# Patient Record
Sex: MALE | Race: ASIAN | NOT HISPANIC OR LATINO
[De-identification: names, ages, dates, MRNs, and addresses within clinical notes are randomized per-mention and may not be internally consistent; named-entity substitution may affect disease eponyms.]

---

## 2017-01-13 ENCOUNTER — APPOINTMENT (OUTPATIENT)
Dept: CV DIAGNOSITCS | Facility: HOSPITAL | Age: 32
End: 2017-01-13

## 2017-01-13 ENCOUNTER — APPOINTMENT (OUTPATIENT)
Dept: CV DIAGNOSTICS | Facility: HOSPITAL | Age: 32
End: 2017-01-13

## 2017-01-13 ENCOUNTER — OUTPATIENT (OUTPATIENT)
Dept: OUTPATIENT SERVICES | Facility: HOSPITAL | Age: 32
LOS: 1 days | End: 2017-01-13

## 2017-01-13 DIAGNOSIS — I10 ESSENTIAL (PRIMARY) HYPERTENSION: ICD-10-CM

## 2017-01-13 DIAGNOSIS — N18.6 END STAGE RENAL DISEASE: ICD-10-CM

## 2017-03-06 ENCOUNTER — APPOINTMENT (OUTPATIENT)
Dept: TRANSPLANT | Facility: CLINIC | Age: 32
End: 2017-03-06

## 2017-03-06 ENCOUNTER — APPOINTMENT (OUTPATIENT)
Dept: NEPHROLOGY | Facility: CLINIC | Age: 32
End: 2017-03-06

## 2017-06-26 ENCOUNTER — APPOINTMENT (OUTPATIENT)
Dept: TRANSPLANT | Facility: CLINIC | Age: 32
End: 2017-06-26

## 2017-06-26 ENCOUNTER — APPOINTMENT (OUTPATIENT)
Dept: NEPHROLOGY | Facility: CLINIC | Age: 32
End: 2017-06-26

## 2017-08-08 ENCOUNTER — APPOINTMENT (OUTPATIENT)
Dept: NEPHROLOGY | Facility: CLINIC | Age: 32
End: 2017-08-08

## 2017-08-08 ENCOUNTER — APPOINTMENT (OUTPATIENT)
Dept: TRANSPLANT | Facility: CLINIC | Age: 32
End: 2017-08-08

## 2017-12-19 ENCOUNTER — APPOINTMENT (OUTPATIENT)
Dept: TRANSPLANT | Facility: CLINIC | Age: 32
End: 2017-12-19
Payer: COMMERCIAL

## 2017-12-19 ENCOUNTER — APPOINTMENT (OUTPATIENT)
Dept: NEPHROLOGY | Facility: CLINIC | Age: 32
End: 2017-12-19
Payer: COMMERCIAL

## 2017-12-19 VITALS
OXYGEN SATURATION: 99 % | RESPIRATION RATE: 16 BRPM | WEIGHT: 149 LBS | BODY MASS INDEX: 24.24 KG/M2 | HEIGHT: 65.75 IN | SYSTOLIC BLOOD PRESSURE: 146 MMHG | DIASTOLIC BLOOD PRESSURE: 94 MMHG | TEMPERATURE: 98.2 F | HEART RATE: 75 BPM

## 2017-12-19 PROCEDURE — 99215 OFFICE O/P EST HI 40 MIN: CPT

## 2017-12-19 PROCEDURE — 99214 OFFICE O/P EST MOD 30 MIN: CPT

## 2018-01-02 ENCOUNTER — APPOINTMENT (OUTPATIENT)
Dept: TRANSPLANT | Facility: CLINIC | Age: 33
End: 2018-01-02

## 2018-01-02 ENCOUNTER — APPOINTMENT (OUTPATIENT)
Dept: NEPHROLOGY | Facility: CLINIC | Age: 33
End: 2018-01-02

## 2018-01-26 ENCOUNTER — EMERGENCY (EMERGENCY)
Facility: HOSPITAL | Age: 33
LOS: 1 days | Discharge: HOME | End: 2018-01-26
Attending: EMERGENCY MEDICINE | Admitting: EMERGENCY MEDICINE
Payer: MEDICARE

## 2018-01-26 VITALS
OXYGEN SATURATION: 100 % | HEART RATE: 81 BPM | SYSTOLIC BLOOD PRESSURE: 141 MMHG | TEMPERATURE: 98 F | RESPIRATION RATE: 16 BRPM | DIASTOLIC BLOOD PRESSURE: 87 MMHG

## 2018-01-26 PROCEDURE — 99285 EMERGENCY DEPT VISIT HI MDM: CPT | Mod: 25,GC

## 2018-01-26 NOTE — ED ADULT TRIAGE NOTE - CHIEF COMPLAINT QUOTE
PT from dialysis with complaints of chest pain which began with dialysis and get progressively worse after, pt told to come to ED for further evaluation, had similar symptoms int he past had a workup at [facility] with negative findings, pt has cardiologist appointment on the 30th with dr gilmore, vitaliy SOB, dizziness, N/V/D

## 2018-01-27 VITALS
HEART RATE: 75 BPM | OXYGEN SATURATION: 99 % | DIASTOLIC BLOOD PRESSURE: 82 MMHG | SYSTOLIC BLOOD PRESSURE: 123 MMHG | RESPIRATION RATE: 16 BRPM

## 2018-01-27 LAB
ALBUMIN SERPL ELPH-MCNC: 4.7 G/DL — SIGNIFICANT CHANGE UP (ref 3.3–5)
ALP SERPL-CCNC: 91 U/L — SIGNIFICANT CHANGE UP (ref 40–120)
ALT FLD-CCNC: 11 U/L — SIGNIFICANT CHANGE UP (ref 4–41)
APTT BLD: 33.4 SEC — SIGNIFICANT CHANGE UP (ref 27.5–37.4)
AST SERPL-CCNC: 9 U/L — SIGNIFICANT CHANGE UP (ref 4–40)
BASOPHILS # BLD AUTO: 0.03 K/UL — SIGNIFICANT CHANGE UP (ref 0–0.2)
BASOPHILS NFR BLD AUTO: 0.5 % — SIGNIFICANT CHANGE UP (ref 0–2)
BILIRUB SERPL-MCNC: 0.3 MG/DL — SIGNIFICANT CHANGE UP (ref 0.2–1.2)
BUN SERPL-MCNC: 25 MG/DL — HIGH (ref 7–23)
CALCIUM SERPL-MCNC: 10.4 MG/DL — SIGNIFICANT CHANGE UP (ref 8.4–10.5)
CHLORIDE SERPL-SCNC: 94 MMOL/L — LOW (ref 98–107)
CK MB BLD-MCNC: 1 NG/ML — SIGNIFICANT CHANGE UP (ref 1–6.6)
CK SERPL-CCNC: 69 U/L — SIGNIFICANT CHANGE UP (ref 30–200)
CO2 SERPL-SCNC: 31 MMOL/L — SIGNIFICANT CHANGE UP (ref 22–31)
CREAT SERPL-MCNC: 7.89 MG/DL — HIGH (ref 0.5–1.3)
EOSINOPHIL # BLD AUTO: 0.14 K/UL — SIGNIFICANT CHANGE UP (ref 0–0.5)
EOSINOPHIL NFR BLD AUTO: 2.2 % — SIGNIFICANT CHANGE UP (ref 0–6)
GLUCOSE SERPL-MCNC: 112 MG/DL — HIGH (ref 70–99)
HCT VFR BLD CALC: 39 % — SIGNIFICANT CHANGE UP (ref 39–50)
HGB BLD-MCNC: 13 G/DL — SIGNIFICANT CHANGE UP (ref 13–17)
IMM GRANULOCYTES # BLD AUTO: 0.01 # — SIGNIFICANT CHANGE UP
IMM GRANULOCYTES NFR BLD AUTO: 0.2 % — SIGNIFICANT CHANGE UP (ref 0–1.5)
INR BLD: 0.96 — SIGNIFICANT CHANGE UP (ref 0.88–1.17)
LYMPHOCYTES # BLD AUTO: 2.47 K/UL — SIGNIFICANT CHANGE UP (ref 1–3.3)
LYMPHOCYTES # BLD AUTO: 39.3 % — SIGNIFICANT CHANGE UP (ref 13–44)
MCHC RBC-ENTMCNC: 29.6 PG — SIGNIFICANT CHANGE UP (ref 27–34)
MCHC RBC-ENTMCNC: 33.3 % — SIGNIFICANT CHANGE UP (ref 32–36)
MCV RBC AUTO: 88.8 FL — SIGNIFICANT CHANGE UP (ref 80–100)
MONOCYTES # BLD AUTO: 0.44 K/UL — SIGNIFICANT CHANGE UP (ref 0–0.9)
MONOCYTES NFR BLD AUTO: 7 % — SIGNIFICANT CHANGE UP (ref 2–14)
NEUTROPHILS # BLD AUTO: 3.2 K/UL — SIGNIFICANT CHANGE UP (ref 1.8–7.4)
NEUTROPHILS NFR BLD AUTO: 50.8 % — SIGNIFICANT CHANGE UP (ref 43–77)
NRBC # FLD: 0 — SIGNIFICANT CHANGE UP
PLATELET # BLD AUTO: 212 K/UL — SIGNIFICANT CHANGE UP (ref 150–400)
PMV BLD: 9.7 FL — SIGNIFICANT CHANGE UP (ref 7–13)
POTASSIUM SERPL-MCNC: 4 MMOL/L — SIGNIFICANT CHANGE UP (ref 3.5–5.3)
POTASSIUM SERPL-SCNC: 4 MMOL/L — SIGNIFICANT CHANGE UP (ref 3.5–5.3)
PROT SERPL-MCNC: 7.6 G/DL — SIGNIFICANT CHANGE UP (ref 6–8.3)
PROTHROM AB SERPL-ACNC: 10.7 SEC — SIGNIFICANT CHANGE UP (ref 9.8–13.1)
RBC # BLD: 4.39 M/UL — SIGNIFICANT CHANGE UP (ref 4.2–5.8)
RBC # FLD: 13.6 % — SIGNIFICANT CHANGE UP (ref 10.3–14.5)
SODIUM SERPL-SCNC: 140 MMOL/L — SIGNIFICANT CHANGE UP (ref 135–145)
TROPONIN T SERPL-MCNC: < 0.06 NG/ML — SIGNIFICANT CHANGE UP (ref 0–0.06)
TROPONIN T SERPL-MCNC: < 0.06 NG/ML — SIGNIFICANT CHANGE UP (ref 0–0.06)
WBC # BLD: 6.29 K/UL — SIGNIFICANT CHANGE UP (ref 3.8–10.5)
WBC # FLD AUTO: 6.29 K/UL — SIGNIFICANT CHANGE UP (ref 3.8–10.5)

## 2018-01-27 PROCEDURE — 71045 X-RAY EXAM CHEST 1 VIEW: CPT | Mod: 26

## 2018-01-27 RX ADMIN — Medication 30 MILLILITER(S): at 03:46

## 2018-01-27 NOTE — ED ADULT NURSE NOTE - OBJECTIVE STATEMENT
pt received to room 18. [id number] c/o chest pain after dialysis. pt experiencing left sternal chest pain of an 8/10 after receiving dialysis at 11pm 1/26. pt describes the pain as intermittent and sharp. pt has a hx of kidney failure due from htn at the age of 25. pt denies SOB/heart palpitations at this time. pt on cardiac monitor in NSR. vs noted. Left AV fistula noted. Right 20 ac , labs collected and sent. will continue to monitor

## 2018-01-27 NOTE — ED ADULT NURSE NOTE - CHIEF COMPLAINT QUOTE
PT from dialysis with complaints of chest pain which began with dialysis and get progressively worse after, pt told to come to ED for further evaluation, had similar symptoms int he past had a workup at [facility] with negative findings, pt has cardiologist appointment on the 30th with dr gilmore, vitaliy SOB, dizziness, N/V/D

## 2018-01-27 NOTE — ED PROVIDER NOTE - MEDICAL DECISION MAKING DETAILS
32M hx of ESRD on HD, HTN presenting with CP that continues since HD is done today. ACS workup, delta trop, Stress test with Dr. Harrell on 1/30 if trop neg, cxr.

## 2018-01-27 NOTE — ED PROVIDER NOTE - ATTENDING CONTRIBUTION TO CARE
31 y/o M with h/o HTN, ESRD on HD (MWF via LUE AVF) here with chest pain.  Pt reports he was finishing dialysis and started to get a "pulling" sensation to the left lower side of his chest.  Pain is nonradiating, constant, without associated palpitiatons, cough, sob, back pain, n/v, fever.  Pt states he has been burping, which is relieving the pain.  No known personal cardiac disease, he had a routine stress test last year Jan 2017 (pt is being evaluated for renal transplant) and is scheduled for a stress test with his cardiologist (Dr Harrell) next week on Wednesday.  Well appearing, lying comfortably in stretcher, awake and alert, nontoxic.  VSS.  Lungs cta bl.  Cards nl S1/S2, RRR, no MRG.  Abd soft ntnd.  No pedal edema or calf tenderness.  AVF with LUE with pos thrill.  Plan for ekg, cxr ce x 2.  Low risk with atypical pain, plan for 2 sets, if neg pt has cards follow-up for provocative testing next week.

## 2018-01-27 NOTE — ED PROVIDER NOTE - OBJECTIVE STATEMENT
32M presenting with CP during HD. Pt has ESRD on HD MWF, had chest pain with last 2 HD episodes. Today is different in that CP worse after the HD and therefore pt came to the ED. Pt has regular stress test with Dr. Harrell for the transplant purpose. Pt has an appointment with Dr. Harrell for stress test on 1/30. Pt denies n/v/weakness or diaphoresis. Has CP currently but since he passed gas, pt feels better with the pain.

## 2018-01-27 NOTE — ED ADULT NURSE REASSESSMENT NOTE - NS ED NURSE REASSESS COMMENT FT1
pt DC instructions given by MD. pt in NAD and denies chest pain at this time. Pt ambulated with friend upon leaving

## 2018-02-07 ENCOUNTER — FORM ENCOUNTER (OUTPATIENT)
Age: 33
End: 2018-02-07

## 2018-02-08 ENCOUNTER — NON-APPOINTMENT (OUTPATIENT)
Age: 33
End: 2018-02-08

## 2018-02-08 ENCOUNTER — APPOINTMENT (OUTPATIENT)
Dept: ULTRASOUND IMAGING | Facility: IMAGING CENTER | Age: 33
End: 2018-02-08
Payer: COMMERCIAL

## 2018-02-08 ENCOUNTER — APPOINTMENT (OUTPATIENT)
Dept: CARDIOLOGY | Facility: CLINIC | Age: 33
End: 2018-02-08
Payer: COMMERCIAL

## 2018-02-08 ENCOUNTER — APPOINTMENT (OUTPATIENT)
Dept: RADIOLOGY | Facility: IMAGING CENTER | Age: 33
End: 2018-02-08
Payer: COMMERCIAL

## 2018-02-08 ENCOUNTER — OUTPATIENT (OUTPATIENT)
Dept: OUTPATIENT SERVICES | Facility: HOSPITAL | Age: 33
LOS: 1 days | End: 2018-02-08
Payer: COMMERCIAL

## 2018-02-08 VITALS
WEIGHT: 156 LBS | OXYGEN SATURATION: 98 % | HEART RATE: 77 BPM | BODY MASS INDEX: 25.37 KG/M2 | HEIGHT: 65.75 IN | DIASTOLIC BLOOD PRESSURE: 87 MMHG | SYSTOLIC BLOOD PRESSURE: 135 MMHG

## 2018-02-08 DIAGNOSIS — R07.89 OTHER CHEST PAIN: ICD-10-CM

## 2018-02-08 DIAGNOSIS — Z76.82 AWAITING ORGAN TRANSPLANT STATUS: ICD-10-CM

## 2018-02-08 PROCEDURE — 71046 X-RAY EXAM CHEST 2 VIEWS: CPT | Mod: 26

## 2018-02-08 PROCEDURE — 93000 ELECTROCARDIOGRAM COMPLETE: CPT

## 2018-02-08 PROCEDURE — 99214 OFFICE O/P EST MOD 30 MIN: CPT

## 2018-02-08 PROCEDURE — 71046 X-RAY EXAM CHEST 2 VIEWS: CPT

## 2018-02-08 PROCEDURE — 93979 VASCULAR STUDY: CPT

## 2018-02-08 PROCEDURE — 93979 VASCULAR STUDY: CPT | Mod: 26

## 2018-02-08 PROCEDURE — 76700 US EXAM ABDOM COMPLETE: CPT | Mod: 26

## 2018-02-08 PROCEDURE — 76700 US EXAM ABDOM COMPLETE: CPT

## 2018-02-19 PROBLEM — R07.89 ATYPICAL CHEST PAIN: Status: ACTIVE | Noted: 2018-02-19

## 2018-03-09 ENCOUNTER — OUTPATIENT (OUTPATIENT)
Dept: OUTPATIENT SERVICES | Facility: HOSPITAL | Age: 33
LOS: 1 days | End: 2018-03-09

## 2018-03-09 ENCOUNTER — APPOINTMENT (OUTPATIENT)
Dept: CV DIAGNOSTICS | Facility: HOSPITAL | Age: 33
End: 2018-03-09
Payer: COMMERCIAL

## 2018-03-09 DIAGNOSIS — I10 ESSENTIAL (PRIMARY) HYPERTENSION: ICD-10-CM

## 2018-03-09 DIAGNOSIS — N18.6 END STAGE RENAL DISEASE: ICD-10-CM

## 2018-03-09 DIAGNOSIS — Z76.82 AWAITING ORGAN TRANSPLANT STATUS: ICD-10-CM

## 2018-03-09 PROCEDURE — 93016 CV STRESS TEST SUPVJ ONLY: CPT | Mod: GC

## 2018-03-09 PROCEDURE — 93018 CV STRESS TEST I&R ONLY: CPT | Mod: GC

## 2018-03-09 PROCEDURE — 78452 HT MUSCLE IMAGE SPECT MULT: CPT | Mod: 26

## 2018-03-13 ENCOUNTER — TRANSCRIPTION ENCOUNTER (OUTPATIENT)
Age: 33
End: 2018-03-13

## 2018-07-22 ENCOUNTER — TRANSCRIPTION ENCOUNTER (OUTPATIENT)
Age: 33
End: 2018-07-22

## 2018-07-23 ENCOUNTER — INPATIENT (INPATIENT)
Facility: HOSPITAL | Age: 33
LOS: 21 days | Discharge: HOME HEALTH | DRG: 652 | End: 2018-08-14
Attending: TRANSPLANT SURGERY | Admitting: TRANSPLANT SURGERY
Payer: MEDICARE

## 2018-07-23 VITALS
OXYGEN SATURATION: 99 % | RESPIRATION RATE: 18 BRPM | WEIGHT: 159.17 LBS | HEART RATE: 64 BPM | HEIGHT: 66 IN | DIASTOLIC BLOOD PRESSURE: 102 MMHG | SYSTOLIC BLOOD PRESSURE: 155 MMHG | TEMPERATURE: 98 F

## 2018-07-23 DIAGNOSIS — N18.6 END STAGE RENAL DISEASE: ICD-10-CM

## 2018-07-23 DIAGNOSIS — I10 ESSENTIAL (PRIMARY) HYPERTENSION: ICD-10-CM

## 2018-07-23 LAB
ALBUMIN SERPL ELPH-MCNC: 4.1 G/DL — SIGNIFICANT CHANGE UP (ref 3.3–5)
ALBUMIN SERPL ELPH-MCNC: 4.4 G/DL — SIGNIFICANT CHANGE UP (ref 3.3–5)
ALP SERPL-CCNC: 69 U/L — SIGNIFICANT CHANGE UP (ref 40–120)
ALP SERPL-CCNC: 69 U/L — SIGNIFICANT CHANGE UP (ref 40–120)
ALT FLD-CCNC: 10 U/L — SIGNIFICANT CHANGE UP (ref 10–45)
ALT FLD-CCNC: 11 U/L — SIGNIFICANT CHANGE UP (ref 10–45)
ANION GAP SERPL CALC-SCNC: 17 MMOL/L — SIGNIFICANT CHANGE UP (ref 5–17)
ANION GAP SERPL CALC-SCNC: 22 MMOL/L — HIGH (ref 5–17)
APTT BLD: 32.5 SEC — SIGNIFICANT CHANGE UP (ref 27.5–37.4)
AST SERPL-CCNC: 14 U/L — SIGNIFICANT CHANGE UP (ref 10–40)
AST SERPL-CCNC: 7 U/L — LOW (ref 10–40)
BASOPHILS # BLD AUTO: 0.1 K/UL — SIGNIFICANT CHANGE UP (ref 0–0.2)
BASOPHILS NFR BLD AUTO: 0.9 % — SIGNIFICANT CHANGE UP (ref 0–2)
BILIRUB SERPL-MCNC: 0.3 MG/DL — SIGNIFICANT CHANGE UP (ref 0.2–1.2)
BILIRUB SERPL-MCNC: 0.4 MG/DL — SIGNIFICANT CHANGE UP (ref 0.2–1.2)
BLD GP AB SCN SERPL QL: NEGATIVE — SIGNIFICANT CHANGE UP
BUN SERPL-MCNC: 36 MG/DL — HIGH (ref 7–23)
BUN SERPL-MCNC: 87 MG/DL — HIGH (ref 7–23)
CALCIUM SERPL-MCNC: 10 MG/DL — SIGNIFICANT CHANGE UP (ref 8.4–10.5)
CALCIUM SERPL-MCNC: 8.8 MG/DL — SIGNIFICANT CHANGE UP (ref 8.4–10.5)
CHLORIDE SERPL-SCNC: 103 MMOL/L — SIGNIFICANT CHANGE UP (ref 96–108)
CHLORIDE SERPL-SCNC: 99 MMOL/L — SIGNIFICANT CHANGE UP (ref 96–108)
CO2 SERPL-SCNC: 19 MMOL/L — LOW (ref 22–31)
CO2 SERPL-SCNC: 22 MMOL/L — SIGNIFICANT CHANGE UP (ref 22–31)
CREAT SERPL-MCNC: 17.32 MG/DL — HIGH (ref 0.5–1.3)
CREAT SERPL-MCNC: 9.17 MG/DL — HIGH (ref 0.5–1.3)
EOSINOPHIL # BLD AUTO: 0.2 K/UL — SIGNIFICANT CHANGE UP (ref 0–0.5)
EOSINOPHIL NFR BLD AUTO: 2.8 % — SIGNIFICANT CHANGE UP (ref 0–6)
GLUCOSE SERPL-MCNC: 137 MG/DL — HIGH (ref 70–99)
GLUCOSE SERPL-MCNC: 93 MG/DL — SIGNIFICANT CHANGE UP (ref 70–99)
HCT VFR BLD CALC: 34.9 % — LOW (ref 39–50)
HCT VFR BLD CALC: 35.7 % — LOW (ref 39–50)
HGB BLD-MCNC: 11.8 G/DL — LOW (ref 13–17)
HGB BLD-MCNC: 11.9 G/DL — LOW (ref 13–17)
INR BLD: 0.98 RATIO — SIGNIFICANT CHANGE UP (ref 0.88–1.16)
LYMPHOCYTES # BLD AUTO: 2.1 K/UL — SIGNIFICANT CHANGE UP (ref 1–3.3)
LYMPHOCYTES # BLD AUTO: 31.4 % — SIGNIFICANT CHANGE UP (ref 13–44)
MAGNESIUM SERPL-MCNC: 1.7 MG/DL — SIGNIFICANT CHANGE UP (ref 1.6–2.6)
MAGNESIUM SERPL-MCNC: 2.4 MG/DL — SIGNIFICANT CHANGE UP (ref 1.6–2.6)
MCHC RBC-ENTMCNC: 28.6 PG — SIGNIFICANT CHANGE UP (ref 27–34)
MCHC RBC-ENTMCNC: 29.1 PG — SIGNIFICANT CHANGE UP (ref 27–34)
MCHC RBC-ENTMCNC: 33.4 GM/DL — SIGNIFICANT CHANGE UP (ref 32–36)
MCHC RBC-ENTMCNC: 34 GM/DL — SIGNIFICANT CHANGE UP (ref 32–36)
MCV RBC AUTO: 85.6 FL — SIGNIFICANT CHANGE UP (ref 80–100)
MCV RBC AUTO: 85.8 FL — SIGNIFICANT CHANGE UP (ref 80–100)
MONOCYTES # BLD AUTO: 0.4 K/UL — SIGNIFICANT CHANGE UP (ref 0–0.9)
MONOCYTES NFR BLD AUTO: 6 % — SIGNIFICANT CHANGE UP (ref 2–14)
NEUTROPHILS # BLD AUTO: 4 K/UL — SIGNIFICANT CHANGE UP (ref 1.8–7.4)
NEUTROPHILS NFR BLD AUTO: 58.8 % — SIGNIFICANT CHANGE UP (ref 43–77)
PHOSPHATE SERPL-MCNC: 10.1 MG/DL — HIGH (ref 2.5–4.5)
PHOSPHATE SERPL-MCNC: 5.4 MG/DL — HIGH (ref 2.5–4.5)
PLATELET # BLD AUTO: 149 K/UL — LOW (ref 150–400)
PLATELET # BLD AUTO: 162 K/UL — SIGNIFICANT CHANGE UP (ref 150–400)
POTASSIUM SERPL-MCNC: 4 MMOL/L — SIGNIFICANT CHANGE UP (ref 3.5–5.3)
POTASSIUM SERPL-MCNC: 6.4 MMOL/L — CRITICAL HIGH (ref 3.5–5.3)
POTASSIUM SERPL-SCNC: 4 MMOL/L — SIGNIFICANT CHANGE UP (ref 3.5–5.3)
POTASSIUM SERPL-SCNC: 6.4 MMOL/L — CRITICAL HIGH (ref 3.5–5.3)
PROT SERPL-MCNC: 6.6 G/DL — SIGNIFICANT CHANGE UP (ref 6–8.3)
PROT SERPL-MCNC: 7.1 G/DL — SIGNIFICANT CHANGE UP (ref 6–8.3)
PROTHROM AB SERPL-ACNC: 10.6 SEC — SIGNIFICANT CHANGE UP (ref 9.8–12.7)
RBC # BLD: 4.06 M/UL — LOW (ref 4.2–5.8)
RBC # BLD: 4.18 M/UL — LOW (ref 4.2–5.8)
RBC # FLD: 12 % — SIGNIFICANT CHANGE UP (ref 10.3–14.5)
RBC # FLD: 12.4 % — SIGNIFICANT CHANGE UP (ref 10.3–14.5)
RH IG SCN BLD-IMP: POSITIVE — SIGNIFICANT CHANGE UP
SODIUM SERPL-SCNC: 140 MMOL/L — SIGNIFICANT CHANGE UP (ref 135–145)
SODIUM SERPL-SCNC: 142 MMOL/L — SIGNIFICANT CHANGE UP (ref 135–145)
WBC # BLD: 6.8 K/UL — SIGNIFICANT CHANGE UP (ref 3.8–10.5)
WBC # BLD: 7.5 K/UL — SIGNIFICANT CHANGE UP (ref 3.8–10.5)
WBC # FLD AUTO: 6.8 K/UL — SIGNIFICANT CHANGE UP (ref 3.8–10.5)
WBC # FLD AUTO: 7.5 K/UL — SIGNIFICANT CHANGE UP (ref 3.8–10.5)

## 2018-07-23 PROCEDURE — 71045 X-RAY EXAM CHEST 1 VIEW: CPT | Mod: 26

## 2018-07-23 PROCEDURE — 93010 ELECTROCARDIOGRAM REPORT: CPT

## 2018-07-23 PROCEDURE — 50360 RNL ALTRNSPLJ W/O RCP NFRCT: CPT

## 2018-07-23 PROCEDURE — 71045 X-RAY EXAM CHEST 1 VIEW: CPT | Mod: 26,77

## 2018-07-23 PROCEDURE — 50605 INSERT URETERAL SUPPORT: CPT

## 2018-07-23 RX ORDER — METOCLOPRAMIDE HCL 10 MG
10 TABLET ORAL ONCE
Qty: 0 | Refills: 0 | Status: DISCONTINUED | OUTPATIENT
Start: 2018-07-23 | End: 2018-07-24

## 2018-07-23 RX ORDER — HYDROMORPHONE HYDROCHLORIDE 2 MG/ML
0.5 INJECTION INTRAMUSCULAR; INTRAVENOUS; SUBCUTANEOUS
Qty: 0 | Refills: 0 | Status: DISCONTINUED | OUTPATIENT
Start: 2018-07-23 | End: 2018-07-24

## 2018-07-23 RX ORDER — TACROLIMUS 5 MG/1
3 CAPSULE ORAL EVERY 12 HOURS
Qty: 0 | Refills: 0 | Status: DISCONTINUED | OUTPATIENT
Start: 2018-07-24 | End: 2018-07-24

## 2018-07-23 RX ORDER — HYDROMORPHONE HYDROCHLORIDE 2 MG/ML
30 INJECTION INTRAMUSCULAR; INTRAVENOUS; SUBCUTANEOUS
Qty: 0 | Refills: 0 | Status: DISCONTINUED | OUTPATIENT
Start: 2018-07-23 | End: 2018-07-24

## 2018-07-23 RX ORDER — MYCOPHENOLATE MOFETIL 250 MG/1
1000 CAPSULE ORAL EVERY 12 HOURS
Qty: 0 | Refills: 0 | Status: DISCONTINUED | OUTPATIENT
Start: 2018-07-24 | End: 2018-07-29

## 2018-07-23 RX ORDER — DOCUSATE SODIUM 100 MG
100 CAPSULE ORAL DAILY
Qty: 0 | Refills: 0 | Status: DISCONTINUED | OUTPATIENT
Start: 2018-07-23 | End: 2018-07-29

## 2018-07-23 RX ORDER — ONDANSETRON 8 MG/1
4 TABLET, FILM COATED ORAL EVERY 6 HOURS
Qty: 0 | Refills: 0 | Status: DISCONTINUED | OUTPATIENT
Start: 2018-07-23 | End: 2018-07-24

## 2018-07-23 RX ORDER — SENNA PLUS 8.6 MG/1
2 TABLET ORAL AT BEDTIME
Qty: 0 | Refills: 0 | Status: DISCONTINUED | OUTPATIENT
Start: 2018-07-23 | End: 2018-07-29

## 2018-07-23 RX ORDER — ONDANSETRON 8 MG/1
4 TABLET, FILM COATED ORAL ONCE
Qty: 0 | Refills: 0 | Status: DISCONTINUED | OUTPATIENT
Start: 2018-07-23 | End: 2018-07-24

## 2018-07-23 RX ORDER — NYSTATIN 500MM UNIT
500000 POWDER (EA) MISCELLANEOUS
Qty: 0 | Refills: 0 | Status: DISCONTINUED | OUTPATIENT
Start: 2018-07-24 | End: 2018-07-29

## 2018-07-23 RX ORDER — SODIUM CHLORIDE 9 MG/ML
1000 INJECTION INTRAMUSCULAR; INTRAVENOUS; SUBCUTANEOUS
Qty: 0 | Refills: 0 | Status: DISCONTINUED | OUTPATIENT
Start: 2018-07-23 | End: 2018-07-24

## 2018-07-23 RX ORDER — LABETALOL HCL 100 MG
5 TABLET ORAL ONCE
Qty: 0 | Refills: 0 | Status: COMPLETED | OUTPATIENT
Start: 2018-07-23 | End: 2018-07-23

## 2018-07-23 RX ORDER — CEFAZOLIN SODIUM 1 G
2000 VIAL (EA) INJECTION ONCE
Qty: 0 | Refills: 0 | Status: COMPLETED | OUTPATIENT
Start: 2018-07-23 | End: 2018-07-23

## 2018-07-23 RX ORDER — SODIUM CHLORIDE 9 MG/ML
1000 INJECTION, SOLUTION INTRAVENOUS
Qty: 0 | Refills: 0 | Status: DISCONTINUED | OUTPATIENT
Start: 2018-07-23 | End: 2018-07-24

## 2018-07-23 RX ORDER — VALGANCICLOVIR 450 MG/1
900 TABLET, FILM COATED ORAL DAILY
Qty: 0 | Refills: 0 | Status: DISCONTINUED | OUTPATIENT
Start: 2018-07-24 | End: 2018-07-24

## 2018-07-23 RX ORDER — BASILIXIMAB 20 MG/5ML
20 INJECTION, POWDER, FOR SOLUTION INTRAVENOUS
Qty: 0 | Refills: 0 | Status: DISCONTINUED | OUTPATIENT
Start: 2018-07-23 | End: 2018-07-27

## 2018-07-23 RX ORDER — LABETALOL HCL 100 MG
10 TABLET ORAL ONCE
Qty: 0 | Refills: 0 | Status: DISCONTINUED | OUTPATIENT
Start: 2018-07-23 | End: 2018-07-23

## 2018-07-23 RX ORDER — SODIUM CHLORIDE 9 MG/ML
500 INJECTION INTRAMUSCULAR; INTRAVENOUS; SUBCUTANEOUS ONCE
Qty: 0 | Refills: 0 | Status: COMPLETED | OUTPATIENT
Start: 2018-07-23 | End: 2018-07-23

## 2018-07-23 RX ORDER — METOPROLOL TARTRATE 50 MG
100 TABLET ORAL EVERY 12 HOURS
Qty: 0 | Refills: 0 | Status: DISCONTINUED | OUTPATIENT
Start: 2018-07-23 | End: 2018-07-29

## 2018-07-23 RX ORDER — NALOXONE HYDROCHLORIDE 4 MG/.1ML
0.1 SPRAY NASAL
Qty: 0 | Refills: 0 | Status: DISCONTINUED | OUTPATIENT
Start: 2018-07-23 | End: 2018-07-24

## 2018-07-23 RX ORDER — FAMOTIDINE 10 MG/ML
20 INJECTION INTRAVENOUS DAILY
Qty: 0 | Refills: 0 | Status: DISCONTINUED | OUTPATIENT
Start: 2018-07-24 | End: 2018-07-29

## 2018-07-23 RX ADMIN — Medication 5 MILLIGRAM(S): at 21:46

## 2018-07-23 RX ADMIN — HYDROMORPHONE HYDROCHLORIDE 30 MILLILITER(S): 2 INJECTION INTRAMUSCULAR; INTRAVENOUS; SUBCUTANEOUS at 20:52

## 2018-07-23 RX ADMIN — HYDROMORPHONE HYDROCHLORIDE 30 MILLILITER(S): 2 INJECTION INTRAMUSCULAR; INTRAVENOUS; SUBCUTANEOUS at 21:59

## 2018-07-23 RX ADMIN — SODIUM CHLORIDE 100 MILLILITER(S): 9 INJECTION, SOLUTION INTRAVENOUS at 20:19

## 2018-07-23 RX ADMIN — SODIUM CHLORIDE 70 MILLILITER(S): 9 INJECTION INTRAMUSCULAR; INTRAVENOUS; SUBCUTANEOUS at 20:25

## 2018-07-23 RX ADMIN — SODIUM CHLORIDE 70 MILLILITER(S): 9 INJECTION INTRAMUSCULAR; INTRAVENOUS; SUBCUTANEOUS at 20:18

## 2018-07-23 RX ADMIN — SODIUM CHLORIDE 500 MILLILITER(S): 9 INJECTION INTRAMUSCULAR; INTRAVENOUS; SUBCUTANEOUS at 20:25

## 2018-07-23 RX ADMIN — SODIUM CHLORIDE 70 MILLILITER(S): 9 INJECTION INTRAMUSCULAR; INTRAVENOUS; SUBCUTANEOUS at 20:22

## 2018-07-23 RX ADMIN — SODIUM CHLORIDE 100 MILLILITER(S): 9 INJECTION, SOLUTION INTRAVENOUS at 20:21

## 2018-07-23 NOTE — CONSULT NOTE ADULT - PROBLEM SELECTOR RECOMMENDATION 9
Pt with hx of ESRD on HD. Plan for HD today prior to DDRT  Pt last outpatient HD was on Friday.   Monitor BMP

## 2018-07-23 NOTE — CONSULT NOTE ADULT - ASSESSMENT
33 year old male with a PMH of ESRD on HD (MWF) from LUE AVF, HTN presents for DDRT planned for today.

## 2018-07-23 NOTE — BRIEF OPERATIVE NOTE - PROCEDURE
<<-----Click on this checkbox to enter Procedure Preparation, donor kidney, cadaveric  2018    [facility],  don[facility], adult recipient  2018    [facility]  2018    [facility]

## 2018-07-23 NOTE — H&P ADULT - NSHPLABSRESULTS_GEN_ALL_CORE
Vital Signs Last 24 Hrs  T(C): 36.6 (07-23-18 @ 08:27), Max: 36.6 (07-23-18 @ 08:27)  T(F): 97.8 (07-23-18 @ 08:27), Max: 97.8 (07-23-18 @ 08:27)  HR: 67 (07-23-18 @ 08:27) (67 - 67)  BP: 165/95 (07-23-18 @ 08:27) (165/95 - 165/95)  BP(mean): --  RR: 18 (07-23-18 @ 08:27) (18 - 18)  SpO2: 99% (07-23-18 @ 08:27) (99% - 99%)  I&O's Detail

## 2018-07-23 NOTE — H&P ADULT - NSHPREVIEWOFSYSTEMS_GEN_ALL_CORE
The patient denies fever, chills; chest pain, SOB, palpitation; dizziness, weakness; nausea, vomiting; diarrhea, constipation; abdominal pain.

## 2018-07-23 NOTE — H&P ADULT - HISTORY OF PRESENT ILLNESS
33M with PMH of HTN and ESRD since 2010 presents for DDRT.    Donor Details:  KDPI: 29%  ABO: B  CMV +, EBV +  Hep B -, Hep C -

## 2018-07-23 NOTE — CONSULT NOTE ADULT - SUBJECTIVE AND OBJECTIVE BOX
[facility] DIVISION OF KIDNEY DISEASES AND HYPERTENSION -- INITIAL CONSULT NOTE  --------------------------------------------------------------------------------    HPI: 33 year old male with a PMH of ESRD on HD (MWF) from [facility], HTN presents for DDRT planned for today. Pt has been on HD for the last 8 years and he follows with outpatient nephrologist Dr. Cline at [facility]. Pts last HD was on Friday. Pt denies hypotension with HD however admits to some hypoglycemia in the past with HD. Pt states he usually has 2-2.5kg removed with HD with a DW of 70.5kg. Pt still has residual renal function and urinates 2x per day. Pt seen and examined. Pt denies CP, SOB or LE edema.     PAST HISTORY  --------------------------------------------------------------------------------  PAST MEDICAL & SURGICAL HISTORY:  ESRD on dialysis  HTN (hypertension)  No significant past surgical history    FAMILY HISTORY:    PAST SOCIAL HISTORY:    ALLERGIES & MEDICATIONS  --------------------------------------------------------------------------------  Allergies    IV Contrast (Rash)  nyquil (Rhinorrhea)  vancomycin (Pruritus; Hives)    Intolerances      Standing Inpatient Medications    PRN Inpatient Medications      REVIEW OF SYSTEMS  --------------------------------------------------------------------------------  Gen: No weakness  Skin: No rashes  Head/Eyes/Ears/Mouth: No headache  Respiratory: No dyspnea  CV: No chest pain, PND, orthopnea  GI: No abdominal pain, diarrhea  : No increased frequency, dysuria  MSK: No edema  Neuro: No dizziness/lightheadedness    All other systems were reviewed and are negative, except as noted.    VITALS/PHYSICAL EXAM  --------------------------------------------------------------------------------  T(C): 36.9 (07-23-18 @ 09:38), Max: 36.9 (07-23-18 @ 09:38)  HR: 64 (07-23-18 @ 09:38) (64 - 67)  BP: 155/102 (07-23-18 @ 09:38) (155/102 - 165/95)  RR: 18 (07-23-18 @ 09:38) (18 - 18)  SpO2: 99% (07-23-18 @ 09:38) (99% - 99%)  Wt(kg): --  Height (cm): 167.64 (07-23-18 @ 08:27)  Weight (kg): 72.2 (07-23-18 @ 09:38)  BMI (kg/m2): 25.7 (07-23-18 @ 09:38)  BSA (m2): 1.82 (07-23-18 @ 09:38)      Physical Exam:  	Gen: NAD, well-appearing  	Pulm: CTA B/L  	CV: RRR, S1S2; no rub  	Abd: +BS, soft, nontender/nondistended  	: No suprapubic tenderness  	UE: Warm, no asterixis  	LE: Warm, no edema, palpable DP pulses b/l   	Psych: Normal affect and mood  	Skin: Warm, without rashes  	Vascular access: LUE AVF, aneurysmal appearance     LABS/STUDIES  --------------------------------------------------------------------------------    Pending

## 2018-07-23 NOTE — H&P ADULT - ASSESSMENT
33M presents for DDRT  - Admit to transplant surgery  - Will undergo HD this am  - OR booked for 1pm    M MD Hollie PGY 3

## 2018-07-24 DIAGNOSIS — Z94.0 KIDNEY TRANSPLANT STATUS: ICD-10-CM

## 2018-07-24 DIAGNOSIS — Z79.899 OTHER LONG TERM (CURRENT) DRUG THERAPY: ICD-10-CM

## 2018-07-24 LAB
ALBUMIN SERPL ELPH-MCNC: 4 G/DL — SIGNIFICANT CHANGE UP (ref 3.3–5)
ALBUMIN SERPL ELPH-MCNC: 4.1 G/DL — SIGNIFICANT CHANGE UP (ref 3.3–5)
ALP SERPL-CCNC: 68 U/L — SIGNIFICANT CHANGE UP (ref 40–120)
ALP SERPL-CCNC: 71 U/L — SIGNIFICANT CHANGE UP (ref 40–120)
ALT FLD-CCNC: 10 U/L — SIGNIFICANT CHANGE UP (ref 10–45)
ALT FLD-CCNC: 10 U/L — SIGNIFICANT CHANGE UP (ref 10–45)
ANION GAP SERPL CALC-SCNC: 16 MMOL/L — SIGNIFICANT CHANGE UP (ref 5–17)
ANION GAP SERPL CALC-SCNC: 17 MMOL/L — SIGNIFICANT CHANGE UP (ref 5–17)
APTT BLD: 30.2 SEC — SIGNIFICANT CHANGE UP (ref 27.5–37.4)
APTT BLD: 30.7 SEC — SIGNIFICANT CHANGE UP (ref 27.5–37.4)
AST SERPL-CCNC: 12 U/L — SIGNIFICANT CHANGE UP (ref 10–40)
AST SERPL-CCNC: 24 U/L — SIGNIFICANT CHANGE UP (ref 10–40)
BASOPHILS # BLD AUTO: 0 K/UL — SIGNIFICANT CHANGE UP (ref 0–0.2)
BASOPHILS # BLD AUTO: 0 K/UL — SIGNIFICANT CHANGE UP (ref 0–0.2)
BASOPHILS NFR BLD AUTO: 0 % — SIGNIFICANT CHANGE UP (ref 0–2)
BASOPHILS NFR BLD AUTO: 0.1 % — SIGNIFICANT CHANGE UP (ref 0–2)
BILIRUB SERPL-MCNC: 0.3 MG/DL — SIGNIFICANT CHANGE UP (ref 0.2–1.2)
BILIRUB SERPL-MCNC: 0.3 MG/DL — SIGNIFICANT CHANGE UP (ref 0.2–1.2)
BUN SERPL-MCNC: 39 MG/DL — HIGH (ref 7–23)
BUN SERPL-MCNC: 43 MG/DL — HIGH (ref 7–23)
CALCIUM SERPL-MCNC: 9.6 MG/DL — SIGNIFICANT CHANGE UP (ref 8.4–10.5)
CALCIUM SERPL-MCNC: 9.7 MG/DL — SIGNIFICANT CHANGE UP (ref 8.4–10.5)
CHLORIDE SERPL-SCNC: 100 MMOL/L — SIGNIFICANT CHANGE UP (ref 96–108)
CHLORIDE SERPL-SCNC: 102 MMOL/L — SIGNIFICANT CHANGE UP (ref 96–108)
CO2 SERPL-SCNC: 22 MMOL/L — SIGNIFICANT CHANGE UP (ref 22–31)
CO2 SERPL-SCNC: 22 MMOL/L — SIGNIFICANT CHANGE UP (ref 22–31)
CREAT SERPL-MCNC: 8.93 MG/DL — HIGH (ref 0.5–1.3)
CREAT SERPL-MCNC: 9.83 MG/DL — HIGH (ref 0.5–1.3)
EOSINOPHIL # BLD AUTO: 0 K/UL — SIGNIFICANT CHANGE UP (ref 0–0.5)
EOSINOPHIL # BLD AUTO: 0 K/UL — SIGNIFICANT CHANGE UP (ref 0–0.5)
EOSINOPHIL NFR BLD AUTO: 0.1 % — SIGNIFICANT CHANGE UP (ref 0–6)
EOSINOPHIL NFR BLD AUTO: 0.3 % — SIGNIFICANT CHANGE UP (ref 0–6)
GLUCOSE BLDC GLUCOMTR-MCNC: 120 MG/DL — HIGH (ref 70–99)
GLUCOSE BLDC GLUCOMTR-MCNC: 126 MG/DL — HIGH (ref 70–99)
GLUCOSE BLDC GLUCOMTR-MCNC: 126 MG/DL — HIGH (ref 70–99)
GLUCOSE BLDC GLUCOMTR-MCNC: 136 MG/DL — HIGH (ref 70–99)
GLUCOSE SERPL-MCNC: 135 MG/DL — HIGH (ref 70–99)
GLUCOSE SERPL-MCNC: 140 MG/DL — HIGH (ref 70–99)
HCT VFR BLD CALC: 36.2 % — LOW (ref 39–50)
HCT VFR BLD CALC: 36.7 % — LOW (ref 39–50)
HGB BLD-MCNC: 12 G/DL — LOW (ref 13–17)
HGB BLD-MCNC: 12.2 G/DL — LOW (ref 13–17)
INR BLD: 1.05 RATIO — SIGNIFICANT CHANGE UP (ref 0.88–1.16)
INR BLD: 1.07 RATIO — SIGNIFICANT CHANGE UP (ref 0.88–1.16)
LACTATE SERPL-SCNC: 1.3 MMOL/L — SIGNIFICANT CHANGE UP (ref 0.7–2)
LDH SERPL L TO P-CCNC: 153 U/L — SIGNIFICANT CHANGE UP (ref 50–242)
LYMPHOCYTES # BLD AUTO: 0.5 K/UL — LOW (ref 1–3.3)
LYMPHOCYTES # BLD AUTO: 0.6 K/UL — LOW (ref 1–3.3)
LYMPHOCYTES # BLD AUTO: 4.6 % — LOW (ref 13–44)
LYMPHOCYTES # BLD AUTO: 8.1 % — LOW (ref 13–44)
MAGNESIUM SERPL-MCNC: 1.8 MG/DL — SIGNIFICANT CHANGE UP (ref 1.6–2.6)
MAGNESIUM SERPL-MCNC: 1.9 MG/DL — SIGNIFICANT CHANGE UP (ref 1.6–2.6)
MCHC RBC-ENTMCNC: 28.4 PG — SIGNIFICANT CHANGE UP (ref 27–34)
MCHC RBC-ENTMCNC: 28.7 PG — SIGNIFICANT CHANGE UP (ref 27–34)
MCHC RBC-ENTMCNC: 33.1 GM/DL — SIGNIFICANT CHANGE UP (ref 32–36)
MCHC RBC-ENTMCNC: 33.1 GM/DL — SIGNIFICANT CHANGE UP (ref 32–36)
MCV RBC AUTO: 85.6 FL — SIGNIFICANT CHANGE UP (ref 80–100)
MCV RBC AUTO: 86.5 FL — SIGNIFICANT CHANGE UP (ref 80–100)
MONOCYTES # BLD AUTO: 0.1 K/UL — SIGNIFICANT CHANGE UP (ref 0–0.9)
MONOCYTES # BLD AUTO: 0.3 K/UL — SIGNIFICANT CHANGE UP (ref 0–0.9)
MONOCYTES NFR BLD AUTO: 1.6 % — LOW (ref 2–14)
MONOCYTES NFR BLD AUTO: 3.2 % — SIGNIFICANT CHANGE UP (ref 2–14)
NEUTROPHILS # BLD AUTO: 7 K/UL — SIGNIFICANT CHANGE UP (ref 1.8–7.4)
NEUTROPHILS # BLD AUTO: 9.2 K/UL — HIGH (ref 1.8–7.4)
NEUTROPHILS NFR BLD AUTO: 90 % — HIGH (ref 43–77)
NEUTROPHILS NFR BLD AUTO: 92 % — HIGH (ref 43–77)
PHOSPHATE SERPL-MCNC: 6.9 MG/DL — HIGH (ref 2.5–4.5)
PHOSPHATE SERPL-MCNC: 7.3 MG/DL — HIGH (ref 2.5–4.5)
PLATELET # BLD AUTO: 163 K/UL — SIGNIFICANT CHANGE UP (ref 150–400)
PLATELET # BLD AUTO: 168 K/UL — SIGNIFICANT CHANGE UP (ref 150–400)
POTASSIUM SERPL-MCNC: 4.8 MMOL/L — SIGNIFICANT CHANGE UP (ref 3.5–5.3)
POTASSIUM SERPL-MCNC: 5 MMOL/L — SIGNIFICANT CHANGE UP (ref 3.5–5.3)
POTASSIUM SERPL-SCNC: 4.8 MMOL/L — SIGNIFICANT CHANGE UP (ref 3.5–5.3)
POTASSIUM SERPL-SCNC: 5 MMOL/L — SIGNIFICANT CHANGE UP (ref 3.5–5.3)
PROT SERPL-MCNC: 6.7 G/DL — SIGNIFICANT CHANGE UP (ref 6–8.3)
PROT SERPL-MCNC: 6.8 G/DL — SIGNIFICANT CHANGE UP (ref 6–8.3)
PROTHROM AB SERPL-ACNC: 11.3 SEC — SIGNIFICANT CHANGE UP (ref 9.8–12.7)
PROTHROM AB SERPL-ACNC: 11.7 SEC — SIGNIFICANT CHANGE UP (ref 9.8–12.7)
RBC # BLD: 4.19 M/UL — LOW (ref 4.2–5.8)
RBC # BLD: 4.29 M/UL — SIGNIFICANT CHANGE UP (ref 4.2–5.8)
RBC # FLD: 12.2 % — SIGNIFICANT CHANGE UP (ref 10.3–14.5)
RBC # FLD: 12.3 % — SIGNIFICANT CHANGE UP (ref 10.3–14.5)
SODIUM SERPL-SCNC: 138 MMOL/L — SIGNIFICANT CHANGE UP (ref 135–145)
SODIUM SERPL-SCNC: 141 MMOL/L — SIGNIFICANT CHANGE UP (ref 135–145)
TACROLIMUS SERPL-MCNC: <2 — SIGNIFICANT CHANGE UP
WBC # BLD: 10 K/UL — SIGNIFICANT CHANGE UP (ref 3.8–10.5)
WBC # BLD: 7.8 K/UL — SIGNIFICANT CHANGE UP (ref 3.8–10.5)
WBC # FLD AUTO: 10 K/UL — SIGNIFICANT CHANGE UP (ref 3.8–10.5)
WBC # FLD AUTO: 7.8 K/UL — SIGNIFICANT CHANGE UP (ref 3.8–10.5)

## 2018-07-24 PROCEDURE — 76776 US EXAM K TRANSPL W/DOPPLER: CPT | Mod: 26,RT

## 2018-07-24 PROCEDURE — 99233 SBSQ HOSP IP/OBS HIGH 50: CPT | Mod: GC

## 2018-07-24 RX ORDER — OXYCODONE AND ACETAMINOPHEN 5; 325 MG/1; MG/1
1 TABLET ORAL ONCE
Qty: 0 | Refills: 0 | Status: DISCONTINUED | OUTPATIENT
Start: 2018-07-24 | End: 2018-07-24

## 2018-07-24 RX ORDER — GLYCERIN ADULT
1 SUPPOSITORY, RECTAL RECTAL ONCE
Qty: 0 | Refills: 0 | Status: COMPLETED | OUTPATIENT
Start: 2018-07-24 | End: 2018-07-24

## 2018-07-24 RX ORDER — OXYCODONE AND ACETAMINOPHEN 5; 325 MG/1; MG/1
2 TABLET ORAL EVERY 4 HOURS
Qty: 0 | Refills: 0 | Status: DISCONTINUED | OUTPATIENT
Start: 2018-07-24 | End: 2018-07-27

## 2018-07-24 RX ORDER — VALGANCICLOVIR 450 MG/1
450 TABLET, FILM COATED ORAL DAILY
Qty: 0 | Refills: 0 | Status: DISCONTINUED | OUTPATIENT
Start: 2018-07-24 | End: 2018-07-29

## 2018-07-24 RX ORDER — TACROLIMUS 5 MG/1
5 CAPSULE ORAL
Qty: 0 | Refills: 0 | Status: DISCONTINUED | OUTPATIENT
Start: 2018-07-24 | End: 2018-07-26

## 2018-07-24 RX ORDER — FUROSEMIDE 40 MG
60 TABLET ORAL ONCE
Qty: 0 | Refills: 0 | Status: COMPLETED | OUTPATIENT
Start: 2018-07-24 | End: 2018-07-24

## 2018-07-24 RX ORDER — OXYCODONE AND ACETAMINOPHEN 5; 325 MG/1; MG/1
1 TABLET ORAL EVERY 4 HOURS
Qty: 0 | Refills: 0 | Status: DISCONTINUED | OUTPATIENT
Start: 2018-07-24 | End: 2018-07-27

## 2018-07-24 RX ORDER — SODIUM CHLORIDE 9 MG/ML
1000 INJECTION INTRAMUSCULAR; INTRAVENOUS; SUBCUTANEOUS
Qty: 0 | Refills: 0 | Status: DISCONTINUED | OUTPATIENT
Start: 2018-07-24 | End: 2018-07-25

## 2018-07-24 RX ORDER — ACETAMINOPHEN 500 MG
650 TABLET ORAL EVERY 6 HOURS
Qty: 0 | Refills: 0 | Status: DISCONTINUED | OUTPATIENT
Start: 2018-07-24 | End: 2018-07-29

## 2018-07-24 RX ORDER — SIMETHICONE 80 MG/1
80 TABLET, CHEWABLE ORAL EVERY 6 HOURS
Qty: 0 | Refills: 0 | Status: DISCONTINUED | OUTPATIENT
Start: 2018-07-24 | End: 2018-07-29

## 2018-07-24 RX ADMIN — OXYCODONE AND ACETAMINOPHEN 1 TABLET(S): 5; 325 TABLET ORAL at 19:27

## 2018-07-24 RX ADMIN — SODIUM CHLORIDE 120 MILLILITER(S): 9 INJECTION INTRAMUSCULAR; INTRAVENOUS; SUBCUTANEOUS at 22:00

## 2018-07-24 RX ADMIN — SENNA PLUS 2 TABLET(S): 8.6 TABLET ORAL at 21:48

## 2018-07-24 RX ADMIN — Medication 500000 UNIT(S): at 06:47

## 2018-07-24 RX ADMIN — Medication 500000 UNIT(S): at 00:33

## 2018-07-24 RX ADMIN — Medication 52 MILLIGRAM(S): at 18:30

## 2018-07-24 RX ADMIN — OXYCODONE AND ACETAMINOPHEN 1 TABLET(S): 5; 325 TABLET ORAL at 22:40

## 2018-07-24 RX ADMIN — MYCOPHENOLATE MOFETIL 1000 MILLIGRAM(S): 250 CAPSULE ORAL at 06:48

## 2018-07-24 RX ADMIN — Medication 60 MILLIGRAM(S): at 18:30

## 2018-07-24 RX ADMIN — Medication 500000 UNIT(S): at 18:31

## 2018-07-24 RX ADMIN — Medication 100 MILLIGRAM(S): at 06:47

## 2018-07-24 RX ADMIN — Medication 500000 UNIT(S): at 13:23

## 2018-07-24 RX ADMIN — OXYCODONE AND ACETAMINOPHEN 1 TABLET(S): 5; 325 TABLET ORAL at 21:48

## 2018-07-24 RX ADMIN — TACROLIMUS 5 MILLIGRAM(S): 5 CAPSULE ORAL at 18:31

## 2018-07-24 RX ADMIN — VALGANCICLOVIR 450 MILLIGRAM(S): 450 TABLET, FILM COATED ORAL at 13:23

## 2018-07-24 RX ADMIN — Medication 52 MILLIGRAM(S): at 06:48

## 2018-07-24 RX ADMIN — OXYCODONE AND ACETAMINOPHEN 1 TABLET(S): 5; 325 TABLET ORAL at 20:30

## 2018-07-24 RX ADMIN — SODIUM CHLORIDE 70 MILLILITER(S): 9 INJECTION INTRAMUSCULAR; INTRAVENOUS; SUBCUTANEOUS at 08:22

## 2018-07-24 RX ADMIN — TACROLIMUS 3 MILLIGRAM(S): 5 CAPSULE ORAL at 06:47

## 2018-07-24 RX ADMIN — SODIUM CHLORIDE 120 MILLILITER(S): 9 INJECTION INTRAMUSCULAR; INTRAVENOUS; SUBCUTANEOUS at 18:30

## 2018-07-24 RX ADMIN — Medication 500000 UNIT(S): at 23:12

## 2018-07-24 RX ADMIN — Medication 100 MILLIGRAM(S): at 13:23

## 2018-07-24 RX ADMIN — MYCOPHENOLATE MOFETIL 1000 MILLIGRAM(S): 250 CAPSULE ORAL at 18:31

## 2018-07-24 RX ADMIN — Medication 1 SUPPOSITORY(S): at 18:29

## 2018-07-24 RX ADMIN — HYDROMORPHONE HYDROCHLORIDE 30 MILLILITER(S): 2 INJECTION INTRAMUSCULAR; INTRAVENOUS; SUBCUTANEOUS at 07:22

## 2018-07-24 RX ADMIN — Medication 1 TABLET(S): at 19:27

## 2018-07-24 RX ADMIN — FAMOTIDINE 20 MILLIGRAM(S): 10 INJECTION INTRAVENOUS at 13:23

## 2018-07-24 RX ADMIN — SIMETHICONE 80 MILLIGRAM(S): 80 TABLET, CHEWABLE ORAL at 18:31

## 2018-07-24 RX ADMIN — Medication 100 MILLIGRAM(S): at 18:31

## 2018-07-24 NOTE — PROGRESS NOTE ADULT - SUBJECTIVE AND OBJECTIVE BOX
POSTOPERATIVE CHECK    Eric Brady Team Daily Progress Note    SUBJECTIVE: Patient seen and examined at bedside in the PACU. No complaints at this time, pain is well controlled, denies n/v, has urine output in marquez bag.      OBJECTIVE:   Vital Signs Last 24 Hrs  T(C): 36.4 (23 Jul 2018 23:00), Max: 37 (23 Jul 2018 13:35)  T(F): 97.5 (23 Jul 2018 23:00), Max: 98.6 (23 Jul 2018 13:35)  HR: 86 (24 Jul 2018 01:00) (64 - 98)  BP: 152/94 (24 Jul 2018 01:00) (146/105 - 172/91)  BP(mean): 117 (24 Jul 2018 01:00) (111 - 122)  RR: 15 (24 Jul 2018 01:00) (14 - 18)  SpO2: 100% (24 Jul 2018 01:00) (96% - 100%)    PHYSICAL EXAM:  Constitutional: resting in bed with no acute distress  Respiratory:  unlabored breathing  Gastrointestinal: Abdomen soft, non distended, tenderness associated with incision sites, incision sites are clean, dry and intact.   Genitourinary:  Marquez present with clear urine   Extremities:  No edema, no calf tenderness    RADIOLOGY & ADDITIONAL STUDIES: no new studies performed

## 2018-07-24 NOTE — DIETITIAN INITIAL EVALUATION ADULT. - PERTINENT MEDS FT
NaCl 0.9% @ 120cc/hr, prednisone, solumedrol, pepcid, cellcept, percocet, tacrolimus, simulect, colace, senna

## 2018-07-24 NOTE — CONSULT NOTE ADULT - PROBLEM SELECTOR RECOMMENDATION 9
f/u transplant team  monitor bmp s/p DDRT  f/u transplant team for immunosuppressive meds  monitor bmp  monitor I/O

## 2018-07-24 NOTE — PROGRESS NOTE ADULT - SUBJECTIVE AND OBJECTIVE BOX
INTERVAL HPI/OVERNIGHT EVENTS:  Patient seen with multidisciplinary team (Nephrologist, pharmacist, nurse, nurse manager, NP, MD surgeons, transplant coordinator, nutritionist, nephrology fellow, residents 3rd year, medical, PA, and pharmacy students, and  )  in am rounds and examined with Dr. Hdz. 34 y/o/m POD#1, s/p R sided DDRT anastomosed to R external iliac artery and vein. Ureter  Ureter anastomosed to bladder over double J stent Cold ischemia time14 hours CMV +, EBV + , no acute event overnight making urine in marquez, pain managed with PCA pump, LACEY intact.     MEDICATIONS  (STANDING):  basiliximab  IVPB 20 milliGRAM(s) IV Intermittent <User Schedule>  docusate sodium 100 milliGRAM(s) Oral daily  famotidine    Tablet 20 milliGRAM(s) Oral daily  methylPREDNISolone sodium succinate IVPB 125 milliGRAM(s) IV Intermittent every 12 hours  metoprolol tartrate 100 milliGRAM(s) Oral every 12 hours  mycophenolate mofetil 1000 milliGRAM(s) Oral every 12 hours  nystatin    Suspension 316147 Unit(s) Swish and Swallow four times a day  senna 2 Tablet(s) Oral at bedtime  sodium chloride 0.9%. 1000 milliLiter(s) (120 mL/Hr) IV Continuous <Continuous>  tacrolimus 3 milliGRAM(s) Oral every 12 hours  trimethoprim   80 mG/sulfamethoxazole 400 mG 1 Tablet(s) Oral daily  valGANciclovir 450 milliGRAM(s) Oral daily    MEDICATIONS  (PRN):  acetaminophen   Tablet. 650 milliGRAM(s) Oral every 6 hours PRN Mild Pain (1 - 3)  oxyCODONE    5 mG/acetaminophen 325 mG 1 Tablet(s) Oral every 4 hours PRN Moderate Pain (4 - 6)  oxyCODONE    5 mG/acetaminophen 325 mG 2 Tablet(s) Oral every 4 hours PRN Severe Pain (7 - 10)      Allergies    IV Contrast (Rash)  nyquil (Rhinorrhea)  vancomycin (Pruritus; Hives)    Intolerances        Vital Signs Last 24 Hrs  T(C): 36.9 (24 Jul 2018 09:00), Max: 37 (23 Jul 2018 13:35)  T(F): 98.5 (24 Jul 2018 09:00), Max: 98.6 (23 Jul 2018 13:35)  HR: 72 (24 Jul 2018 09:00) (70 - 98)  BP: 155/100 (24 Jul 2018 09:00) (136/90 - 172/91)  BP(mean): 123 (24 Jul 2018 07:00) (108 - 126)  RR: 16 (24 Jul 2018 09:00) (14 - 18)  SpO2: 94% (24 Jul 2018 09:00) (94% - 100%)    LABS:                        12.2   7.8   )-----------( 163      ( 24 Jul 2018 02:38 )             36.7     07-24    138  |  100  |  39<H>  ----------------------------<  140<H>  4.8   |  22  |  9.83<H>    Ca    9.6      24 Jul 2018 02:38  Phos  6.9     07-24  Mg     1.8     07-24    TPro  6.8  /  Alb  4.0  /  TBili  0.3  /  DBili  x   /  AST  24  /  ALT  10  /  AlkPhos  71  07-24    PT/INR - ( 24 Jul 2018 02:38 )   PT: 11.3 sec;   INR: 1.05 ratio         PTT - ( 24 Jul 2018 02:38 )  PTT:30.2 sec      RADIOLOGY & ADDITIONAL TESTS:    Review of systems  Gen: No weight changes, fatigue, fevers/chills, weakness  Skin: No rashes  Head/Eyes/Ears/Mouth: No headache; Normal hearing; Normal vision w/o blurriness; No sinus pain/discomfort, sore throat  Respiratory: No dyspnea, cough, wheezing, hemoptysis  CV: No chest pain, PND, orthopnea  GI: No abdominal pain, diarrhea, constipation, nausea, vomiting, melena, hematochezia  : No increased frequency, dysuria, hematuria, nocturia  MSK: No joint pain/swelling; no back pain; no edema  Neuro: No dizziness/lightheadedness, weakness, seizures, numbness, tingling  Heme: No easy bruising or bleeding  Endo: No heat/cold intolerance  Psych: No significant nervousness, anxiety, stress, depression  All other systems were reviewed and are negative, except as noted.    PHYSICAL EXAM:  Constitutional: Well developed / well nourished  Eyes: Anicteric, PERRLA  ENMT: nc/at  Neck: central line  left IJ  Respiratory: CTA B/L  Cardiovascular: RRR  Gastrointestinal: Soft abdomen, mild tender to touch at surgical site, ND, LACEY x1 intact  Genitourinary: Urinary catheter in place   Extremities: SCD's in place and working bilaterally  Vascular: Palpable dp pulses bilaterally  Neurological: A&O x3  Skin: Mild serosanguinous anna on wound dressing no erythema and evidence of infection noted  Musculoskeletal: Moving all extremities  Psychiatric: Responsive

## 2018-07-24 NOTE — DIETITIAN INITIAL EVALUATION ADULT. - ORAL INTAKE PTA
Pt endorsed a good appetite & PO intakes PTA. Usual Diet Recall: Breakfast- usually skips, never eaten even as a child Lunch- burger, pasta, rice, andrew chicken/duck, etc. Dinner- similar to lunch/good

## 2018-07-24 NOTE — PROGRESS NOTE ADULT - SUBJECTIVE AND OBJECTIVE BOX
Day 1 of Anesthesia Pain Management Service    SUBJECTIVE: Patient is doing well with IV PCA    Pain Scale Score:	[X] Refer to charted pain scores    THERAPY:    [ ] IV PCA Morphine		[ ] 5 mg/mL	[ ] 1 mg/mL  [X] IV PCA Hydromorphone	[ ] 5 mg/mL	[X] 1 mg/mL  [ ] IV PCA Fentanyl		[ ] 50 micrograms/mL    Demand dose: 0.2 mg     Lockout: 6 minutes   Continuous Rate: 0 mg/hr  4 Hour Limit: 4 mg    MEDICATIONS  (STANDING):  basiliximab  IVPB 20 milliGRAM(s) IV Intermittent <User Schedule>  docusate sodium 100 milliGRAM(s) Oral daily  famotidine    Tablet 20 milliGRAM(s) Oral daily  methylPREDNISolone sodium succinate IVPB 125 milliGRAM(s) IV Intermittent every 12 hours  metoprolol tartrate 100 milliGRAM(s) Oral every 12 hours  mycophenolate mofetil 1000 milliGRAM(s) Oral every 12 hours  nystatin    Suspension 426751 Unit(s) Swish and Swallow four times a day  senna 2 Tablet(s) Oral at bedtime  sodium chloride 0.9%. 1000 milliLiter(s) (120 mL/Hr) IV Continuous <Continuous>  tacrolimus 3 milliGRAM(s) Oral every 12 hours  trimethoprim   80 mG/sulfamethoxazole 400 mG 1 Tablet(s) Oral daily  valGANciclovir 450 milliGRAM(s) Oral daily    MEDICATIONS  (PRN):  acetaminophen   Tablet. 650 milliGRAM(s) Oral every 6 hours PRN Mild Pain (1 - 3)  oxyCODONE    5 mG/acetaminophen 325 mG 1 Tablet(s) Oral every 4 hours PRN Moderate Pain (4 - 6)  oxyCODONE    5 mG/acetaminophen 325 mG 2 Tablet(s) Oral every 4 hours PRN Severe Pain (7 - 10)      OBJECTIVE:    Sedation Score:	[ X] Alert	[ ] Drowsy 	[ ] Arousable	[ ] Asleep	[ ] Unresponsive    Side Effects:	[X ] None	[ ] Nausea	[ ] Vomiting	[ ] Pruritus  		[ ] Other:    Vital Signs Last 24 Hrs  T(C): 36.9 (24 Jul 2018 09:00), Max: 37 (23 Jul 2018 13:35)  T(F): 98.5 (24 Jul 2018 09:00), Max: 98.6 (23 Jul 2018 13:35)  HR: 72 (24 Jul 2018 09:00) (70 - 98)  BP: 155/100 (24 Jul 2018 09:00) (136/90 - 172/91)  BP(mean): 123 (24 Jul 2018 07:00) (108 - 126)  RR: 16 (24 Jul 2018 09:00) (14 - 18)  SpO2: 94% (24 Jul 2018 09:00) (94% - 100%)    ASSESSMENT/ PLAN    Therapy to  be:               [  ] Continued   [X ] Discontinued   [X ] Changed to PRN Analgesics    Documentation and Verification of current medications:   [X] Done	[ ] Not done, not eligible    Comments: PCA D\C'd by primary service

## 2018-07-24 NOTE — PROGRESS NOTE ADULT - ATTENDING COMMENTS
renal transplant recipient. post op day 1, non oliguric.  Has ureteral stent  Plan:  Immunosuppression reviewed  Prophylactic medications reviewed  F/u Tac level  Will follow

## 2018-07-24 NOTE — DIETITIAN INITIAL EVALUATION ADULT. - ENERGY NEEDS
Ht 66 inches Wt 159.1 pounds BMI 25.7 Kg/m^2   pounds +/- 10%; 112% IBW  Edema: none noted Skin: no pressure injuries  Other pertinent information: 34 yo male with PMH of HTN, ESRD on HD (MWF) S/P right sided DDRT to right external iliac arty and vein on (7/23) with cold time of 14hrs

## 2018-07-24 NOTE — PROGRESS NOTE ADULT - SUBJECTIVE AND OBJECTIVE BOX
33y M admitted for a  donor kidney transplant on 18    Outpatient medication reconciliation reviewed and will be re-started appropriately.  Plan discussed with multidisciplinary team.     Continue current immunosuppressive regimen.  Induction:  Simulect 20 mg on post-operative day 0 (intra-operative) and POD4  Methylprednisolone taper (500 mg, 125 mg twice daily, 60 mg twice daily, 30 mg twice daily) then to PO prednisone taper    Maintenance:  Tacrolimus adjusted to trough (8-10 ng/mL)  Mycophenolate mofetil 1000 mg q12h  Prednisone taper starting POD4 (20 mg twice daily, 10 mg twice daily, 5 mg twice daily, 5 mg daily)    Anti-infective prophylaxis:  Bactrim SS daily  Valcyte (adjusted to renal function)  Nystatin swish and swallow four times daily     GI/DVT ppx:  Famotidine 20 mg daily

## 2018-07-24 NOTE — PROGRESS NOTE ADULT - PROBLEM SELECTOR PLAN 1
Kidney replaced by transplant.  Plan: POD (1 ) doing well.    US/doppler of renal today  Advance PO diet    Pt encouraged to ambulate  Dressing intact. LACEY-SS   D/C PCA and start PRN Tylenol and Percocet for pain, Monitor and manage pain  Adequate urine output, keep Rodriguez in place  D/C replacement fluids and increase IVF to 125ml/hr  On bowel regimen

## 2018-07-24 NOTE — PROGRESS NOTE ADULT - PROBLEM SELECTOR PLAN 2
On Tacrolimus, cellcept, steroid taper, Simulect induction next dose on day 4, Nystatin S&S, bactrim, and Valcyte   F/U Tacrolimus level daily, tacro goal 8-10  Monitor closely.

## 2018-07-24 NOTE — PROGRESS NOTE ADULT - SUBJECTIVE AND OBJECTIVE BOX
[facility] DIVISION OF KIDNEY DISEASES AND HYPERTENSION -- FOLLOW UP NOTE  --------------------------------------------------------------------------------    HPI: 33 year old male with a PMH of ESRD on HD (MWF) from [facility], HTN s/p DDRT 7/23/18. Pt has been on HD for the last 8 years and he follows with outpatient nephrologist Dr. Cline at [facility]. Pts last HD was 7/23/18. Pt non-oliguric with good urine output. Pt seen and examined. Pt denies CP, SOB or LE edema.     PAST HISTORY  --------------------------------------------------------------------------------  No significant changes to PMH, PSH, FHx, SHx, unless otherwise noted    ALLERGIES & MEDICATIONS  --------------------------------------------------------------------------------  Allergies    IV Contrast (Rash)  nyquil (Rhinorrhea)  vancomycin (Pruritus; Hives)    Intolerances      Standing Inpatient Medications  basiliximab  IVPB 20 milliGRAM(s) IV Intermittent <User Schedule>  docusate sodium 100 milliGRAM(s) Oral daily  famotidine    Tablet 20 milliGRAM(s) Oral daily  methylPREDNISolone sodium succinate IVPB 125 milliGRAM(s) IV Intermittent every 12 hours  metoprolol tartrate 100 milliGRAM(s) Oral every 12 hours  mycophenolate mofetil 1000 milliGRAM(s) Oral every 12 hours  nystatin    Suspension [id number] Unit(s) Swish and Swallow four times a day  senna 2 Tablet(s) Oral at bedtime  sodium chloride 0.9%. 1000 milliLiter(s) IV Continuous <Continuous>  tacrolimus 3 milliGRAM(s) Oral every 12 hours  trimethoprim   80 mG/sulfamethoxazole 400 mG 1 Tablet(s) Oral daily  valGANciclovir 450 milliGRAM(s) Oral daily    PRN Inpatient Medications  acetaminophen   Tablet. 650 milliGRAM(s) Oral every 6 hours PRN  oxyCODONE    5 mG/acetaminophen 325 mG 1 Tablet(s) Oral every 4 hours PRN  oxyCODONE    5 mG/acetaminophen 325 mG 2 Tablet(s) Oral every 4 hours PRN      REVIEW OF SYSTEMS  --------------------------------------------------------------------------------  Gen: No weakness  Skin: No rashes  Head/Eyes/Ears/Mouth: No headache  Respiratory: No dyspnea  CV: No chest pain, PND, orthopnea  GI: No abdominal pain, diarrhea  : No increased frequency, dysuria  MSK: No edema  Neuro: No dizziness/lightheadedness    All other systems were reviewed and are negative, except as noted.    VITALS/PHYSICAL EXAM  --------------------------------------------------------------------------------  T(C): 36.9 (07-24-18 @ 09:00), Max: 37 (07-23-18 @ 13:35)  HR: 72 (07-24-18 @ 09:00) (70 - 98)  BP: 155/100 (07-24-18 @ 09:00) (136/90 - 172/91)  RR: 16 (07-24-18 @ 09:00) (14 - 18)  SpO2: 94% (07-24-18 @ 09:00) (94% - 100%)  Wt(kg): --  Height (cm): 167.64 (07-23-18 @ 14:29)  Weight (kg): 72.2 (07-23-18 @ 14:29)  BMI (kg/m2): 25.7 (07-23-18 @ 14:29)  BSA (m2): 1.82 (07-23-18 @ 14:29)      07-23-18 @ 07:01  -  07-24-18 @ 07:00  --------------------------------------------------------  IN: 1970 mL / OUT: 2385 mL / NET: -415 mL    07-24-18 @ 07:01  -  07-24-18 @ 11:36  --------------------------------------------------------  IN: 260 mL / OUT: 150 mL / NET: 110 mL    Physical Exam:  	Gen: NAD, well-appearing  	Pulm: CTA B/L  	CV: RRR, S1S2; no rub  	Abd: +BS, soft, nontender/nondistended  	: No suprapubic tenderness  	UE: Warm, no asterixis  	LE: Warm, no edema, palpable DP pulses b/l   	Psych: Normal affect and mood  	Skin: Warm, without rashes  	Vascular access: NIN[facility] AVF, aneurysmal appearance     LABS/STUDIES  --------------------------------------------------------------------------------              12.0   10.0  >-----------<  168      [07-24-18 @ 10:07]              36.2     141  |  102  |  43  ----------------------------<  135      [07-24-18 @ 10:07]  5.0   |  22  |  8.93        Ca     9.7     [07-24-18 @ 10:07]      Mg     1.9     [07-24-18 @ 10:07]      Phos  7.3     [07-24-18 @ 10:07]    TPro  6.7  /  Alb  4.1  /  TBili  0.3  /  DBili  x   /  AST  12  /  ALT  10  /  AlkPhos  68  [07-24-18 @ 10:07]    PT/INR: PT 11.7 , INR 1.07       [07-24-18 @ 10:07]  PTT: 30.7       [07-24-18 @ 10:07]          [07-24-18 @ 02:38]    Creatinine Trend:  SCr 8.93 [07-24 @ 10:07]  SCr 9.83 [07-24 @ 02:38]  SCr 9.17 [07-23 @ 20:52]  SCr 17.32 [07-23 @ 11:03]        HbA1c 5.1      [08-18-15 @ 18:19]

## 2018-07-24 NOTE — DIETITIAN INITIAL EVALUATION ADULT. - NS AS NUTRI INTERV ED CONTENT
Reviewed food safety guidelines for transplant recipients. Reviewed recommendations to avoid grapefruit, pomegranate and star fruit while taking immunosuppressant medication. Reviewed recommendations for moderate intake of sodium and carbohydrates with transplant medications. Discussed importance of adequate protein-energy intakes post-operatively to promote healing. Pt was receptive and expressed understanding. Provided nutrition handout: USDA Food Safety for Transplant Recipients booklet. Also provided Low Sodium & General Healthful diet education as per patient's request with written materials from [employer]

## 2018-07-24 NOTE — DIETITIAN INITIAL EVALUATION ADULT. - NS AS NUTRI INTERV MEDICAL AND FOOD SUPPLEMENTS
RD will add PowerAde electrolyte drink to meal trays to help meet increased post-Txp fluid needs of ~3L.

## 2018-07-24 NOTE — CONSULT NOTE ADULT - SUBJECTIVE AND OBJECTIVE BOX
Dr. Wagner (Nephrology)  Office [phone]  Cell [phone]  [facility]op[facility] KNIGHT  Cell [phone]    HPI:  33M with PMH of HTN and ESRD since 2010 s/p DDRT 7/23, + tenderness over incision site, feels b/l hand fullness. no other complaints           Allergies:  IV Contrast (Rash)  nyquil (Rhinorrhea)  vancomycin (Pruritus; Hives)      PAST MEDICAL & SURGICAL HISTORY:  ESRD on dialysis  HTN (hypertension)  No significant past surgical history      Home Medications Reviewed    Hospital Medications:   MEDICATIONS  (STANDING):  basiliximab  IVPB 20 milliGRAM(s) IV Intermittent <User Schedule>  docusate sodium 100 milliGRAM(s) Oral daily  famotidine    Tablet 20 milliGRAM(s) Oral daily  methylPREDNISolone sodium succinate IVPB 125 milliGRAM(s) IV Intermittent every 12 hours  metoprolol tartrate 100 milliGRAM(s) Oral every 12 hours  mycophenolate mofetil 1000 milliGRAM(s) Oral every 12 hours  nystatin    Suspension 111353 Unit(s) Swish and Swallow four times a day  senna 2 Tablet(s) Oral at bedtime  sodium chloride 0.9%. 1000 milliLiter(s) (120 mL/Hr) IV Continuous <Continuous>  tacrolimus 5 milliGRAM(s) Oral two times a day  trimethoprim   80 mG/sulfamethoxazole 400 mG 1 Tablet(s) Oral daily  valGANciclovir 450 milliGRAM(s) Oral daily      SOCIAL HISTORY:  Denies ETOh, Smoking,     FAMILY HISTORY:      REVIEW OF SYSTEMS:  CONSTITUTIONAL: No weakness, fevers or chills  EYES/ENT: No visual changes;  No vertigo or throat pain   NECK: No pain or stiffness  RESPIRATORY: No cough, wheezing, hemoptysis; No shortness of breath  CARDIOVASCULAR: No chest pain or palpitations.  GASTROINTESTINAL: see HPI  GENITOURINARY: No dysuria, frequency, foamy urine, urinary urgency, incontinence or hematuria  NEUROLOGICAL: No numbness or weakness  SKIN: No itching, burning, rashes, or lesions   VASCULAR: No bilateral lower extremity edema.   All other review of systems is negative unless indicated above.    VITALS:  T(F): 98.3 (07-24-18 @ 12:00), Max: 98.5 (07-24-18 @ 08:00)  HR: 70 (07-24-18 @ 12:00)  BP: 176/98 (07-24-18 @ 12:00)  RR: 16 (07-24-18 @ 12:00)  SpO2: 98% (07-24-18 @ 12:00)  Wt(kg): --    07-23 @ 07:01  -  07-24 @ 07:00  --------------------------------------------------------  IN: 1970 mL / OUT: 2385 mL / NET: -415 mL    07-24 @ 07:01  -  07-24 @ 16:34  --------------------------------------------------------  IN: 1340 mL / OUT: 460 mL / NET: 880 mL          PHYSICAL EXAM:  Constitutional: NAD  HEENT: anicteric sclera, oropharynx clear, MMM  Neck: No JVD  Respiratory: CTAB, no wheezes, rales or rhonchi  Cardiovascular: S1, S2, RRR  Gastrointestinal: BS+, + tendernss   Extremities: No cyanosis or clubbing. No peripheral edema  Neurological: A/O x 3, no focal deficits  Psychiatric: Normal mood, normal affect  : +marquez.   Skin: No rashes  Vascular Access:    LABS:  07-24    141  |  102  |  43<H>  ----------------------------<  135<H>  5.0   |  22  |  8.93<H>    Ca    9.7      24 Jul 2018 10:07  Phos  7.3     07-24  Mg     1.9     07-24    TPro  6.7  /  Alb  4.1  /  TBili  0.3  /  DBili      /  AST  12  /  ALT  10  /  AlkPhos  68  07-24    Creatinine Trend: 8.93 <--, 9.83 <--, 9.17 <--, 17.32 <--                        12.0   10.0  )-----------( 168      ( 24 Jul 2018 10:07 )             36.2     Urine Studies:        RADIOLOGY & ADDITIONAL STUDIES:

## 2018-07-24 NOTE — DIETITIAN INITIAL EVALUATION ADULT. - NS AS NUTRI INTERV MEALS SNACK
1) Continue regular diet to optimize PO intakes. If phosphorous remains elevated, consider addition of no concentrated phosphorous restriction 2) Provide food preferences within dietary restrictions when feasible 3) Encourage good PO intakes

## 2018-07-24 NOTE — PROGRESS NOTE ADULT - ASSESSMENT
A/P  33 y.o. w/ pmhx of ESRD s/p R renal transplant from  donor  - Pain management  - Antimicrobial  - CLD  - Monitor VS  - Monitor Urine Output  - f/u AM labs  - ISS

## 2018-07-24 NOTE — PROGRESS NOTE ADULT - ATTENDING COMMENTS
post op day 1 post  donor kidney transplant with immediate graft function. doing well. UOP about 50c /hr. immunosuppression FK, MMF and steroids. advance diet and ambulate and stop fluid replacements.

## 2018-07-24 NOTE — DIETITIAN INITIAL EVALUATION ADULT. - OTHER INFO
Patient seen for S/P DDRT as per departmental protocol. Reports UBW of 156 pounds, denies any recent large fluctuations in weight PTA. Noted admit weight (7/23) 159.1 pounds & current weight (7/24) 159.6 pounds consistent with reported weight history. Reports fair appetite, has only tried clear liquids thus far with no distress of note. Observed untouched tray of regular foods at bedside, states he will consume after interview. Discussed importance of adequate protein-energy intake post-operatively to promote healing. Denies chewing/swallowing difficulty. Denies nausea/emesis. No BM thus far. Urine output x24 hrs: 1295ml (ranging from 45-255ml/hr)

## 2018-07-24 NOTE — DIETITIAN INITIAL EVALUATION ADULT. - ADHERENCE
good/Patient with PMH of ESRD on HD for ~8 years (MWF). States he was following a renal diet (low K/Phos/Na). States his monthly numbers were reviewed at HD numbers with good reportings. States he was taking Fosrenol 3x daily (at meals) to assist with phosphorous level. Reports NKFA. Denied micronutrient supplementation PTA

## 2018-07-24 NOTE — PROGRESS NOTE ADULT - ASSESSMENT
33 Y/OM with PMH of HTN and ESRD since 2010, s/p POD#1, s/p R sided DDRT anastomosed to R external iliac artery and vein. Ureter Ureter anastomosed to bladder over double J stent Cold ischemia time14 hours CMV +, EBV + , no acute event overnight making urine in Rodriguez pain managed with PCA pump, LACEY intact.

## 2018-07-24 NOTE — PROGRESS NOTE ADULT - ASSESSMENT
33 year old male with a PMH of ESRD on HD (MWF) from LUE AVF, HTN presents for DDRT planned for today. 33 year old male with a PMH of ESRD on HD (MWF) from LUE AVF, HTN s/p DDRT 33 years old donor, has ureteral stent.  now non oliguric.

## 2018-07-24 NOTE — PROGRESS NOTE ADULT - PROBLEM SELECTOR PLAN 1
Pt s/p DDRT on 7/23/18. Pt non-oliguric with good urine output. Pt received Simulect induction now maintained on tacrolimus/ steroid/ cellcept.   Monitor 12 hour trough.

## 2018-07-25 DIAGNOSIS — E83.39 OTHER DISORDERS OF PHOSPHORUS METABOLISM: ICD-10-CM

## 2018-07-25 LAB
ANION GAP SERPL CALC-SCNC: 15 MMOL/L — SIGNIFICANT CHANGE UP (ref 5–17)
BUN SERPL-MCNC: 54 MG/DL — HIGH (ref 7–23)
CALCIUM SERPL-MCNC: 10.6 MG/DL — HIGH (ref 8.4–10.5)
CHLORIDE SERPL-SCNC: 103 MMOL/L — SIGNIFICANT CHANGE UP (ref 96–108)
CO2 SERPL-SCNC: 19 MMOL/L — LOW (ref 22–31)
CREAT SERPL-MCNC: 6.76 MG/DL — HIGH (ref 0.5–1.3)
GLUCOSE BLDC GLUCOMTR-MCNC: 102 MG/DL — HIGH (ref 70–99)
GLUCOSE BLDC GLUCOMTR-MCNC: 107 MG/DL — HIGH (ref 70–99)
GLUCOSE BLDC GLUCOMTR-MCNC: 119 MG/DL — HIGH (ref 70–99)
GLUCOSE BLDC GLUCOMTR-MCNC: 130 MG/DL — HIGH (ref 70–99)
GLUCOSE BLDC GLUCOMTR-MCNC: 149 MG/DL — HIGH (ref 70–99)
GLUCOSE SERPL-MCNC: 134 MG/DL — HIGH (ref 70–99)
HCT VFR BLD CALC: 38 % — LOW (ref 39–50)
HGB BLD-MCNC: 12.2 G/DL — LOW (ref 13–17)
MAGNESIUM SERPL-MCNC: 2.1 MG/DL — SIGNIFICANT CHANGE UP (ref 1.6–2.6)
MCHC RBC-ENTMCNC: 28.2 PG — SIGNIFICANT CHANGE UP (ref 27–34)
MCHC RBC-ENTMCNC: 32.2 GM/DL — SIGNIFICANT CHANGE UP (ref 32–36)
MCV RBC AUTO: 87.5 FL — SIGNIFICANT CHANGE UP (ref 80–100)
PHOSPHATE SERPL-MCNC: 6.5 MG/DL — HIGH (ref 2.5–4.5)
PLATELET # BLD AUTO: 186 K/UL — SIGNIFICANT CHANGE UP (ref 150–400)
POTASSIUM SERPL-MCNC: 5.4 MMOL/L — HIGH (ref 3.5–5.3)
POTASSIUM SERPL-SCNC: 5.4 MMOL/L — HIGH (ref 3.5–5.3)
RBC # BLD: 4.34 M/UL — SIGNIFICANT CHANGE UP (ref 4.2–5.8)
RBC # FLD: 12.5 % — SIGNIFICANT CHANGE UP (ref 10.3–14.5)
SODIUM SERPL-SCNC: 137 MMOL/L — SIGNIFICANT CHANGE UP (ref 135–145)
TACROLIMUS SERPL-MCNC: 4.4 NG/ML — SIGNIFICANT CHANGE UP
WBC # BLD: 13.1 K/UL — HIGH (ref 3.8–10.5)
WBC # FLD AUTO: 13.1 K/UL — HIGH (ref 3.8–10.5)

## 2018-07-25 PROCEDURE — 99232 SBSQ HOSP IP/OBS MODERATE 35: CPT | Mod: GC

## 2018-07-25 RX ORDER — NIFEDIPINE 30 MG
60 TABLET, EXTENDED RELEASE 24 HR ORAL DAILY
Qty: 0 | Refills: 0 | Status: DISCONTINUED | OUTPATIENT
Start: 2018-07-25 | End: 2018-07-26

## 2018-07-25 RX ORDER — HYDROMORPHONE HYDROCHLORIDE 2 MG/ML
0.25 INJECTION INTRAMUSCULAR; INTRAVENOUS; SUBCUTANEOUS ONCE
Qty: 0 | Refills: 0 | Status: DISCONTINUED | OUTPATIENT
Start: 2018-07-25 | End: 2018-07-25

## 2018-07-25 RX ORDER — HYDROMORPHONE HYDROCHLORIDE 2 MG/ML
0.5 INJECTION INTRAMUSCULAR; INTRAVENOUS; SUBCUTANEOUS ONCE
Qty: 0 | Refills: 0 | Status: DISCONTINUED | OUTPATIENT
Start: 2018-07-25 | End: 2018-07-25

## 2018-07-25 RX ORDER — HYDRALAZINE HCL 50 MG
10 TABLET ORAL ONCE
Qty: 0 | Refills: 0 | Status: DISCONTINUED | OUTPATIENT
Start: 2018-07-25 | End: 2018-07-26

## 2018-07-25 RX ORDER — LIDOCAINE 4 G/100G
1 CREAM TOPICAL THREE TIMES A DAY
Qty: 0 | Refills: 0 | Status: DISCONTINUED | OUTPATIENT
Start: 2018-07-25 | End: 2018-07-29

## 2018-07-25 RX ADMIN — Medication 500000 UNIT(S): at 17:50

## 2018-07-25 RX ADMIN — HYDROMORPHONE HYDROCHLORIDE 0.25 MILLIGRAM(S): 2 INJECTION INTRAMUSCULAR; INTRAVENOUS; SUBCUTANEOUS at 07:00

## 2018-07-25 RX ADMIN — TACROLIMUS 5 MILLIGRAM(S): 5 CAPSULE ORAL at 17:50

## 2018-07-25 RX ADMIN — HYDROMORPHONE HYDROCHLORIDE 0.5 MILLIGRAM(S): 2 INJECTION INTRAMUSCULAR; INTRAVENOUS; SUBCUTANEOUS at 22:40

## 2018-07-25 RX ADMIN — OXYCODONE AND ACETAMINOPHEN 2 TABLET(S): 5; 325 TABLET ORAL at 05:00

## 2018-07-25 RX ADMIN — TACROLIMUS 5 MILLIGRAM(S): 5 CAPSULE ORAL at 06:27

## 2018-07-25 RX ADMIN — Medication 50.96 MILLIGRAM(S): at 17:50

## 2018-07-25 RX ADMIN — Medication 60 MILLIGRAM(S): at 10:06

## 2018-07-25 RX ADMIN — MYCOPHENOLATE MOFETIL 1000 MILLIGRAM(S): 250 CAPSULE ORAL at 06:03

## 2018-07-25 RX ADMIN — Medication 500000 UNIT(S): at 12:01

## 2018-07-25 RX ADMIN — OXYCODONE AND ACETAMINOPHEN 2 TABLET(S): 5; 325 TABLET ORAL at 04:11

## 2018-07-25 RX ADMIN — MYCOPHENOLATE MOFETIL 1000 MILLIGRAM(S): 250 CAPSULE ORAL at 17:50

## 2018-07-25 RX ADMIN — Medication 50.96 MILLIGRAM(S): at 06:03

## 2018-07-25 RX ADMIN — SODIUM CHLORIDE 120 MILLILITER(S): 9 INJECTION INTRAMUSCULAR; INTRAVENOUS; SUBCUTANEOUS at 06:31

## 2018-07-25 RX ADMIN — SENNA PLUS 2 TABLET(S): 8.6 TABLET ORAL at 21:41

## 2018-07-25 RX ADMIN — Medication 100 MILLIGRAM(S): at 12:01

## 2018-07-25 RX ADMIN — HYDROMORPHONE HYDROCHLORIDE 0.5 MILLIGRAM(S): 2 INJECTION INTRAMUSCULAR; INTRAVENOUS; SUBCUTANEOUS at 21:41

## 2018-07-25 RX ADMIN — Medication 650 MILLIGRAM(S): at 21:16

## 2018-07-25 RX ADMIN — Medication 650 MILLIGRAM(S): at 20:16

## 2018-07-25 RX ADMIN — HYDROMORPHONE HYDROCHLORIDE 0.25 MILLIGRAM(S): 2 INJECTION INTRAMUSCULAR; INTRAVENOUS; SUBCUTANEOUS at 06:04

## 2018-07-25 RX ADMIN — Medication 500000 UNIT(S): at 06:03

## 2018-07-25 RX ADMIN — Medication 100 MILLIGRAM(S): at 20:16

## 2018-07-25 RX ADMIN — FAMOTIDINE 20 MILLIGRAM(S): 10 INJECTION INTRAVENOUS at 12:01

## 2018-07-25 RX ADMIN — Medication 100 MILLIGRAM(S): at 06:03

## 2018-07-25 RX ADMIN — Medication 1 TABLET(S): at 12:01

## 2018-07-25 RX ADMIN — VALGANCICLOVIR 450 MILLIGRAM(S): 450 TABLET, FILM COATED ORAL at 12:01

## 2018-07-25 RX ADMIN — LIDOCAINE 1 APPLICATION(S): 4 CREAM TOPICAL at 13:07

## 2018-07-25 NOTE — PROGRESS NOTE ADULT - PROBLEM SELECTOR PLAN 1
Adequate urine output, keep Marquez in place today. Plan for removal tomorrow am  Lidocaine jelly PRN for marquez pain   DC IVF  Diet as tolerated  Continue PRN Tylenol and Percocet for pain  Continue bowel regimen.

## 2018-07-25 NOTE — PROGRESS NOTE ADULT - PROBLEM SELECTOR PLAN 1
s/p DDRT 07/23  Serum creatinine improving  f/u transplant team for immunosuppressive meds  monitor bmp  monitor I/O.

## 2018-07-25 NOTE — PROGRESS NOTE ADULT - PROBLEM SELECTOR PLAN 1
Pt s/p DDRT on 7/23/18 with immediate function. Pt non-oliguric with good urine output. Pt received Simulect induction now maintained on tacrolimus/ steroid/ cellcept.   Monitor 12 hour trough.  Scr improving.

## 2018-07-25 NOTE — PROGRESS NOTE ADULT - PROBLEM SELECTOR PLAN 2
Fluctuating  Pain management  May need adjustment if BP remain suboptimal after pain control  monitor.

## 2018-07-25 NOTE — PROGRESS NOTE ADULT - ASSESSMENT
32 Y/O gentleman with PMH of HTN and ESRD since 2010 now doing well POD#2 R sided DDRT with downtrending creatinine and good uop.

## 2018-07-25 NOTE — PROGRESS NOTE ADULT - SUBJECTIVE AND OBJECTIVE BOX
[facility] DIVISION OF KIDNEY DISEASES AND HYPERTENSION -- FOLLOW UP NOTE  --------------------------------------------------------------------------------    HPI: 33 year old male with a PMH of ESRD on HD (MWF) from [facility], HTN s/p DDRT 7/23/18. Pt has been on HD for the last 8 years and he follows with outpatient nephrologist Dr. Cline at [facility]. Pts last HD was 7/23/18. Pt non-oliguric with good urine output. Pt seen and examined. Pt denies CP, SOB or LE edema.     PAST HISTORY  --------------------------------------------------------------------------------  No significant changes to PMH, PSH, FHx, SHx, unless otherwise noted    ALLERGIES & MEDICATIONS  --------------------------------------------------------------------------------  Allergies    IV Contrast (Rash)  nyquil (Rhinorrhea)  vancomycin (Pruritus; Hives)    Intolerances      Standing Inpatient Medications  basiliximab  IVPB 20 milliGRAM(s) IV Intermittent <User Schedule>  docusate sodium 100 milliGRAM(s) Oral daily  famotidine    Tablet 20 milliGRAM(s) Oral daily  methylPREDNISolone sodium succinate IVPB 60 milliGRAM(s) IV Intermittent every 12 hours  metoprolol tartrate 100 milliGRAM(s) Oral every 12 hours  mycophenolate mofetil 1000 milliGRAM(s) Oral every 12 hours  NIFEdipine XL 60 milliGRAM(s) Oral daily  nystatin    Suspension 057274 Unit(s) Swish and Swallow four times a day  senna 2 Tablet(s) Oral at bedtime  tacrolimus 5 milliGRAM(s) Oral two times a day  trimethoprim   80 mG/sulfamethoxazole 400 mG 1 Tablet(s) Oral daily  valGANciclovir 450 milliGRAM(s) Oral daily    PRN Inpatient Medications  acetaminophen   Tablet. 650 milliGRAM(s) Oral every 6 hours PRN  lidocaine 2% Gel 1 Application(s) Topical three times a day PRN  oxyCODONE    5 mG/acetaminophen 325 mG 1 Tablet(s) Oral every 4 hours PRN  oxyCODONE    5 mG/acetaminophen 325 mG 2 Tablet(s) Oral every 4 hours PRN  simethicone 80 milliGRAM(s) Chew every 6 hours PRN      REVIEW OF SYSTEMS  --------------------------------------------------------------------------------  Gen: No weakness  Skin: No rashes  Head/Eyes/Ears/Mouth: No headache  Respiratory: No dyspnea  CV: No chest pain, PND, orthopnea  GI: No abdominal pain, diarrhea  : No increased frequency, dysuria  MSK: No edema  Neuro: No dizziness/lightheadedness    All other systems were reviewed and are negative, except as noted.    VITALS/PHYSICAL EXAM  --------------------------------------------------------------------------------  T(C): 37 (07-25-18 @ 13:49), Max: 37.1 (07-24-18 @ 17:00)  HR: 63 (07-25-18 @ 13:49) (63 - 74)  BP: 198/115 (07-25-18 @ 13:49) (154/94 - 200/103)  RR: 18 (07-25-18 @ 13:49) (16 - 18)  SpO2: 100% (07-25-18 @ 13:49) (95% - 100%)  Wt(kg): --    07-24-18 @ 07:01  -  07-25-18 @ 07:00  --------------------------------------------------------  IN: 3290 mL / OUT: 2025 mL / NET: 1265 mL    07-25-18 @ 07:01  -  07-25-18 @ 14:42  --------------------------------------------------------  IN: 660 mL / OUT: 365 mL / NET: 295 mL    Physical Exam:  	Gen: NAD, well-appearing  	Pulm: CTA B/L  	CV: RRR, S1S2; no rub  	Abd: +BS, soft, nontender/nondistended  	: No suprapubic tenderness  	UE: Warm, no asterixis  	LE: Warm, no edema, palpable DP pulses b/l   	Psych: Normal affect and mood  	Skin: Warm, without rashes  	Vascular access: LUE AVF, aneurysmal appearance     LABS/STUDIES  --------------------------------------------------------------------------------              12.2   13.1  >-----------<  186      [07-25-18 @ 06:59]              38.0     137  |  103  |  54  ----------------------------<  134      [07-25-18 @ 06:45]  5.4   |  19  |  6.76        Ca     10.6     [07-25-18 @ 06:45]      Mg     2.1     [07-25-18 @ 06:45]      Phos  6.5     [07-25-18 @ 06:45]    TPro  6.7  /  Alb  4.1  /  TBili  0.3  /  DBili  x   /  AST  12  /  ALT  10  /  AlkPhos  68  [07-24-18 @ 10:07]    PT/INR: PT 11.7 , INR 1.07       [07-24-18 @ 10:07]  PTT: 30.7       [07-24-18 @ 10:07]          [07-24-18 @ 02:38]    Creatinine Trend:  SCr 6.76 [07-25 @ 06:45]  SCr 8.93 [07-24 @ 10:07]  SCr 9.83 [07-24 @ 02:38]  SCr 9.17 [07-23 @ 20:52]  SCr 17.32 [07-23 @ 11:03]    HbA1c 5.1      [08-18-15 @ 18:19]

## 2018-07-25 NOTE — PROGRESS NOTE ADULT - SUBJECTIVE AND OBJECTIVE BOX
INTERVAL HPI/OVERNIGHT EVENTS:  Patient seen with multidisciplinary team (Nephrologist, pharmacist, nurse, nurse manager, NP, MD surgeons, transplant cordinator,  nutritionist, and  )  in am rounds and examined with Dr. fleming. 34 y/o gnetleman POD#2 R sided DDRT anastomosed to R external iliac artery and vein. Ureter anastomosed to bladder over double J stent.  Cold ischemia time14 hours.  CMV +, EBV +     No acute event overnight.  Complaints of marquez discomfort. Reports pain mostly from marquez and not incisional. OOB to chair. Creatinine 6.76 from 8.9.  UOP 1790.  BP elevated at times.     MEDICATIONS  (STANDING):  basiliximab  IVPB 20 milliGRAM(s) IV Intermittent <User Schedule>  docusate sodium 100 milliGRAM(s) Oral daily  famotidine    Tablet 20 milliGRAM(s) Oral daily  methylPREDNISolone sodium succinate IVPB 60 milliGRAM(s) IV Intermittent every 12 hours  metoprolol tartrate 100 milliGRAM(s) Oral every 12 hours  mycophenolate mofetil 1000 milliGRAM(s) Oral every 12 hours  NIFEdipine XL 60 milliGRAM(s) Oral daily  nystatin    Suspension 801532 Unit(s) Swish and Swallow four times a day  senna 2 Tablet(s) Oral at bedtime  tacrolimus 5 milliGRAM(s) Oral two times a day  trimethoprim   80 mG/sulfamethoxazole 400 mG 1 Tablet(s) Oral daily  valGANciclovir 450 milliGRAM(s) Oral daily    MEDICATIONS  (PRN):  acetaminophen   Tablet. 650 milliGRAM(s) Oral every 6 hours PRN Mild Pain (1 - 3)  lidocaine 2% Gel 1 Application(s) Topical three times a day PRN pain related to urinary catheter  oxyCODONE    5 mG/acetaminophen 325 mG 1 Tablet(s) Oral every 4 hours PRN Moderate Pain (4 - 6)  oxyCODONE    5 mG/acetaminophen 325 mG 2 Tablet(s) Oral every 4 hours PRN Severe Pain (7 - 10)  simethicone 80 milliGRAM(s) Chew every 6 hours PRN Gas      Allergies    IV Contrast (Rash)  nyquil (Rhinorrhea)  vancomycin (Pruritus; Hives)    Intolerances        Vital Signs Last 24 Hrs  T(C): 36.8 (25 Jul 2018 10:01), Max: 37.1 (24 Jul 2018 17:00)  T(F): 98.2 (25 Jul 2018 10:01), Max: 98.8 (24 Jul 2018 17:00)  HR: 70 (25 Jul 2018 10:01) (64 - 74)  BP: 168/99 (25 Jul 2018 10:01) (154/94 - 200/103)  BP(mean): --  RR: 18 (25 Jul 2018 10:01) (16 - 18)  SpO2: 96% (25 Jul 2018 10:01) (95% - 100%)    LABS:                        12.2   13.1  )-----------( 186      ( 25 Jul 2018 06:59 )             38.0     07-25    137  |  103  |  54<H>  ----------------------------<  134<H>  5.4<H>   |  19<L>  |  6.76<H>    Ca    10.6<H>      25 Jul 2018 06:45  Phos  6.5     07-25  Mg     2.1     07-25    TPro  6.7  /  Alb  4.1  /  TBili  0.3  /  DBili  x   /  AST  12  /  ALT  10  /  AlkPhos  68  07-24    PT/INR - ( 24 Jul 2018 10:07 )   PT: 11.7 sec;   INR: 1.07 ratio         PTT - ( 24 Jul 2018 10:07 )  PTT:30.7 sec      RADIOLOGY & ADDITIONAL TESTS:    Review of systems    EXAM:  US KIDNEY TRANSPLANT W DOPP RT                       PROCEDURE DATE:  07/24/2018    INTERPRETATION:  CLINICAL INFORMATION: Status post renal transplant last   night to assess arteries and veins.    COMPARISON: Correlation is made with previous pre renal transplant images   of the abdomen dated 2/8/2018.    TECHNIQUE: Grayscale, Color and spectral Doppler evaluation of a  right   lower quadrant renal transplant.     FINDINGS:    Renal Transplant: 10.3 cm. No hydronephrosis.    Urinary bladder: Collapsed with a Marquez catheter.    Color and spectral Doppler reveals homogeneous flow throughout the   transplant.    Peak iliac artery velocity is 90 cm/sec pre-anastomosis, 119 cm/sec at   the anastomosis, and 100 cm/sec post anastomosis.    Transplant Renal Artery:   Peak systolic velocity is 190 cm/sec anastomosis, 160 cm/sec proximal,   208 cm/sec mid, 212 cm/sec distal and 180 cm/sec hilum.   Resistive Indices Range: 0.58-0.67    Transplant Renal Vein: Patent..    IMPRESSION:     No evidence of a significant renal artery stenosis.        Review of Systems  Gen: No weight changes, fatigue, fevers/chills, weakness  Skin: No rashes  Head/Eyes/Ears/Mouth: No headache; Normal hearing; Normal vision w/o blurriness; No sinus pain/discomfort, sore throat  Respiratory: No dyspnea, cough, wheezing, hemoptysis  CV: No chest pain, PND, orthopnea  GI: Reports incisional pain and TTP, denies diarrhea, constipation, nausea, vomiting, melena, hematochezia  : No increased frequency, dysuria, hematuria, nocturia. Reports marquez discomfort  MSK: No joint pain/swelling; no back pain; no edema  Neuro: No dizziness/lightheadedness, weakness, seizures, numbness, tingling  Heme: No easy bruising or bleeding  Endo: No heat/cold intolerance  Psych: No significant nervousness, anxiety, stress, depression  All other systems were reviewed and are negative, except as noted.    PHYSICAL EXAM:  Constitutional: Well developed / well nourished  Eyes: Anicteric, PERRLA  ENMT: nc/at  Neck: supple  Respiratory: CTA B/L  Cardiovascular: RRR  Gastrointestinal: Soft abdomen, mild tender to touch at surgical site, ND  Genitourinary: Urinary catheter in place   Extremities: SCD's in place and working bilaterally, No edema  Vascular: Palpable dp pulses bilaterally  Neurological: A&O x3  Skin: Mild serosanguinous anna on wound dressing no erythema and evidence of infection noted  Musculoskeletal: Moving all extremities  Psychiatric: Responsive

## 2018-07-25 NOTE — PROGRESS NOTE ADULT - ASSESSMENT
33 year old male with a PMH of ESRD on HD (MWF) from LUE AVF, HTN s/p DDRT 33 years old donor, has ureteral stent.  now non oliguric.

## 2018-07-25 NOTE — PROGRESS NOTE ADULT - SUBJECTIVE AND OBJECTIVE BOX
Dr. Wagner  Office [phone]  Cell [phone]  Cleopatra KNIGHT  Cell [phone]      Patient is a 33y old  Male who presents with a chief complaint of DDRT (23 Jul 2018 09:25)      Patient seen and examined at bedside. No chest pain/sob    VITALS:  T(F): 98.2 (07-25-18 @ 10:01), Max: 98.8 (07-24-18 @ 17:00)  HR: 70 (07-25-18 @ 10:01)  BP: 168/99 (07-25-18 @ 10:01)  RR: 18 (07-25-18 @ 10:01)  SpO2: 96% (07-25-18 @ 10:01)  Wt(kg): --    07-24 @ 07:01  -  07-25 @ 07:00  --------------------------------------------------------  IN: 3290 mL / OUT: 2025 mL / NET: 1265 mL    07-25 @ 07:01  -  07-25 @ 12:40  --------------------------------------------------------  IN: 660 mL / OUT: 365 mL / NET: 295 mL          PHYSICAL EXAM:  Constitutional: NAD  Neck: No JVD  Respiratory: CTAB, no wheezes, rales or rhonchi  Cardiovascular: S1, S2, RRR  Gastrointestinal: BS+, soft, NT/ND  Extremities: No peripheral edema    Hospital Medications:   MEDICATIONS  (STANDING):  basiliximab  IVPB 20 milliGRAM(s) IV Intermittent <User Schedule>  docusate sodium 100 milliGRAM(s) Oral daily  famotidine    Tablet 20 milliGRAM(s) Oral daily  methylPREDNISolone sodium succinate IVPB 60 milliGRAM(s) IV Intermittent every 12 hours  metoprolol tartrate 100 milliGRAM(s) Oral every 12 hours  mycophenolate mofetil 1000 milliGRAM(s) Oral every 12 hours  NIFEdipine XL 60 milliGRAM(s) Oral daily  nystatin    Suspension 065855 Unit(s) Swish and Swallow four times a day  senna 2 Tablet(s) Oral at bedtime  tacrolimus 5 milliGRAM(s) Oral two times a day  trimethoprim   80 mG/sulfamethoxazole 400 mG 1 Tablet(s) Oral daily  valGANciclovir 450 milliGRAM(s) Oral daily    Tacrolimus (), Serum: 4.4 ng/mL (07-25 @ 07:59)    LABS:  07-25    137  |  103  |  54<H>  ----------------------------<  134<H>  5.4<H>   |  19<L>  |  6.76<H>    Ca    10.6<H>      25 Jul 2018 06:45  Phos  6.5     07-25  Mg     2.1     07-25    TPro  6.7  /  Alb  4.1  /  TBili  0.3  /  DBili      /  AST  12  /  ALT  10  /  AlkPhos  68  07-24    Creatinine Trend: 6.76 <--, 8.93 <--, 9.83 <--, 9.17 <--, 17.32 <--    Phosphorus Level, Serum: 6.5 mg/dL (07-25 @ 06:45)                              12.2   13.1  )-----------( 186      ( 25 Jul 2018 06:59 )             38.0     Urine Studies:      HbA1c 5.1      [08-18-15 @ 18:19]        RADIOLOGY & ADDITIONAL STUDIES: Dr. Wagner  Office [phone]  Cell [phone]  Cleopatra KNIGHT  Cell [phone]      Patient is a 33y old  Male who presents with a chief complaint of DDRT (23 Jul 2018 09:25)      Patient seen and examined at bedside. No chest pain/sob    VITALS:  T(F): 98.2 (07-25-18 @ 10:01), Max: 98.8 (07-24-18 @ 17:00)  HR: 70 (07-25-18 @ 10:01)  BP: 168/99 (07-25-18 @ 10:01)  RR: 18 (07-25-18 @ 10:01)  SpO2: 96% (07-25-18 @ 10:01)  Wt(kg): --    07-24 @ 07:01  -  07-25 @ 07:00  --------------------------------------------------------  IN: 3290 mL / OUT: 2025 mL / NET: 1265 mL    07-25 @ 07:01  -  07-25 @ 12:40  --------------------------------------------------------  IN: 660 mL / OUT: 365 mL / NET: 295 mL          PHYSICAL EXAM:  Constitutional: NAD  Neck: No JVD  Respiratory: CTAB, no wheezes, rales or rhonchi  Cardiovascular: S1, S2, RRR  Gastrointestinal: BS+, soft, ND, tenderness +  Extremities: No peripheral edema    Hospital Medications:   MEDICATIONS  (STANDING):  basiliximab  IVPB 20 milliGRAM(s) IV Intermittent <User Schedule>  docusate sodium 100 milliGRAM(s) Oral daily  famotidine    Tablet 20 milliGRAM(s) Oral daily  methylPREDNISolone sodium succinate IVPB 60 milliGRAM(s) IV Intermittent every 12 hours  metoprolol tartrate 100 milliGRAM(s) Oral every 12 hours  mycophenolate mofetil 1000 milliGRAM(s) Oral every 12 hours  NIFEdipine XL 60 milliGRAM(s) Oral daily  nystatin    Suspension 482826 Unit(s) Swish and Swallow four times a day  senna 2 Tablet(s) Oral at bedtime  tacrolimus 5 milliGRAM(s) Oral two times a day  trimethoprim   80 mG/sulfamethoxazole 400 mG 1 Tablet(s) Oral daily  valGANciclovir 450 milliGRAM(s) Oral daily    Tacrolimus (), Serum: 4.4 ng/mL (07-25 @ 07:59)    LABS:  07-25    137  |  103  |  54<H>  ----------------------------<  134<H>  5.4<H>   |  19<L>  |  6.76<H>    Ca    10.6<H>      25 Jul 2018 06:45  Phos  6.5     07-25  Mg     2.1     07-25    TPro  6.7  /  Alb  4.1  /  TBili  0.3  /  DBili      /  AST  12  /  ALT  10  /  AlkPhos  68  07-24    Creatinine Trend: 6.76 <--, 8.93 <--, 9.83 <--, 9.17 <--, 17.32 <--    Phosphorus Level, Serum: 6.5 mg/dL (07-25 @ 06:45)                              12.2   13.1  )-----------( 186      ( 25 Jul 2018 06:59 )             38.0     Urine Studies:      HbA1c 5.1      [08-18-15 @ 18:19]        RADIOLOGY & ADDITIONAL STUDIES:

## 2018-07-26 ENCOUNTER — MEDICATION RENEWAL (OUTPATIENT)
Age: 33
End: 2018-07-26

## 2018-07-26 DIAGNOSIS — E87.5 HYPERKALEMIA: ICD-10-CM

## 2018-07-26 LAB
ALBUMIN SERPL ELPH-MCNC: 4.4 G/DL — SIGNIFICANT CHANGE UP (ref 3.3–5)
ALP SERPL-CCNC: 60 U/L — SIGNIFICANT CHANGE UP (ref 40–120)
ALT FLD-CCNC: 8 U/L — LOW (ref 10–45)
ANION GAP SERPL CALC-SCNC: 15 MMOL/L — SIGNIFICANT CHANGE UP (ref 5–17)
AST SERPL-CCNC: 15 U/L — SIGNIFICANT CHANGE UP (ref 10–40)
BASOPHILS # BLD AUTO: 0 K/UL — SIGNIFICANT CHANGE UP (ref 0–0.2)
BASOPHILS NFR BLD AUTO: 0.3 % — SIGNIFICANT CHANGE UP (ref 0–2)
BILIRUB SERPL-MCNC: 0.3 MG/DL — SIGNIFICANT CHANGE UP (ref 0.2–1.2)
BUN SERPL-MCNC: 66 MG/DL — HIGH (ref 7–23)
CALCIUM SERPL-MCNC: 10.7 MG/DL — HIGH (ref 8.4–10.5)
CHLORIDE SERPL-SCNC: 101 MMOL/L — SIGNIFICANT CHANGE UP (ref 96–108)
CO2 SERPL-SCNC: 21 MMOL/L — LOW (ref 22–31)
CREAT SERPL-MCNC: 5.99 MG/DL — HIGH (ref 0.5–1.3)
EOSINOPHIL # BLD AUTO: 0 K/UL — SIGNIFICANT CHANGE UP (ref 0–0.5)
EOSINOPHIL NFR BLD AUTO: 0.3 % — SIGNIFICANT CHANGE UP (ref 0–6)
GLUCOSE BLDC GLUCOMTR-MCNC: 106 MG/DL — HIGH (ref 70–99)
GLUCOSE BLDC GLUCOMTR-MCNC: 107 MG/DL — HIGH (ref 70–99)
GLUCOSE BLDC GLUCOMTR-MCNC: 111 MG/DL — HIGH (ref 70–99)
GLUCOSE BLDC GLUCOMTR-MCNC: 117 MG/DL — HIGH (ref 70–99)
GLUCOSE BLDC GLUCOMTR-MCNC: 123 MG/DL — HIGH (ref 70–99)
GLUCOSE BLDC GLUCOMTR-MCNC: 124 MG/DL — HIGH (ref 70–99)
GLUCOSE SERPL-MCNC: 110 MG/DL — HIGH (ref 70–99)
HCT VFR BLD CALC: 36.6 % — LOW (ref 39–50)
HGB BLD-MCNC: 12.1 G/DL — LOW (ref 13–17)
LDH SERPL L TO P-CCNC: 222 U/L — SIGNIFICANT CHANGE UP (ref 50–242)
LYMPHOCYTES # BLD AUTO: 1.4 K/UL — SIGNIFICANT CHANGE UP (ref 1–3.3)
LYMPHOCYTES # BLD AUTO: 11.4 % — LOW (ref 13–44)
MAGNESIUM SERPL-MCNC: 2.3 MG/DL — SIGNIFICANT CHANGE UP (ref 1.6–2.6)
MCHC RBC-ENTMCNC: 28.8 PG — SIGNIFICANT CHANGE UP (ref 27–34)
MCHC RBC-ENTMCNC: 33 GM/DL — SIGNIFICANT CHANGE UP (ref 32–36)
MCV RBC AUTO: 87.2 FL — SIGNIFICANT CHANGE UP (ref 80–100)
MONOCYTES # BLD AUTO: 1 K/UL — HIGH (ref 0–0.9)
MONOCYTES NFR BLD AUTO: 8.2 % — SIGNIFICANT CHANGE UP (ref 2–14)
NEUTROPHILS # BLD AUTO: 9.8 K/UL — HIGH (ref 1.8–7.4)
NEUTROPHILS NFR BLD AUTO: 79.8 % — HIGH (ref 43–77)
PHOSPHATE SERPL-MCNC: 5.8 MG/DL — HIGH (ref 2.5–4.5)
PLATELET # BLD AUTO: 161 K/UL — SIGNIFICANT CHANGE UP (ref 150–400)
POTASSIUM SERPL-MCNC: 5.5 MMOL/L — HIGH (ref 3.5–5.3)
POTASSIUM SERPL-SCNC: 5.5 MMOL/L — HIGH (ref 3.5–5.3)
PROT SERPL-MCNC: 7.2 G/DL — SIGNIFICANT CHANGE UP (ref 6–8.3)
RBC # BLD: 4.2 M/UL — SIGNIFICANT CHANGE UP (ref 4.2–5.8)
RBC # FLD: 12.4 % — SIGNIFICANT CHANGE UP (ref 10.3–14.5)
SODIUM SERPL-SCNC: 137 MMOL/L — SIGNIFICANT CHANGE UP (ref 135–145)
TACROLIMUS SERPL-MCNC: 12 NG/ML — SIGNIFICANT CHANGE UP
WBC # BLD: 12.3 K/UL — HIGH (ref 3.8–10.5)
WBC # FLD AUTO: 12.3 K/UL — HIGH (ref 3.8–10.5)

## 2018-07-26 PROCEDURE — 99232 SBSQ HOSP IP/OBS MODERATE 35: CPT | Mod: GC

## 2018-07-26 RX ORDER — NIFEDIPINE 30 MG
30 TABLET, EXTENDED RELEASE 24 HR ORAL ONCE
Qty: 0 | Refills: 0 | Status: COMPLETED | OUTPATIENT
Start: 2018-07-26 | End: 2018-07-26

## 2018-07-26 RX ORDER — TACROLIMUS 5 MG/1
4 CAPSULE ORAL
Qty: 0 | Refills: 0 | Status: DISCONTINUED | OUTPATIENT
Start: 2018-07-26 | End: 2018-07-29

## 2018-07-26 RX ORDER — GLYCERIN ADULT
1 SUPPOSITORY, RECTAL RECTAL ONCE
Qty: 0 | Refills: 0 | Status: COMPLETED | OUTPATIENT
Start: 2018-07-26 | End: 2018-07-26

## 2018-07-26 RX ORDER — NIFEDIPINE 30 MG
90 TABLET, EXTENDED RELEASE 24 HR ORAL DAILY
Qty: 0 | Refills: 0 | Status: DISCONTINUED | OUTPATIENT
Start: 2018-07-27 | End: 2018-07-29

## 2018-07-26 RX ORDER — HYDROMORPHONE HYDROCHLORIDE 2 MG/ML
0.5 INJECTION INTRAMUSCULAR; INTRAVENOUS; SUBCUTANEOUS ONCE
Qty: 0 | Refills: 0 | Status: DISCONTINUED | OUTPATIENT
Start: 2018-07-26 | End: 2018-07-27

## 2018-07-26 RX ADMIN — Medication 100 MILLIGRAM(S): at 06:16

## 2018-07-26 RX ADMIN — OXYCODONE AND ACETAMINOPHEN 2 TABLET(S): 5; 325 TABLET ORAL at 21:20

## 2018-07-26 RX ADMIN — Medication 650 MILLIGRAM(S): at 19:13

## 2018-07-26 RX ADMIN — Medication 1 TABLET(S): at 12:14

## 2018-07-26 RX ADMIN — TACROLIMUS 5 MILLIGRAM(S): 5 CAPSULE ORAL at 06:16

## 2018-07-26 RX ADMIN — Medication 60 MILLIGRAM(S): at 06:19

## 2018-07-26 RX ADMIN — Medication 100 MILLIGRAM(S): at 18:32

## 2018-07-26 RX ADMIN — Medication 1 SUPPOSITORY(S): at 11:53

## 2018-07-26 RX ADMIN — FAMOTIDINE 20 MILLIGRAM(S): 10 INJECTION INTRAVENOUS at 12:15

## 2018-07-26 RX ADMIN — Medication 50.75 MILLIGRAM(S): at 18:31

## 2018-07-26 RX ADMIN — Medication 50.75 MILLIGRAM(S): at 06:22

## 2018-07-26 RX ADMIN — Medication 500000 UNIT(S): at 06:16

## 2018-07-26 RX ADMIN — BASILIXIMAB 100 MILLIGRAM(S): 20 INJECTION, POWDER, FOR SOLUTION INTRAVENOUS at 15:25

## 2018-07-26 RX ADMIN — Medication 30 MILLIGRAM(S): at 18:32

## 2018-07-26 RX ADMIN — OXYCODONE AND ACETAMINOPHEN 2 TABLET(S): 5; 325 TABLET ORAL at 20:45

## 2018-07-26 RX ADMIN — TACROLIMUS 4 MILLIGRAM(S): 5 CAPSULE ORAL at 18:32

## 2018-07-26 RX ADMIN — Medication 500000 UNIT(S): at 12:15

## 2018-07-26 RX ADMIN — Medication 500000 UNIT(S): at 18:32

## 2018-07-26 RX ADMIN — Medication 650 MILLIGRAM(S): at 12:15

## 2018-07-26 RX ADMIN — Medication 650 MILLIGRAM(S): at 12:57

## 2018-07-26 RX ADMIN — VALGANCICLOVIR 450 MILLIGRAM(S): 450 TABLET, FILM COATED ORAL at 12:14

## 2018-07-26 RX ADMIN — Medication 650 MILLIGRAM(S): at 18:32

## 2018-07-26 RX ADMIN — MYCOPHENOLATE MOFETIL 1000 MILLIGRAM(S): 250 CAPSULE ORAL at 06:16

## 2018-07-26 RX ADMIN — Medication 100 MILLIGRAM(S): at 12:14

## 2018-07-26 RX ADMIN — MYCOPHENOLATE MOFETIL 1000 MILLIGRAM(S): 250 CAPSULE ORAL at 18:32

## 2018-07-26 RX ADMIN — Medication 500000 UNIT(S): at 22:06

## 2018-07-26 RX ADMIN — Medication 500000 UNIT(S): at 01:22

## 2018-07-26 RX ADMIN — SENNA PLUS 2 TABLET(S): 8.6 TABLET ORAL at 22:05

## 2018-07-26 NOTE — PROGRESS NOTE ADULT - PROBLEM SELECTOR PLAN 2
On Tacrolimus, cellcept, steroid taper, Nystatin S&S, bactrim, and Valcyte   Simulect dose today  F/U Tacrolimus level daily, tacro goal 8-10  Monitor closely.

## 2018-07-26 NOTE — PROGRESS NOTE ADULT - SUBJECTIVE AND OBJECTIVE BOX
Learner: Patient  Barriers: None    Patient received a renal transplant on 7/23/18    Method used: Verbal discussion and written material    Medication safety was discussed with the patient: allergies, herbals, adherence, interactions, medication precautions, miss dose instructions, purpose, side effects, signs and symptoms to report, and storage & handling    Patient was able to repeat and verbalize key points    Education Summary: Discharge immunosuppressant medications and prophylatic anti-infective agents reviewed with the patient. Outpatient medication schedule was discussed in detail including: medication name, indication, dose, administration times, treatment duration, side effects, drug interactions, and special instructions. Patient questions and concerns were answered and addressed. Patient demonstrated understanding.    Time spent discharge education: 60 minutes    Renal Transplant medications:  Tacrolimus adjusted to trough   Mycophenolate mofetil 1g BID  Prednisone taper to 5mg daily  Sulfamethoxazole/Trimethoprim 1SS daily  Nystatin swish and swallow four times a day  Valganciclovir 450 mg daily  Pepcid 20 mg daily  Docusate and Senna  Other meds: lopressor 100 mg twice daily, nifedipine xl 60 mg daily

## 2018-07-26 NOTE — PROGRESS NOTE ADULT - SUBJECTIVE AND OBJECTIVE BOX
[facility] DIVISION OF KIDNEY DISEASES AND HYPERTENSION -- FOLLOW UP NOTE  --------------------------------------------------------------------------------    HPI: 33 year old male with a PMH of ESRD on HD (MWF) from [facility], HTN s/p DDRT 7/23/18. Pt has been on HD for the last 8 years and he follows with [facility] Pts last HD was 7/23/18. No plan for further HD. Pt non-oliguric with good urine output. Pt seen and examined. Rodriguez catheter removed today. Pt denies CP, SOB or LE edema.     PAST HISTORY  --------------------------------------------------------------------------------  No significant changes to PMH, PSH, FHx, SHx, unless otherwise noted    ALLERGIES & MEDICATIONS  --------------------------------------------------------------------------------  Allergies    IV Contrast (Rash)  nyquil (Rhinorrhea)  vancomycin (Pruritus; Hives)    Intolerances      Standing Inpatient Medications  basiliximab  IVPB 20 milliGRAM(s) IV Intermittent <User Schedule>  docusate sodium 100 milliGRAM(s) Oral daily  famotidine    Tablet 20 milliGRAM(s) Oral daily  glycerin Suppository - Adult 1 Suppository(s) Rectal once  methylPREDNISolone sodium succinate IVPB 30 milliGRAM(s) IV Intermittent every 12 hours  metoprolol tartrate 100 milliGRAM(s) Oral every 12 hours  mycophenolate mofetil 1000 milliGRAM(s) Oral every 12 hours  NIFEdipine XL 60 milliGRAM(s) Oral daily  nystatin    Suspension 124367 Unit(s) Swish and Swallow four times a day  senna 2 Tablet(s) Oral at bedtime  tacrolimus 5 milliGRAM(s) Oral two times a day  trimethoprim   80 mG/sulfamethoxazole 400 mG 1 Tablet(s) Oral daily  valGANciclovir 450 milliGRAM(s) Oral daily    PRN Inpatient Medications  acetaminophen   Tablet. 650 milliGRAM(s) Oral every 6 hours PRN  lidocaine 2% Gel 1 Application(s) Topical three times a day PRN  oxyCODONE    5 mG/acetaminophen 325 mG 1 Tablet(s) Oral every 4 hours PRN  oxyCODONE    5 mG/acetaminophen 325 mG 2 Tablet(s) Oral every 4 hours PRN  simethicone 80 milliGRAM(s) Chew every 6 hours PRN      REVIEW OF SYSTEMS  --------------------------------------------------------------------------------  Gen: No weakness  Skin: No rashes  Head/Eyes/Ears/Mouth: No headache  Respiratory: No dyspnea  CV: No chest pain, PND, orthopnea  GI: No abdominal pain, diarrhea  : No increased frequency, dysuria  MSK: No edema  Neuro: No dizziness/lightheadedness    All other systems were reviewed and are negative, except as noted.    VITALS/PHYSICAL EXAM  --------------------------------------------------------------------------------  T(C): 36.8 (07-26-18 @ 08:20), Max: 37.2 (07-26-18 @ 01:30)  HR: 68 (07-26-18 @ 08:20) (63 - 74)  BP: 148/97 (07-26-18 @ 08:20) (143/83 - 198/115)  RR: 16 (07-26-18 @ 08:20) (16 - 18)  SpO2: 99% (07-26-18 @ 08:20) (96% - 100%)  Wt(kg): --    07-25-18 @ 07:01  -  07-26-18 @ 07:00  --------------------------------------------------------  IN: 1850 mL / OUT: 1548 mL / NET: 302 mL    07-26-18 @ 07:01  -  07-26-18 @ 09:40  --------------------------------------------------------  IN: 120 mL / OUT: 182 mL / NET: -62 mL    Physical Exam:  	Gen: NAD, well-appearing  	Pulm: CTA B/L  	CV: RRR, S1S2; no rub  	Abd: +BS, soft, nontender/nondistended  	: No suprapubic tenderness  	UE: Warm, no asterixis  	LE: Warm, no edema, palpable DP pulses b/l   	Psych: Normal affect and mood  	Skin: Warm, without rashes  	Vascular access: LUE AVF, aneurysmal appearance     LABS/STUDIES  --------------------------------------------------------------------------------              12.1   12.3  >-----------<  161      [07-26-18 @ 07:20]              36.6     137  |  101  |  66  ----------------------------<  110      [07-26-18 @ 07:20]  5.5   |  21  |  5.99        Ca     10.7     [07-26-18 @ 07:20]      Mg     2.3     [07-26-18 @ 07:20]      Phos  5.8     [07-26-18 @ 07:20]    TPro  7.2  /  Alb  4.4  /  TBili  0.3  /  DBili  x   /  AST  15  /  ALT  8   /  AlkPhos  60  [07-26-18 @ 07:20]    PT/INR: PT 11.7 , INR 1.07       [07-24-18 @ 10:07]  PTT: 30.7       [07-24-18 @ 10:07]          [07-26-18 @ 07:20]    Creatinine Trend:  SCr 5.99 [07-26 @ 07:20]  SCr 6.76 [07-25 @ 06:45]  SCr 8.93 [07-24 @ 10:07]  SCr 9.83 [07-24 @ 02:38]  SCr 9.17 [07-23 @ 20:52]    HbA1c 5.1      [08-18-15 @ 18:19]

## 2018-07-26 NOTE — PROGRESS NOTE ADULT - ATTENDING COMMENTS
Pt s/p DDRT pod 3 from young donor  Kidney functioning but slowly - likely due to mild ATN  BP improving.

## 2018-07-26 NOTE — PROGRESS NOTE ADULT - PROBLEM SELECTOR PLAN 1
Downtrending Creatinine with good uop  Strict I/Os  Regular diet as tolerated. Change to low K, Low Phos  Continue PRN Tylenol and Percocet for pain  Continue bowel regimen.  daily BMP  Blood sugars well controlled. Fingersticks before meals and bedtime. On steroid taper

## 2018-07-26 NOTE — PROGRESS NOTE ADULT - ASSESSMENT
34 Y/O gentleman with PMH of HTN and ESRD since 2010 now POD#3 R sided DDRT with double J stent placement doing well with downtrending creatinine and good uop.

## 2018-07-26 NOTE — PROGRESS NOTE ADULT - SUBJECTIVE AND OBJECTIVE BOX
Dr. Wagner  Office [phone]  Cell [phone]  [facility]  Cell [phone]      Patient is a 33y old  Male who presents with a chief complaint of DDRT (23 Jul 2018 09:25)      Patient seen and examined at bedside. No chest pain/sob    VITALS:  T(F): 98.5 (07-26-18 @ 22:39), Max: 99 (07-26-18 @ 01:30)  HR: 67 (07-26-18 @ 22:39)  BP: 98/- (07-26-18 @ 22:39)  RR: 18 (07-26-18 @ 22:39)  SpO2: 98% (07-26-18 @ 22:39)  Wt(kg): --    07-25 @ 07:01  -  07-26 @ 07:00  --------------------------------------------------------  IN: 1850 mL / OUT: 1548 mL / NET: 302 mL    07-26 @ 07:01  -  07-26 @ 22:40  --------------------------------------------------------  IN: 500 mL / OUT: 1077 mL / NET: -577 mL          PHYSICAL EXAM:  Constitutional: NAD  Neck: No JVD  Respiratory: CTAB, no wheezes, rales or rhonchi  Cardiovascular: S1, S2, RRR  Gastrointestinal: BS+, soft, ND, mildly tender  Extremities: No peripheral edema    Hospital Medications:   MEDICATIONS  (STANDING):  basiliximab  IVPB 20 milliGRAM(s) IV Intermittent <User Schedule>  docusate sodium 100 milliGRAM(s) Oral daily  famotidine    Tablet 20 milliGRAM(s) Oral daily  metoprolol tartrate 100 milliGRAM(s) Oral every 12 hours  mycophenolate mofetil 1000 milliGRAM(s) Oral every 12 hours  nystatin    Suspension [id number] Unit(s) Swish and Swallow four times a day  senna 2 Tablet(s) Oral at bedtime  tacrolimus 4 milliGRAM(s) Oral two times a day  trimethoprim   80 mG/sulfamethoxazole 400 mG 1 Tablet(s) Oral daily  valGANciclovir 450 milliGRAM(s) Oral daily    Tacrolimus (), Serum: 12.0 ng/mL (07-26 @ 08:14)    LABS:  07-26    137  |  101  |  66<H>  ----------------------------<  110<H>  5.5<H>   |  21<L>  |  5.99<H>    Ca    10.7<H>      26 Jul 2018 07:20  Phos  5.8     07-26  Mg     2.3     07-26    TPro  7.2  /  Alb  4.4  /  TBili  0.3  /  DBili      /  AST  15  /  ALT  8<L>  /  AlkPhos  60  07-26    Creatinine Trend: 5.99 <--, 6.76 <--, 8.93 <--, 9.83 <--, 9.17 <--, 17.32 <--    Albumin, Serum: 4.4 g/dL (07-26 @ 07:20)  Phosphorus Level, Serum: 5.8 mg/dL (07-26 @ 07:20)                              12.1   12.3  )-----------( 161      ( 26 Jul 2018 07:20 )             36.6     Urine Studies:      HbA1c 5.1      [08-18-15 @ 18:19]        RADIOLOGY & ADDITIONAL STUDIES:

## 2018-07-26 NOTE — PROGRESS NOTE ADULT - SUBJECTIVE AND OBJECTIVE BOX
INTERVAL HPI/OVERNIGHT EVENTS:  Patient seen with multidisciplinary team (Nephrologist, pharmacist, nurse, nurse manager, NP, MD surgeons, transplant cordinator,  nutritionist, and  )  in am rounds and examined with Dr. Hdz.  32 y/o gentleman POD#3 R sided DDRT anastomosed to R external iliac artery and vein. Ureter anastomosed to bladder over double J stent.  Cold ischemia time14 hours.  CMV +, EBV +     No acute events overnight.  Rodriguez removed at 0600 today.  He reports needing to void frequently since its removal. He has been ambulating but feels he cannot go far because of need to void.     MEDICATIONS  (STANDING):  basiliximab  IVPB 20 milliGRAM(s) IV Intermittent <User Schedule>  docusate sodium 100 milliGRAM(s) Oral daily  famotidine    Tablet 20 milliGRAM(s) Oral daily  methylPREDNISolone sodium succinate IVPB 30 milliGRAM(s) IV Intermittent every 12 hours  metoprolol tartrate 100 milliGRAM(s) Oral every 12 hours  mycophenolate mofetil 1000 milliGRAM(s) Oral every 12 hours  NIFEdipine XL 60 milliGRAM(s) Oral daily  nystatin    Suspension [id number] Unit(s) Swish and Swallow four times a day  senna 2 Tablet(s) Oral at bedtime  tacrolimus 4 milliGRAM(s) Oral two times a day  trimethoprim   80 mG/sulfamethoxazole 400 mG 1 Tablet(s) Oral daily  valGANciclovir 450 milliGRAM(s) Oral daily    MEDICATIONS  (PRN):  acetaminophen   Tablet. 650 milliGRAM(s) Oral every 6 hours PRN Mild Pain (1 - 3)  lidocaine 2% Gel 1 Application(s) Topical three times a day PRN pain related to urinary catheter  oxyCODONE    5 mG/acetaminophen 325 mG 1 Tablet(s) Oral every 4 hours PRN Moderate Pain (4 - 6)  oxyCODONE    5 mG/acetaminophen 325 mG 2 Tablet(s) Oral every 4 hours PRN Severe Pain (7 - 10)  simethicone 80 milliGRAM(s) Chew every 6 hours PRN Gas      Allergies    IV Contrast (Rash)  nyquil (Rhinorrhea)  vancomycin (Pruritus; Hives)    Intolerances        Vital Signs Last 24 Hrs  T(C): 36.9 (26 Jul 2018 12:10), Max: 37.2 (26 Jul 2018 01:30)  T(F): 98.4 (26 Jul 2018 12:10), Max: 99 (26 Jul 2018 01:30)  HR: 70 (26 Jul 2018 12:10) (63 - 74)  BP: 165/100 (26 Jul 2018 12:10) (143/83 - 198/115)  BP(mean): --  RR: 16 (26 Jul 2018 12:10) (16 - 18)  SpO2: 95% (26 Jul 2018 12:10) (95% - 100%)    LABS:                        12.1   12.3  )-----------( 161      ( 26 Jul 2018 07:20 )             36.6     07-26    137  |  101  |  66<H>  ----------------------------<  110<H>  5.5<H>   |  21<L>  |  5.99<H>    Ca    10.7<H>      26 Jul 2018 07:20  Phos  5.8     07-26  Mg     2.3     07-26    TPro  7.2  /  Alb  4.4  /  TBili  0.3  /  DBili  x   /  AST  15  /  ALT  8<L>  /  AlkPhos  60  07-26          RADIOLOGY & ADDITIONAL TESTS:    Gen: No weight changes, fatigue, fevers/chills, weakness  Skin: No rashes  Head/Eyes/Ears/Mouth: No headache; Normal hearing; Normal vision w/o blurriness; No sinus pain/discomfort, sore throat  Respiratory: No dyspnea, cough, wheezing, hemoptysis  CV: No chest pain, PND, orthopnea  GI: Reports incisional pain and TTP, denies diarrhea, constipation, nausea, vomiting, melena, hematochezia  : reports need to void frequently  MSK: No joint pain/swelling; no back pain; no edema  Neuro: No dizziness/lightheadedness, weakness, seizures, numbness, tingling  Heme: No easy bruising or bleeding  Endo: No heat/cold intolerance  Psych: No significant nervousness, anxiety, stress, depression  All other systems were reviewed and are negative, except as noted.    PHYSICAL EXAM:  Constitutional: Well developed / well nourished  Eyes: Anicteric, PERRLA  ENMT: nc/at  Neck: supple  Respiratory: CTA B/L  Cardiovascular: RRR  Gastrointestinal: Soft abdomen, mild tender to touch at surgical site, ND  Genitourinary: voiding  Extremities: SCD's in place and working bilaterally, trace BLE edema and hand edema  Vascular: Palpable dp pulses bilaterally  Neurological: A&O x3  Skin: Mild serosanguinous anna on wound dressing no erythema and evidence of infection noted  Musculoskeletal: Moving all extremities  Psychiatric: Responsive

## 2018-07-26 NOTE — PROGRESS NOTE ADULT - PROBLEM SELECTOR PLAN 1
Pt s/p DDRT on 7/23/18 with immediate function. Pt non-oliguric with good urine output. Pt received Simulect induction now maintained on tacrolimus/ steroid/ cellcept.   Monitor 12 hour trough.  Scr improving.  Rodriguez catheter removed today. Pt doing well. Advised low potassium diet

## 2018-07-27 DIAGNOSIS — N40.0 BENIGN PROSTATIC HYPERPLASIA WITHOUT LOWER URINARY TRACT SYMPMS: ICD-10-CM

## 2018-07-27 DIAGNOSIS — R60.0 LOCALIZED EDEMA: ICD-10-CM

## 2018-07-27 LAB
ALBUMIN SERPL ELPH-MCNC: 3.9 G/DL — SIGNIFICANT CHANGE UP (ref 3.3–5)
ALP SERPL-CCNC: 55 U/L — SIGNIFICANT CHANGE UP (ref 40–120)
ALT FLD-CCNC: 8 U/L — LOW (ref 10–45)
ANION GAP SERPL CALC-SCNC: 12 MMOL/L — SIGNIFICANT CHANGE UP (ref 5–17)
APPEARANCE UR: ABNORMAL
AST SERPL-CCNC: 13 U/L — SIGNIFICANT CHANGE UP (ref 10–40)
BASOPHILS # BLD AUTO: 0 K/UL — SIGNIFICANT CHANGE UP (ref 0–0.2)
BASOPHILS NFR BLD AUTO: 0.1 % — SIGNIFICANT CHANGE UP (ref 0–2)
BILIRUB SERPL-MCNC: 0.3 MG/DL — SIGNIFICANT CHANGE UP (ref 0.2–1.2)
BILIRUB UR-MCNC: NEGATIVE — SIGNIFICANT CHANGE UP
BUN SERPL-MCNC: 71 MG/DL — HIGH (ref 7–23)
CALCIUM SERPL-MCNC: 9.9 MG/DL — SIGNIFICANT CHANGE UP (ref 8.4–10.5)
CHLORIDE SERPL-SCNC: 106 MMOL/L — SIGNIFICANT CHANGE UP (ref 96–108)
CO2 SERPL-SCNC: 18 MMOL/L — LOW (ref 22–31)
COLOR SPEC: YELLOW — SIGNIFICANT CHANGE UP
CREAT FLD-MCNC: >112 MG/DL — SIGNIFICANT CHANGE UP
CREAT SERPL-MCNC: 5.2 MG/DL — HIGH (ref 0.5–1.3)
DIFF PNL FLD: ABNORMAL
EOSINOPHIL # BLD AUTO: 0 K/UL — SIGNIFICANT CHANGE UP (ref 0–0.5)
EOSINOPHIL NFR BLD AUTO: 0.3 % — SIGNIFICANT CHANGE UP (ref 0–6)
EPI CELLS # UR: SIGNIFICANT CHANGE UP /HPF
GLUCOSE BLDC GLUCOMTR-MCNC: 101 MG/DL — HIGH (ref 70–99)
GLUCOSE BLDC GLUCOMTR-MCNC: 102 MG/DL — HIGH (ref 70–99)
GLUCOSE BLDC GLUCOMTR-MCNC: 133 MG/DL — HIGH (ref 70–99)
GLUCOSE BLDC GLUCOMTR-MCNC: 140 MG/DL — HIGH (ref 70–99)
GLUCOSE SERPL-MCNC: 109 MG/DL — HIGH (ref 70–99)
GLUCOSE UR QL: NEGATIVE — SIGNIFICANT CHANGE UP
HCT VFR BLD CALC: 37.7 % — LOW (ref 39–50)
HGB BLD-MCNC: 11.6 G/DL — LOW (ref 13–17)
KETONES UR-MCNC: NEGATIVE — SIGNIFICANT CHANGE UP
LDH SERPL L TO P-CCNC: 203 U/L — SIGNIFICANT CHANGE UP (ref 50–242)
LEUKOCYTE ESTERASE UR-ACNC: ABNORMAL
LYMPHOCYTES # BLD AUTO: 1.4 K/UL — SIGNIFICANT CHANGE UP (ref 1–3.3)
LYMPHOCYTES # BLD AUTO: 16.1 % — SIGNIFICANT CHANGE UP (ref 13–44)
MAGNESIUM SERPL-MCNC: 2.5 MG/DL — SIGNIFICANT CHANGE UP (ref 1.6–2.6)
MCHC RBC-ENTMCNC: 27.1 PG — SIGNIFICANT CHANGE UP (ref 27–34)
MCHC RBC-ENTMCNC: 30.7 GM/DL — LOW (ref 32–36)
MCV RBC AUTO: 88.2 FL — SIGNIFICANT CHANGE UP (ref 80–100)
MONOCYTES # BLD AUTO: 0.8 K/UL — SIGNIFICANT CHANGE UP (ref 0–0.9)
MONOCYTES NFR BLD AUTO: 8.7 % — SIGNIFICANT CHANGE UP (ref 2–14)
NEUTROPHILS # BLD AUTO: 6.6 K/UL — SIGNIFICANT CHANGE UP (ref 1.8–7.4)
NEUTROPHILS NFR BLD AUTO: 74.8 % — SIGNIFICANT CHANGE UP (ref 43–77)
NITRITE UR-MCNC: NEGATIVE — SIGNIFICANT CHANGE UP
PH UR: 6 — SIGNIFICANT CHANGE UP (ref 5–8)
PHOSPHATE SERPL-MCNC: 5 MG/DL — HIGH (ref 2.5–4.5)
PLATELET # BLD AUTO: 138 K/UL — LOW (ref 150–400)
POTASSIUM SERPL-MCNC: 5.5 MMOL/L — HIGH (ref 3.5–5.3)
POTASSIUM SERPL-SCNC: 5.5 MMOL/L — HIGH (ref 3.5–5.3)
PROT SERPL-MCNC: 6.5 G/DL — SIGNIFICANT CHANGE UP (ref 6–8.3)
PROT UR-MCNC: 300 MG/DL
RBC # BLD: 4.27 M/UL — SIGNIFICANT CHANGE UP (ref 4.2–5.8)
RBC # FLD: 12.8 % — SIGNIFICANT CHANGE UP (ref 10.3–14.5)
RBC CASTS # UR COMP ASSIST: >50 /HPF (ref 0–2)
SODIUM SERPL-SCNC: 136 MMOL/L — SIGNIFICANT CHANGE UP (ref 135–145)
SP GR SPEC: 1.02 — SIGNIFICANT CHANGE UP (ref 1.01–1.02)
TACROLIMUS SERPL-MCNC: 6.4 NG/ML — SIGNIFICANT CHANGE UP
UROBILINOGEN FLD QL: NEGATIVE — SIGNIFICANT CHANGE UP
WBC # BLD: 8.8 K/UL — SIGNIFICANT CHANGE UP (ref 3.8–10.5)
WBC # FLD AUTO: 8.8 K/UL — SIGNIFICANT CHANGE UP (ref 3.8–10.5)
WBC UR QL: >50 /HPF (ref 0–5)

## 2018-07-27 PROCEDURE — 99232 SBSQ HOSP IP/OBS MODERATE 35: CPT | Mod: 24,GC

## 2018-07-27 PROCEDURE — 99232 SBSQ HOSP IP/OBS MODERATE 35: CPT | Mod: GC

## 2018-07-27 RX ORDER — CIPROFLOXACIN LACTATE 400MG/40ML
250 VIAL (ML) INTRAVENOUS DAILY
Qty: 0 | Refills: 0 | Status: DISCONTINUED | OUTPATIENT
Start: 2018-07-27 | End: 2018-07-29

## 2018-07-27 RX ORDER — TAMSULOSIN HYDROCHLORIDE 0.4 MG/1
0.4 CAPSULE ORAL AT BEDTIME
Qty: 0 | Refills: 0 | Status: DISCONTINUED | OUTPATIENT
Start: 2018-07-27 | End: 2018-07-29

## 2018-07-27 RX ORDER — NIFEDIPINE 60 MG/1
60 TABLET, FILM COATED, EXTENDED RELEASE ORAL DAILY
Qty: 90 | Refills: 3 | Status: DISCONTINUED | COMMUNITY
Start: 2018-07-26 | End: 2018-07-27

## 2018-07-27 RX ORDER — TRAMADOL HYDROCHLORIDE 50 MG/1
50 TABLET ORAL EVERY 4 HOURS
Qty: 0 | Refills: 0 | Status: DISCONTINUED | OUTPATIENT
Start: 2018-07-27 | End: 2018-07-29

## 2018-07-27 RX ORDER — CIPROFLOXACIN LACTATE 400MG/40ML
250 VIAL (ML) INTRAVENOUS
Qty: 0 | Refills: 0 | Status: DISCONTINUED | OUTPATIENT
Start: 2018-07-27 | End: 2018-07-27

## 2018-07-27 RX ORDER — TRAMADOL HYDROCHLORIDE 50 MG/1
50 TABLET ORAL ONCE
Qty: 0 | Refills: 0 | Status: DISCONTINUED | OUTPATIENT
Start: 2018-07-27 | End: 2018-07-27

## 2018-07-27 RX ORDER — TRAMADOL HYDROCHLORIDE 50 MG/1
25 TABLET ORAL EVERY 4 HOURS
Qty: 0 | Refills: 0 | Status: DISCONTINUED | OUTPATIENT
Start: 2018-07-27 | End: 2018-07-29

## 2018-07-27 RX ADMIN — Medication 90 MILLIGRAM(S): at 06:30

## 2018-07-27 RX ADMIN — Medication 500000 UNIT(S): at 18:10

## 2018-07-27 RX ADMIN — Medication 500000 UNIT(S): at 06:30

## 2018-07-27 RX ADMIN — MYCOPHENOLATE MOFETIL 1000 MILLIGRAM(S): 250 CAPSULE ORAL at 06:30

## 2018-07-27 RX ADMIN — Medication 650 MILLIGRAM(S): at 09:38

## 2018-07-27 RX ADMIN — Medication 650 MILLIGRAM(S): at 03:55

## 2018-07-27 RX ADMIN — TRAMADOL HYDROCHLORIDE 50 MILLIGRAM(S): 50 TABLET ORAL at 15:00

## 2018-07-27 RX ADMIN — Medication 650 MILLIGRAM(S): at 16:03

## 2018-07-27 RX ADMIN — SENNA PLUS 2 TABLET(S): 8.6 TABLET ORAL at 22:11

## 2018-07-27 RX ADMIN — VALGANCICLOVIR 450 MILLIGRAM(S): 450 TABLET, FILM COATED ORAL at 12:08

## 2018-07-27 RX ADMIN — Medication 20 MILLIGRAM(S): at 06:30

## 2018-07-27 RX ADMIN — Medication 500000 UNIT(S): at 12:08

## 2018-07-27 RX ADMIN — Medication 650 MILLIGRAM(S): at 10:30

## 2018-07-27 RX ADMIN — TAMSULOSIN HYDROCHLORIDE 0.4 MILLIGRAM(S): 0.4 CAPSULE ORAL at 09:39

## 2018-07-27 RX ADMIN — Medication 1 TABLET(S): at 12:08

## 2018-07-27 RX ADMIN — TRAMADOL HYDROCHLORIDE 50 MILLIGRAM(S): 50 TABLET ORAL at 22:40

## 2018-07-27 RX ADMIN — Medication 250 MILLIGRAM(S): at 12:41

## 2018-07-27 RX ADMIN — TRAMADOL HYDROCHLORIDE 50 MILLIGRAM(S): 50 TABLET ORAL at 14:04

## 2018-07-27 RX ADMIN — Medication 650 MILLIGRAM(S): at 04:30

## 2018-07-27 RX ADMIN — TACROLIMUS 4 MILLIGRAM(S): 5 CAPSULE ORAL at 06:30

## 2018-07-27 RX ADMIN — HYDROMORPHONE HYDROCHLORIDE 0.5 MILLIGRAM(S): 2 INJECTION INTRAMUSCULAR; INTRAVENOUS; SUBCUTANEOUS at 15:07

## 2018-07-27 RX ADMIN — MYCOPHENOLATE MOFETIL 1000 MILLIGRAM(S): 250 CAPSULE ORAL at 18:10

## 2018-07-27 RX ADMIN — TRAMADOL HYDROCHLORIDE 50 MILLIGRAM(S): 50 TABLET ORAL at 19:34

## 2018-07-27 RX ADMIN — TRAMADOL HYDROCHLORIDE 50 MILLIGRAM(S): 50 TABLET ORAL at 23:20

## 2018-07-27 RX ADMIN — FAMOTIDINE 20 MILLIGRAM(S): 10 INJECTION INTRAVENOUS at 12:08

## 2018-07-27 RX ADMIN — TRAMADOL HYDROCHLORIDE 50 MILLIGRAM(S): 50 TABLET ORAL at 18:54

## 2018-07-27 RX ADMIN — Medication 650 MILLIGRAM(S): at 17:00

## 2018-07-27 RX ADMIN — TACROLIMUS 4 MILLIGRAM(S): 5 CAPSULE ORAL at 18:10

## 2018-07-27 RX ADMIN — Medication 100 MILLIGRAM(S): at 12:08

## 2018-07-27 RX ADMIN — Medication 500000 UNIT(S): at 22:11

## 2018-07-27 RX ADMIN — Medication 100 MILLIGRAM(S): at 06:30

## 2018-07-27 RX ADMIN — HYDROMORPHONE HYDROCHLORIDE 0.5 MILLIGRAM(S): 2 INJECTION INTRAMUSCULAR; INTRAVENOUS; SUBCUTANEOUS at 15:20

## 2018-07-27 RX ADMIN — Medication 100 MILLIGRAM(S): at 18:10

## 2018-07-27 RX ADMIN — Medication 20 MILLIGRAM(S): at 18:11

## 2018-07-27 NOTE — PROGRESS NOTE ADULT - ASSESSMENT
34 Y/O gentleman with PMH of HTN and ESRD since 2010 now POD#3 R sided DDRT with double J stent placement doing well with downtrending creatinine and good uop.  Discharge planning in progress.

## 2018-07-27 NOTE — PROGRESS NOTE ADULT - PROBLEM SELECTOR PLAN 1
Pt s/p DDRT on 7/23/18 with immediate function. Pt non-oliguric with good urine output. Pt received Simulect induction now maintained on tacrolimus/ steroid/ cellcept.   Monitor 12 hour trough.  Scr improving.  Pt with dysuria/frequency. ? UTI follow up culture  Advised low potassium diet

## 2018-07-27 NOTE — PROGRESS NOTE ADULT - SUBJECTIVE AND OBJECTIVE BOX
[facility] DIVISION OF KIDNEY DISEASES AND HYPERTENSION -- FOLLOW UP NOTE  --------------------------------------------------------------------------------    HPI: 33 year old male with a PMH of ESRD on HD (MWF) from [facility], HTN s/p DDRT 7/23/18. Pt has been on HD for the last 8 years and he follows with outpatient nephrologist Dr. Cline at [facility]. Pts last HD was 7/23/18. No plan for further HD. Pt non-oliguric with good urine output. Pt seen and examined. Pt complains of significant dysuria and increased frequency today associated with pain.  Pt denies CP, SOB or LE edema.     PAST HISTORY  --------------------------------------------------------------------------------  No significant changes to PMH, PSH, FHx, SHx, unless otherwise noted    ALLERGIES & MEDICATIONS  --------------------------------------------------------------------------------  Allergies    IV Contrast (Rash)  nyquil (Rhinorrhea)  vancomycin (Pruritus; Hives)    Intolerances      Standing Inpatient Medications  docusate sodium 100 milliGRAM(s) Oral daily  famotidine    Tablet 20 milliGRAM(s) Oral daily  metoprolol tartrate 100 milliGRAM(s) Oral every 12 hours  mycophenolate mofetil 1000 milliGRAM(s) Oral every 12 hours  NIFEdipine XL 90 milliGRAM(s) Oral daily  nystatin    Suspension [id number] Unit(s) Swish and Swallow four times a day  predniSONE   Tablet 20 milliGRAM(s) Oral every 12 hours  senna 2 Tablet(s) Oral at bedtime  tacrolimus 4 milliGRAM(s) Oral two times a day  tamsulosin 0.4 milliGRAM(s) Oral at bedtime  trimethoprim   80 mG/sulfamethoxazole 400 mG 1 Tablet(s) Oral daily  valGANciclovir 450 milliGRAM(s) Oral daily    PRN Inpatient Medications  acetaminophen   Tablet. 650 milliGRAM(s) Oral every 6 hours PRN  HYDROmorphone  Injectable 0.5 milliGRAM(s) IV Push once PRN  lidocaine 2% Gel 1 Application(s) Topical three times a day PRN  oxyCODONE    5 mG/acetaminophen 325 mG 1 Tablet(s) Oral every 4 hours PRN  oxyCODONE    5 mG/acetaminophen 325 mG 2 Tablet(s) Oral every 4 hours PRN  simethicone 80 milliGRAM(s) Chew every 6 hours PRN      REVIEW OF SYSTEMS  --------------------------------------------------------------------------------  Gen: No weakness  Skin: No rashes  Head/Eyes/Ears/Mouth: No headache  Respiratory: No dyspnea  CV: No chest pain, PND, orthopnea  GI: No abdominal pain, diarrhea  : + increased frequency,+ dysuria  MSK: No edema  Neuro: No dizziness/lightheadedness    All other systems were reviewed and are negative, except as noted.    VITALS/PHYSICAL EXAM  --------------------------------------------------------------------------------  T(C): 36.9 (07-27-18 @ 09:42), Max: 36.9 (07-26-18 @ 12:10)  HR: 67 (07-27-18 @ 09:42) (67 - 76)  BP: 156/100 (07-27-18 @ 09:42) (155/99 - 165/100)  RR: 18 (07-27-18 @ 09:42) (16 - 18)  SpO2: 98% (07-27-18 @ 09:42) (95% - 99%)  Wt(kg): --    07-26-18 @ 07:01  -  07-27-18 @ 07:00  --------------------------------------------------------  IN: 650 mL / OUT: 1647 mL / NET: -997 mL    07-27-18 @ 07:01  -  07-27-18 @ 09:45  --------------------------------------------------------  IN: 0 mL / OUT: 45 mL / NET: -45 mL    Physical Exam:  	Gen: NAD, well-appearing  	Pulm: CTA B/L  	CV: RRR, S1S2; no rub  	Abd: +BS, soft, nontender/nondistended  	: No suprapubic tenderness  	UE: Warm, no asterixis  	LE: Warm, no edema, palpable DP pulses b/l   	Psych: Normal affect and mood  	Skin: Warm, without rashes  	Vascular access: LUE AVF, aneurysmal appearance     LABS/STUDIES  --------------------------------------------------------------------------------              11.6   8.8   >-----------<  138      [07-27-18 @ 06:11]              37.7     136  |  106  |  71  ----------------------------<  109      [07-27-18 @ 06:11]  5.5   |  18  |  5.20        Ca     9.9     [07-27-18 @ 06:11]      Mg     2.5     [07-27-18 @ 06:11]      Phos  5.0     [07-27-18 @ 06:11]    TPro  6.5  /  Alb  3.9  /  TBili  0.3  /  DBili  x   /  AST  13  /  ALT  8   /  AlkPhos  55  [07-27-18 @ 06:11]          [07-27-18 @ 06:11]    Creatinine Trend:  SCr 5.20 [07-27 @ 06:11]  SCr 5.99 [07-26 @ 07:20]  SCr 6.76 [07-25 @ 06:45]  SCr 8.93 [07-24 @ 10:07]  SCr 9.83 [07-24 @ 02:38]    HbA1c 5.1      [08-18-15 @ 18:19]

## 2018-07-27 NOTE — PROGRESS NOTE ADULT - SUBJECTIVE AND OBJECTIVE BOX
INTERVAL HPI/OVERNIGHT EVENTS:  Patient seen with multidisciplinary team (Nephrologist, pharmacist, nurse, nurse manager, NP, MD surgeons, transplant cordinator,  nutritionist, and  )  in am rounds and examined with Dr. Diaz.  32 y/o gentleman POD#3 R sided DDRT anastomosed to R external iliac artery and vein. Ureter anastomosed to bladder over double J stent.  Cold ischemia time14 hours.  CMV +, EBV + .  Overnight with C/O dysurea, and frequency urinating, with c/o incisional pain, releived with tylenol, making adequate urine, VSS.    MEDICATIONS  (STANDING):  docusate sodium 100 milliGRAM(s) Oral daily  famotidine    Tablet 20 milliGRAM(s) Oral daily  metoprolol tartrate 100 milliGRAM(s) Oral every 12 hours  mycophenolate mofetil 1000 milliGRAM(s) Oral every 12 hours  NIFEdipine XL 90 milliGRAM(s) Oral daily  nystatin    Suspension [id number] Unit(s) Swish and Swallow four times a day  predniSONE   Tablet 20 milliGRAM(s) Oral every 12 hours  senna 2 Tablet(s) Oral at bedtime  tacrolimus 4 milliGRAM(s) Oral two times a day  tamsulosin 0.4 milliGRAM(s) Oral at bedtime  trimethoprim   80 mG/sulfamethoxazole 400 mG 1 Tablet(s) Oral daily  valGANciclovir 450 milliGRAM(s) Oral daily    MEDICATIONS  (PRN):  acetaminophen   Tablet. 650 milliGRAM(s) Oral every 6 hours PRN Mild Pain (1 - 3)  HYDROmorphone  Injectable 0.5 milliGRAM(s) IV Push once PRN Severe Pain (7 - 10)  lidocaine 2% Gel 1 Application(s) Topical three times a day PRN pain related to urinary catheter  oxyCODONE    5 mG/acetaminophen 325 mG 1 Tablet(s) Oral every 4 hours PRN Moderate Pain (4 - 6)  oxyCODONE    5 mG/acetaminophen 325 mG 2 Tablet(s) Oral every 4 hours PRN Severe Pain (7 - 10)  simethicone 80 milliGRAM(s) Chew every 6 hours PRN Gas      Allergies    IV Contrast (Rash)  nyquil (Rhinorrhea)  vancomycin (Pruritus; Hives)    Intolerances        Vital Signs Last 24 Hrs  T(C): 36.5 (27 Jul 2018 05:32), Max: 36.9 (26 Jul 2018 12:10)  T(F): 97.7 (27 Jul 2018 05:32), Max: 98.5 (26 Jul 2018 22:39)  HR: 75 (27 Jul 2018 05:32) (67 - 76)  BP: 158/92 (27 Jul 2018 05:32) (155/99 - 165/100)  BP(mean): --  RR: 18 (27 Jul 2018 05:32) (16 - 18)  SpO2: 95% (27 Jul 2018 05:32) (95% - 99%)    LABS:                        11.6   8.8   )-----------( 138      ( 27 Jul 2018 06:11 )             37.7     07-27    136  |  106  |  71<H>  ----------------------------<  109<H>  5.5<H>   |  18<L>  |  5.20<H>    Ca    9.9      27 Jul 2018 06:11  Phos  5.0     07-27  Mg     2.5     07-27    TPro  6.5  /  Alb  3.9  /  TBili  0.3  /  DBili  x   /  AST  13  /  ALT  8<L>  /  AlkPhos  55  07-27          RADIOLOGY & ADDITIONAL TESTS:     Review of system:     Gen: No weight changes, fatigue, fevers/chills, weakness  Skin: No rashes  Head/Eyes/Ears/Mouth: No headache; Normal hearing; Normal vision w/o blurriness; No sinus pain/discomfort, sore throat  Respiratory: No dyspnea, cough, wheezing, hemoptysis  CV: No chest pain, PND, orthopnea  GI: Reports incisional pain and TTP, denies diarrhea, constipation, nausea, vomiting, melena, hematochezia  : reports need to void frequently  MSK: No joint pain/swelling; no back pain; no edema  Neuro: No dizziness/lightheadedness, weakness, seizures, numbness, tingling  Heme: No easy bruising or bleeding  Endo: No heat/cold intolerance  Psych: No significant nervousness, anxiety, stress, depression  All other systems were reviewed and are negative, except as noted.    PHYSICAL EXAM:  Constitutional: Well developed / well nourished  Eyes: Anicteric, PERRLA  ENMT: nc/at  Neck: supple  Respiratory: CTA B/L  Cardiovascular: RRR  Gastrointestinal: Soft abdomen, mild tender to touch at surgical site, ND  Genitourinary: voiding  Extremities: SCD's in place and working bilaterally, trace BLE edema and hand edema  Vascular: Palpable dp pulses bilaterally  Neurological: A&O x3  Skin: Mild serosanguinous anna on wound dressing no erythema and evidence of infection noted  Musculoskeletal: Moving all extremities  Psychiatric: Responsive

## 2018-07-27 NOTE — PROGRESS NOTE ADULT - PROBLEM SELECTOR PLAN 1
Downtrending Creatinine with good uop  Send for UA and C&S, if positive may start antibiotics  Strict I/Os  Regular diet low K, Low Phos tolerated.   Continue PRN Tylenol and Percocet for pain  Continue bowel regimen.  daily BMP  Blood sugars well controlled. Fingersticks before meals and bedtime. On steroid taper.

## 2018-07-27 NOTE — PROGRESS NOTE ADULT - SUBJECTIVE AND OBJECTIVE BOX
Dr. Wagnre (Nephrology)  Office [phone]  Cell [phone]  [facility]  Cell [phone]      Patient is a 33y old  Male who presents with a chief complaint of DDRT (23 Jul 2018 09:25)      Patient seen and examined at bedside. No chest pain/sob    VITALS:  T(F): 98.2 (07-27-18 @ 14:12), Max: 98.5 (07-26-18 @ 22:39)  HR: 72 (07-27-18 @ 14:12)  BP: 163/96 (07-27-18 @ 14:12)  RR: 18 (07-27-18 @ 14:12)  SpO2: 98% (07-27-18 @ 14:12)  Wt(kg): --    07-26 @ 07:01  -  07-27 @ 07:00  --------------------------------------------------------  IN: 650 mL / OUT: 1647 mL / NET: -997 mL    07-27 @ 07:01  -  07-27 @ 16:35  --------------------------------------------------------  IN: 440 mL / OUT: 355 mL / NET: 85 mL          PHYSICAL EXAM:  Constitutional: NAD  Neck: No JVD  Respiratory: CTAB, no wheezes, rales or rhonchi  Cardiovascular: S1, S2, RRR  Gastrointestinal: abd tenderness  Extremities: No peripheral edema    Hospital Medications:   MEDICATIONS  (STANDING):  ciprofloxacin     Tablet 250 milliGRAM(s) Oral daily  docusate sodium 100 milliGRAM(s) Oral daily  famotidine    Tablet 20 milliGRAM(s) Oral daily  metoprolol tartrate 100 milliGRAM(s) Oral every 12 hours  mycophenolate mofetil 1000 milliGRAM(s) Oral every 12 hours  NIFEdipine XL 90 milliGRAM(s) Oral daily  nystatin    Suspension 973208 Unit(s) Swish and Swallow four times a day  predniSONE   Tablet 20 milliGRAM(s) Oral every 12 hours  senna 2 Tablet(s) Oral at bedtime  tacrolimus 4 milliGRAM(s) Oral two times a day  tamsulosin 0.4 milliGRAM(s) Oral at bedtime  trimethoprim   80 mG/sulfamethoxazole 400 mG 1 Tablet(s) Oral daily  valGANciclovir 450 milliGRAM(s) Oral daily    Tacrolimus (), Serum: 6.4 ng/mL (07-27 @ 08:29)    LABS:  07-27    136  |  106  |  71<H>  ----------------------------<  109<H>  5.5<H>   |  18<L>  |  5.20<H>    Ca    9.9      27 Jul 2018 06:11  Phos  5.0     07-27  Mg     2.5     07-27    TPro  6.5  /  Alb  3.9  /  TBili  0.3  /  DBili      /  AST  13  /  ALT  8<L>  /  AlkPhos  55  07-27    Creatinine Trend: 5.20 <--, 5.99 <--, 6.76 <--, 8.93 <--, 9.83 <--, 9.17 <--, 17.32 <--    Albumin, Serum: 3.9 g/dL (07-27 @ 06:11)  Phosphorus Level, Serum: 5.0 mg/dL (07-27 @ 06:11)                              11.6   8.8   )-----------( 138      ( 27 Jul 2018 06:11 )             37.7     Urine Studies:  Urinalysis - [07-27-18 @ 09:51]      Color Yellow / Appearance SL Turbid / SG 1.021 / pH 6.0      Gluc Negative / Ketone Negative  / Bili Negative / Urobili Negative       Blood Moderate / Protein 300 / Leuk Est Moderate / Nitrite Negative      RBC >50 / WBC >50 / Hyaline  / Gran  / Sq Epi  / Non Sq Epi Occasional / Bacteria       HbA1c 5.1      [08-18-15 @ 18:19]        RADIOLOGY & ADDITIONAL STUDIES: Dr. Wagner (Nephrology)  Office [phone]  Cell [phone]  [facility]  Cell [phone]      Patient is a 33y old  Male who presents with a chief complaint of DDRT (23 Jul 2018 09:25)      Patient seen and examined at bedside. No chest pain/sob, c/o LE edema    VITALS:  T(F): 98.2 (07-27-18 @ 14:12), Max: 98.5 (07-26-18 @ 22:39)  HR: 72 (07-27-18 @ 14:12)  BP: 163/96 (07-27-18 @ 14:12)  RR: 18 (07-27-18 @ 14:12)  SpO2: 98% (07-27-18 @ 14:12)  Wt(kg): --    07-26 @ 07:01  -  07-27 @ 07:00  --------------------------------------------------------  IN: 650 mL / OUT: 1647 mL / NET: -997 mL    07-27 @ 07:01  -  07-27 @ 16:35  --------------------------------------------------------  IN: 440 mL / OUT: 355 mL / NET: 85 mL          PHYSICAL EXAM:  Constitutional: NAD  Neck: No JVD  Respiratory: CTAB, no wheezes, rales or rhonchi  Cardiovascular: S1, S2, RRR  Gastrointestinal: abd tenderness  Extremities: 1+ peripheral edema R>L    Hospital Medications:   MEDICATIONS  (STANDING):  ciprofloxacin     Tablet 250 milliGRAM(s) Oral daily  docusate sodium 100 milliGRAM(s) Oral daily  famotidine    Tablet 20 milliGRAM(s) Oral daily  metoprolol tartrate 100 milliGRAM(s) Oral every 12 hours  mycophenolate mofetil 1000 milliGRAM(s) Oral every 12 hours  NIFEdipine XL 90 milliGRAM(s) Oral daily  nystatin    Suspension [id number] Unit(s) Swish and Swallow four times a day  predniSONE   Tablet 20 milliGRAM(s) Oral every 12 hours  senna 2 Tablet(s) Oral at bedtime  tacrolimus 4 milliGRAM(s) Oral two times a day  tamsulosin 0.4 milliGRAM(s) Oral at bedtime  trimethoprim   80 mG/sulfamethoxazole 400 mG 1 Tablet(s) Oral daily  valGANciclovir 450 milliGRAM(s) Oral daily    Tacrolimus (), Serum: 6.4 ng/mL (07-27 @ 08:29)    LABS:  07-27    136  |  106  |  71<H>  ----------------------------<  109<H>  5.5<H>   |  18<L>  |  5.20<H>    Ca    9.9      27 Jul 2018 06:11  Phos  5.0     07-27  Mg     2.5     07-27    TPro  6.5  /  Alb  3.9  /  TBili  0.3  /  DBili      /  AST  13  /  ALT  8<L>  /  AlkPhos  55  07-27    Creatinine Trend: 5.20 <--, 5.99 <--, 6.76 <--, 8.93 <--, 9.83 <--, 9.17 <--, 17.32 <--    Albumin, Serum: 3.9 g/dL (07-27 @ 06:11)  Phosphorus Level, Serum: 5.0 mg/dL (07-27 @ 06:11)                              11.6   8.8   )-----------( 138      ( 27 Jul 2018 06:11 )             37.7     Urine Studies:  Urinalysis - [07-27-18 @ 09:51]      Color Yellow / Appearance SL Turbid / SG 1.021 / pH 6.0      Gluc Negative / Ketone Negative  / Bili Negative / Urobili Negative       Blood Moderate / Protein 300 / Leuk Est Moderate / Nitrite Negative      RBC >50 / WBC >50 / Hyaline  / Gran  / Sq Epi  / Non Sq Epi Occasional / Bacteria       HbA1c 5.1      [08-18-15 @ 18:19]        RADIOLOGY & ADDITIONAL STUDIES:

## 2018-07-27 NOTE — PROGRESS NOTE ADULT - ATTENDING COMMENTS
POD#3 s/p DDRT, improving renal function  Immunosuppression - Increase Prograf to 4 mg bid, Steroid taper, Cellcept 1gm bid.   Will check tacrolimus level and adjust dose accordingly.  Transplant Prophylaxis with nystatin, bactrim, valcyte  abx for possible UTI/dysuria  PPI due to steroids  monitor glucose levels and adjust sliding scare as necessary POD#3 s/p DDRT, improving renal function  Immunosuppression - Prograf 4 mg bid, Steroid taper, Cellcept 1gm bid.   Will check tacrolimus level and adjust dose accordingly.  Transplant Prophylaxis with nystatin, bactrim, valcyte  abx for possible UTI/dysuria  PPI due to steroids  monitor glucose levels and adjust sliding scare as necessary

## 2018-07-27 NOTE — CHART NOTE - NSCHARTNOTEFT_GEN_A_CORE
Pt seen for nutrition follow up S/P kidney transplant, as per departmental protocol.     Source: Patient [X ]    Family [x]     other [X ]; medical record; NP; RN    Hospital Course: 32 yo male with PMH of HTN, ESRD on HD (MWF). Pt POD#4 S/P right sided DDRT to right external iliac arty and vein on (7/23) with cold time of 14hrs. Course complicated by concern for UTI/dysuria    Pt noted with 1440ml urine output in past 24-hours; current UO = 40-100ml/hr. Magnesium, sodium WNL & potassium, phosphorus slightly elevated elevated.     Diet : Regular, No Concentrated Phosphorous, No Concentrated Potassium Diet  Patient seen at bedside with wife and family. Reports that appetite & PO intakes remain good despite not feeling well. States he is drinking adequate amounts of fluid, RD encouraged to continue to do. Pt declined need for full review of food safety education previously provided. Wife with good comprehension of material taught and amenable to review. Also reviewed current diet order including no concentrated potassium/phosphorous restriction. Reviewed foods high in these minerals as well as alternatives lower to include in the diet. Provided written materials regarding Potassium & Phosphorous Contents of Foods from [employer]. Pt & wife verbalized understanding.     Current Weight: (7/27) 161. pounds; weight relatively stable since admit weight 159.1 pounds  Edema: 1+ R ankle edema    Pertinent Medications: MEDICATIONS  (STANDING):  Pertinent Medications: MEDICATIONS  (STANDING):  ciprofloxacin     Tablet 250 milliGRAM(s) Oral daily  docusate sodium 100 milliGRAM(s) Oral daily  famotidine    Tablet 20 milliGRAM(s) Oral daily  metoprolol tartrate 100 milliGRAM(s) Oral every 12 hours  mycophenolate mofetil 1000 milliGRAM(s) Oral every 12 hours  NIFEdipine XL 90 milliGRAM(s) Oral daily  nystatin    Suspension [id number] Unit(s) Swish and Swallow four times a day  predniSONE   Tablet 20 milliGRAM(s) Oral every 12 hours  senna 2 Tablet(s) Oral at bedtime  tacrolimus 4 milliGRAM(s) Oral two times a day  tamsulosin 0.4 milliGRAM(s) Oral at bedtime  trimethoprim   80 mG/sulfamethoxazole 400 mG 1 Tablet(s) Oral daily  valGANciclovir 450 milliGRAM(s) Oral daily    MEDICATIONS  (PRN):  acetaminophen   Tablet. 650 milliGRAM(s) Oral every 6 hours PRN Mild Pain (1 - 3)  HYDROmorphone  Injectable 0.5 milliGRAM(s) IV Push once PRN Severe Pain (7 - 10)  lidocaine 2% Gel 1 Application(s) Topical three times a day PRN pain related to urinary catheter  simethicone 80 milliGRAM(s) Chew every 6 hours PRN Gas  traMADol 50 milliGRAM(s) Oral every 4 hours PRN Severe Pain (7 - 10)  traMADol 25 milliGRAM(s) Oral every 4 hours PRN Moderate Pain (4 - 6)    Pertinent Labs:  (7/27) POCT -133, K 5.5H, CO2 18L, BUN 71H, Cr 5.20H, BG 109H, ALT 8L, Phos 5.0H; (7/26) POCT -124    Skin: no pressure injuries    Estimated Needs:   [X ] no change since previous assessment  [ ] recalculated:     Previous Nutrition Diagnosis:   [X ] Increased Nutrient Needs    Nutrition Diagnosis is [X ] ongoing; addressed with regular, no concentrated potassium/phosphorous diet     New Nutrition Diagnosis: [X ] not applicable    Interventions:     Recommend:  1) Continue regular, no concentrated potassium, no concentrated phosphorous diet; encourage intake of high protein and nutrient dense foods   2) Monitor weight, lab values, skin, po intake and GI tolerance  3) Reinforce therapeutic diet education as able    Monitoring and Evaluation:   Follow up per protocol  RD to remain available for further nutritional interventions as indicated.   Poornima Madsen RD Pager #[phone]

## 2018-07-27 NOTE — PROGRESS NOTE ADULT - ATTENDING COMMENTS
Pt s/p DDRT pod 4 from young donor  Kidney functioning but slowly - likely due to mild ATN  LACEY creatinine elevated - possibly small urine leak.  Will need to replace marquez.

## 2018-07-28 ENCOUNTER — TRANSCRIPTION ENCOUNTER (OUTPATIENT)
Age: 33
End: 2018-07-28

## 2018-07-28 DIAGNOSIS — I10 ESSENTIAL (PRIMARY) HYPERTENSION: ICD-10-CM

## 2018-07-28 LAB
ALBUMIN SERPL ELPH-MCNC: 3.9 G/DL — SIGNIFICANT CHANGE UP (ref 3.3–5)
ALP SERPL-CCNC: 59 U/L — SIGNIFICANT CHANGE UP (ref 40–120)
ALT FLD-CCNC: 9 U/L — LOW (ref 10–45)
ANION GAP SERPL CALC-SCNC: 11 MMOL/L — SIGNIFICANT CHANGE UP (ref 5–17)
ANION GAP SERPL CALC-SCNC: 15 MMOL/L — SIGNIFICANT CHANGE UP (ref 5–17)
AST SERPL-CCNC: 13 U/L — SIGNIFICANT CHANGE UP (ref 10–40)
BASOPHILS # BLD AUTO: 0 K/UL — SIGNIFICANT CHANGE UP (ref 0–0.2)
BASOPHILS NFR BLD AUTO: 0.4 % — SIGNIFICANT CHANGE UP (ref 0–2)
BILIRUB SERPL-MCNC: 0.3 MG/DL — SIGNIFICANT CHANGE UP (ref 0.2–1.2)
BUN SERPL-MCNC: 78 MG/DL — HIGH (ref 7–23)
BUN SERPL-MCNC: 79 MG/DL — HIGH (ref 7–23)
CALCIUM SERPL-MCNC: 10.4 MG/DL — SIGNIFICANT CHANGE UP (ref 8.4–10.5)
CALCIUM SERPL-MCNC: 11.1 MG/DL — HIGH (ref 8.4–10.5)
CHLORIDE SERPL-SCNC: 104 MMOL/L — SIGNIFICANT CHANGE UP (ref 96–108)
CHLORIDE SERPL-SCNC: 105 MMOL/L — SIGNIFICANT CHANGE UP (ref 96–108)
CO2 SERPL-SCNC: 17 MMOL/L — LOW (ref 22–31)
CO2 SERPL-SCNC: 21 MMOL/L — LOW (ref 22–31)
CREAT FLD-MCNC: >112 MG/DL — SIGNIFICANT CHANGE UP
CREAT SERPL-MCNC: 5.1 MG/DL — HIGH (ref 0.5–1.3)
CREAT SERPL-MCNC: 5.28 MG/DL — HIGH (ref 0.5–1.3)
CULTURE RESULTS: NO GROWTH — SIGNIFICANT CHANGE UP
EOSINOPHIL # BLD AUTO: 0 K/UL — SIGNIFICANT CHANGE UP (ref 0–0.5)
EOSINOPHIL NFR BLD AUTO: 0.5 % — SIGNIFICANT CHANGE UP (ref 0–6)
GLUCOSE BLDC GLUCOMTR-MCNC: 103 MG/DL — HIGH (ref 70–99)
GLUCOSE BLDC GLUCOMTR-MCNC: 111 MG/DL — HIGH (ref 70–99)
GLUCOSE BLDC GLUCOMTR-MCNC: 113 MG/DL — HIGH (ref 70–99)
GLUCOSE BLDC GLUCOMTR-MCNC: 90 MG/DL — SIGNIFICANT CHANGE UP (ref 70–99)
GLUCOSE SERPL-MCNC: 104 MG/DL — HIGH (ref 70–99)
GLUCOSE SERPL-MCNC: 110 MG/DL — HIGH (ref 70–99)
HCT VFR BLD CALC: 34.1 % — LOW (ref 39–50)
HGB BLD-MCNC: 11.1 G/DL — LOW (ref 13–17)
LDH SERPL L TO P-CCNC: 152 U/L — SIGNIFICANT CHANGE UP (ref 50–242)
LYMPHOCYTES # BLD AUTO: 1 K/UL — SIGNIFICANT CHANGE UP (ref 1–3.3)
LYMPHOCYTES # BLD AUTO: 13.1 % — SIGNIFICANT CHANGE UP (ref 13–44)
MAGNESIUM SERPL-MCNC: 2.6 MG/DL — SIGNIFICANT CHANGE UP (ref 1.6–2.6)
MCHC RBC-ENTMCNC: 28.5 PG — SIGNIFICANT CHANGE UP (ref 27–34)
MCHC RBC-ENTMCNC: 32.4 GM/DL — SIGNIFICANT CHANGE UP (ref 32–36)
MCV RBC AUTO: 87.9 FL — SIGNIFICANT CHANGE UP (ref 80–100)
MONOCYTES # BLD AUTO: 0.6 K/UL — SIGNIFICANT CHANGE UP (ref 0–0.9)
MONOCYTES NFR BLD AUTO: 7.2 % — SIGNIFICANT CHANGE UP (ref 2–14)
NEUTROPHILS # BLD AUTO: 6.3 K/UL — SIGNIFICANT CHANGE UP (ref 1.8–7.4)
NEUTROPHILS NFR BLD AUTO: 78.9 % — HIGH (ref 43–77)
PHOSPHATE SERPL-MCNC: 4.6 MG/DL — HIGH (ref 2.5–4.5)
PLATELET # BLD AUTO: 166 K/UL — SIGNIFICANT CHANGE UP (ref 150–400)
POTASSIUM SERPL-MCNC: 5.7 MMOL/L — HIGH (ref 3.5–5.3)
POTASSIUM SERPL-MCNC: 5.7 MMOL/L — HIGH (ref 3.5–5.3)
POTASSIUM SERPL-SCNC: 5.7 MMOL/L — HIGH (ref 3.5–5.3)
POTASSIUM SERPL-SCNC: 5.7 MMOL/L — HIGH (ref 3.5–5.3)
PROT SERPL-MCNC: 6.7 G/DL — SIGNIFICANT CHANGE UP (ref 6–8.3)
RBC # BLD: 3.88 M/UL — LOW (ref 4.2–5.8)
RBC # FLD: 12.9 % — SIGNIFICANT CHANGE UP (ref 10.3–14.5)
SODIUM SERPL-SCNC: 136 MMOL/L — SIGNIFICANT CHANGE UP (ref 135–145)
SODIUM SERPL-SCNC: 137 MMOL/L — SIGNIFICANT CHANGE UP (ref 135–145)
SPECIMEN SOURCE FLD: SIGNIFICANT CHANGE UP
SPECIMEN SOURCE: SIGNIFICANT CHANGE UP
TACROLIMUS SERPL-MCNC: 11.6 NG/ML — SIGNIFICANT CHANGE UP
WBC # BLD: 8 K/UL — SIGNIFICANT CHANGE UP (ref 3.8–10.5)
WBC # FLD AUTO: 8 K/UL — SIGNIFICANT CHANGE UP (ref 3.8–10.5)

## 2018-07-28 PROCEDURE — 99233 SBSQ HOSP IP/OBS HIGH 50: CPT | Mod: GC

## 2018-07-28 PROCEDURE — 99232 SBSQ HOSP IP/OBS MODERATE 35: CPT | Mod: 24,GC

## 2018-07-28 RX ORDER — FUROSEMIDE 40 MG
40 TABLET ORAL ONCE
Qty: 0 | Refills: 0 | Status: DISCONTINUED | OUTPATIENT
Start: 2018-07-28 | End: 2018-07-28

## 2018-07-28 RX ORDER — SODIUM CHLORIDE 9 MG/ML
250 INJECTION INTRAMUSCULAR; INTRAVENOUS; SUBCUTANEOUS ONCE
Qty: 0 | Refills: 0 | Status: COMPLETED | OUTPATIENT
Start: 2018-07-28 | End: 2018-07-28

## 2018-07-28 RX ORDER — FUROSEMIDE 40 MG
60 TABLET ORAL ONCE
Qty: 0 | Refills: 0 | Status: COMPLETED | OUTPATIENT
Start: 2018-07-28 | End: 2018-07-28

## 2018-07-28 RX ORDER — SODIUM CHLORIDE 9 MG/ML
1000 INJECTION INTRAMUSCULAR; INTRAVENOUS; SUBCUTANEOUS
Qty: 0 | Refills: 0 | Status: DISCONTINUED | OUTPATIENT
Start: 2018-07-28 | End: 2018-07-29

## 2018-07-28 RX ADMIN — TRAMADOL HYDROCHLORIDE 50 MILLIGRAM(S): 50 TABLET ORAL at 06:00

## 2018-07-28 RX ADMIN — Medication 100 MILLIGRAM(S): at 06:29

## 2018-07-28 RX ADMIN — Medication 1 TABLET(S): at 11:23

## 2018-07-28 RX ADMIN — TACROLIMUS 4 MILLIGRAM(S): 5 CAPSULE ORAL at 06:29

## 2018-07-28 RX ADMIN — TRAMADOL HYDROCHLORIDE 25 MILLIGRAM(S): 50 TABLET ORAL at 12:10

## 2018-07-28 RX ADMIN — TRAMADOL HYDROCHLORIDE 50 MILLIGRAM(S): 50 TABLET ORAL at 22:50

## 2018-07-28 RX ADMIN — Medication 500000 UNIT(S): at 06:29

## 2018-07-28 RX ADMIN — Medication 100 MILLIGRAM(S): at 18:03

## 2018-07-28 RX ADMIN — Medication 100 MILLIGRAM(S): at 11:23

## 2018-07-28 RX ADMIN — TRAMADOL HYDROCHLORIDE 25 MILLIGRAM(S): 50 TABLET ORAL at 18:40

## 2018-07-28 RX ADMIN — TRAMADOL HYDROCHLORIDE 50 MILLIGRAM(S): 50 TABLET ORAL at 06:50

## 2018-07-28 RX ADMIN — TRAMADOL HYDROCHLORIDE 50 MILLIGRAM(S): 50 TABLET ORAL at 02:40

## 2018-07-28 RX ADMIN — Medication 90 MILLIGRAM(S): at 06:28

## 2018-07-28 RX ADMIN — TRAMADOL HYDROCHLORIDE 25 MILLIGRAM(S): 50 TABLET ORAL at 07:06

## 2018-07-28 RX ADMIN — TRAMADOL HYDROCHLORIDE 50 MILLIGRAM(S): 50 TABLET ORAL at 03:15

## 2018-07-28 RX ADMIN — TACROLIMUS 4 MILLIGRAM(S): 5 CAPSULE ORAL at 18:03

## 2018-07-28 RX ADMIN — Medication 10 MILLIGRAM(S): at 18:03

## 2018-07-28 RX ADMIN — SENNA PLUS 2 TABLET(S): 8.6 TABLET ORAL at 21:21

## 2018-07-28 RX ADMIN — FAMOTIDINE 20 MILLIGRAM(S): 10 INJECTION INTRAVENOUS at 11:23

## 2018-07-28 RX ADMIN — TRAMADOL HYDROCHLORIDE 50 MILLIGRAM(S): 50 TABLET ORAL at 23:20

## 2018-07-28 RX ADMIN — TRAMADOL HYDROCHLORIDE 50 MILLIGRAM(S): 50 TABLET ORAL at 15:00

## 2018-07-28 RX ADMIN — TRAMADOL HYDROCHLORIDE 50 MILLIGRAM(S): 50 TABLET ORAL at 14:10

## 2018-07-28 RX ADMIN — Medication 10 MILLIGRAM(S): at 06:29

## 2018-07-28 RX ADMIN — Medication 250 MILLIGRAM(S): at 11:23

## 2018-07-28 RX ADMIN — TRAMADOL HYDROCHLORIDE 25 MILLIGRAM(S): 50 TABLET ORAL at 11:25

## 2018-07-28 RX ADMIN — TAMSULOSIN HYDROCHLORIDE 0.4 MILLIGRAM(S): 0.4 CAPSULE ORAL at 21:21

## 2018-07-28 RX ADMIN — TRAMADOL HYDROCHLORIDE 25 MILLIGRAM(S): 50 TABLET ORAL at 19:10

## 2018-07-28 RX ADMIN — SODIUM CHLORIDE 500 MILLILITER(S): 9 INJECTION INTRAMUSCULAR; INTRAVENOUS; SUBCUTANEOUS at 11:45

## 2018-07-28 RX ADMIN — Medication 60 MILLIGRAM(S): at 11:45

## 2018-07-28 RX ADMIN — MYCOPHENOLATE MOFETIL 1000 MILLIGRAM(S): 250 CAPSULE ORAL at 18:03

## 2018-07-28 RX ADMIN — MYCOPHENOLATE MOFETIL 1000 MILLIGRAM(S): 250 CAPSULE ORAL at 06:29

## 2018-07-28 RX ADMIN — TRAMADOL HYDROCHLORIDE 25 MILLIGRAM(S): 50 TABLET ORAL at 07:50

## 2018-07-28 RX ADMIN — Medication 500000 UNIT(S): at 11:23

## 2018-07-28 RX ADMIN — Medication 500000 UNIT(S): at 18:03

## 2018-07-28 RX ADMIN — VALGANCICLOVIR 450 MILLIGRAM(S): 450 TABLET, FILM COATED ORAL at 11:23

## 2018-07-28 NOTE — PROGRESS NOTE ADULT - ASSESSMENT
33 year old gentleman with PMH of HTN and ESRD since 2010 now POD#5 R sided DDRT with double J stent placement now with high creatinine from LACEY fluid concerning for leak, plateaued creatinine ~5 and elevated potassium.

## 2018-07-28 NOTE — PROGRESS NOTE ADULT - PROBLEM SELECTOR PLAN 1
Pt s/p DDRT on 7/23/18 . Pt non-oliguric with good urine output. Pt received Simulect induction now maintained on tacrolimus/ steroid/ cellcept.   Monitor 12 hour trough.  serum creatinine unchanged  LACEY Fluid creatinine remains elevated: keep marquez in   Urine culture pending: maintain on ciprofloaxacin

## 2018-07-28 NOTE — PROGRESS NOTE ADULT - ATTENDING COMMENTS
POD#4 s/p DDRT, stable renal function with Cr 5.2  marquez replaced yesterday due to leak after removal. Cont marquez and LACEY to gravity.  Immunosuppression - Prograf 4 mg bid, Steroid taper, Cellcept 1gm bid.   Will check tacrolimus level and adjust dose accordingly.  lasix and IVF for hyperkalemia. no need for HD at this time. monitor electrolytes  Transplant Prophylaxis with nystatin, bactrim, valcyte  abx for possible UTI/dysuria  PPI due to steroids  monitor glucose levels and adjust sliding scare as necessary

## 2018-07-28 NOTE — PROGRESS NOTE ADULT - SUBJECTIVE AND OBJECTIVE BOX
INTERVAL HPI/OVERNIGHT EVENTS:  Patient seen with multidisciplinary team (Nephrologist, pharmacist, nurse, nurse manager, NP, MD surgeons, transplant cordinator,  nutritionist, and  )  in am rounds and examined with Dr. Diaz.  34 y/o gentleman POD#3 R sided DDRT anastomosed to R external iliac artery and vein. Ureter anastomosed to bladder over double J stent.  Cold ischemia time14 hours.  CMV +, EBV + .  Overnight with C/O dysurea, and frequency urinating, with c/o incisional pain, releived with tylenol, making adequate urine, VSS.      MEDICATIONS  (STANDING):  ciprofloxacin     Tablet 250 milliGRAM(s) Oral daily  docusate sodium 100 milliGRAM(s) Oral daily  famotidine    Tablet 20 milliGRAM(s) Oral daily  furosemide   Injectable 60 milliGRAM(s) IV Push once  metoprolol tartrate 100 milliGRAM(s) Oral every 12 hours  mycophenolate mofetil 1000 milliGRAM(s) Oral every 12 hours  NIFEdipine XL 90 milliGRAM(s) Oral daily  nystatin    Suspension [id number] Unit(s) Swish and Swallow four times a day  predniSONE   Tablet 10 milliGRAM(s) Oral every 12 hours  senna 2 Tablet(s) Oral at bedtime  sodium chloride 0.9% Bolus 250 milliLiter(s) IV Bolus once  tacrolimus 4 milliGRAM(s) Oral two times a day  tamsulosin 0.4 milliGRAM(s) Oral at bedtime  trimethoprim   80 mG/sulfamethoxazole 400 mG 1 Tablet(s) Oral daily  valGANciclovir 450 milliGRAM(s) Oral daily    MEDICATIONS  (PRN):  acetaminophen   Tablet. 650 milliGRAM(s) Oral every 6 hours PRN Mild Pain (1 - 3)  lidocaine 2% Gel 1 Application(s) Topical three times a day PRN pain related to urinary catheter  simethicone 80 milliGRAM(s) Chew every 6 hours PRN Gas  traMADol 50 milliGRAM(s) Oral every 4 hours PRN Severe Pain (7 - 10)  traMADol 25 milliGRAM(s) Oral every 4 hours PRN Moderate Pain (4 - 6)      Allergies    IV Contrast (Rash)  nyquil (Rhinorrhea)  vancomycin (Pruritus; Hives)    Intolerances        Vital Signs Last 24 Hrs  T(C): 36.6 (2018 09:25), Max: 36.8 (2018 14:12)  T(F): 97.9 (2018 09:25), Max: 98.3 (2018 22:17)  HR: 62 (2018 09:25) (62 - 75)  BP: 143/88 (2018 09:25) (143/88 - 164/101)  BP(mean): --  RR: 18 (2018 09:25) (18 - 18)  SpO2: 96% (2018 09:25) (96% - 99%)    LABS:                        11.1   8.0   )-----------( 166      ( 2018 07:41 )             34.1         137  |  105  |  78<H>  ----------------------------<  110<H>  5.7<H>   |  21<L>  |  5.28<H>    Ca    10.4      2018 07:41  Phos  4.6       Mg     2.6         TPro  6.7  /  Alb  3.9  /  TBili  0.3  /  DBili  x   /  AST  13  /  ALT  9<L>  /  AlkPhos  59        Urinalysis Basic - ( 2018 09:51 )    Color: Yellow / Appearance: SL Turbid / S.021 / pH: x  Gluc: x / Ketone: Negative  / Bili: Negative / Urobili: Negative   Blood: x / Protein: 300 mg/dL / Nitrite: Negative   Leuk Esterase: Moderate / RBC: >50 /HPF / WBC >50 /HPF   Sq Epi: x / Non Sq Epi: Occasional /HPF / Bacteria: x        RADIOLOGY & ADDITIONAL TESTS:    Review of systems  Gen: No weight changes, fatigue, fevers/chills, weakness  Skin: No rashes  Head/Eyes/Ears/Mouth: No headache; Normal hearing; Normal vision w/o blurriness; No sinus pain/discomfort, sore throat  Respiratory: No dyspnea, cough, wheezing, hemoptysis  CV: No chest pain, PND, orthopnea  GI: Reports incisional pain and TTP, denies diarrhea, constipation, nausea, vomiting, melena, hematochezia. LACEY   : Marquez in situ  MSK: No joint pain/swelling; no back pain; no edema  Neuro: No dizziness/lightheadedness, weakness, seizures, numbness, tingling  Heme: No easy bruising or bleeding  Endo: No heat/cold intolerance  Psych: No significant nervousness, anxiety, stress, depression  All other systems were reviewed and are negative, except as noted.    PHYSICAL EXAM:  Constitutional: Well developed / well nourished  Eyes: Anicteric, PERRLA  ENMT: nc/at  Neck: supple  Respiratory: CTA B/L  Cardiovascular: RRR  Gastrointestinal: Soft abdomen, mild tender to touch at surgical site, ND  Genitourinary: marquez in situ.  LACEY patent with pinkish/yellow output  Extremities: SCD's in place and working bilaterally, trace BLE edema and hand edema  Vascular: Palpable dp pulses bilaterally  Neurological: A&O x3  Skin: woudn open to air, no erythema and evidence of infection noted  Musculoskeletal: Moving all extremities  Psychiatric: Responsive

## 2018-07-28 NOTE — PROGRESS NOTE ADULT - PROBLEM SELECTOR PLAN 2
On Tacrolimus, cellcept, steroid taper, Nystatin S&S, bactrim, and Valcyte   F/U Tacrolimus level daily, tacro goal 8-10  Monitor closely.

## 2018-07-28 NOTE — PROGRESS NOTE ADULT - SUBJECTIVE AND OBJECTIVE BOX
[facility] DIVISION OF KIDNEY DISEASES AND HYPERTENSION -- FOLLOW UP NOTE  --------------------------------------------------------------------------------  Chief Complaint: DDTX    24 hour events/subjective:  with discomfort over the incision site  feels swollen      PAST HISTORY  --------------------------------------------------------------------------------  No significant changes to PMH, PSH, FHx, SHx, unless otherwise noted    ALLERGIES & MEDICATIONS  --------------------------------------------------------------------------------  Allergies    IV Contrast (Rash)  nyquil (Rhinorrhea)  vancomycin (Pruritus; Hives)    Intolerances      Standing Inpatient Medications  ciprofloxacin     Tablet 250 milliGRAM(s) Oral daily  docusate sodium 100 milliGRAM(s) Oral daily  famotidine    Tablet 20 milliGRAM(s) Oral daily  furosemide   Injectable 60 milliGRAM(s) IV Push once  metoprolol tartrate 100 milliGRAM(s) Oral every 12 hours  mycophenolate mofetil 1000 milliGRAM(s) Oral every 12 hours  NIFEdipine XL 90 milliGRAM(s) Oral daily  nystatin    Suspension [id number] Unit(s) Swish and Swallow four times a day  predniSONE   Tablet 10 milliGRAM(s) Oral every 12 hours  senna 2 Tablet(s) Oral at bedtime  sodium chloride 0.9% Bolus 250 milliLiter(s) IV Bolus once  tacrolimus 4 milliGRAM(s) Oral two times a day  tamsulosin 0.4 milliGRAM(s) Oral at bedtime  trimethoprim   80 mG/sulfamethoxazole 400 mG 1 Tablet(s) Oral daily  valGANciclovir 450 milliGRAM(s) Oral daily    PRN Inpatient Medications  acetaminophen   Tablet. 650 milliGRAM(s) Oral every 6 hours PRN  lidocaine 2% Gel 1 Application(s) Topical three times a day PRN  simethicone 80 milliGRAM(s) Chew every 6 hours PRN  traMADol 50 milliGRAM(s) Oral every 4 hours PRN  traMADol 25 milliGRAM(s) Oral every 4 hours PRN      REVIEW OF SYSTEMS  --------------------------------------------------------------------------------  Gen: No weakness  Skin: No rashes  Head/Eyes/Ears/Mouth: No headache  Respiratory: No dyspnea  CV: No chest pain, PND, orthopnea  GI: + abdominal discomfort,  MSK: +lower extremity swelling  Neuro: No dizziness/lightheadedness    All other systems were reviewed and are negative, except as noted.    VITALS/PHYSICAL EXAM  --------------------------------------------------------------------------------  T(C): 36.6 (07-28-18 @ 09:25), Max: 36.8 (07-27-18 @ 14:12)  HR: 62 (07-28-18 @ 09:25) (62 - 75)  BP: 143/88 (07-28-18 @ 09:25) (143/88 - 164/101)  RR: 18 (07-28-18 @ 09:25) (18 - 18)  SpO2: 96% (07-28-18 @ 09:25) (96% - 99%)  Wt(kg): --        07-27-18 @ 07:01  -  07-28-18 @ 07:00  --------------------------------------------------------  IN: 960 mL / OUT: 1505 mL / NET: -545 mL    07-28-18 @ 07:01  -  07-28-18 @ 11:43  --------------------------------------------------------  IN: 360 mL / OUT: 320 mL / NET: 40 mL      Physical Exam:  	 Gen: NAD, well-appearing  	Pulm: CTA B/L  	CV: RRR, S1S2; no rub  	Abd: +BS, soft, +discomfort on palpation, +LACEY    	: No suprapubic tenderness  	UE: Warm, no asterixis  	LE: Warm, no edema, palpable DP pulses b/l   	Psych: Normal affect and mood  	Skin: Warm, without rashes  	Vascular access: LUE AVF, aneurysmal appearance         LABS/STUDIES  --------------------------------------------------------------------------------              11.1   8.0   >-----------<  166      [07-28-18 @ 07:41]              34.1     137  |  105  |  78  ----------------------------<  110      [07-28-18 @ 07:41]  5.7   |  21  |  5.28        Ca     10.4     [07-28-18 @ 07:41]      Mg     2.6     [07-28-18 @ 07:41]      Phos  4.6     [07-28-18 @ 07:41]    TPro  6.7  /  Alb  3.9  /  TBili  0.3  /  DBili  x   /  AST  13  /  ALT  9   /  AlkPhos  59  [07-28-18 @ 07:41]              [07-28-18 @ 07:41]    Creatinine Trend:  SCr 5.28 [07-28 @ 07:41]  SCr 5.20 [07-27 @ 06:11]  SCr 5.99 [07-26 @ 07:20]  SCr 6.76 [07-25 @ 06:45]  SCr 8.93 [07-24 @ 10:07]    Urinalysis - [07-27-18 @ 09:51]      Color Yellow / Appearance SL Turbid / SG 1.021 / pH 6.0      Gluc Negative / Ketone Negative  / Bili Negative / Urobili Negative       Blood Moderate / Protein 300 / Leuk Est Moderate / Nitrite Negative      RBC >50 / WBC >50 / Hyaline  / Gran  / Sq Epi  / Non Sq Epi Occasional / Bacteria       HbA1c 5.1      [08-18-15 @ 18:19]

## 2018-07-28 NOTE — PROGRESS NOTE ADULT - PROBLEM SELECTOR PLAN 1
Creatinine plateaued at 5. LACEY creatinine elevated with high output.  -Renal ultrasound today to evaluate for fluid collection.  -Potassium 5.7. Lasix 60mg Iv once and 250ml bolus  - Keep LACEY and marquez with concern for leak  - Continue Cipro for symptomatic, borderline UTI  - Strict I/Os  - Regular diet low K, Low Phos tolerated.   Continue PRN Tylenol and Percocet for pain  Continue bowel regimen, Flomax 0.4 daily  daily BMP  Blood sugars well controlled. Fingersticks before meals and bedtime. On steroid taper

## 2018-07-28 NOTE — PROGRESS NOTE ADULT - ASSESSMENT
1.	S/P DDRT, post Op Day 4 with good urine output. He is maintained on Cellcept\Tacrolimus and Prednisone. His LACEY creatinine is elevated, suspicious for leak.  2.	Hyperkalemia, received lasix bolus.    PLAN:  1.	Maintain marquez.  2.	Follow up BMP.  3.	Immunosuppression as per Transplant Nephrology.  4.	Low K diet.

## 2018-07-28 NOTE — PROGRESS NOTE ADULT - SUBJECTIVE AND OBJECTIVE BOX
MICHAEL OLIVEIRA  HPI:  S/P DDRT on Tuesday, Non-Oliguric without HD. Creatinine decreased to 5 but remains level. He has pain at the site, with a maqruez in place and a J stent.    Donor Details:  KDPI: 29%  ABO: B  CMV +, EBV +  Hep B -, Hep C - (2018 09:25)    HEALTH ISSUES - PROBLEM Dx:  HTN (hypertension): HTN (hypertension)  Edema of left lower extremity: Edema of left lower extremity  Hyperkalemia: Hyperkalemia  Hyperphosphatemia: Hyperphosphatemia  Immunosuppressive management encounter following kidney transplant: Immunosuppressive management encounter following kidney transplant  Kidney replaced by transplant: Kidney replaced by transplant  Hypertension, unspecified type: Hypertension, unspecified type  ESRD on dialysis: ESRD on dialysis        MEDICATIONS  (STANDING):  ciprofloxacin     Tablet 250 milliGRAM(s) Oral daily  docusate sodium 100 milliGRAM(s) Oral daily  famotidine    Tablet 20 milliGRAM(s) Oral daily  metoprolol tartrate 100 milliGRAM(s) Oral every 12 hours  mycophenolate mofetil 1000 milliGRAM(s) Oral every 12 hours  NIFEdipine XL 90 milliGRAM(s) Oral daily  nystatin    Suspension 425309 Unit(s) Swish and Swallow four times a day  predniSONE   Tablet 10 milliGRAM(s) Oral every 12 hours  senna 2 Tablet(s) Oral at bedtime  tacrolimus 4 milliGRAM(s) Oral two times a day  tamsulosin 0.4 milliGRAM(s) Oral at bedtime  trimethoprim   80 mG/sulfamethoxazole 400 mG 1 Tablet(s) Oral daily  valGANciclovir 450 milliGRAM(s) Oral daily    MEDICATIONS  (PRN):  acetaminophen   Tablet. 650 milliGRAM(s) Oral every 6 hours PRN Mild Pain (1 - 3)  lidocaine 2% Gel 1 Application(s) Topical three times a day PRN pain related to urinary catheter  simethicone 80 milliGRAM(s) Chew every 6 hours PRN Gas  traMADol 50 milliGRAM(s) Oral every 4 hours PRN Severe Pain (7 - 10)  traMADol 25 milliGRAM(s) Oral every 4 hours PRN Moderate Pain (4 - 6)    Vital Signs Last 24 Hrs  T(C): 36.9 (2018 13:34), Max: 36.9 (2018 13:34)  T(F): 98.5 (2018 13:34), Max: 98.5 (2018 13:34)  HR: 59 (2018 13:34) (59 - 75)  BP: 164/92 (2018 13:34) (143/88 - 164/101)  BP(mean): --  RR: 18 (2018 13:34) (18 - 18)  SpO2: 99% (2018 13:34) (96% - 99%)  Daily     Daily Weight in k.9 (2018 06:45)    PHYSICAL EXAM:  Constitutional:   appears comfortable and not distressed. Not diaphoretic.    Neck:  The thyroid is normal. Trachea is midline.     Respiratory: The lungs are clear to auscultation. No dullness and expansion is normal.    Cardiovascular: S1 and S2 are normal. No mummurs, rubs or gallops are present.    Gastrointestinal: The abdomen is soft. No tenderness is present. No masses are present. Bowel sounds are normal.    Genitourinary: The bladder is not distended. No CVA tenderness is present. Marquez in place with 400 ml urine.    Extremities: Trace edema is noted. No deformities are present.    Neurological: Cognition is normal. Tone, power and sensation are normal. Gait is steady.    Skin: No leasions are seen  or palpated.    Lymph Nodes: No lymphadenopathy is present.    Psychiatric: Mood is appropriate. No hallucinations or flight of ideas are noted.                              11.1   8.0   )-----------( 166      ( 2018 07:41 )             34.1     -    137  |  105  |  78<H>  ----------------------------<  110<H>  5.7<H>   |  21<L>  |  5.28<H>    Ca    10.4      2018 07:41  Phos  4.6       Mg     2.6         TPro  6.7  /  Alb  3.9  /  TBili  0.3  /  DBili  x   /  AST  13  /  ALT  9<L>  /  AlkPhos  59  -28    Urinalysis Basic - ( 2018 09:51 )    Color: Yellow / Appearance: SL Turbid / S.021 / pH: x  Gluc: x / Ketone: Negative  / Bili: Negative / Urobili: Negative   Blood: x / Protein: 300 mg/dL / Nitrite: Negative   Leuk Esterase: Moderate / RBC: >50 /HPF / WBC >50 /HPF   Sq Epi: x / Non Sq Epi: Occasional /HPF / Bacteria: x

## 2018-07-29 LAB
ANION GAP SERPL CALC-SCNC: 10 MMOL/L — SIGNIFICANT CHANGE UP (ref 5–17)
ANION GAP SERPL CALC-SCNC: 13 MMOL/L — SIGNIFICANT CHANGE UP (ref 5–17)
APTT BLD: 27.8 SEC — SIGNIFICANT CHANGE UP (ref 27.5–37.4)
BUN SERPL-MCNC: 76 MG/DL — HIGH (ref 7–23)
BUN SERPL-MCNC: 78 MG/DL — HIGH (ref 7–23)
CALCIUM SERPL-MCNC: 9.8 MG/DL — SIGNIFICANT CHANGE UP (ref 8.4–10.5)
CALCIUM SERPL-MCNC: 9.9 MG/DL — SIGNIFICANT CHANGE UP (ref 8.4–10.5)
CHLORIDE SERPL-SCNC: 109 MMOL/L — HIGH (ref 96–108)
CHLORIDE SERPL-SCNC: 109 MMOL/L — HIGH (ref 96–108)
CO2 SERPL-SCNC: 17 MMOL/L — LOW (ref 22–31)
CO2 SERPL-SCNC: 18 MMOL/L — LOW (ref 22–31)
CREAT SERPL-MCNC: 4.67 MG/DL — HIGH (ref 0.5–1.3)
CREAT SERPL-MCNC: 4.68 MG/DL — HIGH (ref 0.5–1.3)
GLUCOSE BLDC GLUCOMTR-MCNC: 101 MG/DL — HIGH (ref 70–99)
GLUCOSE BLDC GLUCOMTR-MCNC: 107 MG/DL — HIGH (ref 70–99)
GLUCOSE BLDC GLUCOMTR-MCNC: 126 MG/DL — HIGH (ref 70–99)
GLUCOSE BLDC GLUCOMTR-MCNC: 80 MG/DL — SIGNIFICANT CHANGE UP (ref 70–99)
GLUCOSE BLDC GLUCOMTR-MCNC: 88 MG/DL — SIGNIFICANT CHANGE UP (ref 70–99)
GLUCOSE SERPL-MCNC: 106 MG/DL — HIGH (ref 70–99)
GLUCOSE SERPL-MCNC: 115 MG/DL — HIGH (ref 70–99)
HCT VFR BLD CALC: 33.7 % — LOW (ref 39–50)
HCT VFR BLD CALC: 33.8 % — LOW (ref 39–50)
HGB BLD-MCNC: 10.9 G/DL — LOW (ref 13–17)
HGB BLD-MCNC: 11.1 G/DL — LOW (ref 13–17)
INR BLD: 1.04 RATIO — SIGNIFICANT CHANGE UP (ref 0.88–1.16)
MAGNESIUM SERPL-MCNC: 2.3 MG/DL — SIGNIFICANT CHANGE UP (ref 1.6–2.6)
MAGNESIUM SERPL-MCNC: 2.4 MG/DL — SIGNIFICANT CHANGE UP (ref 1.6–2.6)
MCHC RBC-ENTMCNC: 28.3 PG — SIGNIFICANT CHANGE UP (ref 27–34)
MCHC RBC-ENTMCNC: 28.8 PG — SIGNIFICANT CHANGE UP (ref 27–34)
MCHC RBC-ENTMCNC: 32.1 GM/DL — SIGNIFICANT CHANGE UP (ref 32–36)
MCHC RBC-ENTMCNC: 33 GM/DL — SIGNIFICANT CHANGE UP (ref 32–36)
MCV RBC AUTO: 87.4 FL — SIGNIFICANT CHANGE UP (ref 80–100)
MCV RBC AUTO: 88.2 FL — SIGNIFICANT CHANGE UP (ref 80–100)
PHOSPHATE SERPL-MCNC: 3.7 MG/DL — SIGNIFICANT CHANGE UP (ref 2.5–4.5)
PHOSPHATE SERPL-MCNC: 3.8 MG/DL — SIGNIFICANT CHANGE UP (ref 2.5–4.5)
PLATELET # BLD AUTO: 160 K/UL — SIGNIFICANT CHANGE UP (ref 150–400)
PLATELET # BLD AUTO: 171 K/UL — SIGNIFICANT CHANGE UP (ref 150–400)
POTASSIUM SERPL-MCNC: 5.4 MMOL/L — HIGH (ref 3.5–5.3)
POTASSIUM SERPL-MCNC: 5.6 MMOL/L — HIGH (ref 3.5–5.3)
POTASSIUM SERPL-SCNC: 5.4 MMOL/L — HIGH (ref 3.5–5.3)
POTASSIUM SERPL-SCNC: 5.6 MMOL/L — HIGH (ref 3.5–5.3)
PROTHROM AB SERPL-ACNC: 11.2 SEC — SIGNIFICANT CHANGE UP (ref 9.8–12.7)
RBC # BLD: 3.83 M/UL — LOW (ref 4.2–5.8)
RBC # BLD: 3.86 M/UL — LOW (ref 4.2–5.8)
RBC # FLD: 12.5 % — SIGNIFICANT CHANGE UP (ref 10.3–14.5)
RBC # FLD: 12.8 % — SIGNIFICANT CHANGE UP (ref 10.3–14.5)
SODIUM SERPL-SCNC: 137 MMOL/L — SIGNIFICANT CHANGE UP (ref 135–145)
SODIUM SERPL-SCNC: 139 MMOL/L — SIGNIFICANT CHANGE UP (ref 135–145)
TACROLIMUS SERPL-MCNC: 5.4 NG/ML — SIGNIFICANT CHANGE UP
WBC # BLD: 7.2 K/UL — SIGNIFICANT CHANGE UP (ref 3.8–10.5)
WBC # BLD: 8.3 K/UL — SIGNIFICANT CHANGE UP (ref 3.8–10.5)
WBC # FLD AUTO: 7.2 K/UL — SIGNIFICANT CHANGE UP (ref 3.8–10.5)
WBC # FLD AUTO: 8.3 K/UL — SIGNIFICANT CHANGE UP (ref 3.8–10.5)

## 2018-07-29 PROCEDURE — 76776 US EXAM K TRANSPL W/DOPPLER: CPT | Mod: 26,RT

## 2018-07-29 PROCEDURE — 50780 REIMPLANT URETER IN BLADDER: CPT | Mod: 78

## 2018-07-29 PROCEDURE — 49002 REOPENING OF ABDOMEN: CPT | Mod: 78

## 2018-07-29 PROCEDURE — 99233 SBSQ HOSP IP/OBS HIGH 50: CPT | Mod: GC

## 2018-07-29 RX ORDER — ONDANSETRON 8 MG/1
4 TABLET, FILM COATED ORAL ONCE
Qty: 0 | Refills: 0 | Status: DISCONTINUED | OUTPATIENT
Start: 2018-07-29 | End: 2018-08-14

## 2018-07-29 RX ORDER — NIFEDIPINE 30 MG
90 TABLET, EXTENDED RELEASE 24 HR ORAL DAILY
Qty: 0 | Refills: 0 | Status: DISCONTINUED | OUTPATIENT
Start: 2018-07-29 | End: 2018-08-14

## 2018-07-29 RX ORDER — ACETAMINOPHEN 500 MG
650 TABLET ORAL EVERY 6 HOURS
Qty: 0 | Refills: 0 | Status: DISCONTINUED | OUTPATIENT
Start: 2018-07-29 | End: 2018-08-14

## 2018-07-29 RX ORDER — METOPROLOL TARTRATE 50 MG
100 TABLET ORAL EVERY 12 HOURS
Qty: 0 | Refills: 0 | Status: DISCONTINUED | OUTPATIENT
Start: 2018-07-29 | End: 2018-08-11

## 2018-07-29 RX ORDER — MYCOPHENOLATE MOFETIL 250 MG/1
1000 CAPSULE ORAL EVERY 12 HOURS
Qty: 0 | Refills: 0 | Status: DISCONTINUED | OUTPATIENT
Start: 2018-07-29 | End: 2018-08-14

## 2018-07-29 RX ORDER — VALGANCICLOVIR 450 MG/1
450 TABLET, FILM COATED ORAL DAILY
Qty: 0 | Refills: 0 | Status: DISCONTINUED | OUTPATIENT
Start: 2018-07-29 | End: 2018-08-11

## 2018-07-29 RX ORDER — LIDOCAINE 4 G/100G
1 CREAM TOPICAL THREE TIMES A DAY
Qty: 0 | Refills: 0 | Status: DISCONTINUED | OUTPATIENT
Start: 2018-07-29 | End: 2018-08-14

## 2018-07-29 RX ORDER — IPRATROPIUM/ALBUTEROL SULFATE 18-103MCG
3 AEROSOL WITH ADAPTER (GRAM) INHALATION ONCE
Qty: 0 | Refills: 0 | Status: COMPLETED | OUTPATIENT
Start: 2018-07-29 | End: 2018-07-29

## 2018-07-29 RX ORDER — DEXTROSE 50 % IN WATER 50 %
50 SYRINGE (ML) INTRAVENOUS ONCE
Qty: 0 | Refills: 0 | Status: COMPLETED | OUTPATIENT
Start: 2018-07-29 | End: 2018-07-29

## 2018-07-29 RX ORDER — TAMSULOSIN HYDROCHLORIDE 0.4 MG/1
0.4 CAPSULE ORAL AT BEDTIME
Qty: 0 | Refills: 0 | Status: DISCONTINUED | OUTPATIENT
Start: 2018-07-29 | End: 2018-08-14

## 2018-07-29 RX ORDER — DOCUSATE SODIUM 100 MG
100 CAPSULE ORAL DAILY
Qty: 0 | Refills: 0 | Status: DISCONTINUED | OUTPATIENT
Start: 2018-07-29 | End: 2018-08-14

## 2018-07-29 RX ORDER — INSULIN HUMAN 100 [IU]/ML
10 INJECTION, SOLUTION SUBCUTANEOUS ONCE
Qty: 0 | Refills: 0 | Status: COMPLETED | OUTPATIENT
Start: 2018-07-29 | End: 2018-07-29

## 2018-07-29 RX ORDER — HYDROMORPHONE HYDROCHLORIDE 2 MG/ML
0.5 INJECTION INTRAMUSCULAR; INTRAVENOUS; SUBCUTANEOUS
Qty: 0 | Refills: 0 | Status: DISCONTINUED | OUTPATIENT
Start: 2018-07-29 | End: 2018-07-31

## 2018-07-29 RX ORDER — ONDANSETRON 8 MG/1
4 TABLET, FILM COATED ORAL ONCE
Qty: 0 | Refills: 0 | Status: DISCONTINUED | OUTPATIENT
Start: 2018-07-29 | End: 2018-07-29

## 2018-07-29 RX ORDER — METOCLOPRAMIDE HCL 10 MG
10 TABLET ORAL ONCE
Qty: 0 | Refills: 0 | Status: DISCONTINUED | OUTPATIENT
Start: 2018-07-29 | End: 2018-08-13

## 2018-07-29 RX ORDER — NALOXONE HYDROCHLORIDE 4 MG/.1ML
0.1 SPRAY NASAL
Qty: 0 | Refills: 0 | Status: DISCONTINUED | OUTPATIENT
Start: 2018-07-29 | End: 2018-07-31

## 2018-07-29 RX ORDER — NYSTATIN 500MM UNIT
500000 POWDER (EA) MISCELLANEOUS
Qty: 0 | Refills: 0 | Status: DISCONTINUED | OUTPATIENT
Start: 2018-07-29 | End: 2018-07-29

## 2018-07-29 RX ORDER — HYDROMORPHONE HYDROCHLORIDE 2 MG/ML
30 INJECTION INTRAMUSCULAR; INTRAVENOUS; SUBCUTANEOUS
Qty: 0 | Refills: 0 | Status: DISCONTINUED | OUTPATIENT
Start: 2018-07-29 | End: 2018-07-30

## 2018-07-29 RX ORDER — SODIUM CHLORIDE 9 MG/ML
1000 INJECTION INTRAMUSCULAR; INTRAVENOUS; SUBCUTANEOUS
Qty: 0 | Refills: 0 | Status: DISCONTINUED | OUTPATIENT
Start: 2018-07-29 | End: 2018-07-29

## 2018-07-29 RX ORDER — FAMOTIDINE 10 MG/ML
20 INJECTION INTRAVENOUS DAILY
Qty: 0 | Refills: 0 | Status: DISCONTINUED | OUTPATIENT
Start: 2018-07-29 | End: 2018-07-29

## 2018-07-29 RX ORDER — HYDROMORPHONE HYDROCHLORIDE 2 MG/ML
0.5 INJECTION INTRAMUSCULAR; INTRAVENOUS; SUBCUTANEOUS
Qty: 0 | Refills: 0 | Status: DISCONTINUED | OUTPATIENT
Start: 2018-07-29 | End: 2018-07-29

## 2018-07-29 RX ORDER — HYDROMORPHONE HYDROCHLORIDE 2 MG/ML
1 INJECTION INTRAMUSCULAR; INTRAVENOUS; SUBCUTANEOUS
Qty: 0 | Refills: 0 | Status: DISCONTINUED | OUTPATIENT
Start: 2018-07-29 | End: 2018-07-29

## 2018-07-29 RX ORDER — NYSTATIN 500MM UNIT
500000 POWDER (EA) MISCELLANEOUS
Qty: 0 | Refills: 0 | Status: DISCONTINUED | OUTPATIENT
Start: 2018-07-29 | End: 2018-08-14

## 2018-07-29 RX ORDER — ZINC OXIDE 200 MG/G
1 OINTMENT TOPICAL THREE TIMES A DAY
Qty: 0 | Refills: 0 | Status: DISCONTINUED | OUTPATIENT
Start: 2018-07-29 | End: 2018-07-29

## 2018-07-29 RX ORDER — ZINC OXIDE 200 MG/G
1 OINTMENT TOPICAL THREE TIMES A DAY
Qty: 0 | Refills: 0 | Status: DISCONTINUED | OUTPATIENT
Start: 2018-07-29 | End: 2018-08-14

## 2018-07-29 RX ORDER — CEFAZOLIN SODIUM 1 G
1000 VIAL (EA) INJECTION EVERY 12 HOURS
Qty: 0 | Refills: 0 | Status: DISCONTINUED | OUTPATIENT
Start: 2018-07-29 | End: 2018-08-01

## 2018-07-29 RX ORDER — TACROLIMUS 5 MG/1
4 CAPSULE ORAL
Qty: 0 | Refills: 0 | Status: DISCONTINUED | OUTPATIENT
Start: 2018-07-29 | End: 2018-08-05

## 2018-07-29 RX ORDER — SIMETHICONE 80 MG/1
80 TABLET, CHEWABLE ORAL EVERY 6 HOURS
Qty: 0 | Refills: 0 | Status: DISCONTINUED | OUTPATIENT
Start: 2018-07-29 | End: 2018-08-13

## 2018-07-29 RX ORDER — TRAMADOL HYDROCHLORIDE 50 MG/1
50 TABLET ORAL EVERY 4 HOURS
Qty: 0 | Refills: 0 | Status: DISCONTINUED | OUTPATIENT
Start: 2018-07-29 | End: 2018-08-05

## 2018-07-29 RX ORDER — TRAMADOL HYDROCHLORIDE 50 MG/1
25 TABLET ORAL EVERY 4 HOURS
Qty: 0 | Refills: 0 | Status: DISCONTINUED | OUTPATIENT
Start: 2018-07-29 | End: 2018-08-05

## 2018-07-29 RX ORDER — ONDANSETRON 8 MG/1
4 TABLET, FILM COATED ORAL EVERY 6 HOURS
Qty: 0 | Refills: 0 | Status: DISCONTINUED | OUTPATIENT
Start: 2018-07-29 | End: 2018-08-14

## 2018-07-29 RX ORDER — FAMOTIDINE 10 MG/ML
20 INJECTION INTRAVENOUS DAILY
Qty: 0 | Refills: 0 | Status: DISCONTINUED | OUTPATIENT
Start: 2018-07-29 | End: 2018-08-14

## 2018-07-29 RX ORDER — SENNA PLUS 8.6 MG/1
2 TABLET ORAL AT BEDTIME
Qty: 0 | Refills: 0 | Status: DISCONTINUED | OUTPATIENT
Start: 2018-07-29 | End: 2018-08-14

## 2018-07-29 RX ORDER — SODIUM CHLORIDE 9 MG/ML
1000 INJECTION, SOLUTION INTRAVENOUS
Qty: 0 | Refills: 0 | Status: DISCONTINUED | OUTPATIENT
Start: 2018-07-29 | End: 2018-07-31

## 2018-07-29 RX ADMIN — Medication 500000 UNIT(S): at 00:45

## 2018-07-29 RX ADMIN — TRAMADOL HYDROCHLORIDE 50 MILLIGRAM(S): 50 TABLET ORAL at 20:30

## 2018-07-29 RX ADMIN — TRAMADOL HYDROCHLORIDE 50 MILLIGRAM(S): 50 TABLET ORAL at 19:30

## 2018-07-29 RX ADMIN — MYCOPHENOLATE MOFETIL 1000 MILLIGRAM(S): 250 CAPSULE ORAL at 17:55

## 2018-07-29 RX ADMIN — TRAMADOL HYDROCHLORIDE 25 MILLIGRAM(S): 50 TABLET ORAL at 05:41

## 2018-07-29 RX ADMIN — TACROLIMUS 4 MILLIGRAM(S): 5 CAPSULE ORAL at 17:55

## 2018-07-29 RX ADMIN — Medication 650 MILLIGRAM(S): at 07:21

## 2018-07-29 RX ADMIN — HYDROMORPHONE HYDROCHLORIDE 30 MILLILITER(S): 2 INJECTION INTRAMUSCULAR; INTRAVENOUS; SUBCUTANEOUS at 14:06

## 2018-07-29 RX ADMIN — Medication 500000 UNIT(S): at 06:36

## 2018-07-29 RX ADMIN — HYDROMORPHONE HYDROCHLORIDE 0.5 MILLIGRAM(S): 2 INJECTION INTRAMUSCULAR; INTRAVENOUS; SUBCUTANEOUS at 13:45

## 2018-07-29 RX ADMIN — TACROLIMUS 4 MILLIGRAM(S): 5 CAPSULE ORAL at 06:35

## 2018-07-29 RX ADMIN — TAMSULOSIN HYDROCHLORIDE 0.4 MILLIGRAM(S): 0.4 CAPSULE ORAL at 22:05

## 2018-07-29 RX ADMIN — TRAMADOL HYDROCHLORIDE 25 MILLIGRAM(S): 50 TABLET ORAL at 01:15

## 2018-07-29 RX ADMIN — TRAMADOL HYDROCHLORIDE 50 MILLIGRAM(S): 50 TABLET ORAL at 09:37

## 2018-07-29 RX ADMIN — INSULIN HUMAN 10 UNIT(S): 100 INJECTION, SOLUTION SUBCUTANEOUS at 15:37

## 2018-07-29 RX ADMIN — TRAMADOL HYDROCHLORIDE 25 MILLIGRAM(S): 50 TABLET ORAL at 00:45

## 2018-07-29 RX ADMIN — HYDROMORPHONE HYDROCHLORIDE 0.5 MILLIGRAM(S): 2 INJECTION INTRAMUSCULAR; INTRAVENOUS; SUBCUTANEOUS at 14:00

## 2018-07-29 RX ADMIN — VALGANCICLOVIR 450 MILLIGRAM(S): 450 TABLET, FILM COATED ORAL at 15:48

## 2018-07-29 RX ADMIN — Medication 3 MILLILITER(S): at 15:31

## 2018-07-29 RX ADMIN — MYCOPHENOLATE MOFETIL 1000 MILLIGRAM(S): 250 CAPSULE ORAL at 06:35

## 2018-07-29 RX ADMIN — TRAMADOL HYDROCHLORIDE 25 MILLIGRAM(S): 50 TABLET ORAL at 05:11

## 2018-07-29 RX ADMIN — Medication 100 MILLIGRAM(S): at 17:56

## 2018-07-29 RX ADMIN — TRAMADOL HYDROCHLORIDE 50 MILLIGRAM(S): 50 TABLET ORAL at 03:55

## 2018-07-29 RX ADMIN — TRAMADOL HYDROCHLORIDE 50 MILLIGRAM(S): 50 TABLET ORAL at 03:25

## 2018-07-29 RX ADMIN — SODIUM CHLORIDE 75 MILLILITER(S): 9 INJECTION, SOLUTION INTRAVENOUS at 16:35

## 2018-07-29 RX ADMIN — Medication 90 MILLIGRAM(S): at 06:36

## 2018-07-29 RX ADMIN — FAMOTIDINE 20 MILLIGRAM(S): 10 INJECTION INTRAVENOUS at 16:48

## 2018-07-29 RX ADMIN — Medication 650 MILLIGRAM(S): at 06:36

## 2018-07-29 RX ADMIN — HYDROMORPHONE HYDROCHLORIDE 30 MILLILITER(S): 2 INJECTION INTRAMUSCULAR; INTRAVENOUS; SUBCUTANEOUS at 16:53

## 2018-07-29 RX ADMIN — Medication 100 MILLIGRAM(S): at 06:35

## 2018-07-29 RX ADMIN — Medication 100 MILLIGRAM(S): at 19:32

## 2018-07-29 RX ADMIN — SODIUM CHLORIDE 125 MILLILITER(S): 9 INJECTION INTRAMUSCULAR; INTRAVENOUS; SUBCUTANEOUS at 13:58

## 2018-07-29 RX ADMIN — Medication 5 MILLIGRAM(S): at 17:55

## 2018-07-29 RX ADMIN — SENNA PLUS 2 TABLET(S): 8.6 TABLET ORAL at 22:05

## 2018-07-29 RX ADMIN — Medication 5 MILLIGRAM(S): at 06:35

## 2018-07-29 RX ADMIN — Medication 50 MILLILITER(S): at 15:31

## 2018-07-29 RX ADMIN — Medication 500000 UNIT(S): at 17:55

## 2018-07-29 RX ADMIN — Medication 1 TABLET(S): at 17:55

## 2018-07-29 RX ADMIN — TRAMADOL HYDROCHLORIDE 50 MILLIGRAM(S): 50 TABLET ORAL at 08:52

## 2018-07-29 NOTE — PROGRESS NOTE ADULT - PROBLEM SELECTOR PLAN 1
-Plan for OR today for re-exploration and ureteral reimplantation  -Creatinine slightly down 4.67 from 5.1. High LACEY output, 1.5L. LACEY creatinine >112  -Renal ultrasound yesterday demonstrated small  1.7 x 1.1 x 1.6cm collection along interior pole of transplant kidney.   -IVF 125ml/hour  - Continue Cipro for symptomatic, borderline UTI. Cx negative  - Strict I/Os  - NPO for OR. Resume Regular diet low K, Low Phos post-op  - Continue PRN Tylenol and Percocet for pain  - Continue bowel regimen, Flomax 0.4 daily   -daily BMP  Blood sugars well controlled. Fingersticks before meals and bedtime. On steroid taper.

## 2018-07-29 NOTE — PROGRESS NOTE ADULT - ATTENDING COMMENTS
renal function improving  K also a bit lower  To OR today for or re-exploration and revision/reimplantation of ureter

## 2018-07-29 NOTE — PROGRESS NOTE ADULT - SUBJECTIVE AND OBJECTIVE BOX
MICHAEL OLIVEIRA  HPI:  This is a patient with ESRD who had a DDRTx post op day 5. Has LACEY drain with urine so had leak repaired today.    HEALTH ISSUES - PROBLEM Dx:  HTN (hypertension): HTN (hypertension)  Edema of left lower extremity: Edema of left lower extremity  Hyperkalemia: Hyperkalemia  Hyperphosphatemia: Hyperphosphatemia  Immunosuppressive management encounter following kidney transplant: Immunosuppressive management encounter following kidney transplant  Kidney replaced by transplant: Kidney replaced by transplant  Hypertension, unspecified type: Hypertension, unspecified type  ESRD on dialysis: ESRD on dialysis        MEDICATIONS  (STANDING):  docusate sodium 100 milliGRAM(s) Oral daily  famotidine    Tablet 20 milliGRAM(s) Oral daily  HYDROmorphone PCA (1 mG/mL) 30 milliLiter(s) PCA Continuous PCA Continuous  metoprolol tartrate 100 milliGRAM(s) Oral every 12 hours  mycophenolate mofetil 1000 milliGRAM(s) Oral every 12 hours  NIFEdipine XL 90 milliGRAM(s) Oral daily  nystatin    Suspension 357991 Unit(s) Swish and Swallow four times a day  predniSONE   Tablet 5 milliGRAM(s) Oral once  senna 2 Tablet(s) Oral at bedtime  sodium chloride 0.45% 1000 milliLiter(s) (75 mL/Hr) IV Continuous <Continuous>  tacrolimus 4 milliGRAM(s) Oral two times a day  tamsulosin 0.4 milliGRAM(s) Oral at bedtime  trimethoprim   80 mG/sulfamethoxazole 400 mG 1 Tablet(s) Oral daily  valGANciclovir 450 milliGRAM(s) Oral daily    MEDICATIONS  (PRN):  acetaminophen   Tablet. 650 milliGRAM(s) Oral every 6 hours PRN Mild Pain (1 - 3)  HYDROmorphone  Injectable 0.5 milliGRAM(s) IV Push every 10 minutes PRN Moderate Pain (4 - 6)  HYDROmorphone  Injectable 1 milliGRAM(s) IV Push every 10 minutes PRN Severe Pain (7 - 10)  HYDROmorphone PCA (1 mG/mL) Rescue Clinician Bolus 0.5 milliGRAM(s) IV Push every 15 minutes PRN for Pain Scale GREATER THAN 6  lidocaine 2% Gel 1 Application(s) Topical three times a day PRN pain related to urinary catheter  metoclopramide Injectable 10 milliGRAM(s) IV Push once PRN Nausea and/or Vomiting  naloxone Injectable 0.1 milliGRAM(s) IV Push every 3 minutes PRN For ANY of the following changes in patient status:  A. RR LESS THAN 10 breaths per minute, B. Oxygen saturation LESS THAN 90%, C. Sedation score of 6  ondansetron Injectable 4 milliGRAM(s) IV Push every 6 hours PRN Nausea  ondansetron Injectable 4 milliGRAM(s) IV Push once PRN Nausea and/or Vomiting  ondansetron Injectable 4 milliGRAM(s) IV Push once PRN Nausea and/or Vomiting  simethicone 80 milliGRAM(s) Chew every 6 hours PRN Gas  traMADol 50 milliGRAM(s) Oral every 4 hours PRN Severe Pain (7 - 10)  traMADol 25 milliGRAM(s) Oral every 4 hours PRN Moderate Pain (4 - 6)  zinc oxide 20% Ointment 1 Application(s) Topical three times a day PRN rash    Vital Signs Last 24 Hrs  T(C): 36.8 (2018 15:30), Max: 36.9 (2018 17:39)  T(F): 98.2 (2018 15:30), Max: 98.4 (2018 17:39)  HR: 74 (2018 15:30) (60 - 76)  BP: 144/- (2018 15:30) (132/78 - 165/92)  BP(mean): 118 (2018 15:00) (100 - 118)  RR: 16 (2018 15:30) (15 - 18)  SpO2: 99% (2018 15:30) (95% - 100%)  Daily     Daily Weight in k.8 (2018 07:19)    PHYSICAL EXAM:  Constitutional:  He appears comfortable and not distressed. Not diaphoretic.    Neck:  The thyroid is normal. Trachea is midline.     Breasts: Normal examination.    Respiratory: The lungs are clear to auscultation. No dullness and expansion is normal.    Cardiovascular: S1 and S2 are normal. No mummurs, rubs or gallops are present.    Gastrointestinal: The abdomen is soft. No tenderness is present. No masses are present. Bowel sounds are normal.    Genitourinary: The bladder is not distended. No CVA tenderness is present. Rodriguez with clear urine. LACEY drain in place.    Extremities: No edema is noted. No deformities are present.    Neurological: Cognition is normal. Tone, power and sensation are normal. Gait is steady.    Skin: No leasions are seen  or palpated.    Lymph Nodes: No lymphadenopathy is present.    Psychiatric: Mood is appropriate. No hallucinations or flight of ideas are noted.                              11.1   8.3   )-----------( 171      ( 2018 14:19 )             33.7         139  |  109<H>  |  76<H>  ----------------------------<  115<H>  5.6<H>   |  17<L>  |  4.68<H>    Ca    9.8      2018 14:19  Phos  3.7       Mg     2.3         TPro  6.7  /  Alb  3.9  /  TBili  0.3  /  DBili  x   /  AST  13  /  ALT  9<L>  /  AlkPhos  59

## 2018-07-29 NOTE — PROGRESS NOTE ADULT - ATTENDING COMMENTS
POD#6 s/p DDRT, stable renal improving  +ureteral leak with 1L draining from LACEY  Taken to OR for re-exploration and revision of ureterocystostomy.  Cont marquez catheter and LACEY drain  Immunosuppression - Cont Prograf 4 mg bid, Steroid taper, Cellcept 1gm bid.   Will check tacrolimus level and adjust dose accordingly.  Transplant Prophylaxis with nystatin, bactrim, valcyte  abx for UTI  PPI due to steroids  monitor glucose levels and adjust sliding scare as necessary .

## 2018-07-29 NOTE — BRIEF OPERATIVE NOTE - OPERATION/FINDINGS
R sided DDRT anastomosed to R external iliac artery and vein. Ureter anastomosed to bladder over double J stent  Cold ischemia time: 14 hours  CMV +, EBV +
Small opening noted at prior anastamosis  Anastomosis redone over double J stent

## 2018-07-29 NOTE — PROGRESS NOTE ADULT - ASSESSMENT
33 year old male with a PMH of ESRD on HD (MWF) from LUE AVF, HTN s/p DDRT 33 years old donor, has ureteral stent. Pt now with ureteric leak.

## 2018-07-29 NOTE — PROGRESS NOTE ADULT - SUBJECTIVE AND OBJECTIVE BOX
POST-OP NOTE:    Procedure: Reimplantation of ureter of transplanted kidney: anastomosis redone over double J stent, small opening noted at prior anastomosis.    Subjective: Pt c/o incisional and groin pain. No nausea or vomiting. Not passing gas yet.    Vital Signs Last 24 Hrs  T(C): 36.7 (29 Jul 2018 17:20), Max: 36.9 (29 Jul 2018 01:35)  T(F): 98 (29 Jul 2018 17:20), Max: 98.4 (29 Jul 2018 01:35)  HR: 90 (29 Jul 2018 17:20) (60 - 90)  BP: 151/90 (29 Jul 2018 17:20) (132/78 - 243/85)  BP(mean): 118 (29 Jul 2018 17:02) (100 - 118)  RR: 18 (29 Jul 2018 17:20) (15 - 18)  SpO2: 95% (29 Jul 2018 17:20) (95% - 99%)  I&O's Summary    28 Jul 2018 07:01  -  29 Jul 2018 07:00  --------------------------------------------------------  IN: 2505 mL / OUT: 2100 mL / NET: 405 mL    29 Jul 2018 07:01  -  29 Jul 2018 19:04  --------------------------------------------------------  IN: 620 mL / OUT: 970 mL / NET: -350 mL                            11.1   8.3   )-----------( 171      ( 29 Jul 2018 14:19 )             33.7     07-29    139  |  109<H>  |  76<H>  ----------------------------<  115<H>  5.6<H>   |  17<L>  |  4.68<H>    Ca    9.8      29 Jul 2018 14:19  Phos  3.7     07-29  Mg     2.3     07-29    TPro  6.7  /  Alb  3.9  /  TBili  0.3  /  DBili  x   /  AST  13  /  ALT  9<L>  /  AlkPhos  59  07-28   PT/INR - ( 29 Jul 2018 07:19 )   PT: 11.2 sec;   INR: 1.04 ratio         PTT - ( 29 Jul 2018 07:19 )  PTT:27.8 sec    PHYSICAL EXAM:  Gen: NAD, well-developed.  Resp: breathing easily, no stridor.  CV: no peripheral edema/cyanosis.  Abd: soft, appropriately tender, non-distended, RLQ with incision covered by dressing that is intact. +Some leakage noted on blankets underneath patient, none noted around dressing site, reinforced dressingwith tegaderm,   : Rodriguez in place.  LE: 2+ DP pulses b/l.    ASSESSMENT/PLAN:  33 year old gentleman with PMH of HTN and ESRD since 2010 now POD#5 R sided DDRT with double J stent placement with high creatinine from LACEY fluid concerning for leak, plateaued creatinine ~5 and elevated potassium, now POD0 s/p reimplantation of ureter of transplanted kidney: anastomosis redone over double J stent, small opening noted at prior anastomosis.    - C/w immunosuppression: nystatin, bactrim, valcyte. C/w 5 mg prednisone, 1000 mg cellcept q12h, prograf 4 mg BID.  - C/w ancef, 0.45NS @ 75.  - Pain control: PCA, Tylenol, Tramadol.  - GI: Doc/Senna, Simethicone, Pepcid, Reglan/Zofran.  - Lopressor, nifedipine for HTN.  - Tamsulosin.  - Reg diet.    Le Street MD  General Surgery PGY-1 POST-OP NOTE:    Procedure: Reimplantation of ureter of transplanted kidney: anastomosis redone over double J stent, small opening noted at prior anastomosis.    Subjective: Pt c/o incisional and groin pain. No nausea or vomiting. Not passing gas yet.    Vital Signs Last 24 Hrs  T(C): 36.7 (29 Jul 2018 17:20), Max: 36.9 (29 Jul 2018 01:35)  T(F): 98 (29 Jul 2018 17:20), Max: 98.4 (29 Jul 2018 01:35)  HR: 90 (29 Jul 2018 17:20) (60 - 90)  BP: 151/90 (29 Jul 2018 17:20) (132/78 - 243/85)  BP(mean): 118 (29 Jul 2018 17:02) (100 - 118)  RR: 18 (29 Jul 2018 17:20) (15 - 18)  SpO2: 95% (29 Jul 2018 17:20) (95% - 99%)  I&O's Summary    28 Jul 2018 07:01  -  29 Jul 2018 07:00  --------------------------------------------------------  IN: 2505 mL / OUT: 2100 mL / NET: 405 mL    29 Jul 2018 07:01  -  29 Jul 2018 19:04  --------------------------------------------------------  IN: 620 mL / OUT: 970 mL / NET: -350 mL                            11.1   8.3   )-----------( 171      ( 29 Jul 2018 14:19 )             33.7     07-29    139  |  109<H>  |  76<H>  ----------------------------<  115<H>  5.6<H>   |  17<L>  |  4.68<H>    Ca    9.8      29 Jul 2018 14:19  Phos  3.7     07-29  Mg     2.3     07-29    TPro  6.7  /  Alb  3.9  /  TBili  0.3  /  DBili  x   /  AST  13  /  ALT  9<L>  /  AlkPhos  59  07-28   PT/INR - ( 29 Jul 2018 07:19 )   PT: 11.2 sec;   INR: 1.04 ratio         PTT - ( 29 Jul 2018 07:19 )  PTT:27.8 sec    PHYSICAL EXAM:  Gen: NAD, well-developed.  Resp: breathing easily, no stridor.  CV: no peripheral edema/cyanosis.  Abd: soft, appropriately tender, non-distended, RLQ with incision covered by dressing that is intact. +Some leakage noted on blankets underneath patient, no leakage noted around dressing site, reinforced dressing with tegaderm,   : Rodriguez in place.  LE: 2+ DP pulses palpated b/l.    ASSESSMENT/PLAN:  33 year old gentleman with PMHx of HTN and ESRD since 2010 now POD#6 s/p R sided DDRT with double J stent placement c/b high creatinine from LACEY fluid concerning for leak, plateaued creatinine ~5 and elevated potassium, now POD#0 s/p reimplantation of ureter of transplanted kidney: anastomosis redone over double J stent, small opening noted at prior anastomosis.    - C/w immunosuppression: nystatin, bactrim, valcyte. C/w 5 mg prednisone, 1000 mg cellcept q12h, prograf 4 mg BID.  - C/w ancef, 0.45NS @ 75.  - Pain control: PCA, Tylenol, Tramadol.  - GI: Doc/Senna, Simethicone, Pepcid, Reglan/Zofran.  - Lopressor, Nifedipine for HTN.  - Tamsulosin.  - Reg diet.    Le Street MD  General Surgery PGY-1

## 2018-07-29 NOTE — PROGRESS NOTE ADULT - SUBJECTIVE AND OBJECTIVE BOX
POD#6 s/p DDRT with ureteral leak, draining 1L urine from LACEY overnight.  +minimal abdominal pain in RLQ  No fevers, nausea, vomiting or diarrhea.  Creatinine decreasing to 4.6  To go to OR for re-exploration and revision/reimplantation of ureter  Details of surgery discussed with pt and his wife, including risks of bleeding, infection, anastomotic leak, ureter stricture.

## 2018-07-29 NOTE — PROGRESS NOTE ADULT - SUBJECTIVE AND OBJECTIVE BOX
[facility] DIVISION OF KIDNEY DISEASES AND HYPERTENSION -- FOLLOW UP NOTE  --------------------------------------------------------------------------------  Chief Complaint: renal transplant recipient     24 hour events/subjective:  pt with increasing output in LACEY drain and decreased output in marquez. Scr trending down.  Taken to OR  for re-exploration and revision/reimplantation of ureter.      PAST HISTORY  --------------------------------------------------------------------------------  No significant changes to PMH, PSH, FHx, SHx, unless otherwise noted    ALLERGIES & MEDICATIONS  --------------------------------------------------------------------------------  Allergies    IV Contrast (Rash)  nyquil (Rhinorrhea)  vancomycin (Pruritus; Hives)    Intolerances      Standing Inpatient Medications    PRN Inpatient Medications      REVIEW OF SYSTEMS  --------------------------------------------------------------------------------  Gen: No weight changes, fatigue, fevers/chills, weakness  Skin: No rashes  Head/Eyes/Ears/Mouth: No headache; Normal hearing; Normal vision  Respiratory: No dyspnea, cough, wheezing, hemoptysis  CV: No chest pain, PND, orthopnea  GI: No abdominal pain, diarrhea, constipation, nausea, vomiting, melena, hematochezia  : No increased frequency, dysuria, hematuria, nocturia  MSK: No joint pain/swelling; no back pain; no edema      VITALS/PHYSICAL EXAM  --------------------------------------------------------------------------------  T(C): 36.6 (07-29-18 @ 09:05), Max: 36.9 (07-28-18 @ 13:34)  HR: 60 (07-29-18 @ 09:05) (59 - 76)  BP: 151/99 (07-29-18 @ 09:05) (147/91 - 165/92)  RR: 18 (07-29-18 @ 09:05) (18 - 18)  SpO2: 99% (07-29-18 @ 09:05) (97% - 100%)  Wt(kg): --    Weight (kg): 72.8 (07-29-18 @ 07:27)      07-28-18 @ 07:01  -  07-29-18 @ 07:00  --------------------------------------------------------  IN: 2505 mL / OUT: 2100 mL / NET: 405 mL    07-29-18 @ 07:01  -  07-29-18 @ 11:01  --------------------------------------------------------  IN: 500 mL / OUT: 300 mL / NET: 200 mL      Physical Exam:  	Gen: NAD, well-appearing  	Pulm: CTA B/L  	CV: RRR, S1S2; no rub  	Abd: +BS, soft, +discomfort on palpation, +LACEY    	: No suprapubic tenderness  	UE: Warm, no asterixis  	LE: Warm, no edema, palpable DP pulses b/l   	Psych: Normal affect and mood  	Skin: Warm, without rashes  	Vascular access: NINI NORWOOD    LABS/STUDIES  --------------------------------------------------------------------------------              10.9   7.2   >-----------<  160      [07-29-18 @ 06:12]              33.8     137  |  109  |  78  ----------------------------<  106      [07-29-18 @ 06:12]  5.4   |  18  |  4.67        Ca     9.9     [07-29-18 @ 06:12]      Mg     2.4     [07-29-18 @ 06:12]      Phos  3.8     [07-29-18 @ 06:12]    TPro  6.7  /  Alb  3.9  /  TBili  0.3  /  DBili  x   /  AST  13  /  ALT  9   /  AlkPhos  59  [07-28-18 @ 07:41]    PT/INR: PT 11.2 , INR 1.04       [07-29-18 @ 07:19]  PTT: 27.8       [07-29-18 @ 07:19]          [07-28-18 @ 07:41]    Creatinine Trend:  SCr 4.67 [07-29 @ 06:12]  SCr 5.10 [07-28 @ 17:56]  SCr 5.28 [07-28 @ 07:41]  SCr 5.20 [07-27 @ 06:11]  SCr 5.99 [07-26 @ 07:20]    Urinalysis - [07-27-18 @ 09:51]      Color Yellow / Appearance SL Turbid / SG 1.021 / pH 6.0      Gluc Negative / Ketone Negative  / Bili Negative / Urobili Negative       Blood Moderate / Protein 300 / Leuk Est Moderate / Nitrite Negative      RBC >50 / WBC >50 / Hyaline  / Gran  / Sq Epi  / Non Sq Epi Occasional / Bacteria       HbA1c 5.1      [08-18-15 @ 18:19]

## 2018-07-29 NOTE — PROGRESS NOTE ADULT - SUBJECTIVE AND OBJECTIVE BOX
INTERVAL HPI/OVERNIGHT EVENTS:  Patient seen with multidisciplinary team (Nephrologist, pharmacist, nurse, nurse manager, NP, MD surgeons, transplant cordinator,  nutritionist, and  )  in am rounds and examined with Dr. Diaz. 34 y/o gentleman POD#6 R sided DDRT anastomosed to R external iliac artery and vein. Ureter anastomosed to bladder over double J stent.  Cold ischemia time14 hours.  CMV +, EBV + .  Overnight with C/O dysurea, and frequency urinating, with c/o incisional pain, releived with tylenol, making adequate urine, VSS.      MEDICATIONS  (STANDING):    MEDICATIONS  (PRN):      Allergies    IV Contrast (Rash)  nyquil (Rhinorrhea)  vancomycin (Pruritus; Hives)    Intolerances        Vital Signs Last 24 Hrs  T(C): 36.6 (29 Jul 2018 09:05), Max: 36.9 (28 Jul 2018 13:34)  T(F): 97.9 (29 Jul 2018 09:05), Max: 98.5 (28 Jul 2018 13:34)  HR: 60 (29 Jul 2018 09:05) (59 - 76)  BP: 151/99 (29 Jul 2018 09:05) (147/91 - 165/92)  BP(mean): --  RR: 18 (29 Jul 2018 09:05) (18 - 18)  SpO2: 99% (29 Jul 2018 09:05) (97% - 100%)    LABS:                        10.9   7.2   )-----------( 160      ( 29 Jul 2018 06:12 )             33.8     07-29    137  |  109<H>  |  78<H>  ----------------------------<  106<H>  5.4<H>   |  18<L>  |  4.67<H>    Ca    9.9      29 Jul 2018 06:12  Phos  3.8     07-29  Mg     2.4     07-29    TPro  6.7  /  Alb  3.9  /  TBili  0.3  /  DBili  x   /  AST  13  /  ALT  9<L>  /  AlkPhos  59  07-28    PT/INR - ( 29 Jul 2018 07:19 )   PT: 11.2 sec;   INR: 1.04 ratio         PTT - ( 29 Jul 2018 07:19 )  PTT:27.8 sec      RADIOLOGY & ADDITIONAL TESTS:    Review of systems  Gen: No weight changes, fatigue, fevers/chills, weakness  Skin: No rashes  Head/Eyes/Ears/Mouth: No headache; Normal hearing; Normal vision w/o blurriness; No sinus pain/discomfort, sore throat  Respiratory: No dyspnea, cough, wheezing, hemoptysis  CV: No chest pain, PND, orthopnea  GI: Reports incisional pain and TTP, denies diarrhea, constipation, nausea, vomiting, melena, hematochezia. LACEY patent  : Marquez in situ  MSK: No joint pain/swelling; no back pain; no edema  Neuro: No dizziness/lightheadedness, weakness, seizures, numbness, tingling  Heme: No easy bruising or bleeding  Endo: No heat/cold intolerance  Psych: No significant nervousness, anxiety, stress, depression  All other systems were reviewed and are negative, except as noted.    PHYSICAL EXAM:  Constitutional: Well developed / well nourished  Eyes: Anicteric, PERRLA  ENMT: nc/at  Neck: supple  Respiratory: CTA B/L  Cardiovascular: RRR  Gastrointestinal: Soft abdomen, mild tender to touch at surgical site, ND  Genitourinary: marquez in situ.  LACEY patent with large amount pinkish/yellow output  Extremities: SCD's in place and working bilaterally, no edema  Vascular: Palpable dp pulses bilaterally  Neurological: A&O x3  Skin: wound open to air, no erythema and evidence of infection noted  Musculoskeletal: Moving all extremities  Psychiatric: Responsive

## 2018-07-29 NOTE — PROGRESS NOTE ADULT - PROBLEM SELECTOR PLAN 1
Pt s/p DDRT on 7/23/18 . Pt non-oliguric with good urine output. Pt received Simulect induction now maintained on tacrolimus/ steroid/ cellcept.   Monitor 12 hour trough.  serum creatinine improving  OR for re exploration and revision/reimplantation of ureter.

## 2018-07-29 NOTE — BRIEF OPERATIVE NOTE - POST-OP DX
ESRD (end stage renal disease)  07/23/2018    Jacqui Freedman  Ureteral leak from previous kidney transplant  07/29/2018    Jacqui Freedman
ESRD (end stage renal disease)  07/23/2018    Active  Jacqui Browne

## 2018-07-29 NOTE — PROGRESS NOTE ADULT - ASSESSMENT
1.	S/P DDRT, on Cellcept, Prednisone and Prograf.  2.	S/P anastomotic leak repair.  3.	Hyperkalemia.    PLAN:  1.	Immunosuppression as per Transplant Medicine.  2.	Follow up BMP.

## 2018-07-29 NOTE — BRIEF OPERATIVE NOTE - PROCEDURE
<<-----Click on this checkbox to enter Procedure Reimplantation of ureter of transplanted kidney  07/29/2018    Active  RADHA

## 2018-07-30 LAB
ALBUMIN SERPL ELPH-MCNC: 3.7 G/DL — SIGNIFICANT CHANGE UP (ref 3.3–5)
ALP SERPL-CCNC: 66 U/L — SIGNIFICANT CHANGE UP (ref 40–120)
ALT FLD-CCNC: 12 U/L — SIGNIFICANT CHANGE UP (ref 10–45)
ANION GAP SERPL CALC-SCNC: 13 MMOL/L — SIGNIFICANT CHANGE UP (ref 5–17)
AST SERPL-CCNC: 22 U/L — SIGNIFICANT CHANGE UP (ref 10–40)
BASOPHILS # BLD AUTO: 0 K/UL — SIGNIFICANT CHANGE UP (ref 0–0.2)
BASOPHILS NFR BLD AUTO: 0.3 % — SIGNIFICANT CHANGE UP (ref 0–2)
BILIRUB SERPL-MCNC: 0.6 MG/DL — SIGNIFICANT CHANGE UP (ref 0.2–1.2)
BUN SERPL-MCNC: 73 MG/DL — HIGH (ref 7–23)
CALCIUM SERPL-MCNC: 9.9 MG/DL — SIGNIFICANT CHANGE UP (ref 8.4–10.5)
CHLORIDE SERPL-SCNC: 108 MMOL/L — SIGNIFICANT CHANGE UP (ref 96–108)
CO2 SERPL-SCNC: 17 MMOL/L — LOW (ref 22–31)
CREAT SERPL-MCNC: 4.61 MG/DL — HIGH (ref 0.5–1.3)
EOSINOPHIL # BLD AUTO: 0.3 K/UL — SIGNIFICANT CHANGE UP (ref 0–0.5)
EOSINOPHIL NFR BLD AUTO: 2.9 % — SIGNIFICANT CHANGE UP (ref 0–6)
GLUCOSE BLDC GLUCOMTR-MCNC: 104 MG/DL — HIGH (ref 70–99)
GLUCOSE BLDC GLUCOMTR-MCNC: 105 MG/DL — HIGH (ref 70–99)
GLUCOSE BLDC GLUCOMTR-MCNC: 111 MG/DL — HIGH (ref 70–99)
GLUCOSE BLDC GLUCOMTR-MCNC: 121 MG/DL — HIGH (ref 70–99)
GLUCOSE SERPL-MCNC: 93 MG/DL — SIGNIFICANT CHANGE UP (ref 70–99)
HCT VFR BLD CALC: 36.4 % — LOW (ref 39–50)
HGB BLD-MCNC: 11.4 G/DL — LOW (ref 13–17)
LYMPHOCYTES # BLD AUTO: 1.8 K/UL — SIGNIFICANT CHANGE UP (ref 1–3.3)
LYMPHOCYTES # BLD AUTO: 18 % — SIGNIFICANT CHANGE UP (ref 13–44)
MAGNESIUM SERPL-MCNC: 2.3 MG/DL — SIGNIFICANT CHANGE UP (ref 1.6–2.6)
MCHC RBC-ENTMCNC: 27.9 PG — SIGNIFICANT CHANGE UP (ref 27–34)
MCHC RBC-ENTMCNC: 31.4 GM/DL — LOW (ref 32–36)
MCV RBC AUTO: 88.6 FL — SIGNIFICANT CHANGE UP (ref 80–100)
MONOCYTES # BLD AUTO: 0.9 K/UL — SIGNIFICANT CHANGE UP (ref 0–0.9)
MONOCYTES NFR BLD AUTO: 9.7 % — SIGNIFICANT CHANGE UP (ref 2–14)
NEUTROPHILS # BLD AUTO: 6.7 K/UL — SIGNIFICANT CHANGE UP (ref 1.8–7.4)
NEUTROPHILS NFR BLD AUTO: 69.1 % — SIGNIFICANT CHANGE UP (ref 43–77)
PHOSPHATE SERPL-MCNC: 3.8 MG/DL — SIGNIFICANT CHANGE UP (ref 2.5–4.5)
PLATELET # BLD AUTO: 201 K/UL — SIGNIFICANT CHANGE UP (ref 150–400)
POTASSIUM SERPL-MCNC: 5.3 MMOL/L — SIGNIFICANT CHANGE UP (ref 3.5–5.3)
POTASSIUM SERPL-SCNC: 5.3 MMOL/L — SIGNIFICANT CHANGE UP (ref 3.5–5.3)
PROT SERPL-MCNC: 6.4 G/DL — SIGNIFICANT CHANGE UP (ref 6–8.3)
RBC # BLD: 4.1 M/UL — LOW (ref 4.2–5.8)
RBC # FLD: 13.2 % — SIGNIFICANT CHANGE UP (ref 10.3–14.5)
SODIUM SERPL-SCNC: 138 MMOL/L — SIGNIFICANT CHANGE UP (ref 135–145)
TACROLIMUS SERPL-MCNC: 8.5 NG/ML — SIGNIFICANT CHANGE UP
WBC # BLD: 9.7 K/UL — SIGNIFICANT CHANGE UP (ref 3.8–10.5)
WBC # FLD AUTO: 9.7 K/UL — SIGNIFICANT CHANGE UP (ref 3.8–10.5)

## 2018-07-30 PROCEDURE — 99232 SBSQ HOSP IP/OBS MODERATE 35: CPT | Mod: GC

## 2018-07-30 PROCEDURE — 99232 SBSQ HOSP IP/OBS MODERATE 35: CPT | Mod: 24,GC

## 2018-07-30 RX ORDER — CALAMINE AND ZINC OXIDE AND PHENOL 160; 10 MG/ML; MG/ML
1 LOTION TOPICAL DAILY
Qty: 0 | Refills: 0 | Status: DISCONTINUED | OUTPATIENT
Start: 2018-07-30 | End: 2018-07-30

## 2018-07-30 RX ORDER — DIPHENHYDRAMINE HCL 50 MG
25 CAPSULE ORAL ONCE
Qty: 0 | Refills: 0 | Status: COMPLETED | OUTPATIENT
Start: 2018-07-30 | End: 2018-07-30

## 2018-07-30 RX ORDER — OXYBUTYNIN CHLORIDE 5 MG
5 TABLET ORAL
Qty: 0 | Refills: 0 | Status: DISCONTINUED | OUTPATIENT
Start: 2018-07-30 | End: 2018-08-14

## 2018-07-30 RX ORDER — ACETAMINOPHEN 500 MG
1000 TABLET ORAL ONCE
Qty: 0 | Refills: 0 | Status: COMPLETED | OUTPATIENT
Start: 2018-07-30 | End: 2018-07-30

## 2018-07-30 RX ORDER — HYDROMORPHONE HYDROCHLORIDE 2 MG/ML
30 INJECTION INTRAMUSCULAR; INTRAVENOUS; SUBCUTANEOUS
Qty: 0 | Refills: 0 | Status: DISCONTINUED | OUTPATIENT
Start: 2018-07-30 | End: 2018-07-31

## 2018-07-30 RX ADMIN — Medication 100 MILLIGRAM(S): at 11:41

## 2018-07-30 RX ADMIN — MYCOPHENOLATE MOFETIL 1000 MILLIGRAM(S): 250 CAPSULE ORAL at 18:01

## 2018-07-30 RX ADMIN — TACROLIMUS 4 MILLIGRAM(S): 5 CAPSULE ORAL at 06:59

## 2018-07-30 RX ADMIN — Medication 5 MILLIGRAM(S): at 18:00

## 2018-07-30 RX ADMIN — TACROLIMUS 4 MILLIGRAM(S): 5 CAPSULE ORAL at 18:01

## 2018-07-30 RX ADMIN — VALGANCICLOVIR 450 MILLIGRAM(S): 450 TABLET, FILM COATED ORAL at 11:44

## 2018-07-30 RX ADMIN — Medication 500000 UNIT(S): at 06:59

## 2018-07-30 RX ADMIN — Medication 25 MILLIGRAM(S): at 01:01

## 2018-07-30 RX ADMIN — MYCOPHENOLATE MOFETIL 1000 MILLIGRAM(S): 250 CAPSULE ORAL at 06:59

## 2018-07-30 RX ADMIN — TAMSULOSIN HYDROCHLORIDE 0.4 MILLIGRAM(S): 0.4 CAPSULE ORAL at 21:10

## 2018-07-30 RX ADMIN — Medication 90 MILLIGRAM(S): at 06:59

## 2018-07-30 RX ADMIN — HYDROMORPHONE HYDROCHLORIDE 0.5 MILLIGRAM(S): 2 INJECTION INTRAMUSCULAR; INTRAVENOUS; SUBCUTANEOUS at 05:20

## 2018-07-30 RX ADMIN — Medication 100 MILLIGRAM(S): at 18:01

## 2018-07-30 RX ADMIN — Medication 1000 MILLIGRAM(S): at 01:34

## 2018-07-30 RX ADMIN — Medication 100 MILLIGRAM(S): at 06:59

## 2018-07-30 RX ADMIN — SENNA PLUS 2 TABLET(S): 8.6 TABLET ORAL at 21:15

## 2018-07-30 RX ADMIN — FAMOTIDINE 20 MILLIGRAM(S): 10 INJECTION INTRAVENOUS at 11:42

## 2018-07-30 RX ADMIN — Medication 5 MILLIGRAM(S): at 06:59

## 2018-07-30 RX ADMIN — Medication 500000 UNIT(S): at 11:42

## 2018-07-30 RX ADMIN — Medication 500000 UNIT(S): at 18:01

## 2018-07-30 RX ADMIN — Medication 100 MILLIGRAM(S): at 18:02

## 2018-07-30 RX ADMIN — Medication 1 TABLET(S): at 11:43

## 2018-07-30 RX ADMIN — HYDROMORPHONE HYDROCHLORIDE 0.5 MILLIGRAM(S): 2 INJECTION INTRAMUSCULAR; INTRAVENOUS; SUBCUTANEOUS at 02:19

## 2018-07-30 RX ADMIN — Medication 400 MILLIGRAM(S): at 00:43

## 2018-07-30 RX ADMIN — HYDROMORPHONE HYDROCHLORIDE 30 MILLILITER(S): 2 INJECTION INTRAMUSCULAR; INTRAVENOUS; SUBCUTANEOUS at 09:51

## 2018-07-30 RX ADMIN — Medication 500000 UNIT(S): at 00:37

## 2018-07-30 NOTE — PROGRESS NOTE ADULT - ASSESSMENT
34 y/o gentleman with PMH of HTN and ESRD since 2010, POD#6 R sided DDRT anastomosed to R external iliac artery and vein Ureter anastomosed to bladder over double J stent.  Cold ischemia time14 hours.  CMV +, EBV +, Renal ultrasound 7/27 demonstrated small  1.7 x 1.1 x 1.6cm collection along interior pole of transplant kidney.   Pt underwent OR for reimplantation of ureter of transplanted kidney 07/29/2018.

## 2018-07-30 NOTE — PROGRESS NOTE ADULT - PROBLEM SELECTOR PLAN 1
Pt s/p DDRT on 7/23/18 . Pt non-oliguric with good urine output. Pt received Simulect induction now maintained on tacrolimus/ steroid/ cellcept.   Monitor 12 hour trough.  Pt taken to OR for re exploration and revision/reimplantation of ureter 7/29/18.

## 2018-07-30 NOTE — PROGRESS NOTE ADULT - ATTENDING COMMENTS
POD#7 s/p DDRT, POD#1 s/p re-exploration and revision of ureterocystostomy. renal function improving  Cont marquez catheter and LACEY drain  Immunosuppression - Cont Prograf 4 mg bid, Steroid taper, Cellcept 1gm bid.   Will check tacrolimus level and adjust dose accordingly.  Transplant Prophylaxis with nystatin, bactrim, valcyte  abx for UTI and re-exploration  PPI due to steroids  monitor glucose levels and adjust sliding scare as necessary .

## 2018-07-30 NOTE — PROGRESS NOTE ADULT - SUBJECTIVE AND OBJECTIVE BOX
INTERVAL HPI/OVERNIGHT EVENTS:  Patient seen with multidisciplinary team (Nephrologist, pharmacist, nurse, nurse manager, NP, MD surgeons, transplant cordinator,  nutritionist, and  )  in am rounds and examined with Dr. Diaz. 34 y/o gentleman POD#6 R sided DDRT anastomosed to R external iliac artery and vein Ureter anastomosed to bladder over double J stent.  Cold ischemia time14 hours.  CMV +, EBV +, and POD#1 for reimplantation of ureter of transplanted kidney  07/29/2018. No acute event overnight, pain managed with PCA Dilaudid making urine.        MEDICATIONS  (STANDING):  ceFAZolin   IVPB 1000 milliGRAM(s) IV Intermittent every 12 hours  docusate sodium 100 milliGRAM(s) Oral daily  famotidine    Tablet 20 milliGRAM(s) Oral daily  HYDROmorphone PCA (1 mG/mL) 30 milliLiter(s) PCA Continuous PCA Continuous  metoprolol tartrate 100 milliGRAM(s) Oral every 12 hours  mycophenolate mofetil 1000 milliGRAM(s) Oral every 12 hours  NIFEdipine XL 90 milliGRAM(s) Oral daily  nystatin    Suspension 712805 Unit(s) Swish and Swallow four times a day  oxybutynin 5 milliGRAM(s) Oral two times a day  predniSONE   Tablet 5 milliGRAM(s) Oral daily  senna 2 Tablet(s) Oral at bedtime  sodium chloride 0.45% 1000 milliLiter(s) (75 mL/Hr) IV Continuous <Continuous>  tacrolimus 4 milliGRAM(s) Oral two times a day  tamsulosin 0.4 milliGRAM(s) Oral at bedtime  trimethoprim   80 mG/sulfamethoxazole 400 mG 1 Tablet(s) Oral daily  valGANciclovir 450 milliGRAM(s) Oral daily    MEDICATIONS  (PRN):  acetaminophen   Tablet. 650 milliGRAM(s) Oral every 6 hours PRN Mild Pain (1 - 3)  HYDROmorphone PCA (1 mG/mL) Rescue Clinician Bolus 0.5 milliGRAM(s) IV Push every 15 minutes PRN for Pain Scale GREATER THAN 6  lidocaine 2% Gel 1 Application(s) Topical three times a day PRN pain related to urinary catheter  metoclopramide Injectable 10 milliGRAM(s) IV Push once PRN Nausea and/or Vomiting  naloxone Injectable 0.1 milliGRAM(s) IV Push every 3 minutes PRN For ANY of the following changes in patient status:  A. RR LESS THAN 10 breaths per minute, B. Oxygen saturation LESS THAN 90%, C. Sedation score of 6  ondansetron Injectable 4 milliGRAM(s) IV Push every 6 hours PRN Nausea  ondansetron Injectable 4 milliGRAM(s) IV Push once PRN Nausea and/or Vomiting  simethicone 80 milliGRAM(s) Chew every 6 hours PRN Gas  traMADol 50 milliGRAM(s) Oral every 4 hours PRN Severe Pain (7 - 10)  traMADol 25 milliGRAM(s) Oral every 4 hours PRN Moderate Pain (4 - 6)  zinc oxide 20% Ointment 1 Application(s) Topical three times a day PRN rash      Allergies    IV Contrast (Rash)  nyquil (Rhinorrhea)  vancomycin (Pruritus; Hives)    Intolerances        Vital Signs Last 24 Hrs  T(C): 36.7 (30 Jul 2018 09:03), Max: 36.8 (29 Jul 2018 13:25)  T(F): 98.1 (30 Jul 2018 09:03), Max: 98.3 (30 Jul 2018 01:08)  HR: 72 (30 Jul 2018 09:03) (71 - 91)  BP: 157/102 (30 Jul 2018 09:03) (132/78 - 243/85)  BP(mean): 118 (29 Jul 2018 17:02) (100 - 118)  RR: 18 (30 Jul 2018 09:03) (15 - 18)  SpO2: 96% (30 Jul 2018 09:03) (95% - 99%)    LABS:                        11.4   9.7   )-----------( 201      ( 30 Jul 2018 05:58 )             36.4     07-30    138  |  108  |  73<H>  ----------------------------<  93  5.3   |  17<L>  |  4.61<H>    Ca    9.9      30 Jul 2018 05:54  Phos  3.8     07-30  Mg     2.3     07-30    TPro  6.4  /  Alb  3.7  /  TBili  0.6  /  DBili  x   /  AST  22  /  ALT  12  /  AlkPhos  66  07-30    PT/INR - ( 29 Jul 2018 07:19 )   PT: 11.2 sec;   INR: 1.04 ratio         PTT - ( 29 Jul 2018 07:19 )  PTT:27.8 sec      RADIOLOGY & ADDITIONAL TESTS:       Review of systems  Gen: No weight changes, fatigue, fevers/chills, weakness  Skin: No rashes  Head/Eyes/Ears/Mouth: No headache; Normal hearing; Normal vision w/o blurriness; No sinus pain/discomfort, sore throat  Respiratory: No dyspnea, cough, wheezing, hemoptysis  CV: No chest pain, PND, orthopnea  GI: Reports incisional pain and TTP, denies diarrhea, constipation, nausea, vomiting, melena, hematochezia. LACEY patent  : Marquez in situ  MSK: No joint pain/swelling; no back pain; no edema  Neuro: No dizziness/lightheadedness, weakness, seizures, numbness, tingling  Heme: No easy bruising or bleeding  Endo: No heat/cold intolerance  Psych: No significant nervousness, anxiety, stress, depression  All other systems were reviewed and are negative, except as noted.    PHYSICAL EXAM:  Constitutional: Well developed / well nourished  Eyes: Anicteric, PERRLA  ENMT: nc/at  Neck: supple  Respiratory: CTA B/L  Cardiovascular: RRR  Gastrointestinal: Soft abdomen, mild tender to touch at surgical site, ND  Genitourinary: marquez in situ.  LACEY patent with large amount pinkish/yellow output  Extremities: SCD's in place and working bilaterally, no edema  Vascular: Palpable dp pulses bilaterally  Neurological: A&O x3  Skin: wound open to air, no erythema and evidence of infection noted  Musculoskeletal: Moving all extremities  Psychiatric: Responsive

## 2018-07-30 NOTE — PROVIDER CONTACT NOTE (MEDICATION) - ACTION/TREATMENT ORDERED:
Pain Service recommended to administer IV Tylenol with PCA Pump. Will order one time dose IV Tylenol to be administered now. Continue to monitor.

## 2018-07-30 NOTE — PROGRESS NOTE ADULT - PROBLEM SELECTOR PLAN 1
S/P reimplantation of ureter of transplanted kidney  07/29/2018.   -Creatinine slightly down 4.61  -IVF 75ml/hour  - Continue ancef IV  - Strict I/Os  - Continue PCA dilauded for pain now, will change to oral in am  - Continue bowel regimen, Flomax 0.4 daily   -daily BMP  Blood sugars well controlled. Fingersticks before meals and bedtime. On steroid taper.

## 2018-07-30 NOTE — PROGRESS NOTE ADULT - ATTENDING COMMENTS
Transplant recipient with reexploration, revision of ureter anastomosis, non oliguric, improving pain.  Plan: immunosuppression reviewed, will follow

## 2018-07-30 NOTE — PROGRESS NOTE ADULT - SUBJECTIVE AND OBJECTIVE BOX
Pain Management Attending Addendum    SUBJECTIVE:    Therapy:	  PCA	x   Epidural           s/p Spinal Opoid              Postpartum infusion	  Patient controlled regional anesthesia (PCRA)    prn Analgesics    OBJECTIVE: No new signs x     Other:    Side Effects:    None	x		 Other:    Assessment of Catheter Site:		 Intact		 Other:    ASSESSMENT/PLAN  Continue current therapyx    Therapy changed to:     IV PCA        Epidural      prn Analgesics   x  post partum infusion    Comments:

## 2018-07-30 NOTE — PROVIDER CONTACT NOTE (MEDICATION) - RECOMMENDATIONS
Pain Service was called earlier and Demand Dose changed to 0.25mg from 0.2mg. Pt stated having some relief upon change, but now having increase in pain again.

## 2018-07-30 NOTE — PROGRESS NOTE ADULT - SUBJECTIVE AND OBJECTIVE BOX
[facility] DIVISION OF KIDNEY DISEASES AND HYPERTENSION -- FOLLOW UP NOTE  --------------------------------------------------------------------------------    HPI: 33 year old male with a PMH of ESRD on HD (MWF) from LUE AVF, HTN s/p DDRT 7/23/18. Pt has been on HD for the last 8 years and he follows with outpatient nephrologist Dr. Cline at [facility]. Pts last HD was 7/23/18. No plan for further HD. Pt non-oliguric with good urine output.    24 hour events/subjective:  Pt had increasing output in LACEY drain and decreased output in marquez on 7/28 with concerns for a urinary ureter leak.   Taken to OR 7/29 for re-exploration and revision/reimplantation of ureter. Pt seen and examined today. Feels okay. Pain is controlled with medication.     PAST HISTORY  --------------------------------------------------------------------------------  No significant changes to PMH, PSH, FHx, SHx, unless otherwise noted    ALLERGIES & MEDICATIONS  --------------------------------------------------------------------------------  Allergies    IV Contrast (Rash)  nyquil (Rhinorrhea)  vancomycin (Pruritus; Hives)    Intolerances      Standing Inpatient Medications  ceFAZolin   IVPB 1000 milliGRAM(s) IV Intermittent every 12 hours  docusate sodium 100 milliGRAM(s) Oral daily  famotidine    Tablet 20 milliGRAM(s) Oral daily  HYDROmorphone PCA (1 mG/mL) 30 milliLiter(s) PCA Continuous PCA Continuous  metoprolol tartrate 100 milliGRAM(s) Oral every 12 hours  mycophenolate mofetil 1000 milliGRAM(s) Oral every 12 hours  NIFEdipine XL 90 milliGRAM(s) Oral daily  nystatin    Suspension [id number] Unit(s) Swish and Swallow four times a day  oxybutynin 5 milliGRAM(s) Oral two times a day  predniSONE   Tablet 5 milliGRAM(s) Oral daily  senna 2 Tablet(s) Oral at bedtime  sodium chloride 0.45% 1000 milliLiter(s) IV Continuous <Continuous>  tacrolimus 4 milliGRAM(s) Oral two times a day  tamsulosin 0.4 milliGRAM(s) Oral at bedtime  trimethoprim   80 mG/sulfamethoxazole 400 mG 1 Tablet(s) Oral daily  valGANciclovir 450 milliGRAM(s) Oral daily    PRN Inpatient Medications  acetaminophen   Tablet. 650 milliGRAM(s) Oral every 6 hours PRN  HYDROmorphone PCA (1 mG/mL) Rescue Clinician Bolus 0.5 milliGRAM(s) IV Push every 15 minutes PRN  lidocaine 2% Gel 1 Application(s) Topical three times a day PRN  metoclopramide Injectable 10 milliGRAM(s) IV Push once PRN  naloxone Injectable 0.1 milliGRAM(s) IV Push every 3 minutes PRN  ondansetron Injectable 4 milliGRAM(s) IV Push every 6 hours PRN  ondansetron Injectable 4 milliGRAM(s) IV Push once PRN  simethicone 80 milliGRAM(s) Chew every 6 hours PRN  traMADol 50 milliGRAM(s) Oral every 4 hours PRN  traMADol 25 milliGRAM(s) Oral every 4 hours PRN  zinc oxide 20% Ointment 1 Application(s) Topical three times a day PRN      REVIEW OF SYSTEMS  --------------------------------------------------------------------------------  Gen: No weakness  Skin: No rashes  Head/Eyes/Ears/Mouth: No headache  Respiratory: No dyspnea  CV: No chest pain, PND, orthopnea  GI: No abdominal pain, diarrhea  MSK: No edema  Neuro: No dizziness/lightheadedness    All other systems were reviewed and are negative, except as noted.    VITALS/PHYSICAL EXAM  --------------------------------------------------------------------------------  T(C): 36.7 (07-30-18 @ 09:03), Max: 36.8 (07-29-18 @ 13:25)  HR: 72 (07-30-18 @ 09:03) (71 - 91)  BP: 157/102 (07-30-18 @ 09:03) (132/78 - 243/85)  RR: 18 (07-30-18 @ 09:03) (15 - 18)  SpO2: 96% (07-30-18 @ 09:03) (95% - 99%)  Wt(kg): --    Weight (kg): 72.8 (07-29-18 @ 07:27)      07-29-18 @ 07:01  -  07-30-18 @ 07:00  --------------------------------------------------------  IN: 1865 mL / OUT: 2100 mL / NET: -235 mL    Physical Exam:  	Gen: NAD, well-appearing  	Pulm: CTA B/L  	CV: RRR, S1S2; no rub  	Abd: +BS, soft, nontender/nondistended  	: No suprapubic tenderness  	UE: Warm, no asterixis  	LE: Warm, no edema, palpable DP pulses b/l   	Psych: Normal affect and mood  	Skin: Warm, without rashes  	Vascular access: LUE AVF, aneurysmal appearance     LABS/STUDIES  --------------------------------------------------------------------------------              11.4   9.7   >-----------<  201      [07-30-18 @ 05:58]              36.4     138  |  108  |  73  ----------------------------<  93      [07-30-18 @ 05:54]  5.3   |  17  |  4.61        Ca     9.9     [07-30-18 @ 05:54]      Mg     2.3     [07-30-18 @ 05:54]      Phos  3.8     [07-30-18 @ 05:54]    TPro  6.4  /  Alb  3.7  /  TBili  0.6  /  DBili  x   /  AST  22  /  ALT  12  /  AlkPhos  66  [07-30-18 @ 05:54]    PT/INR: PT 11.2 , INR 1.04       [07-29-18 @ 07:19]  PTT: 27.8       [07-29-18 @ 07:19]    Creatinine Trend:  SCr 4.61 [07-30 @ 05:54]  SCr 4.68 [07-29 @ 14:19]  SCr 4.67 [07-29 @ 06:12]  SCr 5.10 [07-28 @ 17:56]  SCr 5.28 [07-28 @ 07:41]    Urinalysis - [07-27-18 @ 09:51]      Color Yellow / Appearance SL Turbid / SG 1.021 / pH 6.0      Gluc Negative / Ketone Negative  / Bili Negative / Urobili Negative       Blood Moderate / Protein 300 / Leuk Est Moderate / Nitrite Negative      RBC >50 / WBC >50 / Hyaline  / Gran  / Sq Epi  / Non Sq Epi Occasional / Bacteria     HbA1c 5.1      [08-18-15 @ 18:19]

## 2018-07-30 NOTE — PROGRESS NOTE ADULT - SUBJECTIVE AND OBJECTIVE BOX
Day 1 of Anesthesia Pain Management Service    SUBJECTIVE: Patient is doing well with IV PCA    Pain Scale Score:	[X] Refer to charted pain scores    THERAPY:    [ ] IV PCA Morphine		[ ] 5 mg/mL	[ ] 1 mg/mL  [X] IV PCA Hydromorphone	[ ] 5 mg/mL	[X] 1 mg/mL  [ ] IV PCA Fentanyl		[ ] 50 micrograms/mL    Demand dose: 0.3 mg     Lockout: 6 minutes   Continuous Rate: 0 mg/hr  4 Hour Limit: 4 mg    MEDICATIONS  (STANDING):  ceFAZolin   IVPB 1000 milliGRAM(s) IV Intermittent every 12 hours  docusate sodium 100 milliGRAM(s) Oral daily  famotidine    Tablet 20 milliGRAM(s) Oral daily  HYDROmorphone PCA (1 mG/mL) 30 milliLiter(s) PCA Continuous PCA Continuous  metoprolol tartrate 100 milliGRAM(s) Oral every 12 hours  mycophenolate mofetil 1000 milliGRAM(s) Oral every 12 hours  NIFEdipine XL 90 milliGRAM(s) Oral daily  nystatin    Suspension [id number] Unit(s) Swish and Swallow four times a day  oxybutynin 5 milliGRAM(s) Oral two times a day  predniSONE   Tablet 5 milliGRAM(s) Oral daily  senna 2 Tablet(s) Oral at bedtime  sodium chloride 0.45% 1000 milliLiter(s) (75 mL/Hr) IV Continuous <Continuous>  tacrolimus 4 milliGRAM(s) Oral two times a day  tamsulosin 0.4 milliGRAM(s) Oral at bedtime  trimethoprim   80 mG/sulfamethoxazole 400 mG 1 Tablet(s) Oral daily  valGANciclovir 450 milliGRAM(s) Oral daily    MEDICATIONS  (PRN):  acetaminophen   Tablet. 650 milliGRAM(s) Oral every 6 hours PRN Mild Pain (1 - 3)  HYDROmorphone PCA (1 mG/mL) Rescue Clinician Bolus 0.5 milliGRAM(s) IV Push every 15 minutes PRN for Pain Scale GREATER THAN 6  lidocaine 2% Gel 1 Application(s) Topical three times a day PRN pain related to urinary catheter  metoclopramide Injectable 10 milliGRAM(s) IV Push once PRN Nausea and/or Vomiting  naloxone Injectable 0.1 milliGRAM(s) IV Push every 3 minutes PRN For ANY of the following changes in patient status:  A. RR LESS THAN 10 breaths per minute, B. Oxygen saturation LESS THAN 90%, C. Sedation score of 6  ondansetron Injectable 4 milliGRAM(s) IV Push every 6 hours PRN Nausea  ondansetron Injectable 4 milliGRAM(s) IV Push once PRN Nausea and/or Vomiting  simethicone 80 milliGRAM(s) Chew every 6 hours PRN Gas  traMADol 50 milliGRAM(s) Oral every 4 hours PRN Severe Pain (7 - 10)  traMADol 25 milliGRAM(s) Oral every 4 hours PRN Moderate Pain (4 - 6)  zinc oxide 20% Ointment 1 Application(s) Topical three times a day PRN rash      OBJECTIVE:    Sedation Score:	[  ] Alert 	[X ] Drowsy 	[ ] Arousable	[ ] Asleep	[ ] Unresponsive    Side Effects:	[X ] None	[ ] Nausea	[ ] Vomiting	[ ] Pruritus  		[ ] Other:    Vital Signs Last 24 Hrs  T(C): 36.7 (30 Jul 2018 09:03), Max: 36.8 (29 Jul 2018 13:25)  T(F): 98.1 (30 Jul 2018 09:03), Max: 98.3 (30 Jul 2018 01:08)  HR: 72 (30 Jul 2018 09:03) (71 - 91)  BP: 157/102 (30 Jul 2018 09:03) (132/78 - 243/85)  BP(mean): 118 (29 Jul 2018 17:02) (100 - 118)  RR: 18 (30 Jul 2018 09:03) (15 - 18)  SpO2: 96% (30 Jul 2018 09:03) (95% - 99%)    ASSESSMENT/ PLAN    Therapy to  be:               [X] Continued   [ ] Discontinued   [ ] Changed to PRN Analgesics    Documentation and Verification of current medications:   [X] Done	[ ] Not done, not eligible    Comments: Reached four hour limit. Increased to 5mg. Using 2-5x/hr. Reeducated to use.

## 2018-07-30 NOTE — PROGRESS NOTE ADULT - SUBJECTIVE AND OBJECTIVE BOX
Dr. Wagner  Office [phone]  Cell [phone]  [facility]  Cell [phone]      Patient is a 33y old  Male who presents with a chief complaint of DDRT (23 Jul 2018 09:25)      Patient seen and examined at bedside. No chest pain/sob    VITALS:  T(F): 98.3 (07-30-18 @ 14:13), Max: 98.3 (07-30-18 @ 01:08)  HR: 72 (07-30-18 @ 14:13)  BP: 160/92 (07-30-18 @ 14:13)  RR: 18 (07-30-18 @ 14:13)  SpO2: 99% (07-30-18 @ 14:13)  Wt(kg): --    07-29 @ 07:01  -  07-30 @ 07:00  --------------------------------------------------------  IN: 1865 mL / OUT: 2100 mL / NET: -235 mL    07-30 @ 07:01  -  07-30 @ 16:41  --------------------------------------------------------  IN: 360 mL / OUT: 620 mL / NET: -260 mL          PHYSICAL EXAM:  Constitutional: NAD  Neck: No JVD  Respiratory: CTAB, no wheezes, rales or rhonchi  Cardiovascular: S1, S2, RRR  Gastrointestinal: BS+, soft, mildly tender  Extremities: No peripheral edema    Hospital Medications:   MEDICATIONS  (STANDING):  ceFAZolin   IVPB 1000 milliGRAM(s) IV Intermittent every 12 hours  docusate sodium 100 milliGRAM(s) Oral daily  famotidine    Tablet 20 milliGRAM(s) Oral daily  HYDROmorphone PCA (1 mG/mL) 30 milliLiter(s) PCA Continuous PCA Continuous  metoprolol tartrate 100 milliGRAM(s) Oral every 12 hours  mycophenolate mofetil 1000 milliGRAM(s) Oral every 12 hours  NIFEdipine XL 90 milliGRAM(s) Oral daily  nystatin    Suspension [id number] Unit(s) Swish and Swallow four times a day  oxybutynin 5 milliGRAM(s) Oral two times a day  predniSONE   Tablet 5 milliGRAM(s) Oral daily  senna 2 Tablet(s) Oral at bedtime  sodium chloride 0.45% 1000 milliLiter(s) (75 mL/Hr) IV Continuous <Continuous>  tacrolimus 4 milliGRAM(s) Oral two times a day  tamsulosin 0.4 milliGRAM(s) Oral at bedtime  trimethoprim   80 mG/sulfamethoxazole 400 mG 1 Tablet(s) Oral daily  valGANciclovir 450 milliGRAM(s) Oral daily    Tacrolimus (), Serum: 8.5 ng/mL (07-30 @ 08:06)    LABS:  07-30    138  |  108  |  73<H>  ----------------------------<  93  5.3   |  17<L>  |  4.61<H>    Ca    9.9      30 Jul 2018 05:54  Phos  3.8     07-30  Mg     2.3     07-30    TPro  6.4  /  Alb  3.7  /  TBili  0.6  /  DBili      /  AST  22  /  ALT  12  /  AlkPhos  66  07-30    Creatinine Trend: 4.61 <--, 4.68 <--, 4.67 <--, 5.10 <--, 5.28 <--, 5.20 <--, 5.99 <--, 6.76 <--, 8.93 <--, 9.83 <--, 9.17 <--    Albumin, Serum: 3.7 g/dL (07-30 @ 05:54)  Phosphorus Level, Serum: 3.8 mg/dL (07-30 @ 05:54)                              11.4   9.7   )-----------( 201      ( 30 Jul 2018 05:58 )             36.4     Urine Studies:  Urinalysis - [07-27-18 @ 09:51]      Color Yellow / Appearance SL Turbid / SG 1.021 / pH 6.0      Gluc Negative / Ketone Negative  / Bili Negative / Urobili Negative       Blood Moderate / Protein 300 / Leuk Est Moderate / Nitrite Negative      RBC >50 / WBC >50 / Hyaline  / Gran  / Sq Epi  / Non Sq Epi Occasional / Bacteria       HbA1c 5.1      [08-18-15 @ 18:19]        RADIOLOGY & ADDITIONAL STUDIES:

## 2018-07-31 LAB
ALBUMIN SERPL ELPH-MCNC: 3.3 G/DL — SIGNIFICANT CHANGE UP (ref 3.3–5)
ALP SERPL-CCNC: 61 U/L — SIGNIFICANT CHANGE UP (ref 40–120)
ALT FLD-CCNC: 12 U/L — SIGNIFICANT CHANGE UP (ref 10–45)
AST SERPL-CCNC: 24 U/L — SIGNIFICANT CHANGE UP (ref 10–40)
BASOPHILS # BLD AUTO: 0 K/UL — SIGNIFICANT CHANGE UP (ref 0–0.2)
BASOPHILS NFR BLD AUTO: 0.2 % — SIGNIFICANT CHANGE UP (ref 0–2)
BILIRUB SERPL-MCNC: 0.3 MG/DL — SIGNIFICANT CHANGE UP (ref 0.2–1.2)
BLD GP AB SCN SERPL QL: NEGATIVE — SIGNIFICANT CHANGE UP
BUN SERPL-MCNC: 67 MG/DL — HIGH (ref 7–23)
CALCIUM SERPL-MCNC: 10 MG/DL — SIGNIFICANT CHANGE UP (ref 8.4–10.5)
CHLORIDE SERPL-SCNC: 105 MMOL/L — SIGNIFICANT CHANGE UP (ref 96–108)
CO2 SERPL-SCNC: 16 MMOL/L — LOW (ref 22–31)
CREAT FLD-MCNC: 20.23 MG/DL — SIGNIFICANT CHANGE UP
CREAT SERPL-MCNC: 4.18 MG/DL — HIGH (ref 0.5–1.3)
EOSINOPHIL # BLD AUTO: 0.3 K/UL — SIGNIFICANT CHANGE UP (ref 0–0.5)
EOSINOPHIL NFR BLD AUTO: 3.3 % — SIGNIFICANT CHANGE UP (ref 0–6)
GLUCOSE BLDC GLUCOMTR-MCNC: 102 MG/DL — HIGH (ref 70–99)
GLUCOSE BLDC GLUCOMTR-MCNC: 106 MG/DL — HIGH (ref 70–99)
GLUCOSE BLDC GLUCOMTR-MCNC: 140 MG/DL — HIGH (ref 70–99)
GLUCOSE BLDC GLUCOMTR-MCNC: 184 MG/DL — HIGH (ref 70–99)
GLUCOSE SERPL-MCNC: 102 MG/DL — HIGH (ref 70–99)
HCT VFR BLD CALC: 32.7 % — LOW (ref 39–50)
HGB BLD-MCNC: 10.6 G/DL — LOW (ref 13–17)
LYMPHOCYTES # BLD AUTO: 1.5 K/UL — SIGNIFICANT CHANGE UP (ref 1–3.3)
LYMPHOCYTES # BLD AUTO: 16 % — SIGNIFICANT CHANGE UP (ref 13–44)
MAGNESIUM SERPL-MCNC: 2.2 MG/DL — SIGNIFICANT CHANGE UP (ref 1.6–2.6)
MCHC RBC-ENTMCNC: 28.6 PG — SIGNIFICANT CHANGE UP (ref 27–34)
MCHC RBC-ENTMCNC: 32.6 GM/DL — SIGNIFICANT CHANGE UP (ref 32–36)
MCV RBC AUTO: 87.7 FL — SIGNIFICANT CHANGE UP (ref 80–100)
MONOCYTES # BLD AUTO: 0.8 K/UL — SIGNIFICANT CHANGE UP (ref 0–0.9)
MONOCYTES NFR BLD AUTO: 8.9 % — SIGNIFICANT CHANGE UP (ref 2–14)
NEUTROPHILS # BLD AUTO: 6.6 K/UL — SIGNIFICANT CHANGE UP (ref 1.8–7.4)
NEUTROPHILS NFR BLD AUTO: 71.6 % — SIGNIFICANT CHANGE UP (ref 43–77)
PHOSPHATE SERPL-MCNC: 3.3 MG/DL — SIGNIFICANT CHANGE UP (ref 2.5–4.5)
PLATELET # BLD AUTO: 161 K/UL — SIGNIFICANT CHANGE UP (ref 150–400)
POTASSIUM SERPL-MCNC: 5 MMOL/L — SIGNIFICANT CHANGE UP (ref 3.5–5.3)
POTASSIUM SERPL-SCNC: 5 MMOL/L — SIGNIFICANT CHANGE UP (ref 3.5–5.3)
PROT SERPL-MCNC: 6 G/DL — SIGNIFICANT CHANGE UP (ref 6–8.3)
RBC # BLD: 3.73 M/UL — LOW (ref 4.2–5.8)
RBC # FLD: 13.2 % — SIGNIFICANT CHANGE UP (ref 10.3–14.5)
RH IG SCN BLD-IMP: POSITIVE — SIGNIFICANT CHANGE UP
SODIUM SERPL-SCNC: 136 MMOL/L — SIGNIFICANT CHANGE UP (ref 135–145)
TACROLIMUS SERPL-MCNC: 10.4 NG/ML — SIGNIFICANT CHANGE UP
WBC # BLD: 9.2 K/UL — SIGNIFICANT CHANGE UP (ref 3.8–10.5)
WBC # FLD AUTO: 9.2 K/UL — SIGNIFICANT CHANGE UP (ref 3.8–10.5)

## 2018-07-31 PROCEDURE — 99232 SBSQ HOSP IP/OBS MODERATE 35: CPT | Mod: GC

## 2018-07-31 PROCEDURE — 99232 SBSQ HOSP IP/OBS MODERATE 35: CPT | Mod: GC,24

## 2018-07-31 PROCEDURE — 50432 PLMT NEPHROSTOMY CATHETER: CPT | Mod: RT

## 2018-07-31 RX ORDER — DIPHENHYDRAMINE HCL 50 MG
25 CAPSULE ORAL EVERY 6 HOURS
Qty: 0 | Refills: 0 | Status: DISCONTINUED | OUTPATIENT
Start: 2018-07-31 | End: 2018-08-14

## 2018-07-31 RX ORDER — ACETAMINOPHEN 500 MG
650 TABLET ORAL EVERY 6 HOURS
Qty: 0 | Refills: 0 | Status: DISCONTINUED | OUTPATIENT
Start: 2018-07-31 | End: 2018-07-31

## 2018-07-31 RX ORDER — POLYETHYLENE GLYCOL 3350 17 G/17G
17 POWDER, FOR SOLUTION ORAL DAILY
Qty: 0 | Refills: 0 | Status: DISCONTINUED | OUTPATIENT
Start: 2018-07-31 | End: 2018-08-14

## 2018-07-31 RX ADMIN — Medication 100 MILLIGRAM(S): at 06:20

## 2018-07-31 RX ADMIN — TRAMADOL HYDROCHLORIDE 50 MILLIGRAM(S): 50 TABLET ORAL at 23:31

## 2018-07-31 RX ADMIN — TAMSULOSIN HYDROCHLORIDE 0.4 MILLIGRAM(S): 0.4 CAPSULE ORAL at 22:16

## 2018-07-31 RX ADMIN — Medication 500000 UNIT(S): at 01:09

## 2018-07-31 RX ADMIN — POLYETHYLENE GLYCOL 3350 17 GRAM(S): 17 POWDER, FOR SOLUTION ORAL at 12:20

## 2018-07-31 RX ADMIN — Medication 650 MILLIGRAM(S): at 12:25

## 2018-07-31 RX ADMIN — Medication 500000 UNIT(S): at 19:13

## 2018-07-31 RX ADMIN — TACROLIMUS 4 MILLIGRAM(S): 5 CAPSULE ORAL at 19:15

## 2018-07-31 RX ADMIN — Medication 500000 UNIT(S): at 12:18

## 2018-07-31 RX ADMIN — Medication 5 MILLIGRAM(S): at 19:15

## 2018-07-31 RX ADMIN — TRAMADOL HYDROCHLORIDE 25 MILLIGRAM(S): 50 TABLET ORAL at 10:52

## 2018-07-31 RX ADMIN — TRAMADOL HYDROCHLORIDE 50 MILLIGRAM(S): 50 TABLET ORAL at 09:20

## 2018-07-31 RX ADMIN — Medication 5 MILLIGRAM(S): at 06:20

## 2018-07-31 RX ADMIN — Medication 500000 UNIT(S): at 23:33

## 2018-07-31 RX ADMIN — Medication 100 MILLIGRAM(S): at 19:16

## 2018-07-31 RX ADMIN — TRAMADOL HYDROCHLORIDE 50 MILLIGRAM(S): 50 TABLET ORAL at 10:05

## 2018-07-31 RX ADMIN — Medication 90 MILLIGRAM(S): at 06:20

## 2018-07-31 RX ADMIN — Medication 500000 UNIT(S): at 06:20

## 2018-07-31 RX ADMIN — TRAMADOL HYDROCHLORIDE 25 MILLIGRAM(S): 50 TABLET ORAL at 11:37

## 2018-07-31 RX ADMIN — MYCOPHENOLATE MOFETIL 1000 MILLIGRAM(S): 250 CAPSULE ORAL at 19:13

## 2018-07-31 RX ADMIN — TRAMADOL HYDROCHLORIDE 25 MILLIGRAM(S): 50 TABLET ORAL at 21:40

## 2018-07-31 RX ADMIN — Medication 100 MILLIGRAM(S): at 19:15

## 2018-07-31 RX ADMIN — TACROLIMUS 4 MILLIGRAM(S): 5 CAPSULE ORAL at 06:20

## 2018-07-31 RX ADMIN — TRAMADOL HYDROCHLORIDE 50 MILLIGRAM(S): 50 TABLET ORAL at 14:30

## 2018-07-31 RX ADMIN — Medication 100 MILLIGRAM(S): at 12:18

## 2018-07-31 RX ADMIN — FAMOTIDINE 20 MILLIGRAM(S): 10 INJECTION INTRAVENOUS at 12:18

## 2018-07-31 RX ADMIN — Medication 25 MILLIGRAM(S): at 01:08

## 2018-07-31 RX ADMIN — Medication 1 TABLET(S): at 12:19

## 2018-07-31 RX ADMIN — VALGANCICLOVIR 450 MILLIGRAM(S): 450 TABLET, FILM COATED ORAL at 12:19

## 2018-07-31 RX ADMIN — TRAMADOL HYDROCHLORIDE 50 MILLIGRAM(S): 50 TABLET ORAL at 15:15

## 2018-07-31 RX ADMIN — SENNA PLUS 2 TABLET(S): 8.6 TABLET ORAL at 22:16

## 2018-07-31 RX ADMIN — TRAMADOL HYDROCHLORIDE 25 MILLIGRAM(S): 50 TABLET ORAL at 20:47

## 2018-07-31 RX ADMIN — Medication 100 MILLIGRAM(S): at 06:29

## 2018-07-31 RX ADMIN — Medication 650 MILLIGRAM(S): at 13:10

## 2018-07-31 RX ADMIN — MYCOPHENOLATE MOFETIL 1000 MILLIGRAM(S): 250 CAPSULE ORAL at 06:20

## 2018-07-31 RX ADMIN — SODIUM CHLORIDE 75 MILLILITER(S): 9 INJECTION, SOLUTION INTRAVENOUS at 01:09

## 2018-07-31 NOTE — PROGRESS NOTE ADULT - PROBLEM SELECTOR PLAN 1
Pt s/p DDRT on 7/23/18 . Pt non-oliguric with good urine output. Pt received Simulect induction now maintained on tacrolimus/ steroid/ cellcept.   Monitor 12 hour trough.  Pt taken to OR for re exploration and revision/reimplantation of ureter 7/29/18.  Pt non-oliguric with good urine output.   Pain is managed

## 2018-07-31 NOTE — PROGRESS NOTE ADULT - SUBJECTIVE AND OBJECTIVE BOX
INTERVAL HPI/OVERNIGHT EVENTS:  Patient seen with multidisciplinary team (Nephrologist, pharmacist, nurse, nurse manager, NP, MD surgeons, transplant coordinator,  nutritionist, and  )  in am rounds and examined with Dr. Brown. 32 y/o gentleman POD#7 R sided DDRT anastomosed to R external iliac artery and vein Ureter anastomosed to bladder over double J stent.  Cold ischemia time14 hours.  CMV +, EBV +, and POD#2  for reimplantation of ureter of transplanted kidney  07/29/2018. No acute event overnight, pain managed with PCA Dilaudid making urine.     MEDICATIONS  (STANDING):  ceFAZolin   IVPB 1000 milliGRAM(s) IV Intermittent every 12 hours  docusate sodium 100 milliGRAM(s) Oral daily  famotidine    Tablet 20 milliGRAM(s) Oral daily  metoprolol tartrate 100 milliGRAM(s) Oral every 12 hours  mycophenolate mofetil 1000 milliGRAM(s) Oral every 12 hours  NIFEdipine XL 90 milliGRAM(s) Oral daily  nystatin    Suspension [id number] Unit(s) Swish and Swallow four times a day  oxybutynin 5 milliGRAM(s) Oral two times a day  predniSONE   Tablet 5 milliGRAM(s) Oral daily  senna 2 Tablet(s) Oral at bedtime  tacrolimus 4 milliGRAM(s) Oral two times a day  tamsulosin 0.4 milliGRAM(s) Oral at bedtime  trimethoprim   80 mG/sulfamethoxazole 400 mG 1 Tablet(s) Oral daily  valGANciclovir 450 milliGRAM(s) Oral daily    MEDICATIONS  (PRN):  acetaminophen   Tablet. 650 milliGRAM(s) Oral every 6 hours PRN Mild Pain (1 - 3)  diphenhydrAMINE   Capsule 25 milliGRAM(s) Oral every 6 hours PRN Rash and/or Itching  lidocaine 2% Gel 1 Application(s) Topical three times a day PRN pain related to urinary catheter  metoclopramide Injectable 10 milliGRAM(s) IV Push once PRN Nausea and/or Vomiting  ondansetron Injectable 4 milliGRAM(s) IV Push every 6 hours PRN Nausea  ondansetron Injectable 4 milliGRAM(s) IV Push once PRN Nausea and/or Vomiting  simethicone 80 milliGRAM(s) Chew every 6 hours PRN Gas  traMADol 50 milliGRAM(s) Oral every 4 hours PRN Severe Pain (7 - 10)  traMADol 25 milliGRAM(s) Oral every 4 hours PRN Moderate Pain (4 - 6)  zinc oxide 20% Ointment 1 Application(s) Topical three times a day PRN rash      Allergies    IV Contrast (Rash)  nyquil (Rhinorrhea)  vancomycin (Pruritus; Hives)    Intolerances        Vital Signs Last 24 Hrs  T(C): 36.4 (31 Jul 2018 09:10), Max: 37.2 (30 Jul 2018 22:00)  T(F): 97.6 (31 Jul 2018 09:10), Max: 98.9 (30 Jul 2018 22:00)  HR: 76 (31 Jul 2018 09:10) (72 - 89)  BP: 140/90 (31 Jul 2018 09:10) (133/83 - 160/92)  BP(mean): --  RR: 18 (31 Jul 2018 09:10) (18 - 18)  SpO2: 97% (31 Jul 2018 09:10) (96% - 99%)    LABS:                        10.6   9.2   )-----------( 161      ( 31 Jul 2018 08:20 )             32.7     07-31    136  |  105  |  67<H>  ----------------------------<  102<H>  5.0   |  16<L>  |  4.18<H>    Ca    10.0      31 Jul 2018 06:09  Phos  3.3     07-31  Mg     2.2     07-31    TPro  6.0  /  Alb  3.3  /  TBili  0.3  /  DBili  x   /  AST  24  /  ALT  12  /  AlkPhos  61  07-31          RADIOLOGY & ADDITIONAL TESTS:     Review of systems  Gen: No weight changes, fatigue, fevers/chills, weakness  Skin: No rashes  Head/Eyes/Ears/Mouth: No headache; Normal hearing; Normal vision w/o blurriness; No sinus pain/discomfort, sore throat  Respiratory: No dyspnea, cough, wheezing, hemoptysis  CV: No chest pain, PND, orthopnea  GI: Reports incisional pain and TTP, denies diarrhea, constipation, nausea, vomiting, melena, hematochezia. LACEY patent  : Marquez in situ  MSK: No joint pain/swelling; no back pain; no edema  Neuro: No dizziness/lightheadedness, weakness, seizures, numbness, tingling  Heme: No easy bruising or bleeding  Endo: No heat/cold intolerance  Psych: No significant nervousness, anxiety, stress, depression  All other systems were reviewed and are negative, except as noted.    PHYSICAL EXAM:  Constitutional: Well developed / well nourished  Eyes: Anicteric, PERRLA  ENMT: nc/at  Neck: supple  Respiratory: CTA B/L  Cardiovascular: RRR  Gastrointestinal: Soft abdomen, mild tender to touch at surgical site, ND  Genitourinary: marquez in situ.  LACEY patent with large amount pinkish/yellow output  Extremities: SCD's in place and working bilaterally, no edema  Vascular: Palpable dp pulses bilaterally  Neurological: A&O x3  Skin: wound open to air, no erythema and evidence of infection noted  Musculoskeletal: Moving all extremities  Psychiatric: Responsive

## 2018-07-31 NOTE — PROGRESS NOTE ADULT - ATTENDING COMMENTS
Patient with functioning renal allograft, S/p revision of ureteral anastomosis, urine leak, s/p drain, ureteral stent for nephrostomy today.  Improving creatinine noted.  Plan:  Continue current immunosuppression  Will follow

## 2018-07-31 NOTE — PROGRESS NOTE ADULT - SUBJECTIVE AND OBJECTIVE BOX
Day 2 of Anesthesia Pain Management Service    SUBJECTIVE: Patient is doing well with IV PCA    Pain Scale Score:	[X] Refer to charted pain scores    THERAPY:    [ ] IV PCA Morphine		[ ] 5 mg/mL	[ ] 1 mg/mL  [X] IV PCA Hydromorphone	[ ] 5 mg/mL	[X] 1 mg/mL  [ ] IV PCA Fentanyl		[ ] 50 micrograms/mL    Demand dose: 0.3 mg     Lockout: 6 minutes   Continuous Rate: 0 mg/hr  4 Hour Limit: 4 mg    MEDICATIONS  (STANDING):  ceFAZolin   IVPB 1000 milliGRAM(s) IV Intermittent every 12 hours  docusate sodium 100 milliGRAM(s) Oral daily  famotidine    Tablet 20 milliGRAM(s) Oral daily  metoprolol tartrate 100 milliGRAM(s) Oral every 12 hours  mycophenolate mofetil 1000 milliGRAM(s) Oral every 12 hours  NIFEdipine XL 90 milliGRAM(s) Oral daily  nystatin    Suspension 757914 Unit(s) Swish and Swallow four times a day  oxybutynin 5 milliGRAM(s) Oral two times a day  predniSONE   Tablet 5 milliGRAM(s) Oral daily  senna 2 Tablet(s) Oral at bedtime  tacrolimus 4 milliGRAM(s) Oral two times a day  tamsulosin 0.4 milliGRAM(s) Oral at bedtime  trimethoprim   80 mG/sulfamethoxazole 400 mG 1 Tablet(s) Oral daily  valGANciclovir 450 milliGRAM(s) Oral daily    MEDICATIONS  (PRN):  acetaminophen   Tablet. 650 milliGRAM(s) Oral every 6 hours PRN Mild Pain (1 - 3)  diphenhydrAMINE   Capsule 25 milliGRAM(s) Oral every 6 hours PRN Rash and/or Itching  lidocaine 2% Gel 1 Application(s) Topical three times a day PRN pain related to urinary catheter  metoclopramide Injectable 10 milliGRAM(s) IV Push once PRN Nausea and/or Vomiting  ondansetron Injectable 4 milliGRAM(s) IV Push every 6 hours PRN Nausea  ondansetron Injectable 4 milliGRAM(s) IV Push once PRN Nausea and/or Vomiting  simethicone 80 milliGRAM(s) Chew every 6 hours PRN Gas  traMADol 50 milliGRAM(s) Oral every 4 hours PRN Severe Pain (7 - 10)  traMADol 25 milliGRAM(s) Oral every 4 hours PRN Moderate Pain (4 - 6)  zinc oxide 20% Ointment 1 Application(s) Topical three times a day PRN rash      OBJECTIVE:    Sedation Score:	[ X] Alert	[ ] Drowsy 	[ ] Arousable	[ ] Asleep	[ ] Unresponsive    Side Effects:	[X ] None	[ ] Nausea	[ ] Vomiting	[ ] Pruritus  		[ ] Other:    Vital Signs Last 24 Hrs  T(C): 36.4 (31 Jul 2018 09:10), Max: 37.2 (30 Jul 2018 22:00)  T(F): 97.6 (31 Jul 2018 09:10), Max: 98.9 (30 Jul 2018 22:00)  HR: 76 (31 Jul 2018 09:10) (72 - 89)  BP: 140/90 (31 Jul 2018 09:10) (133/83 - 160/92)  BP(mean): --  RR: 18 (31 Jul 2018 09:10) (18 - 18)  SpO2: 97% (31 Jul 2018 09:10) (96% - 99%)    ASSESSMENT/ PLAN    Therapy to  be:               [  ] Continued   [X ] Discontinued   [X ] Changed to PRN Analgesics    Documentation and Verification of current medications:   [X] Done	[ ] Not done, not eligible    Comments: PCA D\C'd by primary service

## 2018-07-31 NOTE — PROGRESS NOTE ADULT - SUBJECTIVE AND OBJECTIVE BOX
[facility] DIVISION OF KIDNEY DISEASES AND HYPERTENSION -- FOLLOW UP NOTE  --------------------------------------------------------------------------------    HPI: 33 year old male with a PMH of ESRD on HD (MWF) from LUE AVF, HTN s/p DDRT 7/23/18. Pt has been on HD for the last 8 years and he follows with outpatient nephrologist Dr. Cline at [facility]. Pts last HD was 7/23/18. No plan for further HD. Pt non-oliguric with good urine output. Pt had increasing output in LACEY drain and decreased output in marquez on 7/28 (concerns for a urinary ureter leak). Taken to OR 7/29 for re-exploration and revision/reimplantation of ureter. Pt seen and examined today. Pt has improved pain today.     PAST HISTORY  --------------------------------------------------------------------------------  No significant changes to PMH, PSH, FHx, SHx, unless otherwise noted    ALLERGIES & MEDICATIONS  --------------------------------------------------------------------------------  Allergies    IV Contrast (Rash)  nyquil (Rhinorrhea)  vancomycin (Pruritus; Hives)    Intolerances      Standing Inpatient Medications  ceFAZolin   IVPB 1000 milliGRAM(s) IV Intermittent every 12 hours  docusate sodium 100 milliGRAM(s) Oral daily  famotidine    Tablet 20 milliGRAM(s) Oral daily  metoprolol tartrate 100 milliGRAM(s) Oral every 12 hours  mycophenolate mofetil 1000 milliGRAM(s) Oral every 12 hours  NIFEdipine XL 90 milliGRAM(s) Oral daily  nystatin    Suspension [id number] Unit(s) Swish and Swallow four times a day  oxybutynin 5 milliGRAM(s) Oral two times a day  polyethylene glycol 3350 17 Gram(s) Oral daily  predniSONE   Tablet 5 milliGRAM(s) Oral daily  senna 2 Tablet(s) Oral at bedtime  tacrolimus 4 milliGRAM(s) Oral two times a day  tamsulosin 0.4 milliGRAM(s) Oral at bedtime  trimethoprim   80 mG/sulfamethoxazole 400 mG 1 Tablet(s) Oral daily  valGANciclovir 450 milliGRAM(s) Oral daily    PRN Inpatient Medications  acetaminophen   Tablet. 650 milliGRAM(s) Oral every 6 hours PRN  diphenhydrAMINE   Capsule 25 milliGRAM(s) Oral every 6 hours PRN  lidocaine 2% Gel 1 Application(s) Topical three times a day PRN  metoclopramide Injectable 10 milliGRAM(s) IV Push once PRN  ondansetron Injectable 4 milliGRAM(s) IV Push every 6 hours PRN  ondansetron Injectable 4 milliGRAM(s) IV Push once PRN  simethicone 80 milliGRAM(s) Chew every 6 hours PRN  traMADol 50 milliGRAM(s) Oral every 4 hours PRN  traMADol 25 milliGRAM(s) Oral every 4 hours PRN  zinc oxide 20% Ointment 1 Application(s) Topical three times a day PRN      REVIEW OF SYSTEMS  --------------------------------------------------------------------------------  Gen: No weakness  Skin: No rashes  Head/Eyes/Ears/Mouth: No headache  Respiratory: No dyspnea  CV: No chest pain, PND, orthopnea  GI: No abdominal pain, diarrhea  MSK: No edema  Neuro: No dizziness/lightheadedness    All other systems were reviewed and are negative, except as noted.    VITALS/PHYSICAL EXAM  --------------------------------------------------------------------------------  T(C): 36.4 (07-31-18 @ 09:10), Max: 37.2 (07-30-18 @ 22:00)  HR: 76 (07-31-18 @ 09:10) (72 - 89)  BP: 140/90 (07-31-18 @ 09:10) (133/83 - 160/92)  RR: 18 (07-31-18 @ 09:10) (18 - 18)  SpO2: 97% (07-31-18 @ 09:10) (96% - 99%)  Wt(kg): --    07-30-18 @ 07:01  -  07-31-18 @ 07:00  --------------------------------------------------------  IN: 2780 mL / OUT: 2150 mL / NET: 630 mL    07-31-18 @ 07:01  -  07-31-18 @ 12:27  --------------------------------------------------------  IN: 510 mL / OUT: 350 mL / NET: 160 mL    Physical Exam:  	Gen: NAD, well-appearing  	Pulm: CTA B/L  	CV: RRR, S1S2; no rub  	Abd: +BS, soft, nontender/nondistended  	: No suprapubic tenderness  	UE: Warm, no asterixis  	LE: Warm, no edema, palpable DP pulses b/l   	Psych: Normal affect and mood  	Skin: Warm, without rashes  	Vascular access: LUE AVF, aneurysmal appearance     LABS/STUDIES  --------------------------------------------------------------------------------              10.6   9.2   >-----------<  161      [07-31-18 @ 08:20]              32.7     136  |  105  |  67  ----------------------------<  102      [07-31-18 @ 06:09]  5.0   |  16  |  4.18        Ca     10.0     [07-31-18 @ 06:09]      Mg     2.2     [07-31-18 @ 06:09]      Phos  3.3     [07-31-18 @ 06:09]    TPro  6.0  /  Alb  3.3  /  TBili  0.3  /  DBili  x   /  AST  24  /  ALT  12  /  AlkPhos  61  [07-31-18 @ 06:09]    Creatinine Trend:  SCr 4.18 [07-31 @ 06:09]  SCr 4.61 [07-30 @ 05:54]  SCr 4.68 [07-29 @ 14:19]  SCr 4.67 [07-29 @ 06:12]  SCr 5.10 [07-28 @ 17:56]    Urinalysis - [07-27-18 @ 09:51]      Color Yellow / Appearance SL Turbid / SG 1.021 / pH 6.0      Gluc Negative / Ketone Negative  / Bili Negative / Urobili Negative       Blood Moderate / Protein 300 / Leuk Est Moderate / Nitrite Negative      RBC >50 / WBC >50 / Hyaline  / Gran  / Sq Epi  / Non Sq Epi Occasional / Bacteria     HbA1c 5.1      [08-18-15 @ 18:19]

## 2018-07-31 NOTE — CHART NOTE - NSCHARTNOTEFT_GEN_A_CORE
POST-OPERATIVE NOTE    Subjective:  Patient is s/p percutaneous nephrostomy of right transplanted kidney. Patient denies any n/v, chest pain, or SOB. Patient denies pain in lower abdomen. Recovering appropriately.    Objective:  Vital Signs Last 24 Hrs  T(C): 37.2 (31 Jul 2018 22:20), Max: 37.2 (31 Jul 2018 22:20)  T(F): 99 (31 Jul 2018 22:20), Max: 99 (31 Jul 2018 22:20)  HR: 72 (31 Jul 2018 22:20) (72 - 90)  BP: 123/78 (31 Jul 2018 22:20) (123/78 - 144/88)  BP(mean): --  RR: 18 (31 Jul 2018 22:20) (18 - 18)  SpO2: 97% (31 Jul 2018 22:20) (94% - 98%)  I&O's Detail    30 Jul 2018 07:01  -  31 Jul 2018 07:00  --------------------------------------------------------  IN:    Oral Fluid: 1080 mL    sodium chloride 0.45%: 1650 mL    Solution: 50 mL  Total IN: 2780 mL    OUT:    Bulb: 195 mL    Indwelling Catheter - Urethral: 1955 mL  Total OUT: 2150 mL    Total NET: 630 mL      31 Jul 2018 07:01  -  31 Jul 2018 22:38  --------------------------------------------------------  IN:    Oral Fluid: 660 mL    sodium chloride 0.45%: 150 mL  Total IN: 810 mL    OUT:    Bulb: 130 mL    Indwelling Catheter - Urethral: 725 mL    Nephrostomy Tube: 290 mL  Total OUT: 1145 mL    Total NET: -335 mL      ceFAZolin   IVPB 1000  nystatin    Suspension [id number]  trimethoprim   80 mG/sulfamethoxazole 400 mG 1  valGANciclovir 450  ceFAZolin   IVPB 1000  metoprolol tartrate 100  NIFEdipine XL 90  nystatin    Suspension 854699  tamsulosin 0.4  trimethoprim   80 mG/sulfamethoxazole 400 mG 1  valGANciclovir 450    PAST MEDICAL & SURGICAL HISTORY:  ESRD on dialysis  HTN (hypertension)  No significant past surgical history      Physical Exam:  General: NAD, resting comfortably in a chair  Pulmonary: Nonlabored breathing, no respiratory distress  Cardiovascular: RRR  Abdominal: soft, nontender, nondistended, drainage tube in place in RLQ, bandage c/d/i  Extremities: WWP  : marquez in place draining hermes urine      LABS:                        10.6   9.2   )-----------( 161      ( 31 Jul 2018 08:20 )             32.7     07-31    136  |  105  |  67<H>  ----------------------------<  102<H>  5.0   |  16<L>  |  4.18<H>    Ca    10.0      31 Jul 2018 06:09  Phos  3.3     07-31  Mg     2.2     07-31    TPro  6.0  /  Alb  3.3  /  TBili  0.3  /  DBili  x   /  AST  24  /  ALT  12  /  AlkPhos  61  07-31      CAPILLARY BLOOD GLUCOSE      POCT Blood Glucose.: 184 mg/dL (31 Jul 2018 22:15)  POCT Blood Glucose.: 140 mg/dL (31 Jul 2018 19:43)  POCT Blood Glucose.: 102 mg/dL (31 Jul 2018 12:03)  POCT Blood Glucose.: 106 mg/dL (31 Jul 2018 07:56)        Assessment:  The patient is a 33y Male who is now several hours post-op from a percutaneous nephrostomy tube placement.     Plan:  - Pain control as needed  - OOB and ambulating as tolerated  - F/u AM labs  - monitor UOP from marquez and PCN

## 2018-07-31 NOTE — PROGRESS NOTE ADULT - PROBLEM SELECTOR PLAN 1
S/P reimplantation of ureter of transplanted kidney  07/29/2018.   -Creatinine slightly down 4.1  D/C IVF, send for Cr from fluid (LACEY)  - Continue ancef IV  - Strict I/Os  D/C PCA dilauded for pain and start tylenol and tramadol PRN  - Continue bowel regimen, Flomax 0.4 daily   -daily BMP  Blood sugars well controlled. Fingersticks before meals and bedtime. On steroid taper.

## 2018-07-31 NOTE — PROGRESS NOTE ADULT - SUBJECTIVE AND OBJECTIVE BOX
Interventional Radiology  Pre-Procedure Note        HPI:  This is a 33 year old male with PMH of HTN and ESRD on HD since 2010 s/p  DDRT last week & re - exploration with revision / reimplantation of ureter 7/29 with decreased urine output via marquez & increased urine output via LACEY. Pt is scheduled for renal transplant kidney nephrostomy tube placement per renal transplant team's request.  Pt a&o x 3. Informed consent obtained from pt after risks, benefits & alternatives discussion with his comprehension confirmed. IV contrast not planned to be utilized for this procedure.    NPO since 12 noon today - had a glass of water @ 12 noon & apple juice at 9 am today.       PAST MEDICAL & SURGICAL HISTORY:  ESRD on dialysis  HTN (hypertension)  No significant past surgical history        Allergies: IV Contrast (Rash)  nyquil (Rhinorrhea)  vancomycin (Pruritus; Hives)      Current Medications: acetaminophen   Tablet. 650 milliGRAM(s) Oral every 6 hours PRN  ceFAZolin   IVPB 1000 milliGRAM(s) IV Intermittent every 12 hours  diphenhydrAMINE   Capsule 25 milliGRAM(s) Oral every 6 hours PRN  docusate sodium 100 milliGRAM(s) Oral daily  famotidine    Tablet 20 milliGRAM(s) Oral daily  lidocaine 2% Gel 1 Application(s) Topical three times a day PRN  metoclopramide Injectable 10 milliGRAM(s) IV Push once PRN  metoprolol tartrate 100 milliGRAM(s) Oral every 12 hours  mycophenolate mofetil 1000 milliGRAM(s) Oral every 12 hours  NIFEdipine XL 90 milliGRAM(s) Oral daily  nystatin    Suspension 315474 Unit(s) Swish and Swallow four times a day  ondansetron Injectable 4 milliGRAM(s) IV Push every 6 hours PRN  ondansetron Injectable 4 milliGRAM(s) IV Push once PRN  oxybutynin 5 milliGRAM(s) Oral two times a day  polyethylene glycol 3350 17 Gram(s) Oral daily  predniSONE   Tablet 5 milliGRAM(s) Oral daily  senna 2 Tablet(s) Oral at bedtime  simethicone 80 milliGRAM(s) Chew every 6 hours PRN  tacrolimus 4 milliGRAM(s) Oral two times a day  tamsulosin 0.4 milliGRAM(s) Oral at bedtime  traMADol 50 milliGRAM(s) Oral every 4 hours PRN  traMADol 25 milliGRAM(s) Oral every 4 hours PRN  trimethoprim   80 mG/sulfamethoxazole 400 mG 1 Tablet(s) Oral daily  valGANciclovir 450 milliGRAM(s) Oral daily  zinc oxide 20% Ointment 1 Application(s) Topical three times a day PRN      Labs:                         10.6   9.2   )-----------( 161      ( 31 Jul 2018 08:20 )             32.7       07-31    136  |  105  |  67<H>  ----------------------------<  102<H>  5.0   |  16<L>  |  4.18<H>    Ca    10.0      31 Jul 2018 06:09  Phos  3.3     07-31  Mg     2.2     07-31    TPro  6.0  /  Alb  3.3  /  TBili  0.3  /  DBili  x   /  AST  24  /  ALT  12  /  AlkPhos  61  07-31      Blood Available Until: 2nd specimen to be sent stat per blood bank [facility]'s request.    Assessment/Plan:   This is a 33 year old male with hx of ESRD on HD s/p renal transplant last week, re exploration with revision / reimplantation of ureter 7/29, now with decreased urine output via marquez & increased urine output in LACEY drain.  Patient is scheduled for renal transplant kidney nephrostomy tube placement with anesthesia. Stat 2nd T&S specimen to be sent pre op.    Angelica CORBETT BC  ext [phone]  # [phone] All other review of systems negative, except as noted in HPI

## 2018-07-31 NOTE — PROGRESS NOTE ADULT - ASSESSMENT
34 y/o gentleman with PMH of HTN and ESRD since 2010, POD#6 R sided DDRT anastomosed to R external iliac artery and vein Ureter anastomosed to bladder over double J stent.  Cold ischemia time14 hours.  CMV +, EBV +, Renal ultrasound 7/27 demonstrated small  1.7 x 1.1 x 1.6cm collection along interior pole of transplant kidney. Pt underwent OR for reimplantation of ureter of transplanted kidney 07/29/2018. Doing well. Discharge planning in progress.

## 2018-07-31 NOTE — PROGRESS NOTE ADULT - ATTENDING COMMENTS
Tacrolimus level 10.4 will keep same dose of 4mg BID I personally saw and examined patient.  IMMUNOSUPPRESSION:  Tacrolimus level 10.4 will keep same dose of 4mg BID  Drain with elevated creatinine level.  Nephrostomy tube placed by Dr. Randhawa.  Pain markedly improved.  will monitor closely

## 2018-07-31 NOTE — PROGRESS NOTE ADULT - SUBJECTIVE AND OBJECTIVE BOX
Interventional Radiology Brief- Operative Note    Procedure: Transplant percutaneous nephrostomy    Operators: Edis    Anesthesia (type): MAC    Contrast: 20cc administered into the renal collecting system    EBL: minimal    Findings/Follow up Plan of Care: Successful transplant nephrostomy and insertion of an 8 Fr drainage catheter. Filling defect in the renal pelvis post placement consistent with non obstructing thrombus. Possible extraluminal contrast arising from distal ureter. Drain should be irrigated with 10cc sterile NS q6h and output monitored. Discussed findings with Dr. Brown and Dr Hdz    Specimens Removed: sample of urine collected for microbiology    Implants: 8 Fr Jones Motley drain    Complications: Non obstructing thrombus in collecting system.    Condition/Disposition: stable / pacu    Please call Interventional Radiology x [phone] with any questions, concerns, or issues.

## 2018-08-01 LAB
ALBUMIN SERPL ELPH-MCNC: 3.8 G/DL — SIGNIFICANT CHANGE UP (ref 3.3–5)
ALP SERPL-CCNC: 61 U/L — SIGNIFICANT CHANGE UP (ref 40–120)
ALT FLD-CCNC: 7 U/L — LOW (ref 10–45)
ANION GAP SERPL CALC-SCNC: 12 MMOL/L — SIGNIFICANT CHANGE UP (ref 5–17)
APPEARANCE UR: ABNORMAL
AST SERPL-CCNC: 21 U/L — SIGNIFICANT CHANGE UP (ref 10–40)
BACTERIA # UR AUTO: ABNORMAL /HPF
BASOPHILS # BLD AUTO: 0 K/UL — SIGNIFICANT CHANGE UP (ref 0–0.2)
BASOPHILS NFR BLD AUTO: 0.5 % — SIGNIFICANT CHANGE UP (ref 0–2)
BILIRUB SERPL-MCNC: 0.4 MG/DL — SIGNIFICANT CHANGE UP (ref 0.2–1.2)
BILIRUB UR-MCNC: NEGATIVE — SIGNIFICANT CHANGE UP
BUN SERPL-MCNC: 60 MG/DL — HIGH (ref 7–23)
CALCIUM SERPL-MCNC: 10.2 MG/DL — SIGNIFICANT CHANGE UP (ref 8.4–10.5)
CHLORIDE SERPL-SCNC: 103 MMOL/L — SIGNIFICANT CHANGE UP (ref 96–108)
CO2 SERPL-SCNC: 18 MMOL/L — LOW (ref 22–31)
COLOR SPEC: YELLOW — SIGNIFICANT CHANGE UP
CREAT SERPL-MCNC: 4.01 MG/DL — HIGH (ref 0.5–1.3)
DIFF PNL FLD: ABNORMAL
EOSINOPHIL # BLD AUTO: 0.2 K/UL — SIGNIFICANT CHANGE UP (ref 0–0.5)
EOSINOPHIL NFR BLD AUTO: 2.1 % — SIGNIFICANT CHANGE UP (ref 0–6)
GLUCOSE BLDC GLUCOMTR-MCNC: 100 MG/DL — HIGH (ref 70–99)
GLUCOSE BLDC GLUCOMTR-MCNC: 103 MG/DL — HIGH (ref 70–99)
GLUCOSE BLDC GLUCOMTR-MCNC: 123 MG/DL — HIGH (ref 70–99)
GLUCOSE BLDC GLUCOMTR-MCNC: 137 MG/DL — HIGH (ref 70–99)
GLUCOSE SERPL-MCNC: 102 MG/DL — HIGH (ref 70–99)
GLUCOSE UR QL: NEGATIVE — SIGNIFICANT CHANGE UP
HCT VFR BLD CALC: 34.4 % — LOW (ref 39–50)
HGB BLD-MCNC: 11.1 G/DL — LOW (ref 13–17)
HYALINE CASTS # UR AUTO: ABNORMAL
KETONES UR-MCNC: NEGATIVE — SIGNIFICANT CHANGE UP
LEUKOCYTE ESTERASE UR-ACNC: ABNORMAL
LYMPHOCYTES # BLD AUTO: 2.1 K/UL — SIGNIFICANT CHANGE UP (ref 1–3.3)
LYMPHOCYTES # BLD AUTO: 24.1 % — SIGNIFICANT CHANGE UP (ref 13–44)
MAGNESIUM SERPL-MCNC: 2.4 MG/DL — SIGNIFICANT CHANGE UP (ref 1.6–2.6)
MCHC RBC-ENTMCNC: 28.2 PG — SIGNIFICANT CHANGE UP (ref 27–34)
MCHC RBC-ENTMCNC: 32.4 GM/DL — SIGNIFICANT CHANGE UP (ref 32–36)
MCV RBC AUTO: 87.1 FL — SIGNIFICANT CHANGE UP (ref 80–100)
MONOCYTES # BLD AUTO: 0.7 K/UL — SIGNIFICANT CHANGE UP (ref 0–0.9)
MONOCYTES NFR BLD AUTO: 7.7 % — SIGNIFICANT CHANGE UP (ref 2–14)
NEUTROPHILS # BLD AUTO: 5.8 K/UL — SIGNIFICANT CHANGE UP (ref 1.8–7.4)
NEUTROPHILS NFR BLD AUTO: 65.6 % — SIGNIFICANT CHANGE UP (ref 43–77)
NITRITE UR-MCNC: NEGATIVE — SIGNIFICANT CHANGE UP
PH UR: 7 — SIGNIFICANT CHANGE UP (ref 5–8)
PHOSPHATE SERPL-MCNC: 3.1 MG/DL — SIGNIFICANT CHANGE UP (ref 2.5–4.5)
PLATELET # BLD AUTO: 190 K/UL — SIGNIFICANT CHANGE UP (ref 150–400)
POTASSIUM SERPL-MCNC: 4.9 MMOL/L — SIGNIFICANT CHANGE UP (ref 3.5–5.3)
POTASSIUM SERPL-SCNC: 4.9 MMOL/L — SIGNIFICANT CHANGE UP (ref 3.5–5.3)
PROT SERPL-MCNC: 6.5 G/DL — SIGNIFICANT CHANGE UP (ref 6–8.3)
PROT UR-MCNC: 150 MG/DL
RBC # BLD: 3.95 M/UL — LOW (ref 4.2–5.8)
RBC # FLD: 13.3 % — SIGNIFICANT CHANGE UP (ref 10.3–14.5)
RBC CASTS # UR COMP ASSIST: >50 /HPF (ref 0–2)
SODIUM SERPL-SCNC: 133 MMOL/L — LOW (ref 135–145)
SP GR SPEC: 1.01 — SIGNIFICANT CHANGE UP (ref 1.01–1.02)
TACROLIMUS SERPL-MCNC: 9.1 NG/ML — SIGNIFICANT CHANGE UP
UROBILINOGEN FLD QL: NEGATIVE — SIGNIFICANT CHANGE UP
WBC # BLD: 8.8 K/UL — SIGNIFICANT CHANGE UP (ref 3.8–10.5)
WBC # FLD AUTO: 8.8 K/UL — SIGNIFICANT CHANGE UP (ref 3.8–10.5)
WBC UR QL: >50 /HPF (ref 0–5)

## 2018-08-01 PROCEDURE — 99232 SBSQ HOSP IP/OBS MODERATE 35: CPT | Mod: GC,24

## 2018-08-01 PROCEDURE — 99232 SBSQ HOSP IP/OBS MODERATE 35: CPT | Mod: GC

## 2018-08-01 RX ORDER — FUROSEMIDE 40 MG
60 TABLET ORAL ONCE
Qty: 0 | Refills: 0 | Status: COMPLETED | OUTPATIENT
Start: 2018-08-01 | End: 2018-08-01

## 2018-08-01 RX ORDER — CEFAZOLIN SODIUM 1 G
2000 VIAL (EA) INJECTION ONCE
Qty: 0 | Refills: 0 | Status: COMPLETED | OUTPATIENT
Start: 2018-08-01 | End: 2018-08-01

## 2018-08-01 RX ADMIN — TRAMADOL HYDROCHLORIDE 50 MILLIGRAM(S): 50 TABLET ORAL at 23:00

## 2018-08-01 RX ADMIN — MYCOPHENOLATE MOFETIL 1000 MILLIGRAM(S): 250 CAPSULE ORAL at 06:28

## 2018-08-01 RX ADMIN — TRAMADOL HYDROCHLORIDE 25 MILLIGRAM(S): 50 TABLET ORAL at 21:22

## 2018-08-01 RX ADMIN — TRAMADOL HYDROCHLORIDE 25 MILLIGRAM(S): 50 TABLET ORAL at 09:35

## 2018-08-01 RX ADMIN — TRAMADOL HYDROCHLORIDE 25 MILLIGRAM(S): 50 TABLET ORAL at 20:34

## 2018-08-01 RX ADMIN — TRAMADOL HYDROCHLORIDE 50 MILLIGRAM(S): 50 TABLET ORAL at 22:22

## 2018-08-01 RX ADMIN — TRAMADOL HYDROCHLORIDE 25 MILLIGRAM(S): 50 TABLET ORAL at 02:47

## 2018-08-01 RX ADMIN — VALGANCICLOVIR 450 MILLIGRAM(S): 450 TABLET, FILM COATED ORAL at 12:50

## 2018-08-01 RX ADMIN — Medication 100 MILLIGRAM(S): at 06:34

## 2018-08-01 RX ADMIN — POLYETHYLENE GLYCOL 3350 17 GRAM(S): 17 POWDER, FOR SOLUTION ORAL at 12:49

## 2018-08-01 RX ADMIN — TRAMADOL HYDROCHLORIDE 25 MILLIGRAM(S): 50 TABLET ORAL at 03:40

## 2018-08-01 RX ADMIN — TRAMADOL HYDROCHLORIDE 50 MILLIGRAM(S): 50 TABLET ORAL at 19:52

## 2018-08-01 RX ADMIN — Medication 500000 UNIT(S): at 06:27

## 2018-08-01 RX ADMIN — Medication 100 MILLIGRAM(S): at 18:21

## 2018-08-01 RX ADMIN — Medication 500000 UNIT(S): at 18:14

## 2018-08-01 RX ADMIN — Medication 100 MILLIGRAM(S): at 12:50

## 2018-08-01 RX ADMIN — Medication 60 MILLIGRAM(S): at 09:34

## 2018-08-01 RX ADMIN — Medication 1 TABLET(S): at 12:50

## 2018-08-01 RX ADMIN — MYCOPHENOLATE MOFETIL 1000 MILLIGRAM(S): 250 CAPSULE ORAL at 18:14

## 2018-08-01 RX ADMIN — Medication 5 MILLIGRAM(S): at 06:28

## 2018-08-01 RX ADMIN — TRAMADOL HYDROCHLORIDE 50 MILLIGRAM(S): 50 TABLET ORAL at 12:50

## 2018-08-01 RX ADMIN — Medication 5 MILLIGRAM(S): at 18:13

## 2018-08-01 RX ADMIN — TRAMADOL HYDROCHLORIDE 25 MILLIGRAM(S): 50 TABLET ORAL at 10:20

## 2018-08-01 RX ADMIN — TRAMADOL HYDROCHLORIDE 50 MILLIGRAM(S): 50 TABLET ORAL at 05:31

## 2018-08-01 RX ADMIN — SIMETHICONE 80 MILLIGRAM(S): 80 TABLET, CHEWABLE ORAL at 22:11

## 2018-08-01 RX ADMIN — Medication 100 MILLIGRAM(S): at 06:28

## 2018-08-01 RX ADMIN — TRAMADOL HYDROCHLORIDE 25 MILLIGRAM(S): 50 TABLET ORAL at 16:27

## 2018-08-01 RX ADMIN — TRAMADOL HYDROCHLORIDE 25 MILLIGRAM(S): 50 TABLET ORAL at 15:42

## 2018-08-01 RX ADMIN — TRAMADOL HYDROCHLORIDE 50 MILLIGRAM(S): 50 TABLET ORAL at 06:30

## 2018-08-01 RX ADMIN — Medication 650 MILLIGRAM(S): at 06:30

## 2018-08-01 RX ADMIN — TRAMADOL HYDROCHLORIDE 50 MILLIGRAM(S): 50 TABLET ORAL at 18:20

## 2018-08-01 RX ADMIN — Medication 90 MILLIGRAM(S): at 06:28

## 2018-08-01 RX ADMIN — SENNA PLUS 2 TABLET(S): 8.6 TABLET ORAL at 22:11

## 2018-08-01 RX ADMIN — TRAMADOL HYDROCHLORIDE 50 MILLIGRAM(S): 50 TABLET ORAL at 13:35

## 2018-08-01 RX ADMIN — TAMSULOSIN HYDROCHLORIDE 0.4 MILLIGRAM(S): 0.4 CAPSULE ORAL at 22:11

## 2018-08-01 RX ADMIN — Medication 100 MILLIGRAM(S): at 18:13

## 2018-08-01 RX ADMIN — FAMOTIDINE 20 MILLIGRAM(S): 10 INJECTION INTRAVENOUS at 12:50

## 2018-08-01 RX ADMIN — TACROLIMUS 4 MILLIGRAM(S): 5 CAPSULE ORAL at 18:13

## 2018-08-01 RX ADMIN — Medication 650 MILLIGRAM(S): at 07:17

## 2018-08-01 RX ADMIN — Medication 500000 UNIT(S): at 12:49

## 2018-08-01 RX ADMIN — TRAMADOL HYDROCHLORIDE 50 MILLIGRAM(S): 50 TABLET ORAL at 00:30

## 2018-08-01 RX ADMIN — TACROLIMUS 4 MILLIGRAM(S): 5 CAPSULE ORAL at 06:28

## 2018-08-01 NOTE — PROGRESS NOTE ADULT - PROBLEM SELECTOR PLAN 1
s/p DDRT 07/23  Continue immunosuppressants per transplant team  Continue  Nystatin S&S, bactrim, and Valcyte for PPX  monitor bmp  monitor I/O.

## 2018-08-01 NOTE — PROGRESS NOTE ADULT - SUBJECTIVE AND OBJECTIVE BOX
[facility] [place] [facility]   MD PERCY MORALES MD RUORU WONG, PA    TEL:  OFFICE: [phone]  DR OCONNOR CELL: [phone]  DAJA PRINGLE CELL: [phone]  DR. GAMBOA CELL: [phone]    RENAL FOLLOW UP NOTE  --------------------------------------------------------------------------------  HPI:  33M with PMH of HTN and ESRD since 2010 s/p DDRT 7/23.    Reimplantation of ureter of transplanted kidney  07/29/2018.   Had IR placement of nephrostomy tube on 7/31 for anastomotic leak  Pt seen and examined at bedside.  Denies sob, chest pain    PAST HISTORY  --------------------------------------------------------------------------------  No significant changes to PMH, PSH, FHx, SHx, unless otherwise noted    ALLERGIES & MEDICATIONS  --------------------------------------------------------------------------------  Allergies    IV Contrast (Rash)  nyquil (Rhinorrhea)  vancomycin (Pruritus; Hives)    Intolerances      Standing Inpatient Medications  docusate sodium 100 milliGRAM(s) Oral daily  famotidine    Tablet 20 milliGRAM(s) Oral daily  metoprolol tartrate 100 milliGRAM(s) Oral every 12 hours  mycophenolate mofetil 1000 milliGRAM(s) Oral every 12 hours  NIFEdipine XL 90 milliGRAM(s) Oral daily  nystatin    Suspension 980204 Unit(s) Swish and Swallow four times a day  oxybutynin 5 milliGRAM(s) Oral two times a day  polyethylene glycol 3350 17 Gram(s) Oral daily  predniSONE   Tablet 5 milliGRAM(s) Oral daily  senna 2 Tablet(s) Oral at bedtime  tacrolimus 4 milliGRAM(s) Oral two times a day  tamsulosin 0.4 milliGRAM(s) Oral at bedtime  trimethoprim   80 mG/sulfamethoxazole 400 mG 1 Tablet(s) Oral daily  valGANciclovir 450 milliGRAM(s) Oral daily    PRN Inpatient Medications  acetaminophen   Tablet. 650 milliGRAM(s) Oral every 6 hours PRN  diphenhydrAMINE   Capsule 25 milliGRAM(s) Oral every 6 hours PRN  lidocaine 2% Gel 1 Application(s) Topical three times a day PRN  metoclopramide Injectable 10 milliGRAM(s) IV Push once PRN  ondansetron Injectable 4 milliGRAM(s) IV Push every 6 hours PRN  ondansetron Injectable 4 milliGRAM(s) IV Push once PRN  simethicone 80 milliGRAM(s) Chew every 6 hours PRN  traMADol 50 milliGRAM(s) Oral every 4 hours PRN  traMADol 25 milliGRAM(s) Oral every 4 hours PRN  zinc oxide 20% Ointment 1 Application(s) Topical three times a day PRN      REVIEW OF SYSTEMS  --------------------------------------------------------------------------------  General: no fever  CVS: no chest pain  RESP: no sob, no cough  ABD: no abdominal pain  MSK: no edema     VITALS/PHYSICAL EXAM  --------------------------------------------------------------------------------  T(C): 36.6 (08-01-18 @ 09:24), Max: 37.2 (07-31-18 @ 22:20)  HR: 67 (08-01-18 @ 09:24) (67 - 90)  BP: 148/87 (08-01-18 @ 09:24) (123/78 - 161/90)  RR: 18 (08-01-18 @ 09:24) (18 - 18)  SpO2: 97% (08-01-18 @ 09:24) (94% - 98%)  Wt(kg): --        07-31-18 @ 07:01  -  08-01-18 @ 07:00  --------------------------------------------------------  IN: 1270 mL / OUT: 1870 mL / NET: -600 mL    08-01-18 @ 07:01  -  08-01-18 @ 12:25  --------------------------------------------------------  IN: 60 mL / OUT: 710 mL / NET: -650 mL      Physical Exam:  	  Gen: NAD  	Pulm: CTA B/L  	CV: RRR, S1S2; no rub  	Abd: +BS, soft, nontender/nondistended  	LE: Warm, no edema,   	Skin: Warm, without rashes  	Vascular access: NINI NORWOOD                    LABS/STUDIES  --------------------------------------------------------------------------------              11.1   8.8   >-----------<  190      [08-01-18 @ 05:58]              34.4     133  |  103  |  60  ----------------------------<  102      [08-01-18 @ 05:58]  4.9   |  18  |  4.01        Ca     10.2     [08-01-18 @ 05:58]      Mg     2.4     [08-01-18 @ 05:58]      Phos  3.1     [08-01-18 @ 05:58]    TPro  6.5  /  Alb  3.8  /  TBili  0.4  /  DBili  x   /  AST  21  /  ALT  7   /  AlkPhos  61  [08-01-18 @ 05:58]          Creatinine Trend:  SCr 4.01 [08-01 @ 05:58]  SCr 4.18 [07-31 @ 06:09]  SCr 4.61 [07-30 @ 05:54]  SCr 4.68 [07-29 @ 14:19]  SCr 4.67 [07-29 @ 06:12]    Urinalysis - [07-27-18 @ 09:51]      Color Yellow / Appearance SL Turbid / SG 1.021 / pH 6.0      Gluc Negative / Ketone Negative  / Bili Negative / Urobili Negative       Blood Moderate / Protein 300 / Leuk Est Moderate / Nitrite Negative      RBC >50 / WBC >50 / Hyaline  / Gran  / Sq Epi  / Non Sq Epi Occasional / Bacteria       HbA1c 5.1      [08-18-15 @ 18:19] [facility] [place] [facility]   MD PERCY MORALES MD RUORU WONG, PA    TEL:  OFFICE: [phone]  DR OCONNOR CELL: [phone]  DAJA PRINGLE CELL: [phone]  DR. GAMBOA CELL: [phone]    RENAL FOLLOW UP NOTE  --------------------------------------------------------------------------------  HPI:  33M with PMH of HTN and ESRD since 2010 s/p DDRT 7/23.    Reimplantation of ureter of transplanted kidney  07/29/2018.   Had IR placement of nephrostomy tube on 7/31 for anastomotic leak  Pt seen and examined at bedside.  Denies sob, chest pain    PAST HISTORY  --------------------------------------------------------------------------------  No significant changes to PMH, PSH, FHx, SHx, unless otherwise noted    ALLERGIES & MEDICATIONS  --------------------------------------------------------------------------------  Allergies    IV Contrast (Rash)  nyquil (Rhinorrhea)  vancomycin (Pruritus; Hives)    Intolerances      Standing Inpatient Medications  docusate sodium 100 milliGRAM(s) Oral daily  famotidine    Tablet 20 milliGRAM(s) Oral daily  metoprolol tartrate 100 milliGRAM(s) Oral every 12 hours  mycophenolate mofetil 1000 milliGRAM(s) Oral every 12 hours  NIFEdipine XL 90 milliGRAM(s) Oral daily  nystatin    Suspension 391713 Unit(s) Swish and Swallow four times a day  oxybutynin 5 milliGRAM(s) Oral two times a day  polyethylene glycol 3350 17 Gram(s) Oral daily  predniSONE   Tablet 5 milliGRAM(s) Oral daily  senna 2 Tablet(s) Oral at bedtime  tacrolimus 4 milliGRAM(s) Oral two times a day  tamsulosin 0.4 milliGRAM(s) Oral at bedtime  trimethoprim   80 mG/sulfamethoxazole 400 mG 1 Tablet(s) Oral daily  valGANciclovir 450 milliGRAM(s) Oral daily    PRN Inpatient Medications  acetaminophen   Tablet. 650 milliGRAM(s) Oral every 6 hours PRN  diphenhydrAMINE   Capsule 25 milliGRAM(s) Oral every 6 hours PRN  lidocaine 2% Gel 1 Application(s) Topical three times a day PRN  metoclopramide Injectable 10 milliGRAM(s) IV Push once PRN  ondansetron Injectable 4 milliGRAM(s) IV Push every 6 hours PRN  ondansetron Injectable 4 milliGRAM(s) IV Push once PRN  simethicone 80 milliGRAM(s) Chew every 6 hours PRN  traMADol 50 milliGRAM(s) Oral every 4 hours PRN  traMADol 25 milliGRAM(s) Oral every 4 hours PRN  zinc oxide 20% Ointment 1 Application(s) Topical three times a day PRN      REVIEW OF SYSTEMS  --------------------------------------------------------------------------------  General: no fever  CVS: no chest pain  RESP: no sob, no cough  ABD: no abdominal pain  MSK: trace edema     VITALS/PHYSICAL EXAM  --------------------------------------------------------------------------------  T(C): 36.6 (08-01-18 @ 09:24), Max: 37.2 (07-31-18 @ 22:20)  HR: 67 (08-01-18 @ 09:24) (67 - 90)  BP: 148/87 (08-01-18 @ 09:24) (123/78 - 161/90)  RR: 18 (08-01-18 @ 09:24) (18 - 18)  SpO2: 97% (08-01-18 @ 09:24) (94% - 98%)  Wt(kg): --        07-31-18 @ 07:01  -  08-01-18 @ 07:00  --------------------------------------------------------  IN: 1270 mL / OUT: 1870 mL / NET: -600 mL    08-01-18 @ 07:01  -  08-01-18 @ 12:25  --------------------------------------------------------  IN: 60 mL / OUT: 710 mL / NET: -650 mL      Physical Exam:  	  Gen: NAD  	Pulm: CTA B/L  	CV: RRR, S1S2; no rub  	Abd: +BS, soft, nontender/nondistended  	LE: Warm, + edema,   	Skin: Warm, without rashes  	Vascular access: LUE AVF +thrill +bruit, skin intact, aneurysmal dilation              Rodrigo Rodriguez present                    LABS/STUDIES  --------------------------------------------------------------------------------              11.1   8.8   >-----------<  190      [08-01-18 @ 05:58]              34.4     133  |  103  |  60  ----------------------------<  102      [08-01-18 @ 05:58]  4.9   |  18  |  4.01        Ca     10.2     [08-01-18 @ 05:58]      Mg     2.4     [08-01-18 @ 05:58]      Phos  3.1     [08-01-18 @ 05:58]    TPro  6.5  /  Alb  3.8  /  TBili  0.4  /  DBili  x   /  AST  21  /  ALT  7   /  AlkPhos  61  [08-01-18 @ 05:58]          Creatinine Trend:  SCr 4.01 [08-01 @ 05:58]  SCr 4.18 [07-31 @ 06:09]  SCr 4.61 [07-30 @ 05:54]  SCr 4.68 [07-29 @ 14:19]  SCr 4.67 [07-29 @ 06:12]    Urinalysis - [07-27-18 @ 09:51]      Color Yellow / Appearance SL Turbid / SG 1.021 / pH 6.0      Gluc Negative / Ketone Negative  / Bili Negative / Urobili Negative       Blood Moderate / Protein 300 / Leuk Est Moderate / Nitrite Negative      RBC >50 / WBC >50 / Hyaline  / Gran  / Sq Epi  / Non Sq Epi Occasional / Bacteria       HbA1c 5.1      [08-18-15 @ 18:19]

## 2018-08-01 NOTE — PROGRESS NOTE ADULT - PROBLEM SELECTOR PLAN 1
S/P reimplantation of ureter of transplanted kidney  07/29/2018.   -Creatinine slightly down 4.0  S/P nephrostomy tube placement yesterday  - Continue ancef IV  - Strict I/Os  D/C PCA dilauded for pain and start tylenol and tramadol PRN  - Continue bowel regimen, Flomax 0.4 daily   -daily BMP  Blood sugars well controlled. Fingersticks before meals and bedtime. On steroid taper.   Send UA and C&S from marquez and nephrostomy in the setting of burning with urination at the marquez site.

## 2018-08-01 NOTE — PROGRESS NOTE ADULT - ATTENDING COMMENTS
Renal Transplant recipient, 0 PRA, XAVI, Urine leak s/p nephrostomy. Has ureteral stent, Drain and Foleys catheter.  Plan: Continue oxybutynin  Continue current immunosuppression  Monitor K, electrolytes, creatinine  Ensure patency of nephrostomy, Rodriguez  Will follow

## 2018-08-01 NOTE — PROVIDER CONTACT NOTE (OTHER) - RECOMMENDATIONS
will continue to monitor; elevate scrotum/penis.
Continue to monitor.
Irrigate marquez catheter and bladder scan patient.
Pt stated he was given IV Benadryl earlier in admission and did not like the way it made him feel and is requesting something different.
will continue to monitor.
will continue to monitor.

## 2018-08-01 NOTE — PROGRESS NOTE ADULT - SUBJECTIVE AND OBJECTIVE BOX
Interventional Radiology Follow- Up Note      33y Male with hx of recent right renal transplant s/p reimplantation of right ureter with anastomotic leak s/p right percutaneous nephrostomy on 7/31  in Interventional Radiology with Dr. Randhawa. Reports pain at the  incisional site.       Vitals: T(F): 97.8 (08-01-18 @ 13:44), Max: 99 (07-31-18 @ 22:20)  HR: 66 (08-01-18 @ 13:44) (66 - 90)  BP: 126/78 (08-01-18 @ 13:44) (123/78 - 161/90)  RR: 18 (08-01-18 @ 13:44) (18 - 18)  SpO2: 97% (08-01-18 @ 13:44) (94% - 98%)  Wt(kg): --    LABS:                        11.1   8.8   )-----------( 190      ( 01 Aug 2018 05:58 )             34.4     08-01    133<L>  |  103  |  60<H>  ----------------------------<  102<H>  4.9   |  18<L>  |  4.01<H>    Ca    10.2      01 Aug 2018 05:58  Phos  3.1     08-01  Mg     2.4     08-01    TPro  6.5  /  Alb  3.8  /  TBili  0.4  /  DBili  x   /  AST  21  /  ALT  7<L>  /  AlkPhos  61  08-01      PHYSICAL EXAM:  General: Nontoxic, in NAD  Neuro:  Alert & oriented x 3  Nephtube site : c/d/i. Clear urine noted.   Rodriguez with serosanguinous out put.  Abdomen: ttp at the incision site. distended. soft    Impression: 33y Male s/p right transplant kidney percutaneous nephtube.     Plan:  -continue to monitor out put    -Flush drain per doctor orders  -trend vitals, labs  -will discuss with IR attending     Please call IR at extension [phone] or [phone] with any questions, concerns, or issues regarding above.

## 2018-08-01 NOTE — PROGRESS NOTE ADULT - PROBLEM SELECTOR PLAN 1
s/p DDRT 07/23  Serum creatinine improving  On Tacrolimus, Cellcept, steroid taper, Nystatin S&S, bactrim, and Valcyte   Goal FK levels 8-10  monitor bmp  monitor I/O.

## 2018-08-01 NOTE — PROGRESS NOTE ADULT - ASSESSMENT
32 y/o gentleman with PMH of HTN and ESRD since 2010, POD#6 R sided DDRT anastomosed to R external iliac artery and vein Ureter anastomosed to bladder over double J stent.  Cold ischemia time14 hours.  CMV +, EBV +, Renal ultrasound 7/27 demonstrated small  1.7 x 1.1 x 1.6cm collection along interior pole of transplant kidney. Pt underwent OR for reimplantation of ureter of transplanted kidney 07/29/2018. Doing well. Nephrostomy tube placed in IR yesterday for anastomotic leak, doing better. Discharge planning in progress.

## 2018-08-01 NOTE — PROGRESS NOTE ADULT - SUBJECTIVE AND OBJECTIVE BOX
INTERVAL HPI/OVERNIGHT EVENTS:  Patient seen with multidisciplinary team (Nephrologist, pharmacist, nurse, nurse manager, NP, MD surgeons, transplant coordinator,  nutritionist, and  )  in am rounds and examined with Dr. Brown. 34 y/o gentleman POD#8 R sided DDRT anastomosed to R external iliac artery and vein Ureter anastomosed to bladder over double J stent.  Cold ischemia time14 hours.  CMV +, EBV +, and POD#3  for reimplantation of ureter of transplanted kidney  07/29/2018. Underwent IR placement of nephrostomy tube yesterday for anastomotic leak, no acute event overnight pain managed with PRN tramadol and tylenol, making adequate urine.    MEDICATIONS  (STANDING):  docusate sodium 100 milliGRAM(s) Oral daily  famotidine    Tablet 20 milliGRAM(s) Oral daily  metoprolol tartrate 100 milliGRAM(s) Oral every 12 hours  mycophenolate mofetil 1000 milliGRAM(s) Oral every 12 hours  NIFEdipine XL 90 milliGRAM(s) Oral daily  nystatin    Suspension 119460 Unit(s) Swish and Swallow four times a day  oxybutynin 5 milliGRAM(s) Oral two times a day  polyethylene glycol 3350 17 Gram(s) Oral daily  predniSONE   Tablet 5 milliGRAM(s) Oral daily  senna 2 Tablet(s) Oral at bedtime  tacrolimus 4 milliGRAM(s) Oral two times a day  tamsulosin 0.4 milliGRAM(s) Oral at bedtime  trimethoprim   80 mG/sulfamethoxazole 400 mG 1 Tablet(s) Oral daily  valGANciclovir 450 milliGRAM(s) Oral daily    MEDICATIONS  (PRN):  acetaminophen   Tablet. 650 milliGRAM(s) Oral every 6 hours PRN Mild Pain (1 - 3)  diphenhydrAMINE   Capsule 25 milliGRAM(s) Oral every 6 hours PRN Rash and/or Itching  lidocaine 2% Gel 1 Application(s) Topical three times a day PRN pain related to urinary catheter  metoclopramide Injectable 10 milliGRAM(s) IV Push once PRN Nausea and/or Vomiting  ondansetron Injectable 4 milliGRAM(s) IV Push every 6 hours PRN Nausea  ondansetron Injectable 4 milliGRAM(s) IV Push once PRN Nausea and/or Vomiting  simethicone 80 milliGRAM(s) Chew every 6 hours PRN Gas  traMADol 50 milliGRAM(s) Oral every 4 hours PRN Severe Pain (7 - 10)  traMADol 25 milliGRAM(s) Oral every 4 hours PRN Moderate Pain (4 - 6)  zinc oxide 20% Ointment 1 Application(s) Topical three times a day PRN rash      Allergies    IV Contrast (Rash)  nyquil (Rhinorrhea)  vancomycin (Pruritus; Hives)    Intolerances        Vital Signs Last 24 Hrs  T(C): 36.6 (01 Aug 2018 09:24), Max: 37.2 (31 Jul 2018 22:20)  T(F): 97.8 (01 Aug 2018 09:24), Max: 99 (31 Jul 2018 22:20)  HR: 67 (01 Aug 2018 09:24) (67 - 90)  BP: 148/87 (01 Aug 2018 09:24) (123/78 - 161/90)  BP(mean): --  RR: 18 (01 Aug 2018 09:24) (18 - 18)  SpO2: 97% (01 Aug 2018 09:24) (94% - 98%)    LABS:                        11.1   8.8   )-----------( 190      ( 01 Aug 2018 05:58 )             34.4     08-01    133<L>  |  103  |  60<H>  ----------------------------<  102<H>  4.9   |  18<L>  |  4.01<H>    Ca    10.2      01 Aug 2018 05:58  Phos  3.1     08-01  Mg     2.4     08-01    TPro  6.5  /  Alb  3.8  /  TBili  0.4  /  DBili  x   /  AST  21  /  ALT  7<L>  /  AlkPhos  61  08-01    RADIOLOGY & ADDITIONAL TESTS:          Review of systems  Gen: No weight changes, fatigue, fevers/chills, weakness  Skin: No rashes  Head/Eyes/Ears/Mouth: No headache; Normal hearing; Normal vision w/o blurriness; No sinus pain/discomfort, sore throat  Respiratory: No dyspnea, cough, wheezing, hemoptysis  CV: No chest pain, PND, orthopnea  GI: Reports incisional pain and TTP, denies diarrhea, constipation, nausea, vomiting, melena, hematochezia. LACEY patent  : Marquez in situ  MSK: No joint pain/swelling; no back pain; no edema  Neuro: No dizziness/lightheadedness, weakness, seizures, numbness, tingling  Heme: No easy bruising or bleeding  Endo: No heat/cold intolerance  Psych: No significant nervousness, anxiety, stress, depression  All other systems were reviewed and are negative, except as noted.    PHYSICAL EXAM:  Constitutional: Well developed / well nourished  Eyes: Anicteric, PERRLA  ENMT: nc/at  Neck: supple  Respiratory: CTA B/L  Cardiovascular: RRR  Gastrointestinal: Soft abdomen, mild tender to touch at surgical site, ND  Genitourinary: marquez in situ.  LACEY patent with large amount pinkish/yellow output, nephrostomy tube patent  Extremities: SCD's in place and working bilaterally, no edema  Vascular: Palpable dp pulses bilaterally  Neurological: A&O x3  Skin: wound open to air, no erythema and evidence of infection noted  Musculoskeletal: Moving all extremities  Psychiatric: Responsive

## 2018-08-01 NOTE — CHART NOTE - NSCHARTNOTEFT_GEN_A_CORE
Pt seen for nutrition follow up S/P kidney transplant, as per departmental protocol.     Source: Patient [X ]    Family [ ]     other [X ]; medical record; interdisciplinary Transplant Team rounds    Hospital Course: 34 yo male with PMH of HTN, ESRD on HD (MWF). Pt POD#4 S/P right sided DDRT to right external iliac arty and vein on (7/23) with cold time of 14hrs. Course complicated by patient S/P reimplantation of ureter of transplanted kidney on (7/29) and S/P R nephrostomy tube placement with IR 2/2 anastomotic leak on (7/31). Per chart, making adequate urine and creatinine trending down    Pt noted with 1665ml urine output in past 24-hours. Magnesium, sodium, potassium and phosphorus WNL on no concentrated potassium/phosphorous restrictions.    Diet : No Concentrated Potassium, No Concentrated Phosphorous  PO intake:  50-75% [x]     Source for PO intake [x] family  Patient seen at bedside asleep, wife+family at bedside able to provide subjective information. States patient is eating slightly better than in the previous few days with 50-75% consumption of meals. Seen with multiple snacks at bedside. Reviewed food safety recommendations with patient's wife as per request and excellent teach back noted on both food safety as well high foods high in potassium/phosphorous. Made aware RD remains available prn for further review. No GI distress of note currently with last BM 8/1 per chart    Current Weight: (8/1) 165.5 pounds, weight relatively stable since admit weight 159.1 pounds (7/23)  Edema: 2+ bilateral ankle/foot edema    Pertinent Medications: MEDICATIONS  (STANDING):  docusate sodium 100 milliGRAM(s) Oral daily  famotidine    Tablet 20 milliGRAM(s) Oral daily  metoprolol tartrate 100 milliGRAM(s) Oral every 12 hours  mycophenolate mofetil 1000 milliGRAM(s) Oral every 12 hours  NIFEdipine XL 90 milliGRAM(s) Oral daily  nystatin    Suspension 290174 Unit(s) Swish and Swallow four times a day  oxybutynin 5 milliGRAM(s) Oral two times a day  polyethylene glycol 3350 17 Gram(s) Oral daily  predniSONE   Tablet 5 milliGRAM(s) Oral daily  senna 2 Tablet(s) Oral at bedtime  tacrolimus 4 milliGRAM(s) Oral two times a day  tamsulosin 0.4 milliGRAM(s) Oral at bedtime  trimethoprim   80 mG/sulfamethoxazole 400 mG 1 Tablet(s) Oral daily  valGANciclovir 450 milliGRAM(s) Oral daily    MEDICATIONS  (PRN):  acetaminophen   Tablet. 650 milliGRAM(s) Oral every 6 hours PRN Mild Pain (1 - 3)  diphenhydrAMINE   Capsule 25 milliGRAM(s) Oral every 6 hours PRN Rash and/or Itching  lidocaine 2% Gel 1 Application(s) Topical three times a day PRN pain related to urinary catheter  metoclopramide Injectable 10 milliGRAM(s) IV Push once PRN Nausea and/or Vomiting  ondansetron Injectable 4 milliGRAM(s) IV Push every 6 hours PRN Nausea  ondansetron Injectable 4 milliGRAM(s) IV Push once PRN Nausea and/or Vomiting  simethicone 80 milliGRAM(s) Chew every 6 hours PRN Gas  traMADol 50 milliGRAM(s) Oral every 4 hours PRN Severe Pain (7 - 10)  traMADol 25 milliGRAM(s) Oral every 4 hours PRN Moderate Pain (4 - 6)  zinc oxide 20% Ointment 1 Application(s) Topical three times a day PRN rash    Pertinent Labs:  (8/1) POCT -103, Na 133L, CO2 18L, BUN 60H, , Cr 4.01H (trending down), ALT 7L; (7/31) POCT -184    Skin: no pressure injuries    Estimated Needs:   [X ] no change since previous assessment  [ ] recalculated:     Previous Nutrition Diagnosis:   [X ] Increased Nutrient Needs    Nutrition Diagnosis is [X ] ongoing; addressed with XX diet and PowerAde electrolyte drink    New Nutrition Diagnosis: [X ] not applicable    Interventions:     Recommend:  1) Continue No Concentrated Potassium, No Concentrated Phosphorous diet; encourage intake of high protein, nutrient dense foods  2) Monitor weight, lab values, skin, po intake and GI tolerance  3) Reinforce therapeutic diet education as able    Monitoring and Evaluation:   Follow up per protocol  RD to remain available for further nutritional interventions as indicated.   Poornima Madsen, BORIS Pager #[phone]

## 2018-08-01 NOTE — PROGRESS NOTE ADULT - SUBJECTIVE AND OBJECTIVE BOX
[facility] DIVISION OF KIDNEY DISEASES AND HYPERTENSION -- FOLLOW UP NOTE  --------------------------------------------------------------------------------  Chief Complaint:    24 hour events/subjective:        PAST HISTORY  --------------------------------------------------------------------------------  No significant changes to PMH, PSH, FHx, SHx, unless otherwise noted    ALLERGIES & MEDICATIONS  --------------------------------------------------------------------------------  Allergies    IV Contrast (Rash)  nyquil (Rhinorrhea)  vancomycin (Pruritus; Hives)    Intolerances      Standing Inpatient Medications  ceFAZolin   IVPB 1000 milliGRAM(s) IV Intermittent every 12 hours  docusate sodium 100 milliGRAM(s) Oral daily  famotidine    Tablet 20 milliGRAM(s) Oral daily  metoprolol tartrate 100 milliGRAM(s) Oral every 12 hours  mycophenolate mofetil 1000 milliGRAM(s) Oral every 12 hours  NIFEdipine XL 90 milliGRAM(s) Oral daily  nystatin    Suspension 129119 Unit(s) Swish and Swallow four times a day  oxybutynin 5 milliGRAM(s) Oral two times a day  polyethylene glycol 3350 17 Gram(s) Oral daily  predniSONE   Tablet 5 milliGRAM(s) Oral daily  senna 2 Tablet(s) Oral at bedtime  tacrolimus 4 milliGRAM(s) Oral two times a day  tamsulosin 0.4 milliGRAM(s) Oral at bedtime  trimethoprim   80 mG/sulfamethoxazole 400 mG 1 Tablet(s) Oral daily  valGANciclovir 450 milliGRAM(s) Oral daily    PRN Inpatient Medications  acetaminophen   Tablet. 650 milliGRAM(s) Oral every 6 hours PRN  diphenhydrAMINE   Capsule 25 milliGRAM(s) Oral every 6 hours PRN  lidocaine 2% Gel 1 Application(s) Topical three times a day PRN  metoclopramide Injectable 10 milliGRAM(s) IV Push once PRN  ondansetron Injectable 4 milliGRAM(s) IV Push every 6 hours PRN  ondansetron Injectable 4 milliGRAM(s) IV Push once PRN  simethicone 80 milliGRAM(s) Chew every 6 hours PRN  traMADol 50 milliGRAM(s) Oral every 4 hours PRN  traMADol 25 milliGRAM(s) Oral every 4 hours PRN  zinc oxide 20% Ointment 1 Application(s) Topical three times a day PRN      REVIEW OF SYSTEMS  --------------------------------------------------------------------------------  Gen: No weight changes, fatigue, fevers/chills, weakness  Skin: No rashes  Head/Eyes/Ears/Mouth: No headache; Normal hearing; Normal vision w/o blurriness; No sinus pain/discomfort, sore throat  Respiratory: No dyspnea, cough, wheezing, hemoptysis  CV: No chest pain, PND, orthopnea  GI: No abdominal pain, diarrhea, constipation, nausea, vomiting, melena, hematochezia  : No increased frequency, dysuria, hematuria, nocturia  MSK: No joint pain/swelling; no back pain; no edema  Neuro: No dizziness/lightheadedness, weakness, seizures, numbness, tingling  Heme: No easy bruising or bleeding  Endo: No heat/cold intolerance  Psych: No significant nervousness, anxiety, stress, depression    All other systems were reviewed and are negative, except as noted.    VITALS/PHYSICAL EXAM  --------------------------------------------------------------------------------  T(C): 36.7 (08-01-18 @ 05:35), Max: 37.2 (07-31-18 @ 22:20)  HR: 80 (08-01-18 @ 05:35) (72 - 90)  BP: 161/90 (08-01-18 @ 05:35) (123/78 - 161/90)  RR: 18 (08-01-18 @ 05:35) (18 - 18)  SpO2: 98% (08-01-18 @ 05:35) (94% - 98%)  Wt(kg): --        07-31-18 @ 07:01  -  08-01-18 @ 07:00  --------------------------------------------------------  IN: 1270 mL / OUT: 1870 mL / NET: -600 mL      Physical Exam:  	Gen: NAD, well-appearing  	HEENT: PERRL, supple neck, clear oropharynx  	Pulm: CTA B/L  	CV: RRR, S1S2; no rub  	Back: No spinal or CVA tenderness; no sacral edema  	Abd: +BS, soft, nontender/nondistended  	: No suprapubic tenderness  	UE: Warm, FROM, no clubbing, intact strength; no edema; no asterixis  	LE: Warm, FROM, no clubbing, intact strength; no edema  	Neuro: No focal deficits, intact gait  	Psych: Normal affect and mood  	Skin: Warm, without rashes  	Vascular access:    LABS/STUDIES  --------------------------------------------------------------------------------              11.1   8.8   >-----------<  190      [08-01-18 @ 05:58]              34.4     133  |  103  |  60  ----------------------------<  102      [08-01-18 @ 05:58]  4.9   |  18  |  4.01        Ca     10.2     [08-01-18 @ 05:58]      Mg     2.4     [08-01-18 @ 05:58]      Phos  3.1     [08-01-18 @ 05:58]    TPro  6.5  /  Alb  3.8  /  TBili  0.4  /  DBili  x   /  AST  21  /  ALT  7   /  AlkPhos  61  [08-01-18 @ 05:58]          Creatinine Trend:  SCr 4.01 [08-01 @ 05:58]  SCr 4.18 [07-31 @ 06:09]  SCr 4.61 [07-30 @ 05:54]  SCr 4.68 [07-29 @ 14:19]  SCr 4.67 [07-29 @ 06:12]    Urinalysis - [07-27-18 @ 09:51]      Color Yellow / Appearance SL Turbid / SG 1.021 / pH 6.0      Gluc Negative / Ketone Negative  / Bili Negative / Urobili Negative       Blood Moderate / Protein 300 / Leuk Est Moderate / Nitrite Negative      RBC >50 / WBC >50 / Hyaline  / Gran  / Sq Epi  / Non Sq Epi Occasional / Bacteria       HbA1c 5.1      [08-18-15 @ 18:19] [facility] DIVISION OF KIDNEY DISEASES AND HYPERTENSION -- FOLLOW UP NOTE  --------------------------------------------------------------------------------  Chief Complaint: renal transplant recipient     24 hour events/subjective:   POD#8 DDRT,  POD#3  reimplantation of ureter of transplanted kidney  07/29/2018.   Had IR placement of nephrostomy tube yesterday for anastomotic leak  No acute events  Scr trending down, pt with good UOP.        PAST HISTORY  --------------------------------------------------------------------------------  No significant changes to PMH, PSH, FHx, SHx, unless otherwise noted    ALLERGIES & MEDICATIONS  --------------------------------------------------------------------------------  Allergies    IV Contrast (Rash)  nyquil (Rhinorrhea)  vancomycin (Pruritus; Hives)    Intolerances      Standing Inpatient Medications  ceFAZolin   IVPB 1000 milliGRAM(s) IV Intermittent every 12 hours  docusate sodium 100 milliGRAM(s) Oral daily  famotidine    Tablet 20 milliGRAM(s) Oral daily  metoprolol tartrate 100 milliGRAM(s) Oral every 12 hours  mycophenolate mofetil 1000 milliGRAM(s) Oral every 12 hours  NIFEdipine XL 90 milliGRAM(s) Oral daily  nystatin    Suspension 274066 Unit(s) Swish and Swallow four times a day  oxybutynin 5 milliGRAM(s) Oral two times a day  polyethylene glycol 3350 17 Gram(s) Oral daily  predniSONE   Tablet 5 milliGRAM(s) Oral daily  senna 2 Tablet(s) Oral at bedtime  tacrolimus 4 milliGRAM(s) Oral two times a day  tamsulosin 0.4 milliGRAM(s) Oral at bedtime  trimethoprim   80 mG/sulfamethoxazole 400 mG 1 Tablet(s) Oral daily  valGANciclovir 450 milliGRAM(s) Oral daily    PRN Inpatient Medications  acetaminophen   Tablet. 650 milliGRAM(s) Oral every 6 hours PRN  diphenhydrAMINE   Capsule 25 milliGRAM(s) Oral every 6 hours PRN  lidocaine 2% Gel 1 Application(s) Topical three times a day PRN  metoclopramide Injectable 10 milliGRAM(s) IV Push once PRN  ondansetron Injectable 4 milliGRAM(s) IV Push every 6 hours PRN  ondansetron Injectable 4 milliGRAM(s) IV Push once PRN  simethicone 80 milliGRAM(s) Chew every 6 hours PRN  traMADol 50 milliGRAM(s) Oral every 4 hours PRN  traMADol 25 milliGRAM(s) Oral every 4 hours PRN  zinc oxide 20% Ointment 1 Application(s) Topical three times a day PRN      REVIEW OF SYSTEMS  --------------------------------------------------------------------------------  Gen: No weakness  Skin: No rashes  Head/Eyes/Ears/Mouth: No headache  Respiratory: No dyspnea  CV: No chest pain, PND, orthopnea  GI: No abdominal pain, diarrhea  MSK: No edema  Neuro: No dizziness/lightheadedness    All other systems were reviewed and are negative, except as noted.    VITALS/PHYSICAL EXAM  --------------------------------------------------------------------------------  T(C): 36.7 (08-01-18 @ 05:35), Max: 37.2 (07-31-18 @ 22:20)  HR: 80 (08-01-18 @ 05:35) (72 - 90)  BP: 161/90 (08-01-18 @ 05:35) (123/78 - 161/90)  RR: 18 (08-01-18 @ 05:35) (18 - 18)  SpO2: 98% (08-01-18 @ 05:35) (94% - 98%)  Wt(kg): --        07-31-18 @ 07:01  -  08-01-18 @ 07:00  --------------------------------------------------------  IN: 1270 mL / OUT: 1870 mL / NET: -600 mL      Physical Exam:               Gen: NAD  	Pulm: CTA B/L  	CV: RRR, S1S2; no rub  	Abd: +BS, soft, nontender/nondistended  	: No suprapubic tenderness  	UE: Warm, no asterixis  	LE: Warm, no edema, palpable DP pulses b/l   	Skin: Warm, without rashes  	Vascular access: NINI NORWOOD  LABS/STUDIES  --------------------------------------------------------------------------------              11.1   8.8   >-----------<  190      [08-01-18 @ 05:58]              34.4     133  |  103  |  60  ----------------------------<  102      [08-01-18 @ 05:58]  4.9   |  18  |  4.01        Ca     10.2     [08-01-18 @ 05:58]      Mg     2.4     [08-01-18 @ 05:58]      Phos  3.1     [08-01-18 @ 05:58]    TPro  6.5  /  Alb  3.8  /  TBili  0.4  /  DBili  x   /  AST  21  /  ALT  7   /  AlkPhos  61  [08-01-18 @ 05:58]          Creatinine Trend:  SCr 4.01 [08-01 @ 05:58]  SCr 4.18 [07-31 @ 06:09]  SCr 4.61 [07-30 @ 05:54]  SCr 4.68 [07-29 @ 14:19]  SCr 4.67 [07-29 @ 06:12]    Urinalysis - [07-27-18 @ 09:51]      Color Yellow / Appearance SL Turbid / SG 1.021 / pH 6.0      Gluc Negative / Ketone Negative  / Bili Negative / Urobili Negative       Blood Moderate / Protein 300 / Leuk Est Moderate / Nitrite Negative      RBC >50 / WBC >50 / Hyaline  / Gran  / Sq Epi  / Non Sq Epi Occasional / Bacteria       HbA1c 5.1      [08-18-15 @ 18:19]

## 2018-08-02 LAB
ALBUMIN SERPL ELPH-MCNC: 3.5 G/DL — SIGNIFICANT CHANGE UP (ref 3.3–5)
ALP SERPL-CCNC: 53 U/L — SIGNIFICANT CHANGE UP (ref 40–120)
ALT FLD-CCNC: <5 U/L — LOW (ref 10–45)
ANION GAP SERPL CALC-SCNC: 12 MMOL/L — SIGNIFICANT CHANGE UP (ref 5–17)
AST SERPL-CCNC: 15 U/L — SIGNIFICANT CHANGE UP (ref 10–40)
BASOPHILS # BLD AUTO: 0 K/UL — SIGNIFICANT CHANGE UP (ref 0–0.2)
BASOPHILS NFR BLD AUTO: 0.4 % — SIGNIFICANT CHANGE UP (ref 0–2)
BILIRUB SERPL-MCNC: 0.2 MG/DL — SIGNIFICANT CHANGE UP (ref 0.2–1.2)
BUN SERPL-MCNC: 53 MG/DL — HIGH (ref 7–23)
CALCIUM SERPL-MCNC: 9.8 MG/DL — SIGNIFICANT CHANGE UP (ref 8.4–10.5)
CHLORIDE SERPL-SCNC: 106 MMOL/L — SIGNIFICANT CHANGE UP (ref 96–108)
CO2 SERPL-SCNC: 18 MMOL/L — LOW (ref 22–31)
CREAT SERPL-MCNC: 3.61 MG/DL — HIGH (ref 0.5–1.3)
CULTURE RESULTS: NO GROWTH — SIGNIFICANT CHANGE UP
CULTURE RESULTS: SIGNIFICANT CHANGE UP
EOSINOPHIL # BLD AUTO: 0.3 K/UL — SIGNIFICANT CHANGE UP (ref 0–0.5)
EOSINOPHIL NFR BLD AUTO: 4 % — SIGNIFICANT CHANGE UP (ref 0–6)
GLUCOSE BLDC GLUCOMTR-MCNC: 93 MG/DL — SIGNIFICANT CHANGE UP (ref 70–99)
GLUCOSE BLDC GLUCOMTR-MCNC: 95 MG/DL — SIGNIFICANT CHANGE UP (ref 70–99)
GLUCOSE BLDC GLUCOMTR-MCNC: 96 MG/DL — SIGNIFICANT CHANGE UP (ref 70–99)
GLUCOSE BLDC GLUCOMTR-MCNC: 96 MG/DL — SIGNIFICANT CHANGE UP (ref 70–99)
GLUCOSE SERPL-MCNC: 98 MG/DL — SIGNIFICANT CHANGE UP (ref 70–99)
HCT VFR BLD CALC: 30.6 % — LOW (ref 39–50)
HGB BLD-MCNC: 10.2 G/DL — LOW (ref 13–17)
LYMPHOCYTES # BLD AUTO: 1.7 K/UL — SIGNIFICANT CHANGE UP (ref 1–3.3)
LYMPHOCYTES # BLD AUTO: 26.9 % — SIGNIFICANT CHANGE UP (ref 13–44)
MAGNESIUM SERPL-MCNC: 2.2 MG/DL — SIGNIFICANT CHANGE UP (ref 1.6–2.6)
MCHC RBC-ENTMCNC: 29.1 PG — SIGNIFICANT CHANGE UP (ref 27–34)
MCHC RBC-ENTMCNC: 33.5 GM/DL — SIGNIFICANT CHANGE UP (ref 32–36)
MCV RBC AUTO: 86.9 FL — SIGNIFICANT CHANGE UP (ref 80–100)
MONOCYTES # BLD AUTO: 0.5 K/UL — SIGNIFICANT CHANGE UP (ref 0–0.9)
MONOCYTES NFR BLD AUTO: 8.4 % — SIGNIFICANT CHANGE UP (ref 2–14)
NEUTROPHILS # BLD AUTO: 3.9 K/UL — SIGNIFICANT CHANGE UP (ref 1.8–7.4)
NEUTROPHILS NFR BLD AUTO: 60.4 % — SIGNIFICANT CHANGE UP (ref 43–77)
PHOSPHATE SERPL-MCNC: 2.9 MG/DL — SIGNIFICANT CHANGE UP (ref 2.5–4.5)
PLATELET # BLD AUTO: 195 K/UL — SIGNIFICANT CHANGE UP (ref 150–400)
POTASSIUM SERPL-MCNC: 4.6 MMOL/L — SIGNIFICANT CHANGE UP (ref 3.5–5.3)
POTASSIUM SERPL-SCNC: 4.6 MMOL/L — SIGNIFICANT CHANGE UP (ref 3.5–5.3)
PROT SERPL-MCNC: 5.9 G/DL — LOW (ref 6–8.3)
RBC # BLD: 3.52 M/UL — LOW (ref 4.2–5.8)
RBC # FLD: 12.9 % — SIGNIFICANT CHANGE UP (ref 10.3–14.5)
SODIUM SERPL-SCNC: 136 MMOL/L — SIGNIFICANT CHANGE UP (ref 135–145)
SPECIMEN SOURCE: SIGNIFICANT CHANGE UP
SPECIMEN SOURCE: SIGNIFICANT CHANGE UP
TACROLIMUS SERPL-MCNC: 7.1 NG/ML — SIGNIFICANT CHANGE UP
WBC # BLD: 6.5 K/UL — SIGNIFICANT CHANGE UP (ref 3.8–10.5)
WBC # FLD AUTO: 6.5 K/UL — SIGNIFICANT CHANGE UP (ref 3.8–10.5)

## 2018-08-02 PROCEDURE — 99232 SBSQ HOSP IP/OBS MODERATE 35: CPT | Mod: GC,24

## 2018-08-02 PROCEDURE — 93971 EXTREMITY STUDY: CPT | Mod: 26

## 2018-08-02 PROCEDURE — 99232 SBSQ HOSP IP/OBS MODERATE 35: CPT | Mod: GC

## 2018-08-02 RX ADMIN — Medication 1 TABLET(S): at 12:11

## 2018-08-02 RX ADMIN — Medication 500000 UNIT(S): at 06:35

## 2018-08-02 RX ADMIN — SENNA PLUS 2 TABLET(S): 8.6 TABLET ORAL at 22:24

## 2018-08-02 RX ADMIN — TRAMADOL HYDROCHLORIDE 25 MILLIGRAM(S): 50 TABLET ORAL at 07:20

## 2018-08-02 RX ADMIN — Medication 500000 UNIT(S): at 18:34

## 2018-08-02 RX ADMIN — TRAMADOL HYDROCHLORIDE 25 MILLIGRAM(S): 50 TABLET ORAL at 01:18

## 2018-08-02 RX ADMIN — TRAMADOL HYDROCHLORIDE 25 MILLIGRAM(S): 50 TABLET ORAL at 18:38

## 2018-08-02 RX ADMIN — Medication 500000 UNIT(S): at 22:24

## 2018-08-02 RX ADMIN — TRAMADOL HYDROCHLORIDE 25 MILLIGRAM(S): 50 TABLET ORAL at 00:45

## 2018-08-02 RX ADMIN — MYCOPHENOLATE MOFETIL 1000 MILLIGRAM(S): 250 CAPSULE ORAL at 06:35

## 2018-08-02 RX ADMIN — TRAMADOL HYDROCHLORIDE 25 MILLIGRAM(S): 50 TABLET ORAL at 06:35

## 2018-08-02 RX ADMIN — Medication 100 MILLIGRAM(S): at 18:35

## 2018-08-02 RX ADMIN — TAMSULOSIN HYDROCHLORIDE 0.4 MILLIGRAM(S): 0.4 CAPSULE ORAL at 22:24

## 2018-08-02 RX ADMIN — Medication 500000 UNIT(S): at 12:10

## 2018-08-02 RX ADMIN — POLYETHYLENE GLYCOL 3350 17 GRAM(S): 17 POWDER, FOR SOLUTION ORAL at 12:10

## 2018-08-02 RX ADMIN — Medication 5 MILLIGRAM(S): at 06:35

## 2018-08-02 RX ADMIN — MYCOPHENOLATE MOFETIL 1000 MILLIGRAM(S): 250 CAPSULE ORAL at 18:34

## 2018-08-02 RX ADMIN — VALGANCICLOVIR 450 MILLIGRAM(S): 450 TABLET, FILM COATED ORAL at 12:11

## 2018-08-02 RX ADMIN — TACROLIMUS 4 MILLIGRAM(S): 5 CAPSULE ORAL at 06:35

## 2018-08-02 RX ADMIN — Medication 5 MILLIGRAM(S): at 18:34

## 2018-08-02 RX ADMIN — TRAMADOL HYDROCHLORIDE 25 MILLIGRAM(S): 50 TABLET ORAL at 22:40

## 2018-08-02 RX ADMIN — TRAMADOL HYDROCHLORIDE 50 MILLIGRAM(S): 50 TABLET ORAL at 09:18

## 2018-08-02 RX ADMIN — TACROLIMUS 4 MILLIGRAM(S): 5 CAPSULE ORAL at 18:34

## 2018-08-02 RX ADMIN — TRAMADOL HYDROCHLORIDE 50 MILLIGRAM(S): 50 TABLET ORAL at 21:00

## 2018-08-02 RX ADMIN — TRAMADOL HYDROCHLORIDE 25 MILLIGRAM(S): 50 TABLET ORAL at 23:15

## 2018-08-02 RX ADMIN — TRAMADOL HYDROCHLORIDE 50 MILLIGRAM(S): 50 TABLET ORAL at 09:35

## 2018-08-02 RX ADMIN — TRAMADOL HYDROCHLORIDE 50 MILLIGRAM(S): 50 TABLET ORAL at 04:21

## 2018-08-02 RX ADMIN — Medication 90 MILLIGRAM(S): at 06:35

## 2018-08-02 RX ADMIN — TRAMADOL HYDROCHLORIDE 25 MILLIGRAM(S): 50 TABLET ORAL at 13:01

## 2018-08-02 RX ADMIN — Medication 100 MILLIGRAM(S): at 12:10

## 2018-08-02 RX ADMIN — TRAMADOL HYDROCHLORIDE 50 MILLIGRAM(S): 50 TABLET ORAL at 20:40

## 2018-08-02 RX ADMIN — FAMOTIDINE 20 MILLIGRAM(S): 10 INJECTION INTRAVENOUS at 12:10

## 2018-08-02 RX ADMIN — Medication 100 MILLIGRAM(S): at 06:35

## 2018-08-02 RX ADMIN — TRAMADOL HYDROCHLORIDE 50 MILLIGRAM(S): 50 TABLET ORAL at 05:00

## 2018-08-02 RX ADMIN — Medication 500000 UNIT(S): at 00:44

## 2018-08-02 RX ADMIN — TRAMADOL HYDROCHLORIDE 25 MILLIGRAM(S): 50 TABLET ORAL at 00:35

## 2018-08-02 RX ADMIN — TRAMADOL HYDROCHLORIDE 25 MILLIGRAM(S): 50 TABLET ORAL at 12:16

## 2018-08-02 NOTE — PROGRESS NOTE ADULT - SUBJECTIVE AND OBJECTIVE BOX
INTERVAL HPI/OVERNIGHT EVENTS:  Patient seen with multidisciplinary team (Nephrologist, pharmacist, nurse, nurse manager, NP, MD surgeons, transplant cordinator,  nutritionist, and  )  in am rounds and examined with . 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  Cold ischemia time14 hours.  CMV +, EBV +. Course complicated by post-operative ureteral leak s/p reimplantation of ureter of transplanted kidney on  2018 an subsequent IR placement of nephrostomy tube .     No acute event overnight. Received 1 dose Ancef for Leuk est + urine, Cx pending. Nephrostomy culture with no growth.  BLE edema R>L noted.        MEDICATIONS  (STANDING):  docusate sodium 100 milliGRAM(s) Oral daily  famotidine    Tablet 20 milliGRAM(s) Oral daily  metoprolol tartrate 100 milliGRAM(s) Oral every 12 hours  mycophenolate mofetil 1000 milliGRAM(s) Oral every 12 hours  NIFEdipine XL 90 milliGRAM(s) Oral daily  nystatin    Suspension 428819 Unit(s) Swish and Swallow four times a day  oxybutynin 5 milliGRAM(s) Oral two times a day  polyethylene glycol 3350 17 Gram(s) Oral daily  predniSONE   Tablet 5 milliGRAM(s) Oral daily  senna 2 Tablet(s) Oral at bedtime  tacrolimus 4 milliGRAM(s) Oral two times a day  tamsulosin 0.4 milliGRAM(s) Oral at bedtime  trimethoprim   80 mG/sulfamethoxazole 400 mG 1 Tablet(s) Oral daily  valGANciclovir 450 milliGRAM(s) Oral daily    MEDICATIONS  (PRN):  acetaminophen   Tablet. 650 milliGRAM(s) Oral every 6 hours PRN Mild Pain (1 - 3)  diphenhydrAMINE   Capsule 25 milliGRAM(s) Oral every 6 hours PRN Rash and/or Itching  lidocaine 2% Gel 1 Application(s) Topical three times a day PRN pain related to urinary catheter  metoclopramide Injectable 10 milliGRAM(s) IV Push once PRN Nausea and/or Vomiting  ondansetron Injectable 4 milliGRAM(s) IV Push every 6 hours PRN Nausea  ondansetron Injectable 4 milliGRAM(s) IV Push once PRN Nausea and/or Vomiting  simethicone 80 milliGRAM(s) Chew every 6 hours PRN Gas  traMADol 50 milliGRAM(s) Oral every 4 hours PRN Severe Pain (7 - 10)  traMADol 25 milliGRAM(s) Oral every 4 hours PRN Moderate Pain (4 - 6)  zinc oxide 20% Ointment 1 Application(s) Topical three times a day PRN rash      Allergies    IV Contrast (Rash)  nyquil (Rhinorrhea)  vancomycin (Pruritus; Hives)    Intolerances        Vital Signs Last 24 Hrs  T(C): 36.6 (02 Aug 2018 09:32), Max: 36.7 (02 Aug 2018 01:35)  T(F): 97.9 (02 Aug 2018 09:32), Max: 98.1 (02 Aug 2018 01:35)  HR: 83 (02 Aug 2018 09:32) (62 - 83)  BP: 144/87 (02 Aug 2018 09:32) (126/78 - 153/106)  BP(mean): --  RR: 18 (02 Aug 2018 09:32) (18 - 18)  SpO2: 96% (02 Aug 2018 09:32) (96% - 100%)    LABS:                        10.2   6.5   )-----------( 195      ( 02 Aug 2018 06:10 )             30.6     08-02    136  |  106  |  53<H>  ----------------------------<  98  4.6   |  18<L>  |  3.61<H>    Ca    9.8      02 Aug 2018 06:09  Phos  2.9     08-02  Mg     2.2     08-02    TPro  5.9<L>  /  Alb  3.5  /  TBili  0.2  /  DBili  x   /  AST  15  /  ALT  <5<L>  /  AlkPhos  53  08-02      Urinalysis Basic - ( 01 Aug 2018 13:23 )    Color: Yellow / Appearance: Turbid / S.012 / pH: x  Gluc: x / Ketone: Negative  / Bili: Negative / Urobili: Negative   Blood: x / Protein: 150 mg/dL / Nitrite: Negative   Leuk Esterase: Large / RBC: >50 /HPF / WBC >50 /HPF   Sq Epi: x / Non Sq Epi: x / Bacteria: Many /HPF        RADIOLOGY & ADDITIONAL TESTS:    Review of systems  Gen: No weight changes, fatigue, fevers/chills, weakness  Skin: No rashes  Head/Eyes/Ears/Mouth: No headache; Normal hearing; Normal vision w/o blurriness; No sinus pain/discomfort, sore throat  Respiratory: No dyspnea, cough, wheezing, hemoptysis  CV: No chest pain, PND, orthopnea  GI: Reports incisional pain and TTP, denies diarrhea, constipation, nausea, vomiting, melena, hematochezia. LACEY patent  : Marquez in situ  MSK: No joint pain/swelling; no back pain;   Neuro: No dizziness/lightheadedness, weakness, seizures, numbness, tingling  Heme: No easy bruising or bleeding  Endo: No heat/cold intolerance  Psych: No significant nervousness, anxiety, stress, depression  All other systems were reviewed and are negative, except as noted.    PHYSICAL EXAM:  Constitutional: Well developed / well nourished  Eyes: Anicteric, PERRLA  ENMT: nc/at  Neck: supple  Respiratory: CTA B/L  Cardiovascular: RRR  Gastrointestinal: Soft abdomen, mild tender to touch at surgical site, ND  Genitourinary: marquez in situ.  LACEY, nephrostomy tube patent  Extremities: SCD's in place and working bilaterally, BLE edema R>L  Vascular: Palpable dp pulses bilaterally  Neurological: A&O x3  Skin: wound open to air, no erythema and evidence of infection noted  Musculoskeletal: Moving all extremities  Psychiatric: Responsive

## 2018-08-02 NOTE — PROGRESS NOTE ADULT - ATTENDING COMMENTS
I personally saw and examined patient.  IMMUNOSUPPRESSION:  Prograf 4-4  US of LE to evaluate for DVT

## 2018-08-02 NOTE — PROGRESS NOTE ADULT - ASSESSMENT
32 y/o gentleman with PMH of HTN and ESRD since 2010, POD#10 R sided DDRT anastomosed to R external iliac artery and vein Ureter anastomosed to bladder over double J stent  with post-operative course complicated by ureteral leak requiring re-op and reimplantation of ureter of transplanted kidney  now s/p Nephrostomy tube anastomotic leak with downtrending creatinine.

## 2018-08-02 NOTE — PROGRESS NOTE ADULT - PROBLEM SELECTOR PLAN 1
07/29/2018 reimplantation of ureter of transplanted kidney for ureteral leak  7/31/08 placement of nephrostomy tube  Creatinine trending down.   - Strict I/Os  -Continue Tylenol and tramadol PRN  -Continue bowel regimen, Flomax 0.4 daily   -daily BMP  - Blood sugars well controlled. Fingersticks before meals and bedtime.  -FU Urine culture from marquez   -On Oxybutynin for bladder spasm  - Obtain LE venous duplex today 2/2 LE swelling  - SCDs, ambulate

## 2018-08-02 NOTE — PROGRESS NOTE ADULT - ATTENDING COMMENTS
Renal allograft recipient, s/p nephrostomy, improving creatinine.  Immunosuppression reviewed  Plan: Continue current immunosuppression  Will follow

## 2018-08-02 NOTE — PROGRESS NOTE ADULT - PROBLEM SELECTOR PLAN 2
On Tacrolimus, Cellcept, steroid, Nystatin S&S, bactrim, and Valcyte   F/U Tacrolimus level daily, tacro goal 8-10  Monitor closely.

## 2018-08-02 NOTE — PROGRESS NOTE ADULT - SUBJECTIVE AND OBJECTIVE BOX
[facility]   MD PERCY MORALES MD RUORU WONG, PA    TEL:  OFFICE: [phone]  DR OCONNOR CELL: [phone]  DAJA PRINGLE CELL: [phone]  DR. GAMBOA CELL: [phone]    RENAL FOLLOW UP NOTE  --------------------------------------------------------------------------------  HPI:  33M with PMH of HTN and ESRD since 2010 s/p DDRT 7/23.    Reimplantation of ureter of transplanted kidney  07/29/2018.   Had IR placement of nephrostomy tube on 7/31 for anastomotic leak  Pt seen and examined at bedside.  Denies sob, chest pain      PAST HISTORY  --------------------------------------------------------------------------------  No significant changes to PMH, PSH, FHx, SHx, unless otherwise noted    ALLERGIES & MEDICATIONS  --------------------------------------------------------------------------------  Allergies    IV Contrast (Rash)  nyquil (Rhinorrhea)  vancomycin (Pruritus; Hives)    Intolerances      Standing Inpatient Medications  docusate sodium 100 milliGRAM(s) Oral daily  famotidine    Tablet 20 milliGRAM(s) Oral daily  metoprolol tartrate 100 milliGRAM(s) Oral every 12 hours  mycophenolate mofetil 1000 milliGRAM(s) Oral every 12 hours  NIFEdipine XL 90 milliGRAM(s) Oral daily  nystatin    Suspension 175690 Unit(s) Swish and Swallow four times a day  oxybutynin 5 milliGRAM(s) Oral two times a day  polyethylene glycol 3350 17 Gram(s) Oral daily  predniSONE   Tablet 5 milliGRAM(s) Oral daily  senna 2 Tablet(s) Oral at bedtime  tacrolimus 4 milliGRAM(s) Oral two times a day  tamsulosin 0.4 milliGRAM(s) Oral at bedtime  trimethoprim   80 mG/sulfamethoxazole 400 mG 1 Tablet(s) Oral daily  valGANciclovir 450 milliGRAM(s) Oral daily    PRN Inpatient Medications  acetaminophen   Tablet. 650 milliGRAM(s) Oral every 6 hours PRN  diphenhydrAMINE   Capsule 25 milliGRAM(s) Oral every 6 hours PRN  lidocaine 2% Gel 1 Application(s) Topical three times a day PRN  metoclopramide Injectable 10 milliGRAM(s) IV Push once PRN  ondansetron Injectable 4 milliGRAM(s) IV Push every 6 hours PRN  ondansetron Injectable 4 milliGRAM(s) IV Push once PRN  simethicone 80 milliGRAM(s) Chew every 6 hours PRN  traMADol 50 milliGRAM(s) Oral every 4 hours PRN  traMADol 25 milliGRAM(s) Oral every 4 hours PRN  zinc oxide 20% Ointment 1 Application(s) Topical three times a day PRN      REVIEW OF SYSTEMS  --------------------------------------------------------------------------------  General: no fever  CVS: no chest pain  RESP: no sob, no cough  ABD: no abdominal pain  : no dysuria,  MSK: no edema     VITALS/PHYSICAL EXAM  --------------------------------------------------------------------------------  T(C): 36.6 (08-02-18 @ 09:32), Max: 36.7 (08-02-18 @ 01:35)  HR: 83 (08-02-18 @ 09:32) (62 - 83)  BP: 144/87 (08-02-18 @ 09:32) (126/78 - 153/106)  RR: 18 (08-02-18 @ 09:32) (18 - 18)  SpO2: 96% (08-02-18 @ 09:32) (96% - 100%)  Wt(kg): --        08-01-18 @ 07:01  -  08-02-18 @ 07:00  --------------------------------------------------------  IN: 770 mL / OUT: 2825 mL / NET: -2055 mL    08-02-18 @ 07:01  -  08-02-18 @ 12:36  --------------------------------------------------------  IN: 0 mL / OUT: 570 mL / NET: -570 mL      Physical Exam:  	    Gen: NAD  	Pulm: CTA B/L  	CV: RRR, S1S2; no rub  	Abd: +BS, soft, nontender/nondistended  	LE: Warm, + edema,   	Skin: Warm, without rashes  	Vascular access: LUE AVF +thrill +bruit, skin intact, aneurysmal dilation              Rodrigo Rodriguez present     LABS/STUDIES  --------------------------------------------------------------------------------              10.2   6.5   >-----------<  195      [08-02-18 @ 06:10]              30.6     136  |  106  |  53  ----------------------------<  98      [08-02-18 @ 06:09]  4.6   |  18  |  3.61        Ca     9.8     [08-02-18 @ 06:09]      Mg     2.2     [08-02-18 @ 06:09]      Phos  2.9     [08-02-18 @ 06:09]    TPro  5.9  /  Alb  3.5  /  TBili  0.2  /  DBili  x   /  AST  15  /  ALT  <5  /  AlkPhos  53  [08-02-18 @ 06:09]          Creatinine Trend:  SCr 3.61 [08-02 @ 06:09]  SCr 4.01 [08-01 @ 05:58]  SCr 4.18 [07-31 @ 06:09]  SCr 4.61 [07-30 @ 05:54]  SCr 4.68 [07-29 @ 14:19]    Urinalysis - [08-01-18 @ 13:23]      Color Yellow / Appearance Turbid / SG 1.012 / pH 7.0      Gluc Negative / Ketone Negative  / Bili Negative / Urobili Negative       Blood Large / Protein 150 / Leuk Est Large / Nitrite Negative      RBC >50 / WBC >50 / Hyaline 2-5 / Gran  / Sq Epi  / Non Sq Epi  / Bacteria Many      HbA1c 5.1      [08-18-15 @ 18:19]

## 2018-08-02 NOTE — PROGRESS NOTE ADULT - SUBJECTIVE AND OBJECTIVE BOX
[facility] DIVISION OF KIDNEY DISEASES AND HYPERTENSION -- FOLLOW UP NOTE  --------------------------------------------------------------------------------  Chief Complaint: DDRT    24 hour events/subjective:  No acute events  nephrostomy tube with 2.3 L output. LACEY with 155 ml and marquez ~285 UOP  Scr trending down.  pt c/o burning around urethra.        PAST HISTORY  --------------------------------------------------------------------------------  No significant changes to PMH, PSH, FHx, SHx, unless otherwise noted    ALLERGIES & MEDICATIONS  --------------------------------------------------------------------------------  Allergies    IV Contrast (Rash)  nyquil (Rhinorrhea)  vancomycin (Pruritus; Hives)    Intolerances      Standing Inpatient Medications  docusate sodium 100 milliGRAM(s) Oral daily  famotidine    Tablet 20 milliGRAM(s) Oral daily  metoprolol tartrate 100 milliGRAM(s) Oral every 12 hours  mycophenolate mofetil 1000 milliGRAM(s) Oral every 12 hours  NIFEdipine XL 90 milliGRAM(s) Oral daily  nystatin    Suspension 731085 Unit(s) Swish and Swallow four times a day  oxybutynin 5 milliGRAM(s) Oral two times a day  polyethylene glycol 3350 17 Gram(s) Oral daily  predniSONE   Tablet 5 milliGRAM(s) Oral daily  senna 2 Tablet(s) Oral at bedtime  tacrolimus 4 milliGRAM(s) Oral two times a day  tamsulosin 0.4 milliGRAM(s) Oral at bedtime  trimethoprim   80 mG/sulfamethoxazole 400 mG 1 Tablet(s) Oral daily  valGANciclovir 450 milliGRAM(s) Oral daily    PRN Inpatient Medications  acetaminophen   Tablet. 650 milliGRAM(s) Oral every 6 hours PRN  diphenhydrAMINE   Capsule 25 milliGRAM(s) Oral every 6 hours PRN  lidocaine 2% Gel 1 Application(s) Topical three times a day PRN  metoclopramide Injectable 10 milliGRAM(s) IV Push once PRN  ondansetron Injectable 4 milliGRAM(s) IV Push every 6 hours PRN  ondansetron Injectable 4 milliGRAM(s) IV Push once PRN  simethicone 80 milliGRAM(s) Chew every 6 hours PRN  traMADol 50 milliGRAM(s) Oral every 4 hours PRN  traMADol 25 milliGRAM(s) Oral every 4 hours PRN  zinc oxide 20% Ointment 1 Application(s) Topical three times a day PRN      REVIEW OF SYSTEMS  --------------------------------------------------------------------------------    Gen: No weakness  Skin: No rashes  Head/Eyes/Ears/Mouth: No headache  Respiratory: No dyspnea  CV: No chest pain, PND, orthopnea  GI: No abdominal pain, diarrhea  MSK: No edema  Neuro: No dizziness/lightheadedness    All other systems were reviewed and are negative, except as noted.  VITALS/PHYSICAL EXAM  --------------------------------------------------------------------------------  T(C): 36.6 (08-02-18 @ 09:32), Max: 36.7 (08-02-18 @ 01:35)  HR: 83 (08-02-18 @ 09:32) (62 - 83)  BP: 144/87 (08-02-18 @ 09:32) (126/78 - 153/106)  RR: 18 (08-02-18 @ 09:32) (18 - 18)  SpO2: 96% (08-02-18 @ 09:32) (96% - 100%)  Wt(kg): --        08-01-18 @ 07:01  -  08-02-18 @ 07:00  --------------------------------------------------------  IN: 770 mL / OUT: 2825 mL / NET: -2055 mL    08-02-18 @ 07:01  -  08-02-18 @ 10:06  --------------------------------------------------------  IN: 0 mL / OUT: 150 mL / NET: -150 mL      Physical Exam:  	Gen: NAD  	Pulm: CTA B/L  	CV: RRR, S1S2; no rub  	Abd: +BS, soft, nontender/nondistended  	: No suprapubic tenderness, marquez+  	UE: Warm, no asterixis  	LE: Warm, b/l LE Edema R>L, palpable DP pulses b/l   	Skin: Warm, without rashes  	Vascular access: NINI NORWOOD    LABS/STUDIES  --------------------------------------------------------------------------------              10.2   6.5   >-----------<  195      [08-02-18 @ 06:10]              30.6     136  |  106  |  53  ----------------------------<  98      [08-02-18 @ 06:09]  4.6   |  18  |  3.61        Ca     9.8     [08-02-18 @ 06:09]      Mg     2.2     [08-02-18 @ 06:09]      Phos  2.9     [08-02-18 @ 06:09]    TPro  5.9  /  Alb  3.5  /  TBili  0.2  /  DBili  x   /  AST  15  /  ALT  <5  /  AlkPhos  53  [08-02-18 @ 06:09]          Creatinine Trend:  SCr 3.61 [08-02 @ 06:09]  SCr 4.01 [08-01 @ 05:58]  SCr 4.18 [07-31 @ 06:09]  SCr 4.61 [07-30 @ 05:54]  SCr 4.68 [07-29 @ 14:19]    Urinalysis - [08-01-18 @ 13:23]      Color Yellow / Appearance Turbid / SG 1.012 / pH 7.0      Gluc Negative / Ketone Negative  / Bili Negative / Urobili Negative       Blood Large / Protein 150 / Leuk Est Large / Nitrite Negative      RBC >50 / WBC >50 / Hyaline 2-5 / Gran  / Sq Epi  / Non Sq Epi  / Bacteria Many      HbA1c 5.1      [08-18-15 @ 18:19]

## 2018-08-02 NOTE — PROGRESS NOTE ADULT - PROBLEM SELECTOR PLAN 1
s/p DDRT 07/23  Renal function improving   Continue immunosuppressants per transplant team  Continue  Nystatin S&S, bactrim, and Valcyte for PPX  monitor bmp  monitor I/O.

## 2018-08-03 LAB
ALBUMIN SERPL ELPH-MCNC: 3.5 G/DL — SIGNIFICANT CHANGE UP (ref 3.3–5)
ALP SERPL-CCNC: 56 U/L — SIGNIFICANT CHANGE UP (ref 40–120)
ALT FLD-CCNC: 6 U/L — LOW (ref 10–45)
ANION GAP SERPL CALC-SCNC: 12 MMOL/L — SIGNIFICANT CHANGE UP (ref 5–17)
AST SERPL-CCNC: 17 U/L — SIGNIFICANT CHANGE UP (ref 10–40)
BASOPHILS # BLD AUTO: 0.1 K/UL — SIGNIFICANT CHANGE UP (ref 0–0.2)
BASOPHILS NFR BLD AUTO: 0.9 % — SIGNIFICANT CHANGE UP (ref 0–2)
BILIRUB SERPL-MCNC: 0.3 MG/DL — SIGNIFICANT CHANGE UP (ref 0.2–1.2)
BUN SERPL-MCNC: 48 MG/DL — HIGH (ref 7–23)
CALCIUM SERPL-MCNC: 10.3 MG/DL — SIGNIFICANT CHANGE UP (ref 8.4–10.5)
CHLORIDE SERPL-SCNC: 106 MMOL/L — SIGNIFICANT CHANGE UP (ref 96–108)
CO2 SERPL-SCNC: 20 MMOL/L — LOW (ref 22–31)
CREAT FLD-MCNC: 3.57 MG/DL — SIGNIFICANT CHANGE UP
CREAT SERPL-MCNC: 3.61 MG/DL — HIGH (ref 0.5–1.3)
EOSINOPHIL # BLD AUTO: 0.1 K/UL — SIGNIFICANT CHANGE UP (ref 0–0.5)
EOSINOPHIL NFR BLD AUTO: 2.2 % — SIGNIFICANT CHANGE UP (ref 0–6)
GLUCOSE BLDC GLUCOMTR-MCNC: 114 MG/DL — HIGH (ref 70–99)
GLUCOSE BLDC GLUCOMTR-MCNC: 158 MG/DL — HIGH (ref 70–99)
GLUCOSE BLDC GLUCOMTR-MCNC: 95 MG/DL — SIGNIFICANT CHANGE UP (ref 70–99)
GLUCOSE BLDC GLUCOMTR-MCNC: 99 MG/DL — SIGNIFICANT CHANGE UP (ref 70–99)
GLUCOSE SERPL-MCNC: 97 MG/DL — SIGNIFICANT CHANGE UP (ref 70–99)
HCT VFR BLD CALC: 29.3 % — LOW (ref 39–50)
HGB BLD-MCNC: 9.8 G/DL — LOW (ref 13–17)
LYMPHOCYTES # BLD AUTO: 2 K/UL — SIGNIFICANT CHANGE UP (ref 1–3.3)
LYMPHOCYTES # BLD AUTO: 29.3 % — SIGNIFICANT CHANGE UP (ref 13–44)
MAGNESIUM SERPL-MCNC: 2.2 MG/DL — SIGNIFICANT CHANGE UP (ref 1.6–2.6)
MCHC RBC-ENTMCNC: 29 PG — SIGNIFICANT CHANGE UP (ref 27–34)
MCHC RBC-ENTMCNC: 33.3 GM/DL — SIGNIFICANT CHANGE UP (ref 32–36)
MCV RBC AUTO: 87.3 FL — SIGNIFICANT CHANGE UP (ref 80–100)
MONOCYTES # BLD AUTO: 0.5 K/UL — SIGNIFICANT CHANGE UP (ref 0–0.9)
MONOCYTES NFR BLD AUTO: 7.4 % — SIGNIFICANT CHANGE UP (ref 2–14)
NEUTROPHILS # BLD AUTO: 4.2 K/UL — SIGNIFICANT CHANGE UP (ref 1.8–7.4)
NEUTROPHILS NFR BLD AUTO: 60.3 % — SIGNIFICANT CHANGE UP (ref 43–77)
PHOSPHATE SERPL-MCNC: 2.9 MG/DL — SIGNIFICANT CHANGE UP (ref 2.5–4.5)
PLATELET # BLD AUTO: 208 K/UL — SIGNIFICANT CHANGE UP (ref 150–400)
POTASSIUM SERPL-MCNC: 5 MMOL/L — SIGNIFICANT CHANGE UP (ref 3.5–5.3)
POTASSIUM SERPL-SCNC: 5 MMOL/L — SIGNIFICANT CHANGE UP (ref 3.5–5.3)
PROT SERPL-MCNC: 6.1 G/DL — SIGNIFICANT CHANGE UP (ref 6–8.3)
RBC # BLD: 3.36 M/UL — LOW (ref 4.2–5.8)
RBC # FLD: 13.1 % — SIGNIFICANT CHANGE UP (ref 10.3–14.5)
SODIUM SERPL-SCNC: 138 MMOL/L — SIGNIFICANT CHANGE UP (ref 135–145)
TACROLIMUS SERPL-MCNC: 7 NG/ML — SIGNIFICANT CHANGE UP
WBC # BLD: 7 K/UL — SIGNIFICANT CHANGE UP (ref 3.8–10.5)
WBC # FLD AUTO: 7 K/UL — SIGNIFICANT CHANGE UP (ref 3.8–10.5)

## 2018-08-03 PROCEDURE — 99232 SBSQ HOSP IP/OBS MODERATE 35: CPT | Mod: GC

## 2018-08-03 PROCEDURE — 99232 SBSQ HOSP IP/OBS MODERATE 35: CPT | Mod: GC,24

## 2018-08-03 RX ORDER — CEFAZOLIN SODIUM 1 G
2000 VIAL (EA) INJECTION EVERY 8 HOURS
Qty: 0 | Refills: 0 | Status: DISCONTINUED | OUTPATIENT
Start: 2018-08-03 | End: 2018-08-03

## 2018-08-03 RX ORDER — CEFAZOLIN SODIUM 1 G
VIAL (EA) INJECTION
Qty: 0 | Refills: 0 | Status: DISCONTINUED | OUTPATIENT
Start: 2018-08-03 | End: 2018-08-03

## 2018-08-03 RX ORDER — CEFAZOLIN SODIUM 1 G
2000 VIAL (EA) INJECTION ONCE
Qty: 0 | Refills: 0 | Status: COMPLETED | OUTPATIENT
Start: 2018-08-03 | End: 2018-08-03

## 2018-08-03 RX ADMIN — Medication 5 MILLIGRAM(S): at 18:10

## 2018-08-03 RX ADMIN — Medication 500000 UNIT(S): at 18:09

## 2018-08-03 RX ADMIN — TRAMADOL HYDROCHLORIDE 25 MILLIGRAM(S): 50 TABLET ORAL at 04:15

## 2018-08-03 RX ADMIN — TRAMADOL HYDROCHLORIDE 50 MILLIGRAM(S): 50 TABLET ORAL at 23:52

## 2018-08-03 RX ADMIN — Medication 100 MILLIGRAM(S): at 06:34

## 2018-08-03 RX ADMIN — VALGANCICLOVIR 450 MILLIGRAM(S): 450 TABLET, FILM COATED ORAL at 12:09

## 2018-08-03 RX ADMIN — TRAMADOL HYDROCHLORIDE 50 MILLIGRAM(S): 50 TABLET ORAL at 06:34

## 2018-08-03 RX ADMIN — Medication 100 MILLIGRAM(S): at 03:53

## 2018-08-03 RX ADMIN — MYCOPHENOLATE MOFETIL 1000 MILLIGRAM(S): 250 CAPSULE ORAL at 06:34

## 2018-08-03 RX ADMIN — Medication 5 MILLIGRAM(S): at 06:34

## 2018-08-03 RX ADMIN — Medication 100 MILLIGRAM(S): at 12:09

## 2018-08-03 RX ADMIN — TRAMADOL HYDROCHLORIDE 25 MILLIGRAM(S): 50 TABLET ORAL at 10:30

## 2018-08-03 RX ADMIN — TRAMADOL HYDROCHLORIDE 50 MILLIGRAM(S): 50 TABLET ORAL at 13:00

## 2018-08-03 RX ADMIN — Medication 100 MILLIGRAM(S): at 18:10

## 2018-08-03 RX ADMIN — TRAMADOL HYDROCHLORIDE 25 MILLIGRAM(S): 50 TABLET ORAL at 03:53

## 2018-08-03 RX ADMIN — Medication 90 MILLIGRAM(S): at 06:34

## 2018-08-03 RX ADMIN — MYCOPHENOLATE MOFETIL 1000 MILLIGRAM(S): 250 CAPSULE ORAL at 18:10

## 2018-08-03 RX ADMIN — TACROLIMUS 4 MILLIGRAM(S): 5 CAPSULE ORAL at 18:09

## 2018-08-03 RX ADMIN — TRAMADOL HYDROCHLORIDE 50 MILLIGRAM(S): 50 TABLET ORAL at 19:14

## 2018-08-03 RX ADMIN — TAMSULOSIN HYDROCHLORIDE 0.4 MILLIGRAM(S): 0.4 CAPSULE ORAL at 21:36

## 2018-08-03 RX ADMIN — TACROLIMUS 4 MILLIGRAM(S): 5 CAPSULE ORAL at 06:34

## 2018-08-03 RX ADMIN — Medication 500000 UNIT(S): at 06:35

## 2018-08-03 RX ADMIN — FAMOTIDINE 20 MILLIGRAM(S): 10 INJECTION INTRAVENOUS at 12:09

## 2018-08-03 RX ADMIN — TRAMADOL HYDROCHLORIDE 50 MILLIGRAM(S): 50 TABLET ORAL at 12:08

## 2018-08-03 RX ADMIN — TRAMADOL HYDROCHLORIDE 50 MILLIGRAM(S): 50 TABLET ORAL at 07:20

## 2018-08-03 RX ADMIN — TRAMADOL HYDROCHLORIDE 50 MILLIGRAM(S): 50 TABLET ORAL at 18:10

## 2018-08-03 RX ADMIN — TRAMADOL HYDROCHLORIDE 50 MILLIGRAM(S): 50 TABLET ORAL at 02:00

## 2018-08-03 RX ADMIN — TRAMADOL HYDROCHLORIDE 50 MILLIGRAM(S): 50 TABLET ORAL at 02:30

## 2018-08-03 RX ADMIN — Medication 500000 UNIT(S): at 12:08

## 2018-08-03 RX ADMIN — Medication 500000 UNIT(S): at 23:52

## 2018-08-03 RX ADMIN — TRAMADOL HYDROCHLORIDE 25 MILLIGRAM(S): 50 TABLET ORAL at 09:55

## 2018-08-03 RX ADMIN — Medication 1 TABLET(S): at 12:09

## 2018-08-03 NOTE — PROGRESS NOTE ADULT - SUBJECTIVE AND OBJECTIVE BOX
Transplant Surgery - Multidisciplinary Rounds  --------------------------------------------------------------  DDRT 7/23/18  POD #11    Present:  Patient seen with multidisciplinary team (surgeon, Nephrologist, Resident MD, MD student)  in am rounds and examined with Dr. Brown. 34 y/o gentleman POD#11  R sided DDRT anastomosed to R external iliac artery and vein Ureter anastomosed to bladder over double J stent.  Cold ischemia time14 hours.  CMV +, EBV +. Course complicated by post-operative ureteral leak s/p reimplantation of ureter of transplanted kidney on  07/29/2018 ans subsequent IR placement of nephrostomy tube 7/31.     No acute event overnight. Complained of white mucous from penis. Received 1 dose Ancef overnight. States that it resolved this morning.  BLE edema R>L noted. Negative venous doppler.       MEDICATIONS  (STANDING):  docusate sodium 100 milliGRAM(s) Oral daily  famotidine    Tablet 20 milliGRAM(s) Oral daily  metoprolol tartrate 100 milliGRAM(s) Oral every 12 hours  mycophenolate mofetil 1000 milliGRAM(s) Oral every 12 hours  NIFEdipine XL 90 milliGRAM(s) Oral daily  nystatin    Suspension 713539 Unit(s) Swish and Swallow four times a day  oxybutynin 5 milliGRAM(s) Oral two times a day  polyethylene glycol 3350 17 Gram(s) Oral daily  predniSONE   Tablet 5 milliGRAM(s) Oral daily  senna 2 Tablet(s) Oral at bedtime  tacrolimus 4 milliGRAM(s) Oral two times a day  tamsulosin 0.4 milliGRAM(s) Oral at bedtime  trimethoprim   80 mG/sulfamethoxazole 400 mG 1 Tablet(s) Oral daily  valGANciclovir 450 milliGRAM(s) Oral daily    MEDICATIONS  (PRN):  acetaminophen   Tablet. 650 milliGRAM(s) Oral every 6 hours PRN Mild Pain (1 - 3)  diphenhydrAMINE   Capsule 25 milliGRAM(s) Oral every 6 hours PRN Rash and/or Itching  lidocaine 2% Gel 1 Application(s) Topical three times a day PRN pain related to urinary catheter  metoclopramide Injectable 10 milliGRAM(s) IV Push once PRN Nausea and/or Vomiting  ondansetron Injectable 4 milliGRAM(s) IV Push every 6 hours PRN Nausea  ondansetron Injectable 4 milliGRAM(s) IV Push once PRN Nausea and/or Vomiting  simethicone 80 milliGRAM(s) Chew every 6 hours PRN Gas  traMADol 50 milliGRAM(s) Oral every 4 hours PRN Severe Pain (7 - 10)  traMADol 25 milliGRAM(s) Oral every 4 hours PRN Moderate Pain (4 - 6)  zinc oxide 20% Ointment 1 Application(s) Topical three times a day PRN rash      PAST MEDICAL & SURGICAL HISTORY:  ESRD on dialysis  HTN (hypertension)  No significant past surgical history      Vital Signs Last 24 Hrs  T(C): 36.8 (03 Aug 2018 13:31), Max: 36.8 (02 Aug 2018 14:22)  T(F): 98.3 (03 Aug 2018 13:31), Max: 98.3 (02 Aug 2018 14:22)  HR: 60 (03 Aug 2018 13:31) (60 - 88)  BP: 139/87 (03 Aug 2018 13:31) (136/80 - 165/99)  BP(mean): --  RR: 16 (03 Aug 2018 13:31) (16 - 18)  SpO2: 98% (03 Aug 2018 09:51) (97% - 99%)    I&O's Summary    02 Aug 2018 07:01  -  03 Aug 2018 07:00  --------------------------------------------------------  IN: 1180 mL / OUT: 1960 mL / NET: -780 mL    03 Aug 2018 07:01  -  03 Aug 2018 13:58  --------------------------------------------------------  IN: 360 mL / OUT: 550 mL / NET: -190 mL                              9.8    7.0   )-----------( 208      ( 03 Aug 2018 06:03 )             29.3     08-03    138  |  106  |  48<H>  ----------------------------<  97  5.0   |  20<L>  |  3.61<H>    Ca    10.3      03 Aug 2018 06:03  Phos  2.9     08-03  Mg     2.2     08-03    TPro  6.1  /  Alb  3.5  /  TBili  0.3  /  DBili  x   /  AST  17  /  ALT  6<L>  /  AlkPhos  56  08-03    Tacrolimus (), Serum: 7.0 ng/mL (08-03 @ 08:08)    Review of systems  Gen: No weight changes, fatigue, fevers/chills, weakness  Skin: No rashes  Head/Eyes/Ears/Mouth: No headache; Normal hearing; Normal vision w/o blurriness; No sinus pain/discomfort, sore throat  Respiratory: No dyspnea, cough, wheezing, hemoptysis  CV: No chest pain, PND, orthopnea  GI: Reports incisional pain and TTP, denies diarrhea, constipation, nausea, vomiting, melena, hematochezia. LACEY patent  : Marquez in situ  MSK: No joint pain/swelling; no back pain;   Neuro: No dizziness/lightheadedness, weakness, seizures, numbness, tingling  Heme: No easy bruising or bleeding  Endo: No heat/cold intolerance  Psych: No significant nervousness, anxiety, stress, depression  All other systems were reviewed and are negative, except as noted.    PHYSICAL EXAM:  Constitutional: Well developed / well nourished  Eyes: Anicteric, PERRLA  ENMT: nc/at  Neck: supple  Respiratory: CTA B/L  Cardiovascular: RRR  Gastrointestinal: Soft abdomen, mild tender to touch at surgical site, ND  Genitourinary: marquez in situ.  LACEY, nephrostomy tube patent  Extremities: SCD's in place and working bilaterally, BLE edema R>L  Vascular: Palpable dp pulses bilaterally  Neurological: A&O x3  Skin: wound open to air, no erythema and evidence of infection noted  Musculoskeletal: Moving all extremities  Psychiatric: Responsive

## 2018-08-03 NOTE — PROVIDER CONTACT NOTE (OTHER) - SITUATION
Patient LACEY drain has put out 145 ml so far this shift.
Patient LACEY drain put out 835ml for the shift.
Pt c/o itching on chest radiating up to b/l shoulders
Pt complaining of purulent drainage from penis & burning
Pt has had no urinary output from marquez catheter in 2 hours
Rodriguez catheter output 25mL after catheter irrigation
Patient is complaining of increased pain in RLQ of abdomen; and penile/scrotum swelling.

## 2018-08-03 NOTE — PROVIDER CONTACT NOTE (OTHER) - BACKGROUND
Hx of kidney transplant, ESRD on dialysis and HTN
Patient admitted to [facility] on 18 for a  donor kidney transplant.
Patient admitted to [facility] on 18 for a  donor kidney transplant.
Pt admitted 7/23 for DDRT.
S/p DDRT one week ago, pt had R nephrostomy tube placed today.
s/p DDRT one week ago, pt is s/p R nephrostomy placement on 7/31/18
Patient admitted to [facility] on 18 for a  donor kidney transplant.

## 2018-08-03 NOTE — PROGRESS NOTE ADULT - SUBJECTIVE AND OBJECTIVE BOX
[facility]   MD PERCY MORALES MD ANGELA WONG, PA    TEL:  OFFICE: [phone]  DR OCONNOR CELL: [phone]  DR. GAMBOA CELL: [phone]  ELMIRA PRINGLE CELL: [phone]      Patient is a 33y old  Male who presents with a chief complaint of DDRT (23 Jul 2018 09:25)      Patient seen and examined at bedside. No chest pain/sob, c/o b/l LE edema and pain from marquez catheter     VITALS:  T(F): 98.3 (08-03-18 @ 13:31), Max: 98.3 (08-03-18 @ 13:31)  HR: 60 (08-03-18 @ 13:31)  BP: 139/87 (08-03-18 @ 13:31)  RR: 16 (08-03-18 @ 13:31)  SpO2: 98% (08-03-18 @ 09:51)  Wt(kg): --    08-02 @ 07:01  -  08-03 @ 07:00  --------------------------------------------------------  IN: 1180 mL / OUT: 1960 mL / NET: -780 mL    08-03 @ 07:01  -  08-03 @ 16:51  --------------------------------------------------------  IN: 360 mL / OUT: 710 mL / NET: -350 mL          PHYSICAL EXAM:  Constitutional: NAD  Neck: No JVD  Respiratory: CTAB, no wheezes, rales or rhonchi  Cardiovascular: S1, S2, RRR  Gastrointestinal: right NT  Extremities: 1-2+ nonpitting edema    Hospital Medications:   MEDICATIONS  (STANDING):  docusate sodium 100 milliGRAM(s) Oral daily  famotidine    Tablet 20 milliGRAM(s) Oral daily  metoprolol tartrate 100 milliGRAM(s) Oral every 12 hours  mycophenolate mofetil 1000 milliGRAM(s) Oral every 12 hours  NIFEdipine XL 90 milliGRAM(s) Oral daily  nystatin    Suspension 934826 Unit(s) Swish and Swallow four times a day  oxybutynin 5 milliGRAM(s) Oral two times a day  polyethylene glycol 3350 17 Gram(s) Oral daily  predniSONE   Tablet 5 milliGRAM(s) Oral daily  senna 2 Tablet(s) Oral at bedtime  tacrolimus 4 milliGRAM(s) Oral two times a day  tamsulosin 0.4 milliGRAM(s) Oral at bedtime  trimethoprim   80 mG/sulfamethoxazole 400 mG 1 Tablet(s) Oral daily  valGANciclovir 450 milliGRAM(s) Oral daily    Tacrolimus (), Serum: 7.0 ng/mL (08-03 @ 08:08)    LABS:  08-03    138  |  106  |  48<H>  ----------------------------<  97  5.0   |  20<L>  |  3.61<H>    Ca    10.3      03 Aug 2018 06:03  Phos  2.9     08-03  Mg     2.2     08-03    TPro  6.1  /  Alb  3.5  /  TBili  0.3  /  DBili      /  AST  17  /  ALT  6<L>  /  AlkPhos  56  08-03    Creatinine Trend: 3.61 <--, 3.61 <--, 4.01 <--, 4.18 <--, 4.61 <--, 4.68 <--, 4.67 <--, 5.10 <--, 5.28 <--    Albumin, Serum: 3.5 g/dL (08-03 @ 06:03)  Phosphorus Level, Serum: 2.9 mg/dL (08-03 @ 06:03)                              9.8    7.0   )-----------( 208      ( 03 Aug 2018 06:03 )             29.3     Urine Studies:  Urinalysis - [08-01-18 @ 13:23]      Color Yellow / Appearance Turbid / SG 1.012 / pH 7.0      Gluc Negative / Ketone Negative  / Bili Negative / Urobili Negative       Blood Large / Protein 150 / Leuk Est Large / Nitrite Negative      RBC >50 / WBC >50 / Hyaline 2-5 / Gran  / Sq Epi  / Non Sq Epi  / Bacteria Many      HbA1c 5.1      [08-18-15 @ 18:19]        RADIOLOGY & ADDITIONAL STUDIES:

## 2018-08-03 NOTE — PROVIDER CONTACT NOTE (OTHER) - ACTION/TREATMENT ORDERED:
NP Omer made aware; stated to monitor for worsening swelling; no further actions at this time.
Blue Sx ([id number]) - Jacqui Browne made aware. Ordered ancef 2g
Continue to monitor.
Irrigate marquez catheter now and bladder scan patient. Call back if bladder scan is greater than 300mL. Continue to monitor.
JOSE Hand made aware; no further actions at this time.
NP Jania Hand made aware; no further actions at this time.
Will order topical ointment to put on skin. Continue to monitor.

## 2018-08-03 NOTE — PROGRESS NOTE ADULT - PROBLEM SELECTOR PLAN 1
s/p DDRT 07/23; Cold ischemia time14 hours.  CMV +, EBV +. Course complicated by post-operative ureteral leak s/p reimplantation of ureter of transplanted kidney on  07/29/2018 an subsequent IR placement of nephrostomy tube 7/31.   Pt with persistent out out from LACEY drain. Send Cr of LACEY fluid to r/o persistent leak.  On Tacrolimus, Cellcept, prednisone 5 mg,  Nystatin S&S, bactrim, and Valcyte   Goal FK levels 8-10  monitor bmp  monitor I/O.

## 2018-08-03 NOTE — PROVIDER CONTACT NOTE (OTHER) - ASSESSMENT
Patient is AxOx4; ambulates independently; prn pain meds at needed
Patient AxOx4; ambulates independently
Patient is AxOx4; ambulating in room.
Pt A&Ox4. Complaining of purulent drainage from penis & burning. Pt refused lidocaine jelly.
VS noted in flowsheet
VS noted in flowsheet. Pt abdomen is not distended.
VS noted in flowsheet. Rodriguez catheter was irrigated at 2300, clots evacuated

## 2018-08-03 NOTE — PROGRESS NOTE ADULT - ASSESSMENT
33M with PMH of HTN and ESRD since 2010 s/p DDRT 7/23. Cold ischemia time14 hours.  CMV +, EBV +. Course complicated by post-operative ureteral leak s/p reimplantation of ureter of transplanted kidney on  07/29/2018 an subsequent IR placement of nephrostomy tube 7/31.

## 2018-08-03 NOTE — PROGRESS NOTE ADULT - ASSESSMENT
34 y/o gentleman with PMH of HTN and ESRD since 2010, POD#10 R sided DDRT anastomosed to R external iliac artery and vein Ureter anastomosed to bladder over double J stent  with post-operative course complicated by ureteral leak requiring re-op and reimplantation of ureter of transplanted kidney  now s/p Nephrostomy tube and creatinine slowly trending down.

## 2018-08-03 NOTE — PROGRESS NOTE ADULT - PROBLEM SELECTOR PLAN 1
07/29/2018 reimplantation of ureter of transplanted kidney for ureteral leak  7/31/08 placement of nephrostomy tube  Creatinine slowly trending down.   -Keep Rodriguez   -Do not compress LACEY. Drain and record output q 6 hours.   - Send LACEY fluid for Creatinine  - Strict I/Os  -Continue Tylenol and tramadol PRN  -Continue bowel regimen, Flomax 0.4 daily   -daily BMP  - Blood sugars well controlled. Fingersticks before meals and bedtime.  -Recent urine cultures negative  -On Oxybutynin for bladder spasm  - SCDs, ambulate.

## 2018-08-03 NOTE — PROVIDER CONTACT NOTE (OTHER) - DATE AND TIME:
01-Aug-2018 00:55
03-Aug-2018 01:24
28-Jul-2018 11:00
28-Jul-2018 18:45
30-Jul-2018 00:20
31-Jul-2018 22:44
31-Jul-2018 13:00

## 2018-08-03 NOTE — PROGRESS NOTE ADULT - ASSESSMENT
33M with PMH of HTN and ESRD since 2010 s/p DDRT 7/23. complicated by post-operative ureteral leak s/p reimplantation of ureter of transplanted kidney on  07/29/2018 ans subsequent IR placement of nephrostomy tube 7/31.

## 2018-08-03 NOTE — PROGRESS NOTE ADULT - ATTENDING COMMENTS
Increase Tacrolimus 4.5mg BID I personally saw and examined patient with transplant team in the morning and later with Ms. Hand.  IMMUNOSUPPRESSION:  Increase Tacrolimus 4.5mg BID  Creatinine in drain fluid similar to serum creatinine

## 2018-08-03 NOTE — PROGRESS NOTE ADULT - ATTENDING COMMENTS
Renal Transplant Recipient, s/p nephrostomy. Allograft dysfunction, Plateauing creatinine  Plan:   Drain fluid creatinine, f/u Tac level, Imaging if creatinine does not decrease.     Will follow

## 2018-08-03 NOTE — CHART NOTE - NSCHARTNOTEFT_GEN_A_CORE
Verbal consult from RN for questions regarding diet.   As per request, reviewed food safety guidelines for transplant recipients with patient's mother who typically does the food preparation at home. Reviewed recommendations to avoid grapefruit, pomegranate and star fruit while taking immunosuppressant medication. Reviewed recommendations for moderate intake of sodium and carbohydrates with transplant medications. Pt+family were receptive and expressed good understanding. Provided 2nd copy of nutrition handout: USDA Food Safety for Transplant Recipients booklet. Also reviewed foods high in potassium/phosphorous to avoid/limit and alternative options available to include    RD remains available, Poornima Madsen RD Pager #[phone]

## 2018-08-03 NOTE — PROGRESS NOTE ADULT - SUBJECTIVE AND OBJECTIVE BOX
[facility] DIVISION OF KIDNEY DISEASES AND HYPERTENSION -- FOLLOW UP NOTE  --------------------------------------------------------------------------------  Chief Complaint: DDRT     24 hour events/subjective:  Pt with pain and discharge around urethral meatus. s/p vanc 1 dose.  nephrostomy with 1275 ml, marquez 475 ml and LACEY with 215 ml output.  Scr ~3.6      PAST HISTORY  --------------------------------------------------------------------------------  No significant changes to PMH, PSH, FHx, SHx, unless otherwise noted    ALLERGIES & MEDICATIONS  --------------------------------------------------------------------------------  Allergies    IV Contrast (Rash)  nyquil (Rhinorrhea)  vancomycin (Pruritus; Hives)    Intolerances      Standing Inpatient Medications  docusate sodium 100 milliGRAM(s) Oral daily  famotidine    Tablet 20 milliGRAM(s) Oral daily  metoprolol tartrate 100 milliGRAM(s) Oral every 12 hours  mycophenolate mofetil 1000 milliGRAM(s) Oral every 12 hours  NIFEdipine XL 90 milliGRAM(s) Oral daily  nystatin    Suspension 864788 Unit(s) Swish and Swallow four times a day  oxybutynin 5 milliGRAM(s) Oral two times a day  polyethylene glycol 3350 17 Gram(s) Oral daily  predniSONE   Tablet 5 milliGRAM(s) Oral daily  senna 2 Tablet(s) Oral at bedtime  tacrolimus 4 milliGRAM(s) Oral two times a day  tamsulosin 0.4 milliGRAM(s) Oral at bedtime  trimethoprim   80 mG/sulfamethoxazole 400 mG 1 Tablet(s) Oral daily  valGANciclovir 450 milliGRAM(s) Oral daily    PRN Inpatient Medications  acetaminophen   Tablet. 650 milliGRAM(s) Oral every 6 hours PRN  diphenhydrAMINE   Capsule 25 milliGRAM(s) Oral every 6 hours PRN  lidocaine 2% Gel 1 Application(s) Topical three times a day PRN  metoclopramide Injectable 10 milliGRAM(s) IV Push once PRN  ondansetron Injectable 4 milliGRAM(s) IV Push every 6 hours PRN  ondansetron Injectable 4 milliGRAM(s) IV Push once PRN  simethicone 80 milliGRAM(s) Chew every 6 hours PRN  traMADol 50 milliGRAM(s) Oral every 4 hours PRN  traMADol 25 milliGRAM(s) Oral every 4 hours PRN  zinc oxide 20% Ointment 1 Application(s) Topical three times a day PRN      REVIEW OF SYSTEMS  --------------------------------------------------------------------------------  Gen: No weakness  Skin: No rashes  Head/Eyes/Ears/Mouth: No headache  Respiratory: No dyspnea  CV: No chest pain, PND, orthopnea  GI: No abdominal pain, diarrhea  MSK: No edema  Neuro: No dizziness/lightheadedness    VITALS/PHYSICAL EXAM  --------------------------------------------------------------------------------  T(C): 36.4 (08-03-18 @ 09:51), Max: 36.8 (08-02-18 @ 14:22)  HR: 62 (08-03-18 @ 09:51) (62 - 88)  BP: 165/99 (08-03-18 @ 09:51) (136/80 - 165/99)  RR: 16 (08-03-18 @ 09:51) (16 - 18)  SpO2: 98% (08-03-18 @ 09:51) (97% - 99%)  Wt(kg): --        08-02-18 @ 07:01  -  08-03-18 @ 07:00  --------------------------------------------------------  IN: 1180 mL / OUT: 1960 mL / NET: -780 mL    08-03-18 @ 07:01  -  08-03-18 @ 10:28  --------------------------------------------------------  IN: 120 mL / OUT: 280 mL / NET: -160 mL      Physical Exam:  	Gen: NAD  	Pulm: CTA B/L  	CV: RRR, S1S2; no rub  	Abd: +BS, soft, nontender/nondistended  	: No suprapubic tenderness, marquez+  	UE: Warm, no asterixis  	LE: Warm, b/l LE Edema R>L, palpable DP pulses b/l   	Skin: Warm, without rashes  	Vascular access: NINI NORWOOD    LABS/STUDIES  --------------------------------------------------------------------------------              9.8    7.0   >-----------<  208      [08-03-18 @ 06:03]              29.3     138  |  106  |  48  ----------------------------<  97      [08-03-18 @ 06:03]  5.0   |  20  |  3.61        Ca     10.3     [08-03-18 @ 06:03]      Mg     2.2     [08-03-18 @ 06:03]      Phos  2.9     [08-03-18 @ 06:03]    TPro  6.1  /  Alb  3.5  /  TBili  0.3  /  DBili  x   /  AST  17  /  ALT  6   /  AlkPhos  56  [08-03-18 @ 06:03]          Creatinine Trend:  SCr 3.61 [08-03 @ 06:03]  SCr 3.61 [08-02 @ 06:09]  SCr 4.01 [08-01 @ 05:58]  SCr 4.18 [07-31 @ 06:09]  SCr 4.61 [07-30 @ 05:54]    Urinalysis - [08-01-18 @ 13:23]      Color Yellow / Appearance Turbid / SG 1.012 / pH 7.0      Gluc Negative / Ketone Negative  / Bili Negative / Urobili Negative       Blood Large / Protein 150 / Leuk Est Large / Nitrite Negative      RBC >50 / WBC >50 / Hyaline 2-5 / Gran  / Sq Epi  / Non Sq Epi  / Bacteria Many      HbA1c 5.1      [08-18-15 @ 18:19]

## 2018-08-03 NOTE — PROGRESS NOTE ADULT - PROBLEM SELECTOR PLAN 1
s/p DDRT 07/23 c/b post-operative ureteral leak s/p reimplantation of ureter of transplanted kidney on  07/29/2018 ans subsequent IR placement of nephrostomy tube 7/31.   Renal function stable today  Continue immunosuppressants per transplant team  Continue  Nystatin S&S, bactrim, and Valcyte for PPX  monitor bmp  monitor I/O.

## 2018-08-03 NOTE — PROVIDER CONTACT NOTE (OTHER) - NAME OF MD/NP/PA/DO NOTIFIED:
Blue Sx ([id number]) Jacqui Browne
JOSE Hand
JOSE Hand
MD Browne, Transplant Resident
MD Hortensia Merritt, [facility]
MD Jacqui Browne
JOSE Tello

## 2018-08-03 NOTE — PROVIDER CONTACT NOTE (OTHER) - REASON
Patient LACEY drain has put out 145 ml so far this shift.
Patient LACEY drain put out 835ml for the shift.
Pt c/o itching on chest radiating up to b/l shoulders
Pt complaining of purulent drainage from penis & burning
Pt has had no urinary output from marquez catheter in 2 hours
Rodriguez catheter output 25mL after catheter irrigation; Nephrostomy output 150mL
Patient is complaining of increased pain in RLQ of abdomen; and penile/scrotum swelling.

## 2018-08-04 LAB
ANION GAP SERPL CALC-SCNC: 9 MMOL/L — SIGNIFICANT CHANGE UP (ref 5–17)
BUN SERPL-MCNC: 41 MG/DL — HIGH (ref 7–23)
CALCIUM SERPL-MCNC: 10.2 MG/DL — SIGNIFICANT CHANGE UP (ref 8.4–10.5)
CHLORIDE SERPL-SCNC: 109 MMOL/L — HIGH (ref 96–108)
CO2 SERPL-SCNC: 17 MMOL/L — LOW (ref 22–31)
CREAT SERPL-MCNC: 3.02 MG/DL — HIGH (ref 0.5–1.3)
GLUCOSE BLDC GLUCOMTR-MCNC: 101 MG/DL — HIGH (ref 70–99)
GLUCOSE BLDC GLUCOMTR-MCNC: 101 MG/DL — HIGH (ref 70–99)
GLUCOSE BLDC GLUCOMTR-MCNC: 117 MG/DL — HIGH (ref 70–99)
GLUCOSE BLDC GLUCOMTR-MCNC: 86 MG/DL — SIGNIFICANT CHANGE UP (ref 70–99)
GLUCOSE SERPL-MCNC: 90 MG/DL — SIGNIFICANT CHANGE UP (ref 70–99)
HCT VFR BLD CALC: 30.8 % — LOW (ref 39–50)
HGB BLD-MCNC: 10.4 G/DL — LOW (ref 13–17)
MAGNESIUM SERPL-MCNC: 2.1 MG/DL — SIGNIFICANT CHANGE UP (ref 1.6–2.6)
MCHC RBC-ENTMCNC: 29.3 PG — SIGNIFICANT CHANGE UP (ref 27–34)
MCHC RBC-ENTMCNC: 33.7 GM/DL — SIGNIFICANT CHANGE UP (ref 32–36)
MCV RBC AUTO: 86.9 FL — SIGNIFICANT CHANGE UP (ref 80–100)
PHOSPHATE SERPL-MCNC: 2.5 MG/DL — SIGNIFICANT CHANGE UP (ref 2.5–4.5)
PLATELET # BLD AUTO: 209 K/UL — SIGNIFICANT CHANGE UP (ref 150–400)
POTASSIUM SERPL-MCNC: 5 MMOL/L — SIGNIFICANT CHANGE UP (ref 3.5–5.3)
POTASSIUM SERPL-SCNC: 5 MMOL/L — SIGNIFICANT CHANGE UP (ref 3.5–5.3)
RBC # BLD: 3.55 M/UL — LOW (ref 4.2–5.8)
RBC # FLD: 13.3 % — SIGNIFICANT CHANGE UP (ref 10.3–14.5)
SODIUM SERPL-SCNC: 135 MMOL/L — SIGNIFICANT CHANGE UP (ref 135–145)
TACROLIMUS SERPL-MCNC: 6.4 NG/ML — SIGNIFICANT CHANGE UP
WBC # BLD: 6 K/UL — SIGNIFICANT CHANGE UP (ref 3.8–10.5)
WBC # FLD AUTO: 6 K/UL — SIGNIFICANT CHANGE UP (ref 3.8–10.5)

## 2018-08-04 PROCEDURE — 99232 SBSQ HOSP IP/OBS MODERATE 35: CPT | Mod: GC

## 2018-08-04 PROCEDURE — 99232 SBSQ HOSP IP/OBS MODERATE 35: CPT | Mod: GC,24

## 2018-08-04 RX ADMIN — Medication 90 MILLIGRAM(S): at 06:26

## 2018-08-04 RX ADMIN — Medication 5 MILLIGRAM(S): at 18:04

## 2018-08-04 RX ADMIN — Medication 100 MILLIGRAM(S): at 11:01

## 2018-08-04 RX ADMIN — TRAMADOL HYDROCHLORIDE 25 MILLIGRAM(S): 50 TABLET ORAL at 18:09

## 2018-08-04 RX ADMIN — TRAMADOL HYDROCHLORIDE 50 MILLIGRAM(S): 50 TABLET ORAL at 21:53

## 2018-08-04 RX ADMIN — TAMSULOSIN HYDROCHLORIDE 0.4 MILLIGRAM(S): 0.4 CAPSULE ORAL at 21:53

## 2018-08-04 RX ADMIN — TRAMADOL HYDROCHLORIDE 50 MILLIGRAM(S): 50 TABLET ORAL at 15:32

## 2018-08-04 RX ADMIN — TRAMADOL HYDROCHLORIDE 50 MILLIGRAM(S): 50 TABLET ORAL at 22:15

## 2018-08-04 RX ADMIN — FAMOTIDINE 20 MILLIGRAM(S): 10 INJECTION INTRAVENOUS at 11:06

## 2018-08-04 RX ADMIN — MYCOPHENOLATE MOFETIL 1000 MILLIGRAM(S): 250 CAPSULE ORAL at 18:04

## 2018-08-04 RX ADMIN — TACROLIMUS 4 MILLIGRAM(S): 5 CAPSULE ORAL at 06:26

## 2018-08-04 RX ADMIN — TRAMADOL HYDROCHLORIDE 50 MILLIGRAM(S): 50 TABLET ORAL at 06:02

## 2018-08-04 RX ADMIN — Medication 500000 UNIT(S): at 06:26

## 2018-08-04 RX ADMIN — TRAMADOL HYDROCHLORIDE 50 MILLIGRAM(S): 50 TABLET ORAL at 06:32

## 2018-08-04 RX ADMIN — Medication 100 MILLIGRAM(S): at 06:26

## 2018-08-04 RX ADMIN — Medication 500000 UNIT(S): at 18:04

## 2018-08-04 RX ADMIN — TRAMADOL HYDROCHLORIDE 50 MILLIGRAM(S): 50 TABLET ORAL at 14:32

## 2018-08-04 RX ADMIN — Medication 1 TABLET(S): at 11:03

## 2018-08-04 RX ADMIN — TRAMADOL HYDROCHLORIDE 50 MILLIGRAM(S): 50 TABLET ORAL at 00:22

## 2018-08-04 RX ADMIN — Medication 5 MILLIGRAM(S): at 06:26

## 2018-08-04 RX ADMIN — TACROLIMUS 4 MILLIGRAM(S): 5 CAPSULE ORAL at 18:04

## 2018-08-04 RX ADMIN — MYCOPHENOLATE MOFETIL 1000 MILLIGRAM(S): 250 CAPSULE ORAL at 06:26

## 2018-08-04 RX ADMIN — Medication 500000 UNIT(S): at 11:02

## 2018-08-04 RX ADMIN — Medication 500000 UNIT(S): at 21:53

## 2018-08-04 RX ADMIN — VALGANCICLOVIR 450 MILLIGRAM(S): 450 TABLET, FILM COATED ORAL at 11:06

## 2018-08-04 RX ADMIN — SENNA PLUS 2 TABLET(S): 8.6 TABLET ORAL at 21:53

## 2018-08-04 RX ADMIN — TRAMADOL HYDROCHLORIDE 25 MILLIGRAM(S): 50 TABLET ORAL at 18:39

## 2018-08-04 NOTE — PROGRESS NOTE ADULT - PROBLEM SELECTOR PLAN 1
s/p DDRT 07/23; Cold ischemia time14 hours.  CMV +, EBV +. Course complicated by post-operative ureteral leak s/p reimplantation of ureter of transplanted kidney on  07/29/2018 an subsequent IR placement of nephrostomy tube 7/31.   Creat improving today.   On Tacrolimus, Cellcept, prednisone 5 mg,  Nystatin S&S, bactrim, and Valcyte   Goal FK levels 8-10  monitor bmp  monitor I/O.  No imaging at this time due to improvement in creat.

## 2018-08-04 NOTE — PROGRESS NOTE ADULT - PROBLEM SELECTOR PLAN 2
On Tacrolimus, Cellcept, steroid, Nystatin S&S, bactrim, and Valcyte   F/U Tacrolimus level daily, tacro goal 8-10  Monitor.

## 2018-08-04 NOTE — PROGRESS NOTE ADULT - SUBJECTIVE AND OBJECTIVE BOX
Transplant Surgery - Multidisciplinary Rounds  --------------------------------------------------------------  DDRT 7/23/18  POD #11    Present:  Patient seen with multidisciplinary team (surgeon, Nephrologist, Resident MD, MD student)  in am rounds and examined with Dr. Brown. 34 y/o gentleman POD#11  R sided DDRT anastomosed to R external iliac artery and vein Ureter anastomosed to bladder over double J stent.  Cold ischemia time14 hours.  CMV +, EBV +. Course complicated by post-operative ureteral leak s/p reimplantation of ureter of transplanted kidney on  07/29/2018 ans subsequent IR placement of nephrostomy tube 7/31.     No acute events overnight. BLE edema and scrotal swelling has improved. No other complaints at this time.      MEDICATIONS  (STANDING):  docusate sodium 100 milliGRAM(s) Oral daily  famotidine    Tablet 20 milliGRAM(s) Oral daily  metoprolol tartrate 100 milliGRAM(s) Oral every 12 hours  mycophenolate mofetil 1000 milliGRAM(s) Oral every 12 hours  NIFEdipine XL 90 milliGRAM(s) Oral daily  nystatin    Suspension 817694 Unit(s) Swish and Swallow four times a day  oxybutynin 5 milliGRAM(s) Oral two times a day  polyethylene glycol 3350 17 Gram(s) Oral daily  predniSONE   Tablet 5 milliGRAM(s) Oral daily  senna 2 Tablet(s) Oral at bedtime  tacrolimus 4 milliGRAM(s) Oral two times a day  tamsulosin 0.4 milliGRAM(s) Oral at bedtime  trimethoprim   80 mG/sulfamethoxazole 400 mG 1 Tablet(s) Oral daily  valGANciclovir 450 milliGRAM(s) Oral daily    MEDICATIONS  (PRN):  acetaminophen   Tablet. 650 milliGRAM(s) Oral every 6 hours PRN Mild Pain (1 - 3)  diphenhydrAMINE   Capsule 25 milliGRAM(s) Oral every 6 hours PRN Rash and/or Itching  lidocaine 2% Gel 1 Application(s) Topical three times a day PRN pain related to urinary catheter  metoclopramide Injectable 10 milliGRAM(s) IV Push once PRN Nausea and/or Vomiting  ondansetron Injectable 4 milliGRAM(s) IV Push every 6 hours PRN Nausea  ondansetron Injectable 4 milliGRAM(s) IV Push once PRN Nausea and/or Vomiting  simethicone 80 milliGRAM(s) Chew every 6 hours PRN Gas  traMADol 50 milliGRAM(s) Oral every 4 hours PRN Severe Pain (7 - 10)  traMADol 25 milliGRAM(s) Oral every 4 hours PRN Moderate Pain (4 - 6)  zinc oxide 20% Ointment 1 Application(s) Topical three times a day PRN rash      PAST MEDICAL & SURGICAL HISTORY:  ESRD on dialysis  HTN (hypertension)  No significant past surgical history      Vital Signs Last 24 Hrs  T(C): 36.8 (04 Aug 2018 09:43), Max: 36.9 (04 Aug 2018 01:49)  T(F): 98.2 (04 Aug 2018 09:43), Max: 98.4 (04 Aug 2018 01:49)  HR: 61 (04 Aug 2018 09:43) (60 - 68)  BP: 136/87 (04 Aug 2018 09:43) (136/87 - 158/96)  BP(mean): --  RR: 18 (04 Aug 2018 09:43) (16 - 18)  SpO2: 99% (04 Aug 2018 09:43) (99% - 100%)    I&O's Detail    03 Aug 2018 07:01  -  04 Aug 2018 07:00  --------------------------------------------------------  IN:    Oral Fluid: 1200 mL  Total IN: 1200 mL    OUT:    Bulb: 90 mL    Indwelling Catheter - Urethral: 250 mL    Nephrostomy Tube: 1280 mL  Total OUT: 1620 mL    Total NET: -420 mL      04 Aug 2018 07:01  -  04 Aug 2018 12:25  --------------------------------------------------------  IN:    Oral Fluid: 264 mL  Total IN: 264 mL    OUT:    Bulb: 15 mL    Indwelling Catheter - Urethral: 100 mL    Nephrostomy Tube: 500 mL  Total OUT: 615 mL    Total NET: -351 mL                                10.4   6.0   )-----------( 209      ( 04 Aug 2018 07:12 )             30.8   08-04    135  |  109<H>  |  41<H>  ----------------------------<  90  5.0   |  17<L>  |  3.02<H>    Ca    10.2      04 Aug 2018 06:53  Phos  2.5     08-04  Mg     2.1     08-04      Review of systems  Gen: No weight changes, fatigue, fevers/chills, weakness  Skin: No rashes  Head/Eyes/Ears/Mouth: No headache; Normal hearing; Normal vision w/o blurriness; No sinus pain/discomfort, sore throat  Respiratory: No dyspnea, cough, wheezing, hemoptysis  CV: No chest pain, PND, orthopnea  GI: Reports incisional pain and TTP, denies diarrhea, constipation, nausea, vomiting, melena, hematochezia. LACEY patent  : Marquez in situ  MSK: No joint pain/swelling; no back pain;   Neuro: No dizziness/lightheadedness, weakness, seizures, numbness, tingling  Heme: No easy bruising or bleeding  Endo: No heat/cold intolerance  Psych: No significant nervousness, anxiety, stress, depression  All other systems were reviewed and are negative, except as noted.    PHYSICAL EXAM:  Constitutional: Well developed / well nourished  Eyes: Anicteric, PERRLA  ENMT: nc/at  Neck: supple  Respiratory: CTA B/L  Cardiovascular: RRR  Gastrointestinal: Soft abdomen, mild tender to touch at surgical site, ND  Genitourinary: marquez in situ.  LACEY, nephrostomy tube patent  Extremities: SCD's in place and working bilaterally, BLE edema R>L  Vascular: Palpable dp pulses bilaterally  Neurological: A&O x3  Skin: wound open to air, no erythema and evidence of infection noted  Musculoskeletal: Moving all extremities  Psychiatric: Responsive

## 2018-08-04 NOTE — PROGRESS NOTE ADULT - SUBJECTIVE AND OBJECTIVE BOX
[facility] DIVISION OF KIDNEY DISEASES AND HYPERTENSION -- FOLLOW UP NOTE  --------------------------------------------------------------------------------  Chief Complaint:  DDRT     24 hour events/subjective:  Pt seen and examined at bed side, not acute distress, has minimal pain. LACEY drain 90 today, Nephrostomy 1280 past 24 hours, Marquez 450 past 24 hours.     PAST HISTORY  --------------------------------------------------------------------------------  No significant changes to PMH, PSH, FHx, SHx, unless otherwise noted    ALLERGIES & MEDICATIONS  --------------------------------------------------------------------------------  Allergies    IV Contrast (Rash)  nyquil (Rhinorrhea)  vancomycin (Pruritus; Hives)    Intolerances      Standing Inpatient Medications  docusate sodium 100 milliGRAM(s) Oral daily  famotidine    Tablet 20 milliGRAM(s) Oral daily  metoprolol tartrate 100 milliGRAM(s) Oral every 12 hours  mycophenolate mofetil 1000 milliGRAM(s) Oral every 12 hours  NIFEdipine XL 90 milliGRAM(s) Oral daily  nystatin    Suspension 635839 Unit(s) Swish and Swallow four times a day  oxybutynin 5 milliGRAM(s) Oral two times a day  polyethylene glycol 3350 17 Gram(s) Oral daily  predniSONE   Tablet 5 milliGRAM(s) Oral daily  senna 2 Tablet(s) Oral at bedtime  tacrolimus 4 milliGRAM(s) Oral two times a day  tamsulosin 0.4 milliGRAM(s) Oral at bedtime  trimethoprim   80 mG/sulfamethoxazole 400 mG 1 Tablet(s) Oral daily  valGANciclovir 450 milliGRAM(s) Oral daily    PRN Inpatient Medications  acetaminophen   Tablet. 650 milliGRAM(s) Oral every 6 hours PRN  diphenhydrAMINE   Capsule 25 milliGRAM(s) Oral every 6 hours PRN  lidocaine 2% Gel 1 Application(s) Topical three times a day PRN  metoclopramide Injectable 10 milliGRAM(s) IV Push once PRN  ondansetron Injectable 4 milliGRAM(s) IV Push every 6 hours PRN  ondansetron Injectable 4 milliGRAM(s) IV Push once PRN  simethicone 80 milliGRAM(s) Chew every 6 hours PRN  traMADol 50 milliGRAM(s) Oral every 4 hours PRN  traMADol 25 milliGRAM(s) Oral every 4 hours PRN  zinc oxide 20% Ointment 1 Application(s) Topical three times a day PRN      REVIEW OF SYSTEMS  --------------------------------------------------------------------------------  Gen: No weakness  Skin: No rashes  Head/Eyes/Ears/Mouth: No headache  Respiratory: No dyspnea  CV: No chest pain, PND, orthopnea  GI: No abdominal pain, diarrhea  MSK: No edema  Neuro: No dizziness/lightheadedness    All other systems were reviewed and are negative, except as noted.    VITALS/PHYSICAL EXAM  --------------------------------------------------------------------------------  T(C): 36.8 (08-04-18 @ 09:43), Max: 36.9 (08-04-18 @ 01:49)  HR: 61 (08-04-18 @ 09:43) (60 - 68)  BP: 136/87 (08-04-18 @ 09:43) (136/87 - 158/96)  RR: 18 (08-04-18 @ 09:43) (16 - 18)  SpO2: 99% (08-04-18 @ 09:43) (99% - 100%)  Wt(kg): --        08-03-18 @ 07:01  -  08-04-18 @ 07:00  --------------------------------------------------------  IN: 1200 mL / OUT: 1620 mL / NET: -420 mL    08-04-18 @ 07:01  -  08-04-18 @ 09:59  --------------------------------------------------------  IN: 240 mL / OUT: 465 mL / NET: -225 mL      Physical Exam:  	Gen: NAD  	Pulm: CTA B/L  	CV: RRR, S1S2; no rub  	Abd: +BS, soft, nontender/nondistended  	: No suprapubic tenderness, marquez+  	UE: Warm, no asterixis  	LE: Warm, b/l LE Edema R>L, palpable DP pulses b/l   	Skin: Warm, without rashes  	Vascular access: NINI NORWOOD    LABS/STUDIES  --------------------------------------------------------------------------------              10.4   6.0   >-----------<  209      [08-04-18 @ 07:12]              30.8     135  |  109  |  41  ----------------------------<  90      [08-04-18 @ 06:53]  5.0   |  17  |  3.02        Ca     10.2     [08-04-18 @ 06:53]      Mg     2.1     [08-04-18 @ 06:53]      Phos  2.5     [08-04-18 @ 06:53]    TPro  6.1  /  Alb  3.5  /  TBili  0.3  /  DBili  x   /  AST  17  /  ALT  6   /  AlkPhos  56  [08-03-18 @ 06:03]    Creatinine Trend:  SCr 3.02 [08-04 @ 06:53]  SCr 3.61 [08-03 @ 06:03]  SCr 3.61 [08-02 @ 06:09]  SCr 4.01 [08-01 @ 05:58]  SCr 4.18 [07-31 @ 06:09]

## 2018-08-04 NOTE — PROGRESS NOTE ADULT - SUBJECTIVE AND OBJECTIVE BOX
Dr. Wagner  Office [phone]  Cell [phone]  [facility]NIGHT  Cell [phone]      Patient is a 33y old  Male who presents with a chief complaint of DDRT (23 Jul 2018 09:25)      Patient seen and examined at bedside. No chest pain/sob    VITALS:  T(F): 98.2 (08-04-18 @ 09:43), Max: 98.4 (08-04-18 @ 01:49)  HR: 61 (08-04-18 @ 09:43)  BP: 136/87 (08-04-18 @ 09:43)  RR: 18 (08-04-18 @ 09:43)  SpO2: 99% (08-04-18 @ 09:43)  Wt(kg): --    08-03 @ 07:01  -  08-04 @ 07:00  --------------------------------------------------------  IN: 1200 mL / OUT: 1620 mL / NET: -420 mL    08-04 @ 07:01  -  08-04 @ 12:59  --------------------------------------------------------  IN: 264 mL / OUT: 615 mL / NET: -351 mL          PHYSICAL EXAM:  Constitutional: NAD  Neck: No JVD  Respiratory: CTAB, no wheezes, rales or rhonchi  Cardiovascular: S1, S2, RRR  Gastrointestinal: BS+, soft, ND, mildly tender  Extremities: No peripheral edema    Hospital Medications:   MEDICATIONS  (STANDING):  docusate sodium 100 milliGRAM(s) Oral daily  famotidine    Tablet 20 milliGRAM(s) Oral daily  metoprolol tartrate 100 milliGRAM(s) Oral every 12 hours  mycophenolate mofetil 1000 milliGRAM(s) Oral every 12 hours  NIFEdipine XL 90 milliGRAM(s) Oral daily  nystatin    Suspension 456268 Unit(s) Swish and Swallow four times a day  oxybutynin 5 milliGRAM(s) Oral two times a day  polyethylene glycol 3350 17 Gram(s) Oral daily  predniSONE   Tablet 5 milliGRAM(s) Oral daily  senna 2 Tablet(s) Oral at bedtime  tacrolimus 4 milliGRAM(s) Oral two times a day  tamsulosin 0.4 milliGRAM(s) Oral at bedtime  trimethoprim   80 mG/sulfamethoxazole 400 mG 1 Tablet(s) Oral daily  valGANciclovir 450 milliGRAM(s) Oral daily      LABS:  08-04    135  |  109<H>  |  41<H>  ----------------------------<  90  5.0   |  17<L>  |  3.02<H>    Ca    10.2      04 Aug 2018 06:53  Phos  2.5     08-04  Mg     2.1     08-04    TPro  6.1  /  Alb  3.5  /  TBili  0.3  /  DBili      /  AST  17  /  ALT  6<L>  /  AlkPhos  56  08-03    Creatinine Trend: 3.02 <--, 3.61 <--, 3.61 <--, 4.01 <--, 4.18 <--, 4.61 <--, 4.68 <--, 4.67 <--, 5.10 <--    Phosphorus Level, Serum: 2.5 mg/dL (08-04 @ 06:53)                              10.4   6.0   )-----------( 209      ( 04 Aug 2018 07:12 )             30.8     Urine Studies:  Urinalysis - [08-01-18 @ 13:23]      Color Yellow / Appearance Turbid / SG 1.012 / pH 7.0      Gluc Negative / Ketone Negative  / Bili Negative / Urobili Negative       Blood Large / Protein 150 / Leuk Est Large / Nitrite Negative      RBC >50 / WBC >50 / Hyaline 2-5 / Gran  / Sq Epi  / Non Sq Epi  / Bacteria Many      HbA1c 5.1      [08-18-15 @ 18:19]        RADIOLOGY & ADDITIONAL STUDIES:

## 2018-08-04 NOTE — PROGRESS NOTE ADULT - ASSESSMENT
33M with PMH of HTN and ESRD since 2010 s/p DDRT 7/23. Cold ischemia time14 hours.  CMV +, EBV +. Course complicated by post-operative ureteral leak s/p reimplantation of ureter of transplanted kidney on  07/29/2018 an subsequent IR placement of nephrostomy tube 7/31. Creat improving today.

## 2018-08-04 NOTE — PROGRESS NOTE ADULT - PROBLEM SELECTOR PLAN 1
s/p DDRT 07/23 c/b post-operative ureteral leak s/p reimplantation of ureter of transplanted kidney on  07/29/2018 and subsequent IR placement of nephrostomy tube 7/31.   Renal function improving  Continue immunosuppressants per transplant team  Continue  Nystatin S&S, bactrim, and Valcyte for PPX  monitor bmp  monitor I/O.

## 2018-08-05 LAB
ANION GAP SERPL CALC-SCNC: 10 MMOL/L — SIGNIFICANT CHANGE UP (ref 5–17)
BUN SERPL-MCNC: 38 MG/DL — HIGH (ref 7–23)
CALCIUM SERPL-MCNC: 10.4 MG/DL — SIGNIFICANT CHANGE UP (ref 8.4–10.5)
CHLORIDE SERPL-SCNC: 106 MMOL/L — SIGNIFICANT CHANGE UP (ref 96–108)
CO2 SERPL-SCNC: 18 MMOL/L — LOW (ref 22–31)
CREAT SERPL-MCNC: 3.06 MG/DL — HIGH (ref 0.5–1.3)
GLUCOSE BLDC GLUCOMTR-MCNC: 100 MG/DL — HIGH (ref 70–99)
GLUCOSE BLDC GLUCOMTR-MCNC: 100 MG/DL — HIGH (ref 70–99)
GLUCOSE BLDC GLUCOMTR-MCNC: 124 MG/DL — HIGH (ref 70–99)
GLUCOSE BLDC GLUCOMTR-MCNC: 94 MG/DL — SIGNIFICANT CHANGE UP (ref 70–99)
GLUCOSE SERPL-MCNC: 100 MG/DL — HIGH (ref 70–99)
HCT VFR BLD CALC: 31.2 % — LOW (ref 39–50)
HGB BLD-MCNC: 10.4 G/DL — LOW (ref 13–17)
MAGNESIUM SERPL-MCNC: 2 MG/DL — SIGNIFICANT CHANGE UP (ref 1.6–2.6)
MCHC RBC-ENTMCNC: 28.8 PG — SIGNIFICANT CHANGE UP (ref 27–34)
MCHC RBC-ENTMCNC: 33.3 GM/DL — SIGNIFICANT CHANGE UP (ref 32–36)
MCV RBC AUTO: 86.5 FL — SIGNIFICANT CHANGE UP (ref 80–100)
PHOSPHATE SERPL-MCNC: 2.7 MG/DL — SIGNIFICANT CHANGE UP (ref 2.5–4.5)
PLATELET # BLD AUTO: 238 K/UL — SIGNIFICANT CHANGE UP (ref 150–400)
POTASSIUM SERPL-MCNC: 4.9 MMOL/L — SIGNIFICANT CHANGE UP (ref 3.5–5.3)
POTASSIUM SERPL-SCNC: 4.9 MMOL/L — SIGNIFICANT CHANGE UP (ref 3.5–5.3)
RBC # BLD: 3.61 M/UL — LOW (ref 4.2–5.8)
RBC # FLD: 13.2 % — SIGNIFICANT CHANGE UP (ref 10.3–14.5)
SODIUM SERPL-SCNC: 134 MMOL/L — LOW (ref 135–145)
TACROLIMUS SERPL-MCNC: 7.5 NG/ML — SIGNIFICANT CHANGE UP
WBC # BLD: 7.4 K/UL — SIGNIFICANT CHANGE UP (ref 3.8–10.5)
WBC # FLD AUTO: 7.4 K/UL — SIGNIFICANT CHANGE UP (ref 3.8–10.5)

## 2018-08-05 PROCEDURE — 99232 SBSQ HOSP IP/OBS MODERATE 35: CPT | Mod: GC,24

## 2018-08-05 PROCEDURE — 99232 SBSQ HOSP IP/OBS MODERATE 35: CPT | Mod: GC

## 2018-08-05 RX ORDER — TRAMADOL HYDROCHLORIDE 50 MG/1
25 TABLET ORAL EVERY 4 HOURS
Qty: 0 | Refills: 0 | Status: DISCONTINUED | OUTPATIENT
Start: 2018-08-05 | End: 2018-08-12

## 2018-08-05 RX ORDER — TRAMADOL HYDROCHLORIDE 50 MG/1
50 TABLET ORAL EVERY 4 HOURS
Qty: 0 | Refills: 0 | Status: DISCONTINUED | OUTPATIENT
Start: 2018-08-05 | End: 2018-08-12

## 2018-08-05 RX ORDER — TACROLIMUS 5 MG/1
4.5 CAPSULE ORAL
Qty: 0 | Refills: 0 | Status: DISCONTINUED | OUTPATIENT
Start: 2018-08-05 | End: 2018-08-06

## 2018-08-05 RX ADMIN — Medication 1 TABLET(S): at 12:12

## 2018-08-05 RX ADMIN — Medication 500000 UNIT(S): at 11:32

## 2018-08-05 RX ADMIN — TRAMADOL HYDROCHLORIDE 25 MILLIGRAM(S): 50 TABLET ORAL at 07:14

## 2018-08-05 RX ADMIN — MYCOPHENOLATE MOFETIL 1000 MILLIGRAM(S): 250 CAPSULE ORAL at 17:58

## 2018-08-05 RX ADMIN — TRAMADOL HYDROCHLORIDE 25 MILLIGRAM(S): 50 TABLET ORAL at 12:42

## 2018-08-05 RX ADMIN — SENNA PLUS 2 TABLET(S): 8.6 TABLET ORAL at 21:41

## 2018-08-05 RX ADMIN — TRAMADOL HYDROCHLORIDE 50 MILLIGRAM(S): 50 TABLET ORAL at 22:15

## 2018-08-05 RX ADMIN — Medication 500000 UNIT(S): at 06:23

## 2018-08-05 RX ADMIN — TRAMADOL HYDROCHLORIDE 25 MILLIGRAM(S): 50 TABLET ORAL at 12:12

## 2018-08-05 RX ADMIN — TRAMADOL HYDROCHLORIDE 50 MILLIGRAM(S): 50 TABLET ORAL at 05:10

## 2018-08-05 RX ADMIN — Medication 90 MILLIGRAM(S): at 06:23

## 2018-08-05 RX ADMIN — TAMSULOSIN HYDROCHLORIDE 0.4 MILLIGRAM(S): 0.4 CAPSULE ORAL at 21:41

## 2018-08-05 RX ADMIN — TACROLIMUS 4 MILLIGRAM(S): 5 CAPSULE ORAL at 06:23

## 2018-08-05 RX ADMIN — Medication 5 MILLIGRAM(S): at 06:23

## 2018-08-05 RX ADMIN — Medication 5 MILLIGRAM(S): at 17:58

## 2018-08-05 RX ADMIN — VALGANCICLOVIR 450 MILLIGRAM(S): 450 TABLET, FILM COATED ORAL at 11:32

## 2018-08-05 RX ADMIN — TACROLIMUS 4.5 MILLIGRAM(S): 5 CAPSULE ORAL at 17:59

## 2018-08-05 RX ADMIN — Medication 500000 UNIT(S): at 21:41

## 2018-08-05 RX ADMIN — TRAMADOL HYDROCHLORIDE 50 MILLIGRAM(S): 50 TABLET ORAL at 04:29

## 2018-08-05 RX ADMIN — MYCOPHENOLATE MOFETIL 1000 MILLIGRAM(S): 250 CAPSULE ORAL at 06:23

## 2018-08-05 RX ADMIN — Medication 100 MILLIGRAM(S): at 11:32

## 2018-08-05 RX ADMIN — TRAMADOL HYDROCHLORIDE 25 MILLIGRAM(S): 50 TABLET ORAL at 06:24

## 2018-08-05 RX ADMIN — FAMOTIDINE 20 MILLIGRAM(S): 10 INJECTION INTRAVENOUS at 11:32

## 2018-08-05 RX ADMIN — Medication 500000 UNIT(S): at 17:58

## 2018-08-05 RX ADMIN — TRAMADOL HYDROCHLORIDE 50 MILLIGRAM(S): 50 TABLET ORAL at 21:41

## 2018-08-05 RX ADMIN — Medication 100 MILLIGRAM(S): at 06:23

## 2018-08-05 NOTE — PROGRESS NOTE ADULT - PROBLEM SELECTOR PLAN 1
s/p DDRT 07/23; Cold ischemia time14 hours.  CMV +, EBV +. Course complicated by post-operative ureteral leak s/p reimplantation of ureter of transplanted kidney on  07/29/2018 an subsequent IR placement of nephrostomy tube 7/31.   Creat stabel.   On Tacrolimus, Cellcept, prednisone 5 mg,  Nystatin S&S, bactrim, and Valcyte   Goal FK levels 8-10  monitor bmp  monitor I/O.  No imaging at this time due to improvement in creat.

## 2018-08-05 NOTE — PROGRESS NOTE ADULT - SUBJECTIVE AND OBJECTIVE BOX
Transplant Surgery - Multidisciplinary Rounds  --------------------------------------------------------------  DDRT 7/23/18  POD #11    Present:  Patient seen with multidisciplinary team (surgeon, Nephrologist, Resident MD, MD student)  in am rounds and examined with Dr. Brown. 34 y/o gentleFlanagan POD#11  R sided DDRT anastomosed to R external iliac artery and vein Ureter anastomosed to bladder over double J stent.  Cold ischemia time14 hours.  CMV +, EBV +. Course complicated by post-operative ureteral leak s/p reimplantation of ureter of transplanted kidney on  07/29/2018 ans subsequent IR placement of nephrostomy tube 7/31.     No acute events overnight. BLE edema and scrotal swelling has improved. No other complaints at this time.    Vital Signs Last 24 Hrs  T(C): 36.9 (08-05-18 @ 09:59), Max: 36.9 (08-05-18 @ 09:59)  T(F): 98.5 (08-05-18 @ 09:59), Max: 98.5 (08-05-18 @ 09:59)  HR: 57 (08-05-18 @ 09:59) (57 - 67)  BP: 148/90 (08-05-18 @ 09:59) (137/89 - 155/97)  BP(mean): --  RR: 18 (08-05-18 @ 09:59) (18 - 18)  SpO2: 97% (08-05-18 @ 09:59) (97% - 100%)  I&O's Detail    04 Aug 2018 07:01  -  05 Aug 2018 07:00  --------------------------------------------------------  IN:    Oral Fluid: 1380 mL  Total IN: 1380 mL    OUT:    Bulb: 70 mL    Indwelling Catheter - Urethral: 230 mL    Nephrostomy Tube: 1350 mL  Total OUT: 1650 mL    Total NET: -270 mL      05 Aug 2018 07:01  -  05 Aug 2018 10:40  --------------------------------------------------------  IN:    Oral Fluid: 240 mL  Total IN: 240 mL    OUT:    Bulb: 10 mL    Indwelling Catheter - Urethral: 25 mL    Nephrostomy Tube: 350 mL  Total OUT: 385 mL    Total NET: -145 mL                          10.4   7.4   )-----------( 238      ( 05 Aug 2018 06:18 )             31.2     08-05    134<L>  |  106  |  38<H>  ----------------------------<  100<H>  4.9   |  18<L>  |  3.06<H>    Ca    10.4      05 Aug 2018 06:18  Phos  2.7     08-05  Mg     2.0     08-05        CAPILLARY BLOOD GLUCOSE      POCT Blood Glucose.: 94 mg/dL (05 Aug 2018 07:55)  POCT Blood Glucose.: 117 mg/dL (04 Aug 2018 21:35)  POCT Blood Glucose.: 86 mg/dL (04 Aug 2018 16:54)  POCT Blood Glucose.: 101 mg/dL (04 Aug 2018 11:56)      MEDICATIONS  (STANDING):  docusate sodium 100 milliGRAM(s) Oral daily  famotidine    Tablet 20 milliGRAM(s) Oral daily  metoprolol tartrate 100 milliGRAM(s) Oral every 12 hours  mycophenolate mofetil 1000 milliGRAM(s) Oral every 12 hours  NIFEdipine XL 90 milliGRAM(s) Oral daily  nystatin    Suspension 309885 Unit(s) Swish and Swallow four times a day  oxybutynin 5 milliGRAM(s) Oral two times a day  polyethylene glycol 3350 17 Gram(s) Oral daily  predniSONE   Tablet 5 milliGRAM(s) Oral daily  senna 2 Tablet(s) Oral at bedtime  tacrolimus 4 milliGRAM(s) Oral two times a day  tamsulosin 0.4 milliGRAM(s) Oral at bedtime  trimethoprim   80 mG/sulfamethoxazole 400 mG 1 Tablet(s) Oral daily  valGANciclovir 450 milliGRAM(s) Oral daily    MEDICATIONS  (PRN):  acetaminophen   Tablet. 650 milliGRAM(s) Oral every 6 hours PRN Mild Pain (1 - 3)  diphenhydrAMINE   Capsule 25 milliGRAM(s) Oral every 6 hours PRN Rash and/or Itching  lidocaine 2% Gel 1 Application(s) Topical three times a day PRN pain related to urinary catheter  metoclopramide Injectable 10 milliGRAM(s) IV Push once PRN Nausea and/or Vomiting  ondansetron Injectable 4 milliGRAM(s) IV Push every 6 hours PRN Nausea  ondansetron Injectable 4 milliGRAM(s) IV Push once PRN Nausea and/or Vomiting  simethicone 80 milliGRAM(s) Chew every 6 hours PRN Gas  traMADol 50 milliGRAM(s) Oral every 4 hours PRN Severe Pain (7 - 10)  traMADol 25 milliGRAM(s) Oral every 4 hours PRN Moderate Pain (4 - 6)  zinc oxide 20% Ointment 1 Application(s) Topical three times a day PRN rash      Review of systems  Gen: No weight changes, fatigue, fevers/chills, weakness  Skin: No rashes  Head/Eyes/Ears/Mouth: No headache; Normal hearing; Normal vision w/o blurriness; No sinus pain/discomfort, sore throat  Respiratory: No dyspnea, cough, wheezing, hemoptysis  CV: No chest pain, PND, orthopnea  GI: Reports incisional pain and TTP, denies diarrhea, constipation, nausea, vomiting, melena, hematochezia. LACEY patent  : Marquez in situ  MSK: No joint pain/swelling; no back pain;   Neuro: No dizziness/lightheadedness, weakness, seizures, numbness, tingling  Heme: No easy bruising or bleeding  Endo: No heat/cold intolerance  Psych: No significant nervousness, anxiety, stress, depression  All other systems were reviewed and are negative, except as noted.    PHYSICAL EXAM:  Constitutional: Well developed / well nourished  Eyes: Anicteric, PERRLA  ENMT: nc/at  Neck: supple  Respiratory: CTA B/L  Cardiovascular: RRR  Gastrointestinal: Soft abdomen, mild tender to touch at surgical site, ND  Genitourinary: marquez in situ.  LACEY, nephrostomy tube patent  Extremities: SCD's in place and working bilaterally, BLE edema R>L  Vascular: Palpable dp pulses bilaterally  Neurological: A&O x3  Skin: wound open to air, no erythema and evidence of infection noted  Musculoskeletal: Moving all extremities  Psychiatric: Responsive

## 2018-08-05 NOTE — PROGRESS NOTE ADULT - SUBJECTIVE AND OBJECTIVE BOX
[facility] DIVISION OF KIDNEY DISEASES AND HYPERTENSION -- FOLLOW UP NOTE  --------------------------------------------------------------------------------  Chief Complaint:  DDRT   24 hour events/subjective:    Pt seen and examined at bed side, not acute distress, has minimal pain, no overnight events.     PAST HISTORY  --------------------------------------------------------------------------------  No significant changes to PMH, PSH, FHx, SHx, unless otherwise noted    ALLERGIES & MEDICATIONS  --------------------------------------------------------------------------------  Allergies    IV Contrast (Rash)  nyquil (Rhinorrhea)  vancomycin (Pruritus; Hives)    Intolerances      Standing Inpatient Medications  docusate sodium 100 milliGRAM(s) Oral daily  famotidine    Tablet 20 milliGRAM(s) Oral daily  metoprolol tartrate 100 milliGRAM(s) Oral every 12 hours  mycophenolate mofetil 1000 milliGRAM(s) Oral every 12 hours  NIFEdipine XL 90 milliGRAM(s) Oral daily  nystatin    Suspension [id number] Unit(s) Swish and Swallow four times a day  oxybutynin 5 milliGRAM(s) Oral two times a day  polyethylene glycol 3350 17 Gram(s) Oral daily  predniSONE   Tablet 5 milliGRAM(s) Oral daily  senna 2 Tablet(s) Oral at bedtime  tacrolimus 4.5 milliGRAM(s) Oral two times a day  tamsulosin 0.4 milliGRAM(s) Oral at bedtime  trimethoprim   80 mG/sulfamethoxazole 400 mG 1 Tablet(s) Oral daily  valGANciclovir 450 milliGRAM(s) Oral daily    PRN Inpatient Medications  acetaminophen   Tablet. 650 milliGRAM(s) Oral every 6 hours PRN  diphenhydrAMINE   Capsule 25 milliGRAM(s) Oral every 6 hours PRN  lidocaine 2% Gel 1 Application(s) Topical three times a day PRN  metoclopramide Injectable 10 milliGRAM(s) IV Push once PRN  ondansetron Injectable 4 milliGRAM(s) IV Push every 6 hours PRN  ondansetron Injectable 4 milliGRAM(s) IV Push once PRN  simethicone 80 milliGRAM(s) Chew every 6 hours PRN  traMADol 50 milliGRAM(s) Oral every 4 hours PRN  traMADol 25 milliGRAM(s) Oral every 4 hours PRN  zinc oxide 20% Ointment 1 Application(s) Topical three times a day PRN      REVIEW OF SYSTEMS  --------------------------------------------------------------------------------  Gen: No weakness  Skin: No rashes  Head/Eyes/Ears/Mouth: No headache  Respiratory: No dyspnea  CV: No chest pain, PND, orthopnea  GI: No abdominal pain, diarrhea  MSK: No edema  Neuro: No dizziness/lightheadedness    All other systems were reviewed and are negative, except as noted.    VITALS/PHYSICAL EXAM  --------------------------------------------------------------------------------  T(C): 36.9 (08-05-18 @ 13:43), Max: 36.9 (08-05-18 @ 09:59)  HR: 58 (08-05-18 @ 13:43) (57 - 67)  BP: 136/81 (08-05-18 @ 13:43) (136/81 - 155/97)  RR: 18 (08-05-18 @ 13:43) (18 - 18)  SpO2: 99% (08-05-18 @ 13:43) (97% - 100%)  Wt(kg): --        08-04-18 @ 07:01  -  08-05-18 @ 07:00  --------------------------------------------------------  IN: 1380 mL / OUT: 1650 mL / NET: -270 mL    08-05-18 @ 07:01  -  08-05-18 @ 13:57  --------------------------------------------------------  IN: 600 mL / OUT: 655 mL / NET: -55 mL      Physical Exam:  	Gen: NAD  	Pulm: CTA B/L  	CV: RRR, S1S2; no rub  	Abd: +BS, soft, nontender/nondistended  	: No suprapubic tenderness, marquez+  	UE: Warm, no asterixis  	LE: Warm, b/l LE Edema R>L, palpable DP pulses b/l   	Skin: Warm, without rashes  	Vascular access: NINI NORWOOD    LABS/STUDIES  --------------------------------------------------------------------------------              10.4   7.4   >-----------<  238      [08-05-18 @ 06:18]              31.2     134  |  106  |  38  ----------------------------<  100      [08-05-18 @ 06:18]  4.9   |  18  |  3.06        Ca     10.4     [08-05-18 @ 06:18]      Mg     2.0     [08-05-18 @ 06:18]      Phos  2.7     [08-05-18 @ 06:18]    Creatinine Trend:  SCr 3.06 [08-05 @ 06:18]  SCr 3.02 [08-04 @ 06:53]  SCr 3.61 [08-03 @ 06:03]  SCr 3.61 [08-02 @ 06:09]  SCr 4.01 [08-01 @ 05:58]

## 2018-08-05 NOTE — PROGRESS NOTE ADULT - ASSESSMENT
32 y/o gentleman with PMH of HTN and ESRD since 2010, POD#10 R sided DDRT anastomosed to R external iliac artery and vein Ureter anastomosed to bladder over double J stent  with post-operative course complicated by ureteral leak requiring re-op and reimplantation of ureter of transplanted kidney  now s/p Nephrostomy tube and creatinine slowly trending down.

## 2018-08-05 NOTE — PROGRESS NOTE ADULT - ASSESSMENT
33M with PMH of HTN and ESRD since 2010 s/p DDRT 7/23. Cold ischemia time14 hours.  CMV +, EBV +. Course complicated by post-operative ureteral leak s/p reimplantation of ureter of transplanted kidney on  07/29/2018 an subsequent IR placement of nephrostomy tube 7/31. Creat stable.

## 2018-08-05 NOTE — PROGRESS NOTE ADULT - SUBJECTIVE AND OBJECTIVE BOX
Dr. Wagner  Office [phone]  Cell [phone]  Cleopatra KNIGHT  Cell [phone]      Patient is a 33y old  Male who presents with a chief complaint of DDRT (23 Jul 2018 09:25)      Patient seen and examined at bedside. No chest pain/sob    VITALS:  T(F): 98.4 (08-05-18 @ 13:43), Max: 98.5 (08-05-18 @ 09:59)  HR: 58 (08-05-18 @ 13:43)  BP: 136/81 (08-05-18 @ 13:43)  RR: 18 (08-05-18 @ 13:43)  SpO2: 99% (08-05-18 @ 13:43)  Wt(kg): --    08-04 @ 07:01  -  08-05 @ 07:00  --------------------------------------------------------  IN: 1380 mL / OUT: 1650 mL / NET: -270 mL    08-05 @ 07:01  -  08-05 @ 17:36  --------------------------------------------------------  IN: 600 mL / OUT: 905 mL / NET: -305 mL          PHYSICAL EXAM:  Constitutional: NAD  Neck: No JVD  Respiratory: CTAB, no wheezes, rales or rhonchi  Cardiovascular: S1, S2, RRR  Gastrointestinal: BS+, soft, ND, slightly distended  Extremities: No peripheral edema    Hospital Medications:   MEDICATIONS  (STANDING):  docusate sodium 100 milliGRAM(s) Oral daily  famotidine    Tablet 20 milliGRAM(s) Oral daily  metoprolol tartrate 100 milliGRAM(s) Oral every 12 hours  mycophenolate mofetil 1000 milliGRAM(s) Oral every 12 hours  NIFEdipine XL 90 milliGRAM(s) Oral daily  nystatin    Suspension 013436 Unit(s) Swish and Swallow four times a day  oxybutynin 5 milliGRAM(s) Oral two times a day  polyethylene glycol 3350 17 Gram(s) Oral daily  predniSONE   Tablet 5 milliGRAM(s) Oral daily  senna 2 Tablet(s) Oral at bedtime  tacrolimus 4.5 milliGRAM(s) Oral two times a day  tamsulosin 0.4 milliGRAM(s) Oral at bedtime  trimethoprim   80 mG/sulfamethoxazole 400 mG 1 Tablet(s) Oral daily  valGANciclovir 450 milliGRAM(s) Oral daily    Tacrolimus (), Serum: 7.5 ng/mL (08-05 @ 08:58)    LABS:  08-05    134<L>  |  106  |  38<H>  ----------------------------<  100<H>  4.9   |  18<L>  |  3.06<H>    Ca    10.4      05 Aug 2018 06:18  Phos  2.7     08-05  Mg     2.0     08-05      Creatinine Trend: 3.06 <--, 3.02 <--, 3.61 <--, 3.61 <--, 4.01 <--, 4.18 <--, 4.61 <--    Phosphorus Level, Serum: 2.7 mg/dL (08-05 @ 06:18)                              10.4   7.4   )-----------( 238      ( 05 Aug 2018 06:18 )             31.2     Urine Studies:  Urinalysis - [08-01-18 @ 13:23]      Color Yellow / Appearance Turbid / SG 1.012 / pH 7.0      Gluc Negative / Ketone Negative  / Bili Negative / Urobili Negative       Blood Large / Protein 150 / Leuk Est Large / Nitrite Negative      RBC >50 / WBC >50 / Hyaline 2-5 / Gran  / Sq Epi  / Non Sq Epi  / Bacteria Many      HbA1c 5.1      [08-18-15 @ 18:19]        RADIOLOGY & ADDITIONAL STUDIES:

## 2018-08-05 NOTE — PROGRESS NOTE ADULT - PROBLEM SELECTOR PLAN 1
07/29/2018 reimplantation of ureter of transplanted kidney for ureteral leak  7/31/08 placement of nephrostomy tube  Creatinine slowly trending down.   -Keep Rodriguez   -Do not compress LACEY. Drain and record output q 6 hours.   - Send LACEY fluid for Creatinine  - Strict I/Os  -Continue Tylenol and tramadol PRN  -Continue bowel regimen, Flomax 0.4 daily   -daily BMP  - Blood sugars well controlled. Fingersticks before meals and bedtime.  -Recent urine cultures negative  -On Oxybutynin for bladder spasm  - SCDs, ambulate.  - Will get repeat nephrostomy study on Monday

## 2018-08-06 LAB
ANION GAP SERPL CALC-SCNC: 11 MMOL/L — SIGNIFICANT CHANGE UP (ref 5–17)
APPEARANCE UR: ABNORMAL
APPEARANCE UR: CLEAR — SIGNIFICANT CHANGE UP
BACTERIA # UR AUTO: NEGATIVE — SIGNIFICANT CHANGE UP
BACTERIA # UR AUTO: NEGATIVE — SIGNIFICANT CHANGE UP
BILIRUB UR-MCNC: NEGATIVE — SIGNIFICANT CHANGE UP
BILIRUB UR-MCNC: NEGATIVE — SIGNIFICANT CHANGE UP
BUN SERPL-MCNC: 31 MG/DL — HIGH (ref 7–23)
CALCIUM SERPL-MCNC: 10.6 MG/DL — HIGH (ref 8.4–10.5)
CHLORIDE SERPL-SCNC: 111 MMOL/L — HIGH (ref 96–108)
CO2 SERPL-SCNC: 18 MMOL/L — LOW (ref 22–31)
COLOR SPEC: ABNORMAL
COLOR SPEC: YELLOW — SIGNIFICANT CHANGE UP
CREAT FLD-MCNC: 3.02 MG/DL — SIGNIFICANT CHANGE UP
CREAT SERPL-MCNC: 3.08 MG/DL — HIGH (ref 0.5–1.3)
DIFF PNL FLD: ABNORMAL
DIFF PNL FLD: ABNORMAL
EPI CELLS # UR: 0 /HPF — SIGNIFICANT CHANGE UP (ref 0–5)
EPI CELLS # UR: 3 /HPF — SIGNIFICANT CHANGE UP (ref 0–5)
GLUCOSE BLDC GLUCOMTR-MCNC: 114 MG/DL — HIGH (ref 70–99)
GLUCOSE BLDC GLUCOMTR-MCNC: 114 MG/DL — HIGH (ref 70–99)
GLUCOSE BLDC GLUCOMTR-MCNC: 85 MG/DL — SIGNIFICANT CHANGE UP (ref 70–99)
GLUCOSE BLDC GLUCOMTR-MCNC: 98 MG/DL — SIGNIFICANT CHANGE UP (ref 70–99)
GLUCOSE SERPL-MCNC: 98 MG/DL — SIGNIFICANT CHANGE UP (ref 70–99)
GLUCOSE UR QL: 100 MG/DL
GLUCOSE UR QL: 100 MG/DL
HCT VFR BLD CALC: 29.3 % — LOW (ref 39–50)
HGB BLD-MCNC: 9.9 G/DL — LOW (ref 13–17)
HYALINE CASTS # UR AUTO: 6 /LPF — SIGNIFICANT CHANGE UP (ref 0–7)
HYALINE CASTS # UR AUTO: 8 /LPF — HIGH (ref 0–7)
KETONES UR-MCNC: NEGATIVE — SIGNIFICANT CHANGE UP
KETONES UR-MCNC: NEGATIVE — SIGNIFICANT CHANGE UP
LEUKOCYTE ESTERASE UR-ACNC: ABNORMAL
LEUKOCYTE ESTERASE UR-ACNC: NEGATIVE — SIGNIFICANT CHANGE UP
MAGNESIUM SERPL-MCNC: 1.8 MG/DL — SIGNIFICANT CHANGE UP (ref 1.6–2.6)
MCHC RBC-ENTMCNC: 29.5 PG — SIGNIFICANT CHANGE UP (ref 27–34)
MCHC RBC-ENTMCNC: 33.9 GM/DL — SIGNIFICANT CHANGE UP (ref 32–36)
MCV RBC AUTO: 86.9 FL — SIGNIFICANT CHANGE UP (ref 80–100)
NITRITE UR-MCNC: NEGATIVE — SIGNIFICANT CHANGE UP
NITRITE UR-MCNC: NEGATIVE — SIGNIFICANT CHANGE UP
PH UR: 6 — SIGNIFICANT CHANGE UP (ref 5–8)
PH UR: 6.5 — SIGNIFICANT CHANGE UP (ref 5–8)
PHOSPHATE SERPL-MCNC: 2.2 MG/DL — LOW (ref 2.5–4.5)
PLATELET # BLD AUTO: 221 K/UL — SIGNIFICANT CHANGE UP (ref 150–400)
POTASSIUM SERPL-MCNC: 5.3 MMOL/L — SIGNIFICANT CHANGE UP (ref 3.5–5.3)
POTASSIUM SERPL-SCNC: 5.3 MMOL/L — SIGNIFICANT CHANGE UP (ref 3.5–5.3)
PROT UR-MCNC: 100 MG/DL
PROT UR-MCNC: >300 MG/DL
RBC # BLD: 3.37 M/UL — LOW (ref 4.2–5.8)
RBC # FLD: 13.2 % — SIGNIFICANT CHANGE UP (ref 10.3–14.5)
RBC CASTS # UR COMP ASSIST: 69 /HPF — HIGH (ref 0–4)
RBC CASTS # UR COMP ASSIST: >720 /HPF — HIGH (ref 0–4)
SODIUM SERPL-SCNC: 140 MMOL/L — SIGNIFICANT CHANGE UP (ref 135–145)
SP GR SPEC: 1.01 — SIGNIFICANT CHANGE UP (ref 1.01–1.02)
SP GR SPEC: 1.02 — SIGNIFICANT CHANGE UP (ref 1.01–1.02)
TACROLIMUS SERPL-MCNC: 5.6 NG/ML — SIGNIFICANT CHANGE UP
UROBILINOGEN FLD QL: NEGATIVE MG/DL — SIGNIFICANT CHANGE UP
UROBILINOGEN FLD QL: NEGATIVE MG/DL — SIGNIFICANT CHANGE UP
WBC # BLD: 8.6 K/UL — SIGNIFICANT CHANGE UP (ref 3.8–10.5)
WBC # FLD AUTO: 8.6 K/UL — SIGNIFICANT CHANGE UP (ref 3.8–10.5)
WBC UR QL: 10 /HPF — HIGH (ref 0–5)
WBC UR QL: 183 /HPF — HIGH (ref 0–5)

## 2018-08-06 PROCEDURE — 99232 SBSQ HOSP IP/OBS MODERATE 35: CPT | Mod: GC

## 2018-08-06 PROCEDURE — 76776 US EXAM K TRANSPL W/DOPPLER: CPT | Mod: 26,RT

## 2018-08-06 PROCEDURE — 99232 SBSQ HOSP IP/OBS MODERATE 35: CPT | Mod: GC,24

## 2018-08-06 RX ORDER — TACROLIMUS 5 MG/1
1 CAPSULE ORAL ONCE
Qty: 0 | Refills: 0 | Status: COMPLETED | OUTPATIENT
Start: 2018-08-06 | End: 2018-08-06

## 2018-08-06 RX ORDER — TACROLIMUS 5 MG/1
6 CAPSULE ORAL
Qty: 0 | Refills: 0 | Status: DISCONTINUED | OUTPATIENT
Start: 2018-08-06 | End: 2018-08-10

## 2018-08-06 RX ADMIN — MYCOPHENOLATE MOFETIL 1000 MILLIGRAM(S): 250 CAPSULE ORAL at 18:20

## 2018-08-06 RX ADMIN — TACROLIMUS 1 MILLIGRAM(S): 5 CAPSULE ORAL at 14:58

## 2018-08-06 RX ADMIN — VALGANCICLOVIR 450 MILLIGRAM(S): 450 TABLET, FILM COATED ORAL at 12:07

## 2018-08-06 RX ADMIN — TAMSULOSIN HYDROCHLORIDE 0.4 MILLIGRAM(S): 0.4 CAPSULE ORAL at 21:22

## 2018-08-06 RX ADMIN — Medication 100 MILLIGRAM(S): at 06:16

## 2018-08-06 RX ADMIN — TACROLIMUS 6 MILLIGRAM(S): 5 CAPSULE ORAL at 18:24

## 2018-08-06 RX ADMIN — Medication 90 MILLIGRAM(S): at 06:15

## 2018-08-06 RX ADMIN — TRAMADOL HYDROCHLORIDE 50 MILLIGRAM(S): 50 TABLET ORAL at 06:16

## 2018-08-06 RX ADMIN — MYCOPHENOLATE MOFETIL 1000 MILLIGRAM(S): 250 CAPSULE ORAL at 06:16

## 2018-08-06 RX ADMIN — Medication 5 MILLIGRAM(S): at 18:20

## 2018-08-06 RX ADMIN — Medication 5 MILLIGRAM(S): at 06:16

## 2018-08-06 RX ADMIN — Medication 5 MILLIGRAM(S): at 06:15

## 2018-08-06 RX ADMIN — Medication 500000 UNIT(S): at 12:07

## 2018-08-06 RX ADMIN — TRAMADOL HYDROCHLORIDE 50 MILLIGRAM(S): 50 TABLET ORAL at 07:00

## 2018-08-06 RX ADMIN — Medication 500000 UNIT(S): at 06:16

## 2018-08-06 RX ADMIN — Medication 500000 UNIT(S): at 23:52

## 2018-08-06 RX ADMIN — TACROLIMUS 4.5 MILLIGRAM(S): 5 CAPSULE ORAL at 06:15

## 2018-08-06 RX ADMIN — Medication 1 TABLET(S): at 12:07

## 2018-08-06 RX ADMIN — FAMOTIDINE 20 MILLIGRAM(S): 10 INJECTION INTRAVENOUS at 12:07

## 2018-08-06 RX ADMIN — Medication 500000 UNIT(S): at 18:20

## 2018-08-06 NOTE — PROGRESS NOTE ADULT - ATTENDING COMMENTS
XAVI of renal transplant - Creatinine stable at 3 but should be improving  Check renal sonogram  last nephrostogram revealed clot in pelvis.  Will review sono to see if still present.   If not improvement will eventually need kidney biopsy.

## 2018-08-06 NOTE — PROGRESS NOTE ADULT - SUBJECTIVE AND OBJECTIVE BOX
[facility] DIVISION OF KIDNEY DISEASES AND HYPERTENSION -- FOLLOW UP NOTE  --------------------------------------------------------------------------------  Chief Complaint: DDRT    24 hour events/subjective:  No acute events.  Marquez with 215 ml, LACEY with 49 ml and nephrostomy tube with 1425 ml UOP.  Pt has no acute complaint.    PAST HISTORY  --------------------------------------------------------------------------------  No significant changes to PMH, PSH, FHx, SHx, unless otherwise noted    ALLERGIES & MEDICATIONS  --------------------------------------------------------------------------------  Allergies    IV Contrast (Rash)  nyquil (Rhinorrhea)  vancomycin (Pruritus; Hives)    Intolerances      Standing Inpatient Medications  docusate sodium 100 milliGRAM(s) Oral daily  famotidine    Tablet 20 milliGRAM(s) Oral daily  metoprolol tartrate 100 milliGRAM(s) Oral every 12 hours  mycophenolate mofetil 1000 milliGRAM(s) Oral every 12 hours  NIFEdipine XL 90 milliGRAM(s) Oral daily  nystatin    Suspension 271923 Unit(s) Swish and Swallow four times a day  oxybutynin 5 milliGRAM(s) Oral two times a day  polyethylene glycol 3350 17 Gram(s) Oral daily  predniSONE   Tablet 5 milliGRAM(s) Oral daily  senna 2 Tablet(s) Oral at bedtime  tacrolimus 6 milliGRAM(s) Oral two times a day  tamsulosin 0.4 milliGRAM(s) Oral at bedtime  trimethoprim   80 mG/sulfamethoxazole 400 mG 1 Tablet(s) Oral daily  valGANciclovir 450 milliGRAM(s) Oral daily    PRN Inpatient Medications  acetaminophen   Tablet. 650 milliGRAM(s) Oral every 6 hours PRN  diphenhydrAMINE   Capsule 25 milliGRAM(s) Oral every 6 hours PRN  lidocaine 2% Gel 1 Application(s) Topical three times a day PRN  metoclopramide Injectable 10 milliGRAM(s) IV Push once PRN  ondansetron Injectable 4 milliGRAM(s) IV Push every 6 hours PRN  ondansetron Injectable 4 milliGRAM(s) IV Push once PRN  simethicone 80 milliGRAM(s) Chew every 6 hours PRN  traMADol 50 milliGRAM(s) Oral every 4 hours PRN  traMADol 25 milliGRAM(s) Oral every 4 hours PRN  zinc oxide 20% Ointment 1 Application(s) Topical three times a day PRN      REVIEW OF SYSTEMS  --------------------------------------------------------------------------------  Gen: No weakness  Skin: No rashes  Head/Eyes/Ears/Mouth: No headache  Respiratory: No dyspnea  CV: No chest pain, PND, orthopnea  GI: No abdominal pain, diarrhea  MSK: No edema  Neuro: No dizziness/lightheadedness    All other systems were reviewed and are negative, except as noted.    VITALS/PHYSICAL EXAM  --------------------------------------------------------------------------------  T(C): 36.7 (08-06-18 @ 13:45), Max: 37 (08-06-18 @ 05:04)  HR: 65 (08-06-18 @ 13:45) (62 - 71)  BP: 130/79 (08-06-18 @ 13:45) (130/79 - 162/95)  RR: 18 (08-06-18 @ 13:45) (18 - 18)  SpO2: 100% (08-06-18 @ 13:45) (96% - 100%)  Wt(kg): --        08-05-18 @ 07:01  -  08-06-18 @ 07:00  --------------------------------------------------------  IN: 1200 mL / OUT: 1689 mL / NET: -489 mL    08-06-18 @ 07:01  -  08-06-18 @ 14:07  --------------------------------------------------------  IN: 120 mL / OUT: 530 mL / NET: -410 mL      Physical Exam:  	Gen: NAD  	Pulm: CTA B/L  	CV: RRR, S1S2; no rub  	Abd: +BS, soft, nontender/nondistended  	: No suprapubic tenderness, marquez+  	UE: Warm, no asterixis  	LE: Warm, no edema, palpable DP pulses b/l   	Skin: Warm, without rashes  	Vascular access: NINI NORWOOD    LABS/STUDIES  --------------------------------------------------------------------------------              9.9    8.6   >-----------<  221      [08-06-18 @ 06:12]              29.3     140  |  111  |  31  ----------------------------<  98      [08-06-18 @ 06:11]  5.3   |  18  |  3.08        Ca     10.6     [08-06-18 @ 06:11]      Mg     1.8     [08-06-18 @ 06:11]      Phos  2.2     [08-06-18 @ 06:11]            Creatinine Trend:  SCr 3.08 [08-06 @ 06:11]  SCr 3.06 [08-05 @ 06:18]  SCr 3.02 [08-04 @ 06:53]  SCr 3.61 [08-03 @ 06:03]  SCr 3.61 [08-02 @ 06:09]    Urinalysis - [08-01-18 @ 13:23]      Color Yellow / Appearance Turbid / SG 1.012 / pH 7.0      Gluc Negative / Ketone Negative  / Bili Negative / Urobili Negative       Blood Large / Protein 150 / Leuk Est Large / Nitrite Negative      RBC >50 / WBC >50 / Hyaline 2-5 / Gran  / Sq Epi  / Non Sq Epi  / Bacteria Many      HbA1c 5.1      [08-18-15 @ 18:19]

## 2018-08-06 NOTE — PROGRESS NOTE ADULT - PROBLEM SELECTOR PLAN 1
07/29/2018 reimplantation of ureter of transplanted kidney for ureteral leak  7/31/08 placement of nephrostomy tube  -Keep Rodriguez   -Do not compress LACEY. Drain and record output q 6 hours.   - Send LACEY fluid for Creatinine  - Strict I/Os  -Continue Tylenol and tramadol PRN  -Continue bowel regimen, Flomax 0.4 daily   -daily BMP  - Blood sugars well controlled. Fingersticks before meals and bedtime.  - Recent urine cultures negative  - On Oxybutynin for bladder spasm  - SCDs, ambulate.  - UA/UCx today  - renal u/s

## 2018-08-06 NOTE — PROGRESS NOTE ADULT - PROBLEM SELECTOR PLAN 1
s/p DDRT 07/23; Cold ischemia time14 hours.  CMV +, EBV +. Course complicated by post-operative ureteral leak s/p reimplantation of ureter of transplanted kidney on  07/29/2018 an subsequent IR placement of nephrostomy tube 7/31.   Scr elevated at 3.06. Repeat LACEY fluid Cr.  Obtain UA, UCx and Renal U/S  On Tacrolimus, Cellcept, prednisone 5 mg,  Nystatin S&S, bactrim, and Valcyte   Goal FK levels 8-10  monitor bmp  monitor I/O.

## 2018-08-06 NOTE — PROGRESS NOTE ADULT - PROBLEM SELECTOR PLAN 1
s/p DDRT 07/23 c/b post-operative ureteral leak s/p reimplantation of ureter of transplanted kidney on  07/29/2018 and subsequent IR placement of nephrostomy tube 7/31.   Renal function stable  Continue immunosuppressants per transplant team  Continue  Nystatin S&S, bactrim, and Valcyte for PPX  monitor bmp  monitor I/O. s/p DDRT 07/23 c/b post-operative ureteral leak s/p reimplantation of ureter of transplanted kidney on  07/29/2018 and subsequent IR placement of nephrostomy tube 7/31.   Renal function elevated however stable  Continue immunosuppressants per transplant team  Continue  Nystatin S&S, bactrim, and Valcyte for PPX  Pt planned for renal sonogram and possible kidney biopsy if Scr does not improve   monitor bmp  monitor I/O.

## 2018-08-06 NOTE — PROGRESS NOTE ADULT - SUBJECTIVE AND OBJECTIVE BOX
[facility] [place] [facility]   MD PERCY MORALES MD ANGELA WONG, PA    TEL:  OFFICE: [phone]  DR OCONNOR CELL: [phone]  DR. GAMBOA CELL: [phone]  ELMIRA PRINGLE CELL: [phone]        Patient is a 33y old  Male who presents with a chief complaint of DDRT (23 Jul 2018 09:25)      Patient seen and examined at bedside. No chest pain/sob    VITALS:  T(F): 98.1 (08-06-18 @ 13:45), Max: 98.6 (08-06-18 @ 05:04)  HR: 65 (08-06-18 @ 13:45)  BP: 130/79 (08-06-18 @ 13:45)  RR: 18 (08-06-18 @ 13:45)  SpO2: 100% (08-06-18 @ 13:45)  Wt(kg): --    08-05 @ 07:01  -  08-06 @ 07:00  --------------------------------------------------------  IN: 1200 mL / OUT: 1689 mL / NET: -489 mL    08-06 @ 07:01  -  08-06 @ 15:47  --------------------------------------------------------  IN: 120 mL / OUT: 530 mL / NET: -410 mL          PHYSICAL EXAM:  Constitutional: NAD  Neck: No JVD  Respiratory: CTAB, no wheezes, rales or rhonchi  Cardiovascular: S1, S2, RRR  Gastrointestinal: BS+, soft, NT/ND  Extremities: b/l 1+ edema     Hospital Medications:   MEDICATIONS  (STANDING):  docusate sodium 100 milliGRAM(s) Oral daily  famotidine    Tablet 20 milliGRAM(s) Oral daily  metoprolol tartrate 100 milliGRAM(s) Oral every 12 hours  mycophenolate mofetil 1000 milliGRAM(s) Oral every 12 hours  NIFEdipine XL 90 milliGRAM(s) Oral daily  nystatin    Suspension 060564 Unit(s) Swish and Swallow four times a day  oxybutynin 5 milliGRAM(s) Oral two times a day  polyethylene glycol 3350 17 Gram(s) Oral daily  predniSONE   Tablet 5 milliGRAM(s) Oral daily  senna 2 Tablet(s) Oral at bedtime  tacrolimus 6 milliGRAM(s) Oral two times a day  tamsulosin 0.4 milliGRAM(s) Oral at bedtime  trimethoprim   80 mG/sulfamethoxazole 400 mG 1 Tablet(s) Oral daily  valGANciclovir 450 milliGRAM(s) Oral daily    Tacrolimus (), Serum: 5.6 ng/mL (08-06 @ 08:00)    LABS:  08-06    140  |  111<H>  |  31<H>  ----------------------------<  98  5.3   |  18<L>  |  3.08<H>    Ca    10.6<H>      06 Aug 2018 06:11  Phos  2.2     08-06  Mg     1.8     08-06      Creatinine Trend: 3.08 <--, 3.06 <--, 3.02 <--, 3.61 <--, 3.61 <--, 4.01 <--, 4.18 <--    Phosphorus Level, Serum: 2.2 mg/dL (08-06 @ 06:11)                              9.9    8.6   )-----------( 221      ( 06 Aug 2018 06:12 )             29.3     Urine Studies:  Urinalysis - [08-06-18 @ 14:10]      Color Orange / Appearance Turbid / SG 1.015 / pH 6.5      Gluc 100 / Ketone Negative  / Bili Negative / Urobili Negative       Blood Large / Protein >300 / Leuk Est Moderate / Nitrite Negative      RBC >720 /  / Hyaline 6 / Gran  / Sq Epi  / Non Sq Epi 0 / Bacteria Negative      HbA1c 5.1      [08-18-15 @ 18:19]        RADIOLOGY & ADDITIONAL STUDIES: [facility] [place] [facility]   MD PERCY MORALES MD ANGELA WONG, PA    TEL:  OFFICE: [phone]  DR OCONNOR CELL: [phone]  DR. GAMBOA CELL: [phone]  ELMIRA PRINGLE CELL: [phone]    HPI  33M with PMH of HTN and ESRD since 2010 s/p DDRT 7/23.    Reimplantation of ureter of transplanted kidney  07/29/2018.   Had IR placement of nephrostomy tube on 7/31 for anastomotic leak  Pt seen and examined at bedside.  Denies sob, chest pain        VITALS:  T(F): 98.1 (08-06-18 @ 13:45), Max: 98.6 (08-06-18 @ 05:04)  HR: 65 (08-06-18 @ 13:45)  BP: 130/79 (08-06-18 @ 13:45)  RR: 18 (08-06-18 @ 13:45)  SpO2: 100% (08-06-18 @ 13:45)  Wt(kg): --    08-05 @ 07:01  -  08-06 @ 07:00  --------------------------------------------------------  IN: 1200 mL / OUT: 1689 mL / NET: -489 mL    08-06 @ 07:01  -  08-06 @ 15:47  --------------------------------------------------------  IN: 120 mL / OUT: 530 mL / NET: -410 mL          PHYSICAL EXAM:  Constitutional: NAD  Neck: No JVD  HENT: PerrLA, EOMI  Respiratory: CTAB, no wheezes, rales or rhonchi  Cardiovascular: S1, S2, RRR  Gastrointestinal: BS+, soft, NT/ND  Extremities: b/l 1+ edema   Vascular LUE aVF   TREVIN Rodriguez present   Skin warm and dry     Hospital Medications:   MEDICATIONS  (STANDING):  docusate sodium 100 milliGRAM(s) Oral daily  famotidine    Tablet 20 milliGRAM(s) Oral daily  metoprolol tartrate 100 milliGRAM(s) Oral every 12 hours  mycophenolate mofetil 1000 milliGRAM(s) Oral every 12 hours  NIFEdipine XL 90 milliGRAM(s) Oral daily  nystatin    Suspension 882525 Unit(s) Swish and Swallow four times a day  oxybutynin 5 milliGRAM(s) Oral two times a day  polyethylene glycol 3350 17 Gram(s) Oral daily  predniSONE   Tablet 5 milliGRAM(s) Oral daily  senna 2 Tablet(s) Oral at bedtime  tacrolimus 6 milliGRAM(s) Oral two times a day  tamsulosin 0.4 milliGRAM(s) Oral at bedtime  trimethoprim   80 mG/sulfamethoxazole 400 mG 1 Tablet(s) Oral daily  valGANciclovir 450 milliGRAM(s) Oral daily    Tacrolimus (), Serum: 5.6 ng/mL (08-06 @ 08:00)    LABS:  08-06    140  |  111<H>  |  31<H>  ----------------------------<  98  5.3   |  18<L>  |  3.08<H>    Ca    10.6<H>      06 Aug 2018 06:11  Phos  2.2     08-06  Mg     1.8     08-06      Creatinine Trend: 3.08 <--, 3.06 <--, 3.02 <--, 3.61 <--, 3.61 <--, 4.01 <--, 4.18 <--    Phosphorus Level, Serum: 2.2 mg/dL (08-06 @ 06:11)                              9.9    8.6   )-----------( 221      ( 06 Aug 2018 06:12 )             29.3     Urine Studies:  Urinalysis - [08-06-18 @ 14:10]      Color Orange / Appearance Turbid / SG 1.015 / pH 6.5      Gluc 100 / Ketone Negative  / Bili Negative / Urobili Negative       Blood Large / Protein >300 / Leuk Est Moderate / Nitrite Negative      RBC >720 /  / Hyaline 6 / Gran  / Sq Epi  / Non Sq Epi 0 / Bacteria Negative      HbA1c 5.1      [08-18-15 @ 18:19]        RADIOLOGY & ADDITIONAL STUDIES:

## 2018-08-06 NOTE — PROGRESS NOTE ADULT - ASSESSMENT
32 y/o gentleman with PMH of HTN and ESRD since 2010, POD#10 R sided DDRT anastomosed to R external iliac artery and vein Ureter anastomosed to bladder over double J stent  with post-operative course complicated by ureteral leak requiring re-op and reimplantation of ureter of transplanted kidney  now s/p Nephrostomy tube and creatinine slowly trending down. 04/21/2018 04/24/2018

## 2018-08-07 LAB
ANION GAP SERPL CALC-SCNC: 9 MMOL/L — SIGNIFICANT CHANGE UP (ref 5–17)
BUN SERPL-MCNC: 32 MG/DL — HIGH (ref 7–23)
CALCIUM SERPL-MCNC: 10.3 MG/DL — SIGNIFICANT CHANGE UP (ref 8.4–10.5)
CHLORIDE SERPL-SCNC: 109 MMOL/L — HIGH (ref 96–108)
CO2 SERPL-SCNC: 19 MMOL/L — LOW (ref 22–31)
CREAT SERPL-MCNC: 2.96 MG/DL — HIGH (ref 0.5–1.3)
GLUCOSE BLDC GLUCOMTR-MCNC: 109 MG/DL — HIGH (ref 70–99)
GLUCOSE BLDC GLUCOMTR-MCNC: 131 MG/DL — HIGH (ref 70–99)
GLUCOSE BLDC GLUCOMTR-MCNC: 95 MG/DL — SIGNIFICANT CHANGE UP (ref 70–99)
GLUCOSE BLDC GLUCOMTR-MCNC: 99 MG/DL — SIGNIFICANT CHANGE UP (ref 70–99)
GLUCOSE SERPL-MCNC: 98 MG/DL — SIGNIFICANT CHANGE UP (ref 70–99)
HCT VFR BLD CALC: 27.2 % — LOW (ref 39–50)
HGB BLD-MCNC: 9 G/DL — LOW (ref 13–17)
MAGNESIUM SERPL-MCNC: 1.8 MG/DL — SIGNIFICANT CHANGE UP (ref 1.6–2.6)
MCHC RBC-ENTMCNC: 29 PG — SIGNIFICANT CHANGE UP (ref 27–34)
MCHC RBC-ENTMCNC: 33 GM/DL — SIGNIFICANT CHANGE UP (ref 32–36)
MCV RBC AUTO: 87.8 FL — SIGNIFICANT CHANGE UP (ref 80–100)
PHOSPHATE SERPL-MCNC: 2.4 MG/DL — LOW (ref 2.5–4.5)
PLATELET # BLD AUTO: 203 K/UL — SIGNIFICANT CHANGE UP (ref 150–400)
POTASSIUM SERPL-MCNC: 5.6 MMOL/L — HIGH (ref 3.5–5.3)
POTASSIUM SERPL-SCNC: 5.6 MMOL/L — HIGH (ref 3.5–5.3)
RBC # BLD: 3.1 M/UL — LOW (ref 4.2–5.8)
RBC # FLD: 13.7 % — SIGNIFICANT CHANGE UP (ref 10.3–14.5)
SODIUM SERPL-SCNC: 137 MMOL/L — SIGNIFICANT CHANGE UP (ref 135–145)
TACROLIMUS SERPL-MCNC: 7 NG/ML — SIGNIFICANT CHANGE UP
WBC # BLD: 7.3 K/UL — SIGNIFICANT CHANGE UP (ref 3.8–10.5)
WBC # FLD AUTO: 7.3 K/UL — SIGNIFICANT CHANGE UP (ref 3.8–10.5)

## 2018-08-07 PROCEDURE — 99232 SBSQ HOSP IP/OBS MODERATE 35: CPT | Mod: GC,24

## 2018-08-07 PROCEDURE — 99232 SBSQ HOSP IP/OBS MODERATE 35: CPT | Mod: GC

## 2018-08-07 RX ORDER — FUROSEMIDE 40 MG
40 TABLET ORAL ONCE
Qty: 0 | Refills: 0 | Status: COMPLETED | OUTPATIENT
Start: 2018-08-07 | End: 2018-08-07

## 2018-08-07 RX ORDER — MAGNESIUM SULFATE 500 MG/ML
2 VIAL (ML) INJECTION ONCE
Qty: 0 | Refills: 0 | Status: COMPLETED | OUTPATIENT
Start: 2018-08-07 | End: 2018-08-07

## 2018-08-07 RX ADMIN — Medication 5 MILLIGRAM(S): at 06:15

## 2018-08-07 RX ADMIN — VALGANCICLOVIR 450 MILLIGRAM(S): 450 TABLET, FILM COATED ORAL at 12:11

## 2018-08-07 RX ADMIN — TAMSULOSIN HYDROCHLORIDE 0.4 MILLIGRAM(S): 0.4 CAPSULE ORAL at 22:42

## 2018-08-07 RX ADMIN — Medication 90 MILLIGRAM(S): at 06:15

## 2018-08-07 RX ADMIN — TRAMADOL HYDROCHLORIDE 50 MILLIGRAM(S): 50 TABLET ORAL at 12:09

## 2018-08-07 RX ADMIN — Medication 50 GRAM(S): at 14:09

## 2018-08-07 RX ADMIN — Medication 5 MILLIGRAM(S): at 17:42

## 2018-08-07 RX ADMIN — MYCOPHENOLATE MOFETIL 1000 MILLIGRAM(S): 250 CAPSULE ORAL at 06:15

## 2018-08-07 RX ADMIN — TRAMADOL HYDROCHLORIDE 50 MILLIGRAM(S): 50 TABLET ORAL at 23:40

## 2018-08-07 RX ADMIN — TRAMADOL HYDROCHLORIDE 50 MILLIGRAM(S): 50 TABLET ORAL at 07:11

## 2018-08-07 RX ADMIN — FAMOTIDINE 20 MILLIGRAM(S): 10 INJECTION INTRAVENOUS at 12:11

## 2018-08-07 RX ADMIN — Medication 40 MILLIGRAM(S): at 11:19

## 2018-08-07 RX ADMIN — TRAMADOL HYDROCHLORIDE 50 MILLIGRAM(S): 50 TABLET ORAL at 13:00

## 2018-08-07 RX ADMIN — TACROLIMUS 6 MILLIGRAM(S): 5 CAPSULE ORAL at 06:15

## 2018-08-07 RX ADMIN — TRAMADOL HYDROCHLORIDE 50 MILLIGRAM(S): 50 TABLET ORAL at 08:05

## 2018-08-07 RX ADMIN — TRAMADOL HYDROCHLORIDE 50 MILLIGRAM(S): 50 TABLET ORAL at 22:45

## 2018-08-07 RX ADMIN — TACROLIMUS 6 MILLIGRAM(S): 5 CAPSULE ORAL at 17:41

## 2018-08-07 RX ADMIN — MYCOPHENOLATE MOFETIL 1000 MILLIGRAM(S): 250 CAPSULE ORAL at 17:42

## 2018-08-07 RX ADMIN — Medication 500000 UNIT(S): at 12:11

## 2018-08-07 RX ADMIN — Medication 100 MILLIGRAM(S): at 06:15

## 2018-08-07 RX ADMIN — Medication 1 TABLET(S): at 12:11

## 2018-08-07 RX ADMIN — Medication 500000 UNIT(S): at 06:15

## 2018-08-07 RX ADMIN — Medication 500000 UNIT(S): at 17:42

## 2018-08-07 NOTE — PROGRESS NOTE ADULT - PROBLEM SELECTOR PLAN 2
BP well controlled.  continue current meds BP well controlled.  continue current meds  give Lasix 40 mg IV x1 for leg edema

## 2018-08-07 NOTE — PROGRESS NOTE ADULT - PROBLEM SELECTOR PLAN 1
s/p DDRT 07/23; Cold ischemia time14 hours.  CMV +, EBV +. Course complicated by post-operative ureteral leak s/p reimplantation of ureter of transplanted kidney on  07/29/2018 an subsequent IR placement of nephrostomy tube 7/31.   Scr trending down to 2.9.  On Tacrolimus, Cellcept, prednisone 5 mg,  Nystatin S&S, bactrim, and Valcyte   Goal FK levels 8-10  monitor bmp  monitor I/O. s/p DDRT 07/23; Cold ischemia time14 hours.  CMV +, EBV +. Course complicated by post-operative ureteral leak s/p reimplantation of ureter of transplanted kidney on  07/29/2018 an subsequent IR placement of nephrostomy tube 7/31.   Scr trending down to 2.9. repeat US with small amount gissell-transplant fluid, good perfusion and no RYAN. LACEY fluid Cr 3.02.  Keep LACEY drain.	  Continue Tacrolimus, Cellcept, prednisone 5 mg,  Nystatin S&S, bactrim, and Valcyte   Goal FK levels 8-10  monitor bmp  monitor I/O.

## 2018-08-07 NOTE — PROGRESS NOTE ADULT - ATTENDING COMMENTS
Still with XAVI of transplant kidney with slight improvement in creatinine.  Renal sonogram ok and low output from drain.  Maintain nephrostomy and marquez for small leak  F/u nephrostomy urine culture   Cont current immunosuppression.

## 2018-08-07 NOTE — PROGRESS NOTE ADULT - ATTENDING COMMENTS
No change in Tacrolimus dose I personally saw and examined patient.  IMMUNOSUPPRESSION:  No change in Tacrolimus dose

## 2018-08-07 NOTE — PROGRESS NOTE ADULT - SUBJECTIVE AND OBJECTIVE BOX
[facility] [place] [facility]   MD PERCY MORALES MD RUORU WONG, PA    TEL:  OFFICE: [phone]  DR OCONNOR CELL: [phone]  DAJA PRINGLE CELL: [phone]  DR. GAMBOA CELL: [phone]    RENAL FOLLOW UP NOTE  --------------------------------------------------------------------------------  HPI:  33M with PMH of HTN and ESRD since 2010 s/p DDRT 7/23.    Reimplantation of ureter of transplanted kidney  07/29/2018.   Had IR placement of nephrostomy tube on 7/31 for anastomotic leak  Pt seen and examined at bedside.  Denies sob, chest pain    PAST HISTORY  --------------------------------------------------------------------------------  No significant changes to PMH, PSH, FHx, SHx, unless otherwise noted    ALLERGIES & MEDICATIONS  --------------------------------------------------------------------------------  Allergies    IV Contrast (Rash)  nyquil (Rhinorrhea)  vancomycin (Pruritus; Hives)    Intolerances      Standing Inpatient Medications  docusate sodium 100 milliGRAM(s) Oral daily  famotidine    Tablet 20 milliGRAM(s) Oral daily  metoprolol tartrate 100 milliGRAM(s) Oral every 12 hours  mycophenolate mofetil 1000 milliGRAM(s) Oral every 12 hours  NIFEdipine XL 90 milliGRAM(s) Oral daily  nystatin    Suspension 906328 Unit(s) Swish and Swallow four times a day  oxybutynin 5 milliGRAM(s) Oral two times a day  polyethylene glycol 3350 17 Gram(s) Oral daily  predniSONE   Tablet 5 milliGRAM(s) Oral daily  senna 2 Tablet(s) Oral at bedtime  tacrolimus 6 milliGRAM(s) Oral two times a day  tamsulosin 0.4 milliGRAM(s) Oral at bedtime  trimethoprim   80 mG/sulfamethoxazole 400 mG 1 Tablet(s) Oral daily  valGANciclovir 450 milliGRAM(s) Oral daily    PRN Inpatient Medications  acetaminophen   Tablet. 650 milliGRAM(s) Oral every 6 hours PRN  diphenhydrAMINE   Capsule 25 milliGRAM(s) Oral every 6 hours PRN  lidocaine 2% Gel 1 Application(s) Topical three times a day PRN  metoclopramide Injectable 10 milliGRAM(s) IV Push once PRN  ondansetron Injectable 4 milliGRAM(s) IV Push every 6 hours PRN  ondansetron Injectable 4 milliGRAM(s) IV Push once PRN  simethicone 80 milliGRAM(s) Chew every 6 hours PRN  traMADol 50 milliGRAM(s) Oral every 4 hours PRN  traMADol 25 milliGRAM(s) Oral every 4 hours PRN  zinc oxide 20% Ointment 1 Application(s) Topical three times a day PRN      REVIEW OF SYSTEMS  --------------------------------------------------------------------------------  General: no fever  CVS: no chest pain  RESP: no sob, no cough  ABD: no abdominal pain  : + alma WARDK: + edema     VITALS/PHYSICAL EXAM  --------------------------------------------------------------------------------  T(C): 37.1 (08-07-18 @ 10:35), Max: 37.1 (08-07-18 @ 10:35)  HR: 61 (08-07-18 @ 10:35) (61 - 77)  BP: 149/95 (08-07-18 @ 10:35) (130/79 - 149/95)  RR: 18 (08-07-18 @ 10:35) (18 - 18)  SpO2: 96% (08-07-18 @ 10:35) (96% - 100%)  Wt(kg): --        08-06-18 @ 07:01  -  08-07-18 @ 07:00  --------------------------------------------------------  IN: 600 mL / OUT: 1490 mL / NET: -890 mL    08-07-18 @ 07:01  -  08-07-18 @ 11:51  --------------------------------------------------------  IN: 0 mL / OUT: 200 mL / NET: -200 mL      Physical Exam:  	Constitutional: NAD  Neck: No JVD  HENT: PerrLA, EOMI  Respiratory: CTAB, no wheezes, rales or rhonchi  Cardiovascular: S1, S2, RRR  Gastrointestinal: BS+, soft, NT/ND  Extremities: b/l 1+ edema   Vascular LUE Darcy Rodriguez present   Skin warm and dry     LABS/STUDIES  --------------------------------------------------------------------------------              9.0    7.3   >-----------<  203      [08-07-18 @ 05:56]              27.2     137  |  109  |  32  ----------------------------<  98      [08-07-18 @ 05:56]  5.6   |  19  |  2.96        Ca     10.3     [08-07-18 @ 05:56]      Mg     1.8     [08-07-18 @ 05:56]      Phos  2.4     [08-07-18 @ 05:56]            Creatinine Trend:  SCr 2.96 [08-07 @ 05:56]  SCr 3.08 [08-06 @ 06:11]  SCr 3.06 [08-05 @ 06:18]  SCr 3.02 [08-04 @ 06:53]  SCr 3.61 [08-03 @ 06:03]    Urinalysis - [08-06-18 @ 21:43]      Color Yellow / Appearance Clear / SG 1.018 / pH 6.0      Gluc 100 / Ketone Negative  / Bili Negative / Urobili Negative       Blood Moderate / Protein 100 / Leuk Est Negative / Nitrite Negative      RBC 69 / WBC 10 / Hyaline 8 / Gran  / Sq Epi  / Non Sq Epi 3 / Bacteria Negative      HbA1c 5.1      [08-18-15 @ 18:19]

## 2018-08-07 NOTE — PROGRESS NOTE ADULT - ASSESSMENT
33M with PMH of HTN and ESRD since 2010 s/p DDRT 7/23. Cold ischemia time14 hours.  CMV +, EBV +. Course complicated by post-operative ureteral leak s/p reimplantation of ureter of transplanted kidney on  07/29/2018 an subsequent IR placement of nephrostomy tube 7/31. Creat stable. 33M with PMH of HTN and ESRD since 2010 s/p DDRT 7/23. Cold ischemia time14 hours.  CMV +, EBV +. Course complicated by post-operative ureteral leak s/p reimplantation of ureter of transplanted kidney on  07/29/2018 an subsequent IR placement of nephrostomy tube 7/31. Creatinine stable.

## 2018-08-07 NOTE — PROGRESS NOTE ADULT - PROBLEM SELECTOR PLAN 1
07/29/2018 reimplantation of ureter of transplanted kidney for ureteral leak  7/31/08 nephrostomy tube  Creatinine slowly trending down.   -Keep Rodriguez   -Do not compress LACEY. Drain and record output q 6 hours.   - Strict I/Os  -Continue Tylenol and tramadol PRN  -Continue bowel regimen, Flomax 0.4 daily   -daily BMP  - Blood sugars well controlled. Fingersticks before meals and bedtime.  -On Oxybutynin for bladder spasm  - SCDs, ambulate. 07/29/2018 reimplantation of ureter of transplanted kidney for ureteral leak  7/31/08 nephrostomy tube  Creatinine slowly trending down.  Give Lasix 40mg IV once today  -Keep Rodriguez   -Do not compress LACEY. Drain and record output q 6 hours.   - Strict I/Os  -Continue Tylenol and tramadol PRN  -Continue bowel regimen, Flomax 0.4 daily   -daily BMP  - Blood sugars well controlled. Fingersticks before meals and bedtime.  -On Oxybutynin for bladder spasm  - SCDs, ambulate.

## 2018-08-07 NOTE — PROGRESS NOTE ADULT - PROBLEM SELECTOR PLAN 1
s/p DDRT 07/23 c/b post-operative ureteral leak s/p reimplantation of ureter of transplanted kidney on  07/29/2018 and subsequent IR placement of nephrostomy tube 7/31.   Renal function elevated however stable  Pt with good urine output   Continue immunosuppressants per transplant team  Continue  Nystatin S&S, bactrim, and Valcyte for PPX   renal sonogram with no hydro/ RYAN, follow up transplant team  monitor bmp

## 2018-08-07 NOTE — PROGRESS NOTE ADULT - ASSESSMENT
32 y/o gentleman with PMH of HTN and ESRD since 2010, POD#10 R sided DDRT anastomosed to R external iliac artery and vein Ureter anastomosed to bladder over double J stent  with post-operative course complicated by ureteral leak requiring re-op and reimplantation of ureter of transplanted kidney and  Nephrostomy tube with slowly downtrending creatinine.

## 2018-08-07 NOTE — CHART NOTE - NSCHARTNOTEFT_GEN_A_CORE
Pt seen for nutrition follow up S/P kidney transplant, as per departmental protocol.     Source: Patient [X ]    Family [ ]     other [X ]; medical record; interdisciplinary Transplant Team rounds    Hospital Course: 32 yo male with PMH of HTN, ESRD on HD (MWF). Pt POD#4 S/P right sided DDRT to right external iliac arty and vein on (7/23) with cold time of 14hrs. Course complicated by patient S/P reimplantation of ureter of transplanted kidney on (7/29) and S/P R nephrostomy tube placement with IR 2/2 anastomotic leak on (7/31).     Pt noted with 1640ml urine output in past 24-hours. Magnesium + sodium WNL, potassium high, and phosphorus low.     Diet : No Concentrated Potassium, No Concentrated Phosphorous Diet  PO intake:  % [x]  Source for PO intake [X ] Patient   Patient seen at bedside, observed with breakfast untouched. States he woke up a few minutes prior and is planning to have his breakfast shortly. Endorses good PO intakes, states his appetite is variable but he makes sure he is eating and had a large dinner last night. States family is bringing food from home 2/2 reported distaste for institutionalized foods/meat. Denies any nausea/emesis. Last BM 8/7. Good retention of post-transplant diet education noted.     Current Weight: (8/7) 154.1 pounds; weight appears to be downtrending; however, pt states he is now at his UBW & suspects wt loss 2/2 fluid shifts  Edema: 1+ scrotum, R ankle edema    Pertinent Medications: MEDICATIONS  (STANDING):  docusate sodium 100 milliGRAM(s) Oral daily  famotidine    Tablet 20 milliGRAM(s) Oral daily  metoprolol tartrate 100 milliGRAM(s) Oral every 12 hours  mycophenolate mofetil 1000 milliGRAM(s) Oral every 12 hours  NIFEdipine XL 90 milliGRAM(s) Oral daily  nystatin    Suspension 402012 Unit(s) Swish and Swallow four times a day  oxybutynin 5 milliGRAM(s) Oral two times a day  polyethylene glycol 3350 17 Gram(s) Oral daily  predniSONE   Tablet 5 milliGRAM(s) Oral daily  senna 2 Tablet(s) Oral at bedtime  tacrolimus 6 milliGRAM(s) Oral two times a day  tamsulosin 0.4 milliGRAM(s) Oral at bedtime  trimethoprim   80 mG/sulfamethoxazole 400 mG 1 Tablet(s) Oral daily  valGANciclovir 450 milliGRAM(s) Oral daily    MEDICATIONS  (PRN):  acetaminophen   Tablet. 650 milliGRAM(s) Oral every 6 hours PRN Mild Pain (1 - 3)  diphenhydrAMINE   Capsule 25 milliGRAM(s) Oral every 6 hours PRN Rash and/or Itching  lidocaine 2% Gel 1 Application(s) Topical three times a day PRN pain related to urinary catheter  metoclopramide Injectable 10 milliGRAM(s) IV Push once PRN Nausea and/or Vomiting  ondansetron Injectable 4 milliGRAM(s) IV Push every 6 hours PRN Nausea  ondansetron Injectable 4 milliGRAM(s) IV Push once PRN Nausea and/or Vomiting  simethicone 80 milliGRAM(s) Chew every 6 hours PRN Gas  traMADol 50 milliGRAM(s) Oral every 4 hours PRN Severe Pain (7 - 10)  traMADol 25 milliGRAM(s) Oral every 4 hours PRN Moderate Pain (4 - 6)  zinc oxide 20% Ointment 1 Application(s) Topical three times a day PRN rash    Pertinent Labs:  (8/7) POCT BG 95, K 5.6H, Cl 109H, CO2 19L, BUN 32H, Cr 2.96H, Phos 2.4L; (8/6) POCT BG , Phos 2.2L    Skin: no pressure injuries    Estimated Needs:   [X ] no change since previous assessment  [ ] recalculated:     Previous Nutrition Diagnosis:   [X] Increased Nutrient Needs    Nutrition Diagnosis is [X ] ongoing; addressed with PO diet     New Nutrition Diagnosis: [X ] not applicable    Interventions:     Recommend:  1) Recommend No Concentrated Potassium Diet. Phosphorous restriction not indicated at this time given low level x2 days. Encourage intake of high protein, nutrient dense foods  2) Monitor weight, lab values, skin, po intake and GI tolerance  3) Reinforce therapeutic diet education as able    Monitoring and Evaluation:   Follow up per protocol  RD to remain available for further nutritional interventions as indicated.   Poornima Madsen, BORIS Pager #[phone]

## 2018-08-07 NOTE — PROGRESS NOTE ADULT - SUBJECTIVE AND OBJECTIVE BOX
[facility] DIVISION OF KIDNEY DISEASES AND HYPERTENSION -- FOLLOW UP NOTE  --------------------------------------------------------------------------------  Chief Complaint:    24 hour events/subjective:        PAST HISTORY  --------------------------------------------------------------------------------  No significant changes to PMH, PSH, FHx, SHx, unless otherwise noted    ALLERGIES & MEDICATIONS  --------------------------------------------------------------------------------  Allergies    IV Contrast (Rash)  nyquil (Rhinorrhea)  vancomycin (Pruritus; Hives)    Intolerances      Standing Inpatient Medications  docusate sodium 100 milliGRAM(s) Oral daily  famotidine    Tablet 20 milliGRAM(s) Oral daily  metoprolol tartrate 100 milliGRAM(s) Oral every 12 hours  mycophenolate mofetil 1000 milliGRAM(s) Oral every 12 hours  NIFEdipine XL 90 milliGRAM(s) Oral daily  nystatin    Suspension 317276 Unit(s) Swish and Swallow four times a day  oxybutynin 5 milliGRAM(s) Oral two times a day  polyethylene glycol 3350 17 Gram(s) Oral daily  predniSONE   Tablet 5 milliGRAM(s) Oral daily  senna 2 Tablet(s) Oral at bedtime  tacrolimus 6 milliGRAM(s) Oral two times a day  tamsulosin 0.4 milliGRAM(s) Oral at bedtime  trimethoprim   80 mG/sulfamethoxazole 400 mG 1 Tablet(s) Oral daily  valGANciclovir 450 milliGRAM(s) Oral daily    PRN Inpatient Medications  acetaminophen   Tablet. 650 milliGRAM(s) Oral every 6 hours PRN  diphenhydrAMINE   Capsule 25 milliGRAM(s) Oral every 6 hours PRN  lidocaine 2% Gel 1 Application(s) Topical three times a day PRN  metoclopramide Injectable 10 milliGRAM(s) IV Push once PRN  ondansetron Injectable 4 milliGRAM(s) IV Push every 6 hours PRN  ondansetron Injectable 4 milliGRAM(s) IV Push once PRN  simethicone 80 milliGRAM(s) Chew every 6 hours PRN  traMADol 50 milliGRAM(s) Oral every 4 hours PRN  traMADol 25 milliGRAM(s) Oral every 4 hours PRN  zinc oxide 20% Ointment 1 Application(s) Topical three times a day PRN      REVIEW OF SYSTEMS  --------------------------------------------------------------------------------  Gen: No weight changes, fatigue, fevers/chills, weakness  Skin: No rashes  Head/Eyes/Ears/Mouth: No headache; Normal hearing; Normal vision w/o blurriness; No sinus pain/discomfort, sore throat  Respiratory: No dyspnea, cough, wheezing, hemoptysis  CV: No chest pain, PND, orthopnea  GI: No abdominal pain, diarrhea, constipation, nausea, vomiting, melena, hematochezia  : No increased frequency, dysuria, hematuria, nocturia  MSK: No joint pain/swelling; no back pain; no edema  Neuro: No dizziness/lightheadedness, weakness, seizures, numbness, tingling  Heme: No easy bruising or bleeding  Endo: No heat/cold intolerance  Psych: No significant nervousness, anxiety, stress, depression    All other systems were reviewed and are negative, except as noted.    VITALS/PHYSICAL EXAM  --------------------------------------------------------------------------------  T(C): 37.1 (08-07-18 @ 10:35), Max: 37.1 (08-07-18 @ 10:35)  HR: 61 (08-07-18 @ 10:35) (61 - 77)  BP: 149/95 (08-07-18 @ 10:35) (130/79 - 149/95)  RR: 18 (08-07-18 @ 10:35) (18 - 18)  SpO2: 96% (08-07-18 @ 10:35) (96% - 100%)  Wt(kg): --        08-06-18 @ 07:01  -  08-07-18 @ 07:00  --------------------------------------------------------  IN: 600 mL / OUT: 1490 mL / NET: -890 mL    08-07-18 @ 07:01  -  08-07-18 @ 11:27  --------------------------------------------------------  IN: 0 mL / OUT: 200 mL / NET: -200 mL      Physical Exam:  	Gen: NAD, well-appearing  	HEENT: PERRL, supple neck, clear oropharynx  	Pulm: CTA B/L  	CV: RRR, S1S2; no rub  	Back: No spinal or CVA tenderness; no sacral edema  	Abd: +BS, soft, nontender/nondistended  	: No suprapubic tenderness  	UE: Warm, FROM, no clubbing, intact strength; no edema; no asterixis  	LE: Warm, FROM, no clubbing, intact strength; no edema  	Neuro: No focal deficits, intact gait  	Psych: Normal affect and mood  	Skin: Warm, without rashes  	Vascular access:    LABS/STUDIES  --------------------------------------------------------------------------------              9.0    7.3   >-----------<  203      [08-07-18 @ 05:56]              27.2     137  |  109  |  32  ----------------------------<  98      [08-07-18 @ 05:56]  5.6   |  19  |  2.96        Ca     10.3     [08-07-18 @ 05:56]      Mg     1.8     [08-07-18 @ 05:56]      Phos  2.4     [08-07-18 @ 05:56]            Creatinine Trend:  SCr 2.96 [08-07 @ 05:56]  SCr 3.08 [08-06 @ 06:11]  SCr 3.06 [08-05 @ 06:18]  SCr 3.02 [08-04 @ 06:53]  SCr 3.61 [08-03 @ 06:03]    Urinalysis - [08-06-18 @ 21:43]      Color Yellow / Appearance Clear / SG 1.018 / pH 6.0      Gluc 100 / Ketone Negative  / Bili Negative / Urobili Negative       Blood Moderate / Protein 100 / Leuk Est Negative / Nitrite Negative      RBC 69 / WBC 10 / Hyaline 8 / Gran  / Sq Epi  / Non Sq Epi 3 / Bacteria Negative      HbA1c 5.1      [08-18-15 @ 18:19] [facility] DIVISION OF KIDNEY DISEASES AND HYPERTENSION -- FOLLOW UP NOTE  --------------------------------------------------------------------------------  Chief Complaint: DDRT    24 hour events/subjective:  No acute event overnight.   Repeat renal U/S with small amount gissell-transplant fluid, good perfusion and no RYAN.    pt complains of leg swelling.    PAST HISTORY  --------------------------------------------------------------------------------  No significant changes to PMH, PSH, FHx, SHx, unless otherwise noted    ALLERGIES & MEDICATIONS  --------------------------------------------------------------------------------  Allergies    IV Contrast (Rash)  nyquil (Rhinorrhea)  vancomycin (Pruritus; Hives)    Intolerances      Standing Inpatient Medications  docusate sodium 100 milliGRAM(s) Oral daily  famotidine    Tablet 20 milliGRAM(s) Oral daily  metoprolol tartrate 100 milliGRAM(s) Oral every 12 hours  mycophenolate mofetil 1000 milliGRAM(s) Oral every 12 hours  NIFEdipine XL 90 milliGRAM(s) Oral daily  nystatin    Suspension 038598 Unit(s) Swish and Swallow four times a day  oxybutynin 5 milliGRAM(s) Oral two times a day  polyethylene glycol 3350 17 Gram(s) Oral daily  predniSONE   Tablet 5 milliGRAM(s) Oral daily  senna 2 Tablet(s) Oral at bedtime  tacrolimus 6 milliGRAM(s) Oral two times a day  tamsulosin 0.4 milliGRAM(s) Oral at bedtime  trimethoprim   80 mG/sulfamethoxazole 400 mG 1 Tablet(s) Oral daily  valGANciclovir 450 milliGRAM(s) Oral daily    PRN Inpatient Medications  acetaminophen   Tablet. 650 milliGRAM(s) Oral every 6 hours PRN  diphenhydrAMINE   Capsule 25 milliGRAM(s) Oral every 6 hours PRN  lidocaine 2% Gel 1 Application(s) Topical three times a day PRN  metoclopramide Injectable 10 milliGRAM(s) IV Push once PRN  ondansetron Injectable 4 milliGRAM(s) IV Push every 6 hours PRN  ondansetron Injectable 4 milliGRAM(s) IV Push once PRN  simethicone 80 milliGRAM(s) Chew every 6 hours PRN  traMADol 50 milliGRAM(s) Oral every 4 hours PRN  traMADol 25 milliGRAM(s) Oral every 4 hours PRN  zinc oxide 20% Ointment 1 Application(s) Topical three times a day PRN      REVIEW OF SYSTEMS    --------------------------------------------------------------------------------  Gen: No weakness  Skin: No rashes  Head/Eyes/Ears/Mouth: No headache  Respiratory: No dyspnea  CV: No chest pain, PND, orthopnea  GI: No abdominal pain, diarrhea  MSK: No edema  Neuro: No dizziness/lightheadedness      All other systems were reviewed and are negative, except as noted.    VITALS/PHYSICAL EXAM  --------------------------------------------------------------------------------  T(C): 37.1 (08-07-18 @ 10:35), Max: 37.1 (08-07-18 @ 10:35)  HR: 61 (08-07-18 @ 10:35) (61 - 77)  BP: 149/95 (08-07-18 @ 10:35) (130/79 - 149/95)  RR: 18 (08-07-18 @ 10:35) (18 - 18)  SpO2: 96% (08-07-18 @ 10:35) (96% - 100%)  Wt(kg): --        08-06-18 @ 07:01  -  08-07-18 @ 07:00  --------------------------------------------------------  IN: 600 mL / OUT: 1490 mL / NET: -890 mL    08-07-18 @ 07:01  -  08-07-18 @ 11:27  --------------------------------------------------------  IN: 0 mL / OUT: 200 mL / NET: -200 mL      Physical Exam:  	Gen: NAD  	Pulm: CTA B/L  	CV: RRR, S1S2; no rub  	Abd: +BS, soft, nontender/nondistended  	: No suprapubic tenderness, marquez+  	UE: Warm, no asterixis  	LE: Warm, b/l LE edema, palpable DP pulses b/l   	Skin: Warm, without rashes  	Vascular access: NINI NORWOOD    LABS/STUDIES  --------------------------------------------------------------------------------              9.0    7.3   >-----------<  203      [08-07-18 @ 05:56]              27.2     137  |  109  |  32  ----------------------------<  98      [08-07-18 @ 05:56]  5.6   |  19  |  2.96        Ca     10.3     [08-07-18 @ 05:56]      Mg     1.8     [08-07-18 @ 05:56]      Phos  2.4     [08-07-18 @ 05:56]            Creatinine Trend:  SCr 2.96 [08-07 @ 05:56]  SCr 3.08 [08-06 @ 06:11]  SCr 3.06 [08-05 @ 06:18]  SCr 3.02 [08-04 @ 06:53]  SCr 3.61 [08-03 @ 06:03]    Urinalysis - [08-06-18 @ 21:43]      Color Yellow / Appearance Clear / SG 1.018 / pH 6.0      Gluc 100 / Ketone Negative  / Bili Negative / Urobili Negative       Blood Moderate / Protein 100 / Leuk Est Negative / Nitrite Negative      RBC 69 / WBC 10 / Hyaline 8 / Gran  / Sq Epi  / Non Sq Epi 3 / Bacteria Negative      HbA1c 5.1      [08-18-15 @ 18:19]

## 2018-08-07 NOTE — PROGRESS NOTE ADULT - SUBJECTIVE AND OBJECTIVE BOX
Transplant Surgery - Multidisciplinary Rounds  --------------------------------------------------------------  DDRT 7/23/18   POD15    Present:  Patient seen with multidisciplinary team (surgeon, Nephrologist, pharmacist, nurse, nurse manager, NP, MD, , MD student)  in am rounds and examined with Dr. Brown.  32 y/o gent suman POD#15 R sided DDRT anastomosed to R external iliac artery and vein Ureter anastomosed to bladder over double J stent.  Cold ischemia time14 hours.  CMV +, EBV +. Course complicated by post-operative ureteral leak s/p reimplantation of ureter of transplanted kidney on  07/29/2018 and subsequent IR placement of nephrostomy tube 7/31.     No acute event overnight. Repeat renal usg demonstrated no hydro, small amount gissell-transplant fluid, good perfusion and no RYAN.  LACEY creatinine 3.02       MEDICATIONS  (STANDING):  docusate sodium 100 milliGRAM(s) Oral daily  famotidine    Tablet 20 milliGRAM(s) Oral daily  metoprolol tartrate 100 milliGRAM(s) Oral every 12 hours  mycophenolate mofetil 1000 milliGRAM(s) Oral every 12 hours  NIFEdipine XL 90 milliGRAM(s) Oral daily  nystatin    Suspension [id number] Unit(s) Swish and Swallow four times a day  oxybutynin 5 milliGRAM(s) Oral two times a day  polyethylene glycol 3350 17 Gram(s) Oral daily  predniSONE   Tablet 5 milliGRAM(s) Oral daily  senna 2 Tablet(s) Oral at bedtime  tacrolimus 6 milliGRAM(s) Oral two times a day  tamsulosin 0.4 milliGRAM(s) Oral at bedtime  trimethoprim   80 mG/sulfamethoxazole 400 mG 1 Tablet(s) Oral daily  valGANciclovir 450 milliGRAM(s) Oral daily    MEDICATIONS  (PRN):  acetaminophen   Tablet. 650 milliGRAM(s) Oral every 6 hours PRN Mild Pain (1 - 3)  diphenhydrAMINE   Capsule 25 milliGRAM(s) Oral every 6 hours PRN Rash and/or Itching  lidocaine 2% Gel 1 Application(s) Topical three times a day PRN pain related to urinary catheter  metoclopramide Injectable 10 milliGRAM(s) IV Push once PRN Nausea and/or Vomiting  ondansetron Injectable 4 milliGRAM(s) IV Push every 6 hours PRN Nausea  ondansetron Injectable 4 milliGRAM(s) IV Push once PRN Nausea and/or Vomiting  simethicone 80 milliGRAM(s) Chew every 6 hours PRN Gas  traMADol 50 milliGRAM(s) Oral every 4 hours PRN Severe Pain (7 - 10)  traMADol 25 milliGRAM(s) Oral every 4 hours PRN Moderate Pain (4 - 6)  zinc oxide 20% Ointment 1 Application(s) Topical three times a day PRN rash      PAST MEDICAL & SURGICAL HISTORY:  ESRD on dialysis  HTN (hypertension)  No significant past surgical history      Vital Signs Last 24 Hrs  T(C): 37.1 (07 Aug 2018 10:35), Max: 37.1 (07 Aug 2018 10:35)  T(F): 98.7 (07 Aug 2018 10:35), Max: 98.7 (07 Aug 2018 10:35)  HR: 61 (07 Aug 2018 10:35) (61 - 77)  BP: 149/95 (07 Aug 2018 10:35) (130/79 - 149/95)  BP(mean): --  RR: 18 (07 Aug 2018 10:35) (18 - 18)  SpO2: 96% (07 Aug 2018 10:35) (96% - 100%)    I&O's Summary    06 Aug 2018 07:01  -  07 Aug 2018 07:00  --------------------------------------------------------  IN: 600 mL / OUT: 1490 mL / NET: -890 mL    07 Aug 2018 07:01  -  07 Aug 2018 12:01  --------------------------------------------------------  IN: 0 mL / OUT: 500 mL / NET: -500 mL                              9.0    7.3   )-----------( 203      ( 07 Aug 2018 05:56 )             27.2     08-07    137  |  109<H>  |  32<H>  ----------------------------<  98  5.6<H>   |  19<L>  |  2.96<H>    Ca    10.3      07 Aug 2018 05:56  Phos  2.4     08-07  Mg     1.8     08-07      Tacrolimus (), Serum: 7.0 ng/mL (08-07 @ 08:03)    Review of systems  Gen: No weight changes, fatigue, fevers/chills, weakness  Skin: No rashes  Head/Eyes/Ears/Mouth: No headache; Normal hearing; Normal vision w/o blurriness; No sinus pain/discomfort, sore throat  Respiratory: No dyspnea, cough, wheezing, hemoptysis  CV: No chest pain, PND, orthopnea  GI: Reports incisional pain and TTP, denies diarrhea, constipation, nausea, vomiting, melena, hematochezia. LACEY patent  : Marquez in situ  MSK: No joint pain/swelling; no back pain;   Neuro: No dizziness/lightheadedness, weakness, seizures, numbness, tingling  Heme: No easy bruising or bleeding  Endo: No heat/cold intolerance  Psych: No significant nervousness, anxiety, stress, depression  All other systems were reviewed and are negative, except as noted.    PHYSICAL EXAM:  Constitutional: Well developed / well nourished  Eyes: Anicteric, PERRLA  ENMT: nc/at  Neck: supple  Respiratory: CTA B/L  Cardiovascular: RRR  Gastrointestinal: Soft abdomen, mild tender to touch at surgical site, ND  Genitourinary: marquez in situ.  LACEY, nephrostomy tube patent  Extremities: SCD's in place and working bilaterally, BLE edema R>L  Vascular: Palpable dp pulses bilaterally  Neurological: A&O x3  Skin: wound open to air, no erythema and evidence of infection noted  Musculoskeletal: Moving all extremities  Psychiatric: Responsive

## 2018-08-08 LAB
ANION GAP SERPL CALC-SCNC: 10 MMOL/L — SIGNIFICANT CHANGE UP (ref 5–17)
BUN SERPL-MCNC: 31 MG/DL — HIGH (ref 7–23)
CALCIUM SERPL-MCNC: 10.4 MG/DL — SIGNIFICANT CHANGE UP (ref 8.4–10.5)
CHLORIDE SERPL-SCNC: 110 MMOL/L — HIGH (ref 96–108)
CO2 SERPL-SCNC: 20 MMOL/L — LOW (ref 22–31)
CREAT SERPL-MCNC: 2.93 MG/DL — HIGH (ref 0.5–1.3)
CULTURE RESULTS: NO GROWTH — SIGNIFICANT CHANGE UP
GLUCOSE BLDC GLUCOMTR-MCNC: 126 MG/DL — HIGH (ref 70–99)
GLUCOSE BLDC GLUCOMTR-MCNC: 90 MG/DL — SIGNIFICANT CHANGE UP (ref 70–99)
GLUCOSE BLDC GLUCOMTR-MCNC: 90 MG/DL — SIGNIFICANT CHANGE UP (ref 70–99)
GLUCOSE BLDC GLUCOMTR-MCNC: 97 MG/DL — SIGNIFICANT CHANGE UP (ref 70–99)
GLUCOSE SERPL-MCNC: 102 MG/DL — HIGH (ref 70–99)
HCT VFR BLD CALC: 30.5 % — LOW (ref 39–50)
HGB BLD-MCNC: 10.1 G/DL — LOW (ref 13–17)
MAGNESIUM SERPL-MCNC: 2 MG/DL — SIGNIFICANT CHANGE UP (ref 1.6–2.6)
MCHC RBC-ENTMCNC: 29.3 PG — SIGNIFICANT CHANGE UP (ref 27–34)
MCHC RBC-ENTMCNC: 33.2 GM/DL — SIGNIFICANT CHANGE UP (ref 32–36)
MCV RBC AUTO: 88.1 FL — SIGNIFICANT CHANGE UP (ref 80–100)
PHOSPHATE SERPL-MCNC: 3.1 MG/DL — SIGNIFICANT CHANGE UP (ref 2.5–4.5)
PLATELET # BLD AUTO: 196 K/UL — SIGNIFICANT CHANGE UP (ref 150–400)
POTASSIUM SERPL-MCNC: 5.2 MMOL/L — SIGNIFICANT CHANGE UP (ref 3.5–5.3)
POTASSIUM SERPL-SCNC: 5.2 MMOL/L — SIGNIFICANT CHANGE UP (ref 3.5–5.3)
RBC # BLD: 3.46 M/UL — LOW (ref 4.2–5.8)
RBC # FLD: 13.8 % — SIGNIFICANT CHANGE UP (ref 10.3–14.5)
SODIUM SERPL-SCNC: 140 MMOL/L — SIGNIFICANT CHANGE UP (ref 135–145)
SPECIMEN SOURCE: SIGNIFICANT CHANGE UP
TACROLIMUS SERPL-MCNC: 9 NG/ML — SIGNIFICANT CHANGE UP
WBC # BLD: 6.2 K/UL — SIGNIFICANT CHANGE UP (ref 3.8–10.5)
WBC # FLD AUTO: 6.2 K/UL — SIGNIFICANT CHANGE UP (ref 3.8–10.5)

## 2018-08-08 PROCEDURE — 99232 SBSQ HOSP IP/OBS MODERATE 35: CPT | Mod: GC

## 2018-08-08 PROCEDURE — 99232 SBSQ HOSP IP/OBS MODERATE 35: CPT | Mod: GC,24

## 2018-08-08 RX ADMIN — SENNA PLUS 2 TABLET(S): 8.6 TABLET ORAL at 22:18

## 2018-08-08 RX ADMIN — MYCOPHENOLATE MOFETIL 1000 MILLIGRAM(S): 250 CAPSULE ORAL at 06:18

## 2018-08-08 RX ADMIN — TACROLIMUS 6 MILLIGRAM(S): 5 CAPSULE ORAL at 06:19

## 2018-08-08 RX ADMIN — TRAMADOL HYDROCHLORIDE 50 MILLIGRAM(S): 50 TABLET ORAL at 06:23

## 2018-08-08 RX ADMIN — FAMOTIDINE 20 MILLIGRAM(S): 10 INJECTION INTRAVENOUS at 12:37

## 2018-08-08 RX ADMIN — Medication 5 MILLIGRAM(S): at 06:18

## 2018-08-08 RX ADMIN — TACROLIMUS 6 MILLIGRAM(S): 5 CAPSULE ORAL at 17:28

## 2018-08-08 RX ADMIN — Medication 500000 UNIT(S): at 22:18

## 2018-08-08 RX ADMIN — TRAMADOL HYDROCHLORIDE 50 MILLIGRAM(S): 50 TABLET ORAL at 13:10

## 2018-08-08 RX ADMIN — Medication 500000 UNIT(S): at 12:37

## 2018-08-08 RX ADMIN — TAMSULOSIN HYDROCHLORIDE 0.4 MILLIGRAM(S): 0.4 CAPSULE ORAL at 22:19

## 2018-08-08 RX ADMIN — TRAMADOL HYDROCHLORIDE 50 MILLIGRAM(S): 50 TABLET ORAL at 22:45

## 2018-08-08 RX ADMIN — Medication 100 MILLIGRAM(S): at 06:18

## 2018-08-08 RX ADMIN — MYCOPHENOLATE MOFETIL 1000 MILLIGRAM(S): 250 CAPSULE ORAL at 17:28

## 2018-08-08 RX ADMIN — Medication 1 TABLET(S): at 12:39

## 2018-08-08 RX ADMIN — TRAMADOL HYDROCHLORIDE 50 MILLIGRAM(S): 50 TABLET ORAL at 07:00

## 2018-08-08 RX ADMIN — Medication 100 MILLIGRAM(S): at 12:37

## 2018-08-08 RX ADMIN — TRAMADOL HYDROCHLORIDE 50 MILLIGRAM(S): 50 TABLET ORAL at 22:18

## 2018-08-08 RX ADMIN — Medication 100 MILLIGRAM(S): at 17:29

## 2018-08-08 RX ADMIN — TRAMADOL HYDROCHLORIDE 50 MILLIGRAM(S): 50 TABLET ORAL at 12:40

## 2018-08-08 RX ADMIN — Medication 90 MILLIGRAM(S): at 06:18

## 2018-08-08 RX ADMIN — VALGANCICLOVIR 450 MILLIGRAM(S): 450 TABLET, FILM COATED ORAL at 12:37

## 2018-08-08 RX ADMIN — Medication 500000 UNIT(S): at 00:44

## 2018-08-08 RX ADMIN — Medication 500000 UNIT(S): at 17:29

## 2018-08-08 RX ADMIN — Medication 500000 UNIT(S): at 06:18

## 2018-08-08 RX ADMIN — Medication 5 MILLIGRAM(S): at 17:29

## 2018-08-08 NOTE — PROGRESS NOTE ADULT - SUBJECTIVE AND OBJECTIVE BOX
Transplant Surgery - Multidisciplinary Rounds  --------------------------------------------------------------  DDRT 7/23/2018   POD 16  Reimplantation of ureter of transplanted kidney  07/29/2018   IR nephrostomy 7/31/2018    Present:  Patient seen with multidisciplinary team (surgeon, Nephrologist, pharmacist, NP, resident MD, MD student)  in am rounds and examined with Dr. Brown.    34 y/o gent suman POD#16 R sided DDRT anastomosed to R external iliac artery and vein Ureter anastomosed to bladder over double J stent.  Cold ischemia time14 hours.  CMV +, EBV +. Course complicated by post-operative ureteral leak s/p reimplantation of ureter of transplanted kidney on  07/29/2018 and subsequent IR placement of nephrostomy tube 7/31.     No acute event overnight.  Ambulating in hallway. Tolerating diet with bowel function. Reports slept well overnight. Pain well controlled. Received Lasix 40mg IV x 1 yesterday.         MEDICATIONS  (STANDING):  docusate sodium 100 milliGRAM(s) Oral daily  famotidine    Tablet 20 milliGRAM(s) Oral daily  metoprolol tartrate 100 milliGRAM(s) Oral every 12 hours  mycophenolate mofetil 1000 milliGRAM(s) Oral every 12 hours  NIFEdipine XL 90 milliGRAM(s) Oral daily  nystatin    Suspension 424348 Unit(s) Swish and Swallow four times a day  oxybutynin 5 milliGRAM(s) Oral two times a day  polyethylene glycol 3350 17 Gram(s) Oral daily  predniSONE   Tablet 5 milliGRAM(s) Oral daily  senna 2 Tablet(s) Oral at bedtime  tacrolimus 6 milliGRAM(s) Oral two times a day  tamsulosin 0.4 milliGRAM(s) Oral at bedtime  trimethoprim   80 mG/sulfamethoxazole 400 mG 1 Tablet(s) Oral daily  valGANciclovir 450 milliGRAM(s) Oral daily    MEDICATIONS  (PRN):  acetaminophen   Tablet. 650 milliGRAM(s) Oral every 6 hours PRN Mild Pain (1 - 3)  diphenhydrAMINE   Capsule 25 milliGRAM(s) Oral every 6 hours PRN Rash and/or Itching  lidocaine 2% Gel 1 Application(s) Topical three times a day PRN pain related to urinary catheter  metoclopramide Injectable 10 milliGRAM(s) IV Push once PRN Nausea and/or Vomiting  ondansetron Injectable 4 milliGRAM(s) IV Push every 6 hours PRN Nausea  ondansetron Injectable 4 milliGRAM(s) IV Push once PRN Nausea and/or Vomiting  simethicone 80 milliGRAM(s) Chew every 6 hours PRN Gas  traMADol 50 milliGRAM(s) Oral every 4 hours PRN Severe Pain (7 - 10)  traMADol 25 milliGRAM(s) Oral every 4 hours PRN Moderate Pain (4 - 6)  zinc oxide 20% Ointment 1 Application(s) Topical three times a day PRN rash      PAST MEDICAL & SURGICAL HISTORY:  ESRD on dialysis  HTN (hypertension)  No significant past surgical history      Vital Signs Last 24 Hrs  T(C): 36.4 (08 Aug 2018 10:10), Max: 36.9 (07 Aug 2018 13:40)  T(F): 97.5 (08 Aug 2018 10:10), Max: 98.4 (07 Aug 2018 13:40)  HR: 56 (08 Aug 2018 10:10) (55 - 69)  BP: 136/95 (08 Aug 2018 10:10) (115/70 - 140/90)  BP(mean): --  RR: 18 (08 Aug 2018 10:10) (16 - 18)  SpO2: 98% (08 Aug 2018 10:10) (97% - 100%)    I&O's Summary    07 Aug 2018 07:01  -  08 Aug 2018 07:00  --------------------------------------------------------  IN: 1230 mL / OUT: 2305 mL / NET: -1075 mL    08 Aug 2018 07:01  -  08 Aug 2018 11:31  --------------------------------------------------------  IN: 240 mL / OUT: 150 mL / NET: 90 mL                              10.1   6.2   )-----------( 196      ( 08 Aug 2018 06:26 )             30.5     08-08    140  |  110<H>  |  31<H>  ----------------------------<  102<H>  5.2   |  20<L>  |  2.93<H>    Ca    10.4      08 Aug 2018 06:26  Phos  3.1     08-08  Mg     2.0     08-08      Tacrolimus (), Serum: 9.0 ng/mL (08-08 @ 08:15)    Review of systems  Gen: No weight changes, fatigue, fevers/chills, weakness  Skin: No rashes  Head/Eyes/Ears/Mouth: No headache; Normal hearing; Normal vision w/o blurriness; No sinus pain/discomfort, sore throat  Respiratory: No dyspnea, cough, wheezing, hemoptysis  CV: No chest pain, PND, orthopnea  GI: Reports incisional pain and TTP, denies diarrhea, constipation, nausea, vomiting, melena, hematochezia. LACEY patent  : Marquez in situ  MSK: No joint pain/swelling; no back pain;   Neuro: No dizziness/lightheadedness, weakness, seizures, numbness, tingling  Heme: No easy bruising or bleeding  Endo: No heat/cold intolerance  Psych: No significant nervousness, anxiety, stress, depression  All other systems were reviewed and are negative, except as noted.    PHYSICAL EXAM:  Constitutional: Well developed / well nourished  Eyes: Anicteric, PERRLA  ENMT: nc/at  Neck: supple  Respiratory: CTA B/L  Cardiovascular: RRR  Gastrointestinal: Soft abdomen, mild tender to touch at surgical site, ND  Genitourinary: marquez in situ.  LACEY, nephrostomy tube patent  Extremities: SCD's in place and working bilaterally, BLE edema R>L  Vascular: Palpable dp pulses bilaterally  Neurological: A&O x3  Skin: wound open to air, no erythema and evidence of infection noted  Musculoskeletal: Moving all extremities  Psychiatric: Responsive

## 2018-08-08 NOTE — PROGRESS NOTE ADULT - PROBLEM SELECTOR PLAN 1
07/29/2018 reimplantation of ureter of transplanted kidney for ureteral leak  7/31/08 nephrostomy tube  Creatinine plateaued ~3.   -Send DSA today  -Keep Rodriguez   -Do not compress LACEY. Drain and record output q 6 hours.   - Strict I/Os  -Continue Tylenol and tramadol PRN  -Continue bowel regimen, Flomax 0.4 daily   -daily BMP  - Blood sugars well controlled. Fingersticks before meals and bedtime.  -On Oxybutynin for bladder spasm  - SCDs, ambulate.

## 2018-08-08 NOTE — PROGRESS NOTE ADULT - SUBJECTIVE AND OBJECTIVE BOX
[facility] DIVISION OF KIDNEY DISEASES AND HYPERTENSION -- FOLLOW UP NOTE  --------------------------------------------------------------------------------  Chief Complaint: renal transplant    24 hour events/subjective:  no acute event.  received lasix 40 mg IV x1.      PAST HISTORY  --------------------------------------------------------------------------------  No significant changes to PMH, PSH, FHx, SHx, unless otherwise noted    ALLERGIES & MEDICATIONS  --------------------------------------------------------------------------------  Allergies    IV Contrast (Rash)  nyquil (Rhinorrhea)  vancomycin (Pruritus; Hives)    Intolerances      Standing Inpatient Medications  docusate sodium 100 milliGRAM(s) Oral daily  famotidine    Tablet 20 milliGRAM(s) Oral daily  metoprolol tartrate 100 milliGRAM(s) Oral every 12 hours  mycophenolate mofetil 1000 milliGRAM(s) Oral every 12 hours  NIFEdipine XL 90 milliGRAM(s) Oral daily  nystatin    Suspension 320634 Unit(s) Swish and Swallow four times a day  oxybutynin 5 milliGRAM(s) Oral two times a day  polyethylene glycol 3350 17 Gram(s) Oral daily  predniSONE   Tablet 5 milliGRAM(s) Oral daily  senna 2 Tablet(s) Oral at bedtime  tacrolimus 6 milliGRAM(s) Oral two times a day  tamsulosin 0.4 milliGRAM(s) Oral at bedtime  trimethoprim   80 mG/sulfamethoxazole 400 mG 1 Tablet(s) Oral daily  valGANciclovir 450 milliGRAM(s) Oral daily    PRN Inpatient Medications  acetaminophen   Tablet. 650 milliGRAM(s) Oral every 6 hours PRN  diphenhydrAMINE   Capsule 25 milliGRAM(s) Oral every 6 hours PRN  lidocaine 2% Gel 1 Application(s) Topical three times a day PRN  metoclopramide Injectable 10 milliGRAM(s) IV Push once PRN  ondansetron Injectable 4 milliGRAM(s) IV Push every 6 hours PRN  ondansetron Injectable 4 milliGRAM(s) IV Push once PRN  simethicone 80 milliGRAM(s) Chew every 6 hours PRN  traMADol 50 milliGRAM(s) Oral every 4 hours PRN  traMADol 25 milliGRAM(s) Oral every 4 hours PRN  zinc oxide 20% Ointment 1 Application(s) Topical three times a day PRN      REVIEW OF SYSTEMS  --------------------------------------------------------------------------------  Gen: No weight changes, fatigue, fevers/chills, weakness  Skin: No rashes  Head/Eyes/Ears/Mouth: No headache; Normal hearing; Normal vision w/o blurriness  Respiratory: No dyspnea, cough, wheezing, hemoptysis  CV: No chest pain, PND, orthopnea  GI: No abdominal pain, diarrhea, constipation, nausea, vomiting, melena, hematochezia  : No increased frequency, dysuria, hematuria, nocturia  MSK: No joint pain/swelling; no back pain; no edema  Neuro: No dizziness/lightheadedness, weakness, seizures, numbness, tingling      VITALS/PHYSICAL EXAM  --------------------------------------------------------------------------------  T(C): 36.4 (08-08-18 @ 10:10), Max: 36.9 (08-07-18 @ 13:40)  HR: 56 (08-08-18 @ 10:10) (55 - 69)  BP: 136/95 (08-08-18 @ 10:10) (115/70 - 140/90)  RR: 18 (08-08-18 @ 10:10) (16 - 18)  SpO2: 98% (08-08-18 @ 10:10) (97% - 100%)  Wt(kg): --        08-07-18 @ 07:01  -  08-08-18 @ 07:00  --------------------------------------------------------  IN: 1230 mL / OUT: 2305 mL / NET: -1075 mL    08-08-18 @ 07:01  -  08-08-18 @ 12:35  --------------------------------------------------------  IN: 240 mL / OUT: 150 mL / NET: 90 mL      Physical Exam:  	Gen: NAD  	Pulm: CTA B/L  	CV: RRR, S1S2; no rub  	Abd: +BS, soft, nontender/nondistended  	: No suprapubic tenderness, marquez+  	UE: Warm, no asterixis  	LE: Warm, b/l LE edema, palpable DP pulses b/l   	Skin: Warm, without rashes  	Vascular access: NINI NORWOOD      LABS/STUDIES  --------------------------------------------------------------------------------              10.1   6.2   >-----------<  196      [08-08-18 @ 06:26]              30.5     140  |  110  |  31  ----------------------------<  102      [08-08-18 @ 06:26]  5.2   |  20  |  2.93        Ca     10.4     [08-08-18 @ 06:26]      Mg     2.0     [08-08-18 @ 06:26]      Phos  3.1     [08-08-18 @ 06:26]            Creatinine Trend:  SCr 2.93 [08-08 @ 06:26]  SCr 2.96 [08-07 @ 05:56]  SCr 3.08 [08-06 @ 06:11]  SCr 3.06 [08-05 @ 06:18]  SCr 3.02 [08-04 @ 06:53]    Urinalysis - [08-06-18 @ 21:43]      Color Yellow / Appearance Clear / SG 1.018 / pH 6.0      Gluc 100 / Ketone Negative  / Bili Negative / Urobili Negative       Blood Moderate / Protein 100 / Leuk Est Negative / Nitrite Negative      RBC 69 / WBC 10 / Hyaline 8 / Gran  / Sq Epi  / Non Sq Epi 3 / Bacteria Negative      HbA1c 5.1      [08-18-15 @ 18:19]

## 2018-08-08 NOTE — PROGRESS NOTE ADULT - ASSESSMENT
32 y/o gentleman with PMH of HTN and ESRD since 2010, POD#16 R sided DDRT  with post-operative course complicated by ureteral leak requiring re-op and reimplantation of ureter of transplanted kidney and  Nephrostomy tube with slowly downtrending creatinine.

## 2018-08-08 NOTE — PROGRESS NOTE ADULT - ASSESSMENT
33M with PMH of HTN and ESRD since 2010 s/p DDRT 7/23. Cold ischemia time14 hours.  CMV +, EBV +. Course complicated by post-operative ureteral leak s/p reimplantation of ureter of transplanted kidney on  07/29/2018 an subsequent IR placement of nephrostomy tube 7/31. Creatinine stable.

## 2018-08-08 NOTE — PROGRESS NOTE ADULT - ATTENDING COMMENTS
Still with XAVI of transplant kidney with slight improvement in creatinine.  Renal sonogram ok and low output from drain.  Maintain nephrostomy and marquez for small leak  F/u nephrostomy urine culture   Cont current immunosuppression.  check DSA - if no improvement soon will need kidney biopsy. Still with XAVI of transplant kidney with slight improvement in creatinine.  Renal sonogram ok and low output from drain.  Maintain nephrostomy and marquez for small leak  Cont current immunosuppression.  check DSA - if no improvement soon will need kidney biopsy.

## 2018-08-08 NOTE — PROGRESS NOTE ADULT - PROBLEM SELECTOR PLAN 1
s/p DDRT 07/23; Cold ischemia time14 hours.  CMV +, EBV +. Course complicated by post-operative ureteral leak s/p reimplantation of ureter of transplanted kidney on  07/29/2018 an subsequent IR placement of nephrostomy tube 7/31.   Scr plateaued at ~2.9.   Repeat US with small amount gissell-transplant fluid, good perfusion and no RYAN. LACEY fluid Cr 3.02.  Keep LACEY drain.	  Send DSA.  Continue Tacrolimus, Cellcept, prednisone 5 mg,  Nystatin S&S, bactrim, and Valcyte   Goal FK levels 8-10  monitor bmp  monitor I/O.

## 2018-08-08 NOTE — PROGRESS NOTE ADULT - SUBJECTIVE AND OBJECTIVE BOX
[facility] [place] [facility]   MD PERCY MORALES MD RUORU WONG, PA    TEL:  OFFICE: [phone]  DR OCONNOR CELL: [phone]  DAJA PRINGLE CELL: [phone]  DR. GAMBOA CELL: [phone]    RENAL FOLLOW UP NOTE  --------------------------------------------------------------------------------  HPI:  33M with PMH of HTN and ESRD since 2010 s/p DDRT 7/23.    Reimplantation of ureter of transplanted kidney  07/29/2018.   Had IR placement of nephrostomy tube on 7/31 for anastomotic leak  Pt seen and examined at bedside.  Denies sob, chest pain    PAST HISTORY  --------------------------------------------------------------------------------  No significant changes to PMH, PSH, FHx, SHx, unless otherwise noted    ALLERGIES & MEDICATIONS  --------------------------------------------------------------------------------  Allergies    IV Contrast (Rash)  nyquil (Rhinorrhea)  vancomycin (Pruritus; Hives)    Intolerances      Standing Inpatient Medications  docusate sodium 100 milliGRAM(s) Oral daily  famotidine    Tablet 20 milliGRAM(s) Oral daily  metoprolol tartrate 100 milliGRAM(s) Oral every 12 hours  mycophenolate mofetil 1000 milliGRAM(s) Oral every 12 hours  NIFEdipine XL 90 milliGRAM(s) Oral daily  nystatin    Suspension 402945 Unit(s) Swish and Swallow four times a day  oxybutynin 5 milliGRAM(s) Oral two times a day  polyethylene glycol 3350 17 Gram(s) Oral daily  predniSONE   Tablet 5 milliGRAM(s) Oral daily  senna 2 Tablet(s) Oral at bedtime  tacrolimus 6 milliGRAM(s) Oral two times a day  tamsulosin 0.4 milliGRAM(s) Oral at bedtime  trimethoprim   80 mG/sulfamethoxazole 400 mG 1 Tablet(s) Oral daily  valGANciclovir 450 milliGRAM(s) Oral daily    PRN Inpatient Medications  acetaminophen   Tablet. 650 milliGRAM(s) Oral every 6 hours PRN  diphenhydrAMINE   Capsule 25 milliGRAM(s) Oral every 6 hours PRN  lidocaine 2% Gel 1 Application(s) Topical three times a day PRN  metoclopramide Injectable 10 milliGRAM(s) IV Push once PRN  ondansetron Injectable 4 milliGRAM(s) IV Push every 6 hours PRN  ondansetron Injectable 4 milliGRAM(s) IV Push once PRN  simethicone 80 milliGRAM(s) Chew every 6 hours PRN  traMADol 50 milliGRAM(s) Oral every 4 hours PRN  traMADol 25 milliGRAM(s) Oral every 4 hours PRN  zinc oxide 20% Ointment 1 Application(s) Topical three times a day PRN      REVIEW OF SYSTEMS  --------------------------------------------------------------------------------  General: no fever  CVS: no chest pain  RESP: no sob, no cough  ABD: no abdominal pain  : no dysuria,  MSK: + edema     VITALS/PHYSICAL EXAM  --------------------------------------------------------------------------------  T(C): 36.4 (08-08-18 @ 10:10), Max: 36.9 (08-07-18 @ 13:40)  HR: 56 (08-08-18 @ 10:10) (55 - 69)  BP: 136/95 (08-08-18 @ 10:10) (115/70 - 140/90)  RR: 18 (08-08-18 @ 10:10) (16 - 18)  SpO2: 98% (08-08-18 @ 10:10) (97% - 100%)  Wt(kg): --        08-07-18 @ 07:01  -  08-08-18 @ 07:00  --------------------------------------------------------  IN: 1230 mL / OUT: 2305 mL / NET: -1075 mL    08-08-18 @ 07:01  -  08-08-18 @ 10:40  --------------------------------------------------------  IN: 240 mL / OUT: 150 mL / NET: 90 mL      Physical Exam:  	Gen: NAD  	HEENT: MMM  	Pulm: CTA B/L  	CV: S1S2  	Abd: Soft, +BS  	Ext: + LE edema B/L                      Neuro: Awake   	Skin: Warm and Dry   	Rodrigo marquez    LABS/STUDIES  --------------------------------------------------------------------------------              10.1   6.2   >-----------<  196      [08-08-18 @ 06:26]              30.5     140  |  110  |  31  ----------------------------<  102      [08-08-18 @ 06:26]  5.2   |  20  |  2.93        Ca     10.4     [08-08-18 @ 06:26]      Mg     2.0     [08-08-18 @ 06:26]      Phos  3.1     [08-08-18 @ 06:26]            Creatinine Trend:  SCr 2.93 [08-08 @ 06:26]  SCr 2.96 [08-07 @ 05:56]  SCr 3.08 [08-06 @ 06:11]  SCr 3.06 [08-05 @ 06:18]  SCr 3.02 [08-04 @ 06:53]    Urinalysis - [08-06-18 @ 21:43]      Color Yellow / Appearance Clear / SG 1.018 / pH 6.0      Gluc 100 / Ketone Negative  / Bili Negative / Urobili Negative       Blood Moderate / Protein 100 / Leuk Est Negative / Nitrite Negative      RBC 69 / WBC 10 / Hyaline 8 / Gran  / Sq Epi  / Non Sq Epi 3 / Bacteria Negative      HbA1c 5.1      [08-18-15 @ 18:19]

## 2018-08-09 LAB
ANION GAP SERPL CALC-SCNC: 9 MMOL/L — SIGNIFICANT CHANGE UP (ref 5–17)
BUN SERPL-MCNC: 31 MG/DL — HIGH (ref 7–23)
CALCIUM SERPL-MCNC: 10.5 MG/DL — SIGNIFICANT CHANGE UP (ref 8.4–10.5)
CHLORIDE SERPL-SCNC: 109 MMOL/L — HIGH (ref 96–108)
CO2 SERPL-SCNC: 20 MMOL/L — LOW (ref 22–31)
CREAT SERPL-MCNC: 2.91 MG/DL — HIGH (ref 0.5–1.3)
GLUCOSE BLDC GLUCOMTR-MCNC: 120 MG/DL — HIGH (ref 70–99)
GLUCOSE BLDC GLUCOMTR-MCNC: 130 MG/DL — HIGH (ref 70–99)
GLUCOSE BLDC GLUCOMTR-MCNC: 195 MG/DL — HIGH (ref 70–99)
GLUCOSE BLDC GLUCOMTR-MCNC: 89 MG/DL — SIGNIFICANT CHANGE UP (ref 70–99)
GLUCOSE SERPL-MCNC: 90 MG/DL — SIGNIFICANT CHANGE UP (ref 70–99)
HCT VFR BLD CALC: 30.9 % — LOW (ref 39–50)
HGB BLD-MCNC: 10.4 G/DL — LOW (ref 13–17)
MAGNESIUM SERPL-MCNC: 1.8 MG/DL — SIGNIFICANT CHANGE UP (ref 1.6–2.6)
MCHC RBC-ENTMCNC: 29.5 PG — SIGNIFICANT CHANGE UP (ref 27–34)
MCHC RBC-ENTMCNC: 33.5 GM/DL — SIGNIFICANT CHANGE UP (ref 32–36)
MCV RBC AUTO: 88.1 FL — SIGNIFICANT CHANGE UP (ref 80–100)
PHOSPHATE SERPL-MCNC: 2.8 MG/DL — SIGNIFICANT CHANGE UP (ref 2.5–4.5)
PLATELET # BLD AUTO: 206 K/UL — SIGNIFICANT CHANGE UP (ref 150–400)
POTASSIUM SERPL-MCNC: 6 MMOL/L — HIGH (ref 3.5–5.3)
POTASSIUM SERPL-SCNC: 6 MMOL/L — HIGH (ref 3.5–5.3)
RBC # BLD: 3.51 M/UL — LOW (ref 4.2–5.8)
RBC # FLD: 13.9 % — SIGNIFICANT CHANGE UP (ref 10.3–14.5)
SODIUM SERPL-SCNC: 138 MMOL/L — SIGNIFICANT CHANGE UP (ref 135–145)
TACROLIMUS SERPL-MCNC: 11.2 NG/ML — SIGNIFICANT CHANGE UP
WBC # BLD: 7 K/UL — SIGNIFICANT CHANGE UP (ref 3.8–10.5)
WBC # FLD AUTO: 7 K/UL — SIGNIFICANT CHANGE UP (ref 3.8–10.5)

## 2018-08-09 PROCEDURE — 99232 SBSQ HOSP IP/OBS MODERATE 35: CPT | Mod: GC,24

## 2018-08-09 PROCEDURE — 99232 SBSQ HOSP IP/OBS MODERATE 35: CPT | Mod: GC

## 2018-08-09 RX ORDER — FUROSEMIDE 40 MG
40 TABLET ORAL ONCE
Qty: 0 | Refills: 0 | Status: COMPLETED | OUTPATIENT
Start: 2018-08-09 | End: 2018-08-09

## 2018-08-09 RX ADMIN — TRAMADOL HYDROCHLORIDE 50 MILLIGRAM(S): 50 TABLET ORAL at 06:38

## 2018-08-09 RX ADMIN — Medication 40 MILLIGRAM(S): at 09:44

## 2018-08-09 RX ADMIN — MYCOPHENOLATE MOFETIL 1000 MILLIGRAM(S): 250 CAPSULE ORAL at 06:37

## 2018-08-09 RX ADMIN — Medication 500000 UNIT(S): at 22:45

## 2018-08-09 RX ADMIN — Medication 5 MILLIGRAM(S): at 06:37

## 2018-08-09 RX ADMIN — TACROLIMUS 6 MILLIGRAM(S): 5 CAPSULE ORAL at 18:10

## 2018-08-09 RX ADMIN — Medication 100 MILLIGRAM(S): at 06:37

## 2018-08-09 RX ADMIN — Medication 5 MILLIGRAM(S): at 18:11

## 2018-08-09 RX ADMIN — Medication 90 MILLIGRAM(S): at 06:37

## 2018-08-09 RX ADMIN — VALGANCICLOVIR 450 MILLIGRAM(S): 450 TABLET, FILM COATED ORAL at 11:43

## 2018-08-09 RX ADMIN — TRAMADOL HYDROCHLORIDE 50 MILLIGRAM(S): 50 TABLET ORAL at 07:15

## 2018-08-09 RX ADMIN — TRAMADOL HYDROCHLORIDE 50 MILLIGRAM(S): 50 TABLET ORAL at 18:10

## 2018-08-09 RX ADMIN — Medication 100 MILLIGRAM(S): at 09:44

## 2018-08-09 RX ADMIN — MYCOPHENOLATE MOFETIL 1000 MILLIGRAM(S): 250 CAPSULE ORAL at 18:11

## 2018-08-09 RX ADMIN — TRAMADOL HYDROCHLORIDE 50 MILLIGRAM(S): 50 TABLET ORAL at 17:18

## 2018-08-09 RX ADMIN — TACROLIMUS 6 MILLIGRAM(S): 5 CAPSULE ORAL at 06:38

## 2018-08-09 RX ADMIN — Medication 1 TABLET(S): at 11:43

## 2018-08-09 RX ADMIN — SENNA PLUS 2 TABLET(S): 8.6 TABLET ORAL at 22:45

## 2018-08-09 RX ADMIN — Medication 500000 UNIT(S): at 18:10

## 2018-08-09 RX ADMIN — TAMSULOSIN HYDROCHLORIDE 0.4 MILLIGRAM(S): 0.4 CAPSULE ORAL at 22:45

## 2018-08-09 RX ADMIN — Medication 500000 UNIT(S): at 11:43

## 2018-08-09 RX ADMIN — Medication 500000 UNIT(S): at 06:37

## 2018-08-09 RX ADMIN — TRAMADOL HYDROCHLORIDE 50 MILLIGRAM(S): 50 TABLET ORAL at 23:29

## 2018-08-09 RX ADMIN — FAMOTIDINE 20 MILLIGRAM(S): 10 INJECTION INTRAVENOUS at 11:43

## 2018-08-09 NOTE — PROGRESS NOTE ADULT - SUBJECTIVE AND OBJECTIVE BOX
[facility] [place] [facility]   MD PERCY MORALES MD RUORU WONG, PA    TEL:  OFFICE: [phone]  DR OCONNOR CELL: [phone]  DAJA PRINGLE CELL: [phone]  DR. GAMBOA CELL: [phone]    RENAL FOLLOW UP NOTE  --------------------------------------------------------------------------------  HPI:  33M with PMH of HTN and ESRD since 2010 s/p DDRT 7/23.    Reimplantation of ureter of transplanted kidney  07/29/2018.   Had IR placement of nephrostomy tube on 7/31 for anastomotic leak  Pt seen and examined at bedside.  Denies sob, chest pain      PAST HISTORY  --------------------------------------------------------------------------------  No significant changes to PMH, PSH, FHx, SHx, unless otherwise noted    ALLERGIES & MEDICATIONS  --------------------------------------------------------------------------------  Allergies    IV Contrast (Rash)  nyquil (Rhinorrhea)  vancomycin (Pruritus; Hives)    Intolerances      Standing Inpatient Medications  docusate sodium 100 milliGRAM(s) Oral daily  famotidine    Tablet 20 milliGRAM(s) Oral daily  metoprolol tartrate 100 milliGRAM(s) Oral every 12 hours  mycophenolate mofetil 1000 milliGRAM(s) Oral every 12 hours  NIFEdipine XL 90 milliGRAM(s) Oral daily  nystatin    Suspension 171948 Unit(s) Swish and Swallow four times a day  oxybutynin 5 milliGRAM(s) Oral two times a day  polyethylene glycol 3350 17 Gram(s) Oral daily  predniSONE   Tablet 5 milliGRAM(s) Oral daily  senna 2 Tablet(s) Oral at bedtime  tacrolimus 6 milliGRAM(s) Oral two times a day  tamsulosin 0.4 milliGRAM(s) Oral at bedtime  trimethoprim   80 mG/sulfamethoxazole 400 mG 1 Tablet(s) Oral daily  valGANciclovir 450 milliGRAM(s) Oral daily    PRN Inpatient Medications  acetaminophen   Tablet. 650 milliGRAM(s) Oral every 6 hours PRN  diphenhydrAMINE   Capsule 25 milliGRAM(s) Oral every 6 hours PRN  lidocaine 2% Gel 1 Application(s) Topical three times a day PRN  metoclopramide Injectable 10 milliGRAM(s) IV Push once PRN  ondansetron Injectable 4 milliGRAM(s) IV Push every 6 hours PRN  ondansetron Injectable 4 milliGRAM(s) IV Push once PRN  simethicone 80 milliGRAM(s) Chew every 6 hours PRN  traMADol 50 milliGRAM(s) Oral every 4 hours PRN  traMADol 25 milliGRAM(s) Oral every 4 hours PRN  zinc oxide 20% Ointment 1 Application(s) Topical three times a day PRN      REVIEW OF SYSTEMS  --------------------------------------------------------------------------------  General: no fever  CVS: no chest pain  RESP: no sob, no cough  ABD: no abdominal pain  : no dysuria,  MSK: + edema     VITALS/PHYSICAL EXAM  --------------------------------------------------------------------------------  T(C): 36.6 (08-09-18 @ 09:43), Max: 37 (08-08-18 @ 14:06)  HR: 56 (08-09-18 @ 09:43) (54 - 65)  BP: 145/88 (08-09-18 @ 09:43) (120/74 - 156/97)  RR: 18 (08-09-18 @ 09:43) (18 - 18)  SpO2: 99% (08-09-18 @ 09:43) (96% - 100%)  Wt(kg): --        08-08-18 @ 07:01  -  08-09-18 @ 07:00  --------------------------------------------------------  IN: 1320 mL / OUT: 1560 mL / NET: -240 mL    08-09-18 @ 07:01  -  08-09-18 @ 10:04  --------------------------------------------------------  IN: 300 mL / OUT: 125 mL / NET: 175 mL      Physical Exam:  	Gen: NAD  	HEENT: MMM  	Pulm: CTA B/L  	CV: S1S2  	Abd: Soft, +BS  	Ext: + LE edema B/L                      Neuro: Awake   	Skin: Warm and Dry                Rodrigo marquez     LABS/STUDIES  --------------------------------------------------------------------------------              10.4   7.0   >-----------<  206      [08-09-18 @ 06:28]              30.9     138  |  109  |  31  ----------------------------<  90      [08-09-18 @ 06:28]  6.0   |  20  |  2.91        Ca     10.5     [08-09-18 @ 06:28]      Mg     1.8     [08-09-18 @ 06:28]      Phos  2.8     [08-09-18 @ 06:28]            Creatinine Trend:  SCr 2.91 [08-09 @ 06:28]  SCr 2.93 [08-08 @ 06:26]  SCr 2.96 [08-07 @ 05:56]  SCr 3.08 [08-06 @ 06:11]  SCr 3.06 [08-05 @ 06:18]    Urinalysis - [08-06-18 @ 21:43]      Color Yellow / Appearance Clear / SG 1.018 / pH 6.0      Gluc 100 / Ketone Negative  / Bili Negative / Urobili Negative       Blood Moderate / Protein 100 / Leuk Est Negative / Nitrite Negative      RBC 69 / WBC 10 / Hyaline 8 / Gran  / Sq Epi  / Non Sq Epi 3 / Bacteria Negative      HbA1c 5.1      [08-18-15 @ 18:19]

## 2018-08-09 NOTE — PROGRESS NOTE ADULT - PROBLEM SELECTOR PLAN 1
s/p DDRT 07/23; Cold ischemia time14 hours.  CMV +, EBV +. Course complicated by post-operative ureteral leak s/p reimplantation of ureter of transplanted kidney on  07/29/2018 an subsequent IR placement of nephrostomy tube 7/31.   Scr plateaued at ~2.9.   Repeat US with small amount gissell-transplant fluid, good perfusion and no RYAN.   Start pulse steroids for presumed CMR.  Keep LACEY drain.	  Send DSA.  Continue Tacrolimus, Cellcept, prednisone 5 mg,  Nystatin S&S, bactrim, and Valcyte   Goal FK levels 8-10  monitor bmp  monitor I/O.

## 2018-08-09 NOTE — PROGRESS NOTE ADULT - ASSESSMENT
33M with PMH of HTN and ESRD since 2010 s/p DDRT 7/23. Cold ischemia time14 hours.  CMV +, EBV +. Course complicated by post-operative ureteral leak s/p reimplantation of ureter of transplanted kidney on  07/29/2018 an subsequent IR placement of nephrostomy tube 7/31. Creatinine elevated.

## 2018-08-09 NOTE — PROGRESS NOTE ADULT - ASSESSMENT
34 y/o gentleman with PMH of HTN and ESRD since 2010, POD#16 R sided DDRT  with post-operative course complicated by ureteral leak requiring re-op and reimplantation of ureter of transplanted kidney and  Nephrostomy tube with creatinine plateaud ~3.0

## 2018-08-09 NOTE — PROGRESS NOTE ADULT - ATTENDING COMMENTS
Still with XAVI of transplant kidney with slight improvement in creatinine.  Renal sonogram ok and low output from drain.  Maintain nephrostomy and marquez for small leak  Cont current immunosuppression.  check DSA   will give solumedrol 250mgs  IV for possible low grade rejection.

## 2018-08-09 NOTE — PROGRESS NOTE ADULT - PROBLEM SELECTOR PLAN 1
s/p DDRT 07/23 c/b post-operative ureteral leak s/p reimplantation of ureter of transplanted kidney on  07/29/2018 and subsequent IR placement of nephrostomy tube 7/31.   Renal function elevated however stable  pt to get pulse steroids today , at high risk for biopsy per transplant team   Continue immunosuppressants per transplant team  Continue  Nystatin S&S, bactrim, and Valcyte for PPX  renal sonogram with no hydro/ RYAN, follow up transplant team  monitor bmp.

## 2018-08-09 NOTE — PROGRESS NOTE ADULT - SUBJECTIVE AND OBJECTIVE BOX
[facility] DIVISION OF KIDNEY DISEASES AND HYPERTENSION -- FOLLOW UP NOTE  --------------------------------------------------------------------------------  Chief Complaint: renal transplant      24 hour events/subjective:    no acute event.  no complaint this AM.      PAST HISTORY  --------------------------------------------------------------------------------  No significant changes to PMH, PSH, FHx, SHx, unless otherwise noted    ALLERGIES & MEDICATIONS  --------------------------------------------------------------------------------  Allergies    IV Contrast (Rash)  nyquil (Rhinorrhea)  vancomycin (Pruritus; Hives)    Intolerances      Standing Inpatient Medications  docusate sodium 100 milliGRAM(s) Oral daily  famotidine    Tablet 20 milliGRAM(s) Oral daily  metoprolol tartrate 100 milliGRAM(s) Oral every 12 hours  mycophenolate mofetil 1000 milliGRAM(s) Oral every 12 hours  NIFEdipine XL 90 milliGRAM(s) Oral daily  nystatin    Suspension 293522 Unit(s) Swish and Swallow four times a day  oxybutynin 5 milliGRAM(s) Oral two times a day  polyethylene glycol 3350 17 Gram(s) Oral daily  predniSONE   Tablet 5 milliGRAM(s) Oral daily  senna 2 Tablet(s) Oral at bedtime  tacrolimus 6 milliGRAM(s) Oral two times a day  tamsulosin 0.4 milliGRAM(s) Oral at bedtime  trimethoprim   80 mG/sulfamethoxazole 400 mG 1 Tablet(s) Oral daily  valGANciclovir 450 milliGRAM(s) Oral daily    PRN Inpatient Medications  acetaminophen   Tablet. 650 milliGRAM(s) Oral every 6 hours PRN  diphenhydrAMINE   Capsule 25 milliGRAM(s) Oral every 6 hours PRN  lidocaine 2% Gel 1 Application(s) Topical three times a day PRN  metoclopramide Injectable 10 milliGRAM(s) IV Push once PRN  ondansetron Injectable 4 milliGRAM(s) IV Push every 6 hours PRN  ondansetron Injectable 4 milliGRAM(s) IV Push once PRN  simethicone 80 milliGRAM(s) Chew every 6 hours PRN  traMADol 50 milliGRAM(s) Oral every 4 hours PRN  traMADol 25 milliGRAM(s) Oral every 4 hours PRN  zinc oxide 20% Ointment 1 Application(s) Topical three times a day PRN      REVIEW OF SYSTEMS  --------------------------------------------------------------------------------  Gen: No weight changes, fatigue, fevers/chills, weakness  Skin: No rashes  Head/Eyes/Ears/Mouth: No headache; Normal hearing; Normal vision w/o blurriness; No sinus pain/discomfort, sore throat  Respiratory: No dyspnea, cough, wheezing, hemoptysis  CV: No chest pain, PND, orthopnea  GI: No abdominal pain, diarrhea, constipation, nausea, vomiting, melena, hematochezia  : No increased frequency, dysuria, hematuria, nocturia  MSK: No joint pain/swelling; no back pain; no edema  Neuro: No dizziness/lightheadedness, weakness, seizures, numbness, tingling      VITALS/PHYSICAL EXAM  --------------------------------------------------------------------------------  T(C): 36.6 (08-09-18 @ 09:43), Max: 37 (08-08-18 @ 14:06)  HR: 56 (08-09-18 @ 09:43) (54 - 65)  BP: 145/88 (08-09-18 @ 09:43) (120/74 - 156/97)  RR: 18 (08-09-18 @ 09:43) (18 - 18)  SpO2: 99% (08-09-18 @ 09:43) (96% - 100%)  Wt(kg): --        08-08-18 @ 07:01  -  08-09-18 @ 07:00  --------------------------------------------------------  IN: 1320 mL / OUT: 1560 mL / NET: -240 mL    08-09-18 @ 07:01  -  08-09-18 @ 10:02  --------------------------------------------------------  IN: 300 mL / OUT: 125 mL / NET: 175 mL      Physical Exam:  	Gen: NAD  	Pulm: CTA B/L  	CV: RRR, S1S2; no rub  	Abd: +BS, soft, nontender/nondistended  	: No suprapubic tenderness, marquez+  	UE: Warm, no asterixis  	LE: Warm, b/l LE edema, palpable DP pulses b/l   	Skin: Warm, without rashes  	Vascular access: NINI NORWOOD    LABS/STUDIES  --------------------------------------------------------------------------------              10.4   7.0   >-----------<  206      [08-09-18 @ 06:28]              30.9     138  |  109  |  31  ----------------------------<  90      [08-09-18 @ 06:28]  6.0   |  20  |  2.91        Ca     10.5     [08-09-18 @ 06:28]      Mg     1.8     [08-09-18 @ 06:28]      Phos  2.8     [08-09-18 @ 06:28]            Creatinine Trend:  SCr 2.91 [08-09 @ 06:28]  SCr 2.93 [08-08 @ 06:26]  SCr 2.96 [08-07 @ 05:56]  SCr 3.08 [08-06 @ 06:11]  SCr 3.06 [08-05 @ 06:18]    Urinalysis - [08-06-18 @ 21:43]      Color Yellow / Appearance Clear / SG 1.018 / pH 6.0      Gluc 100 / Ketone Negative  / Bili Negative / Urobili Negative       Blood Moderate / Protein 100 / Leuk Est Negative / Nitrite Negative      RBC 69 / WBC 10 / Hyaline 8 / Gran  / Sq Epi  / Non Sq Epi 3 / Bacteria Negative      HbA1c 5.1      [08-18-15 @ 18:19]

## 2018-08-09 NOTE — PROGRESS NOTE ADULT - PROBLEM SELECTOR PLAN 1
7/23/18 DDRT   07/29/18 reimplantation of ureter of transplanted kidney for ureteral leak  7/31/18 nephrostomy tube  Creatinine plateaued ~3.  K+6.0  -Pulse dose Steroid 250mg IV x1 today with concern for rejection  -Lasix 40mg IV x1 today   -FU DSA, sent 8/8  -Keep Rodriguez   -Do not compress LACEY. Drain and record output q 6 hours.   - Strict I/Os  -Continue Tylenol and tramadol PRN  -Continue bowel regimen, Flomax 0.4 daily   -daily BMP  - Blood sugars well controlled. Fingersticks before meals and bedtime.  -On Oxybutynin for bladder spasm  - SCDs, ambulate.

## 2018-08-09 NOTE — PROGRESS NOTE ADULT - SUBJECTIVE AND OBJECTIVE BOX
Transplant Surgery - Multidisciplinary Rounds  --------------------------------------------------------------  DDRT 7/23/2018   POD 17  Reimplantation of ureter of transplanted kidney  07/29/2018   IR nephrostomy 7/31/2018    Present:  Patient seen with multidisciplinary team (surgeon, Nephrologist, pharmacist, NP, resident MD, MD student)  in am rounds and examined with Dr. Brown.    34 y/o gent suman POD#16 R sided DDRT anastomosed to R external iliac artery and vein Ureter anastomosed to bladder over double J stent.  Cold ischemia time14 hours.  CMV +, EBV +. Course complicated by post-operative ureteral leak s/p reimplantation of ureter of transplanted kidney on  07/29/2018 and subsequent IR placement of nephrostomy tube 7/31.     No acute event overnight.  Ambulating in hallway. Tolerating diet with bowel function. Reports slept well overnight. Pain well controlled. Marquez, nephrostomy and LACEY in situ    Potential Discharge date  8/13-14    Education: transplant medications    Plan of care: See assessment and plan of care    MEDICATIONS  (STANDING):  docusate sodium 100 milliGRAM(s) Oral daily  famotidine    Tablet 20 milliGRAM(s) Oral daily  metoprolol tartrate 100 milliGRAM(s) Oral every 12 hours  mycophenolate mofetil 1000 milliGRAM(s) Oral every 12 hours  NIFEdipine XL 90 milliGRAM(s) Oral daily  nystatin    Suspension [id number] Unit(s) Swish and Swallow four times a day  oxybutynin 5 milliGRAM(s) Oral two times a day  polyethylene glycol 3350 17 Gram(s) Oral daily  predniSONE   Tablet 5 milliGRAM(s) Oral daily  senna 2 Tablet(s) Oral at bedtime  tacrolimus 6 milliGRAM(s) Oral two times a day  tamsulosin 0.4 milliGRAM(s) Oral at bedtime  trimethoprim   80 mG/sulfamethoxazole 400 mG 1 Tablet(s) Oral daily  valGANciclovir 450 milliGRAM(s) Oral daily    MEDICATIONS  (PRN):  acetaminophen   Tablet. 650 milliGRAM(s) Oral every 6 hours PRN Mild Pain (1 - 3)  diphenhydrAMINE   Capsule 25 milliGRAM(s) Oral every 6 hours PRN Rash and/or Itching  lidocaine 2% Gel 1 Application(s) Topical three times a day PRN pain related to urinary catheter  metoclopramide Injectable 10 milliGRAM(s) IV Push once PRN Nausea and/or Vomiting  ondansetron Injectable 4 milliGRAM(s) IV Push every 6 hours PRN Nausea  ondansetron Injectable 4 milliGRAM(s) IV Push once PRN Nausea and/or Vomiting  simethicone 80 milliGRAM(s) Chew every 6 hours PRN Gas  traMADol 50 milliGRAM(s) Oral every 4 hours PRN Severe Pain (7 - 10)  traMADol 25 milliGRAM(s) Oral every 4 hours PRN Moderate Pain (4 - 6)  zinc oxide 20% Ointment 1 Application(s) Topical three times a day PRN rash      PAST MEDICAL & SURGICAL HISTORY:  ESRD on dialysis  HTN (hypertension)  No significant past surgical history      Vital Signs Last 24 Hrs  T(C): 36.6 (09 Aug 2018 09:43), Max: 37 (08 Aug 2018 14:06)  T(F): 97.8 (09 Aug 2018 09:43), Max: 98.6 (08 Aug 2018 14:06)  HR: 56 (09 Aug 2018 09:43) (54 - 65)  BP: 145/88 (09 Aug 2018 09:43) (120/74 - 156/97)  BP(mean): --  RR: 18 (09 Aug 2018 09:43) (18 - 18)  SpO2: 99% (09 Aug 2018 09:43) (96% - 100%)    I&O's Summary    08 Aug 2018 07:01  -  09 Aug 2018 07:00  --------------------------------------------------------  IN: 1320 mL / OUT: 1560 mL / NET: -240 mL    09 Aug 2018 07:01  -  09 Aug 2018 11:56  --------------------------------------------------------  IN: 540 mL / OUT: 900 mL / NET: -360 mL                              10.4   7.0   )-----------( 206      ( 09 Aug 2018 06:28 )             30.9     08-09    138  |  109<H>  |  31<H>  ----------------------------<  90  6.0<H>   |  20<L>  |  2.91<H>    Ca    10.5      09 Aug 2018 06:28  Phos  2.8     08-09  Mg     1.8     08-09      Tacrolimus (), Serum: 11.2 ng/mL (08-09 @ 07:25)    Review of systems  Gen: No weight changes, fatigue, fevers/chills, weakness  Skin: No rashes  Head/Eyes/Ears/Mouth: No headache; Normal hearing; Normal vision w/o blurriness; No sinus pain/discomfort, sore throat  Respiratory: No dyspnea, cough, wheezing, hemoptysis  CV: No chest pain, PND, orthopnea  GI: Reports incisional pain and TTP near drains, denies diarrhea, constipation, nausea, vomiting, melena, hematochezia.   : Marquez in situ, nephrostomy and LACEY patent  MSK: No joint pain/swelling; no back pain;   Neuro: No dizziness/lightheadedness, weakness, seizures, numbness, tingling  Heme: No easy bruising or bleeding  Endo: No heat/cold intolerance  Psych: No significant nervousness, anxiety, stress, depression  All other systems were reviewed and are negative, except as noted.    PHYSICAL EXAM:  Constitutional: Well developed / well nourished  Eyes: Anicteric, PERRLA  ENMT: nc/at  Neck: supple  Respiratory: CTA B/L  Cardiovascular: RRR  Gastrointestinal: Soft abdomen, mild tender to touch at surgical site, ND  Genitourinary: marquez in situ.  LACEY, nephrostomy tube patent  Extremities: SCD's in place and working bilaterally, BLE edema R>L  Vascular: Palpable dp pulses bilaterally  Neurological: A&O x3  Skin: wound open to air, no erythema and evidence of infection noted  Musculoskeletal: Moving all extremities  Psychiatric: Responsive

## 2018-08-10 LAB
ANION GAP SERPL CALC-SCNC: 12 MMOL/L — SIGNIFICANT CHANGE UP (ref 5–17)
BUN SERPL-MCNC: 38 MG/DL — HIGH (ref 7–23)
CALCIUM SERPL-MCNC: 11.3 MG/DL — HIGH (ref 8.4–10.5)
CHLORIDE SERPL-SCNC: 106 MMOL/L — SIGNIFICANT CHANGE UP (ref 96–108)
CO2 SERPL-SCNC: 18 MMOL/L — LOW (ref 22–31)
CREAT SERPL-MCNC: 3.23 MG/DL — HIGH (ref 0.5–1.3)
GLUCOSE BLDC GLUCOMTR-MCNC: 103 MG/DL — HIGH (ref 70–99)
GLUCOSE BLDC GLUCOMTR-MCNC: 128 MG/DL — HIGH (ref 70–99)
GLUCOSE BLDC GLUCOMTR-MCNC: 147 MG/DL — HIGH (ref 70–99)
GLUCOSE BLDC GLUCOMTR-MCNC: 97 MG/DL — SIGNIFICANT CHANGE UP (ref 70–99)
GLUCOSE SERPL-MCNC: 114 MG/DL — HIGH (ref 70–99)
HCT VFR BLD CALC: 34.5 % — LOW (ref 39–50)
HGB BLD-MCNC: 10.8 G/DL — LOW (ref 13–17)
MAGNESIUM SERPL-MCNC: 1.8 MG/DL — SIGNIFICANT CHANGE UP (ref 1.6–2.6)
MCHC RBC-ENTMCNC: 27.6 PG — SIGNIFICANT CHANGE UP (ref 27–34)
MCHC RBC-ENTMCNC: 31.4 GM/DL — LOW (ref 32–36)
MCV RBC AUTO: 87.9 FL — SIGNIFICANT CHANGE UP (ref 80–100)
PHOSPHATE SERPL-MCNC: 3.1 MG/DL — SIGNIFICANT CHANGE UP (ref 2.5–4.5)
PLATELET # BLD AUTO: 222 K/UL — SIGNIFICANT CHANGE UP (ref 150–400)
POTASSIUM SERPL-MCNC: 5.8 MMOL/L — HIGH (ref 3.5–5.3)
POTASSIUM SERPL-SCNC: 5.8 MMOL/L — HIGH (ref 3.5–5.3)
RBC # BLD: 3.92 M/UL — LOW (ref 4.2–5.8)
RBC # FLD: 14 % — SIGNIFICANT CHANGE UP (ref 10.3–14.5)
SODIUM SERPL-SCNC: 136 MMOL/L — SIGNIFICANT CHANGE UP (ref 135–145)
TACROLIMUS SERPL-MCNC: 11.9 NG/ML — SIGNIFICANT CHANGE UP
WBC # BLD: 10.3 K/UL — SIGNIFICANT CHANGE UP (ref 3.8–10.5)
WBC # FLD AUTO: 10.3 K/UL — SIGNIFICANT CHANGE UP (ref 3.8–10.5)

## 2018-08-10 PROCEDURE — 99233 SBSQ HOSP IP/OBS HIGH 50: CPT | Mod: GC,24

## 2018-08-10 PROCEDURE — 99232 SBSQ HOSP IP/OBS MODERATE 35: CPT | Mod: GC

## 2018-08-10 PROCEDURE — 76776 US EXAM K TRANSPL W/DOPPLER: CPT | Mod: 26,RT

## 2018-08-10 RX ORDER — TACROLIMUS 5 MG/1
4 CAPSULE ORAL
Qty: 0 | Refills: 0 | Status: DISCONTINUED | OUTPATIENT
Start: 2018-08-10 | End: 2018-08-14

## 2018-08-10 RX ORDER — SODIUM BICARBONATE 1 MEQ/ML
0.21 SYRINGE (ML) INTRAVENOUS
Qty: 150 | Refills: 0 | Status: DISCONTINUED | OUTPATIENT
Start: 2018-08-10 | End: 2018-08-13

## 2018-08-10 RX ORDER — SODIUM CHLORIDE 9 MG/ML
500 INJECTION INTRAMUSCULAR; INTRAVENOUS; SUBCUTANEOUS ONCE
Qty: 0 | Refills: 0 | Status: COMPLETED | OUTPATIENT
Start: 2018-08-10 | End: 2018-08-10

## 2018-08-10 RX ADMIN — Medication 1 TABLET(S): at 13:08

## 2018-08-10 RX ADMIN — TRAMADOL HYDROCHLORIDE 25 MILLIGRAM(S): 50 TABLET ORAL at 22:06

## 2018-08-10 RX ADMIN — TRAMADOL HYDROCHLORIDE 50 MILLIGRAM(S): 50 TABLET ORAL at 06:10

## 2018-08-10 RX ADMIN — TRAMADOL HYDROCHLORIDE 25 MILLIGRAM(S): 50 TABLET ORAL at 15:15

## 2018-08-10 RX ADMIN — SODIUM CHLORIDE 500 MILLILITER(S): 9 INJECTION INTRAMUSCULAR; INTRAVENOUS; SUBCUTANEOUS at 10:19

## 2018-08-10 RX ADMIN — Medication 500000 UNIT(S): at 13:07

## 2018-08-10 RX ADMIN — TRAMADOL HYDROCHLORIDE 50 MILLIGRAM(S): 50 TABLET ORAL at 00:00

## 2018-08-10 RX ADMIN — Medication 100 MILLIGRAM(S): at 13:07

## 2018-08-10 RX ADMIN — VALGANCICLOVIR 450 MILLIGRAM(S): 450 TABLET, FILM COATED ORAL at 13:07

## 2018-08-10 RX ADMIN — Medication 5 MILLIGRAM(S): at 18:14

## 2018-08-10 RX ADMIN — Medication 500000 UNIT(S): at 06:15

## 2018-08-10 RX ADMIN — Medication 100 MILLIGRAM(S): at 06:15

## 2018-08-10 RX ADMIN — Medication 100 MEQ/KG/HR: at 14:42

## 2018-08-10 RX ADMIN — TRAMADOL HYDROCHLORIDE 25 MILLIGRAM(S): 50 TABLET ORAL at 14:42

## 2018-08-10 RX ADMIN — FAMOTIDINE 20 MILLIGRAM(S): 10 INJECTION INTRAVENOUS at 13:08

## 2018-08-10 RX ADMIN — Medication 5 MILLIGRAM(S): at 06:15

## 2018-08-10 RX ADMIN — Medication 500000 UNIT(S): at 23:34

## 2018-08-10 RX ADMIN — TACROLIMUS 6 MILLIGRAM(S): 5 CAPSULE ORAL at 06:15

## 2018-08-10 RX ADMIN — TAMSULOSIN HYDROCHLORIDE 0.4 MILLIGRAM(S): 0.4 CAPSULE ORAL at 21:03

## 2018-08-10 RX ADMIN — TRAMADOL HYDROCHLORIDE 50 MILLIGRAM(S): 50 TABLET ORAL at 06:30

## 2018-08-10 RX ADMIN — Medication 500000 UNIT(S): at 18:15

## 2018-08-10 RX ADMIN — POLYETHYLENE GLYCOL 3350 17 GRAM(S): 17 POWDER, FOR SOLUTION ORAL at 13:08

## 2018-08-10 RX ADMIN — MYCOPHENOLATE MOFETIL 1000 MILLIGRAM(S): 250 CAPSULE ORAL at 06:15

## 2018-08-10 RX ADMIN — TACROLIMUS 4 MILLIGRAM(S): 5 CAPSULE ORAL at 18:14

## 2018-08-10 RX ADMIN — MYCOPHENOLATE MOFETIL 1000 MILLIGRAM(S): 250 CAPSULE ORAL at 18:15

## 2018-08-10 RX ADMIN — SENNA PLUS 2 TABLET(S): 8.6 TABLET ORAL at 21:03

## 2018-08-10 RX ADMIN — Medication 90 MILLIGRAM(S): at 06:15

## 2018-08-10 NOTE — PROGRESS NOTE ADULT - PROBLEM SELECTOR PLAN 1
s/p DDRT 07/23; Cold ischemia time14 hours.  CMV +, EBV +. Course complicated by post-operative ureteral leak s/p reimplantation of ureter of transplanted kidney on  07/29/2018 an subsequent IR placement of nephrostomy tube 7/31.   Scr plateaued at ~2.9.   Repeat US with small amount gissell-transplant fluid, good perfusion and no RYAN.   Start pulse steroids for presumed CMR.  Keep LACEY drain.	  Send DSA.  Continue Tacrolimus, Cellcept, prednisone 5 mg,  Nystatin S&S, bactrim, and Valcyte   Goal FK levels 8-10  monitor bmp  monitor I/O. s/p DDRT 07/23; Cold ischemia time14 hours. CMV +, EBV +. Course complicated by post-operative ureteral leak s/p reimplantation of ureter of transplanted kidney on  07/29/2018 an subsequent IR placement of nephrostomy tube 7/31. Repeat US with small amount gissell-transplant fluid, good perfusion and no RYAN.   Scr plateaued at ~2.9;  pt s/p solumedrol 250 mg  for presumed CMR.   Scr now trending up to 3.2  XAVI likely pre renal in setting of diuretics use. Also with K+ 5.7  Give 500 cc bolus and 1 L of d5W + bicarb at 100cc/hr.  Repeat renal US.  f/u DSA ( sent 08/08).  Keep LACEY drain.	  Continue Tacrolimus, Cellcept, prednisone 5 mg,  Nystatin S&S, bactrim, and Valcyte   Goal FK levels 8-10

## 2018-08-10 NOTE — PROGRESS NOTE ADULT - PROBLEM SELECTOR PLAN 3
K+ elevated.  Lasix 40 mg IV x1.  low K+ diet.  Monitor K. K+ elevated.  IV hydration as above.  low K+ diet.  Monitor K.

## 2018-08-10 NOTE — PROGRESS NOTE ADULT - SUBJECTIVE AND OBJECTIVE BOX
[facility] DIVISION OF KIDNEY DISEASES AND HYPERTENSION -- FOLLOW UP NOTE  --------------------------------------------------------------------------------  Chief Complaint: DDRT    24 hour events/subjective:  Pt received lasix       PAST HISTORY  --------------------------------------------------------------------------------  No significant changes to PMH, PSH, FHx, SHx, unless otherwise noted    ALLERGIES & MEDICATIONS  --------------------------------------------------------------------------------  Allergies    IV Contrast (Rash)  nyquil (Rhinorrhea)  vancomycin (Pruritus; Hives)    Intolerances      Standing Inpatient Medications  docusate sodium 100 milliGRAM(s) Oral daily  famotidine    Tablet 20 milliGRAM(s) Oral daily  metoprolol tartrate 100 milliGRAM(s) Oral every 12 hours  mycophenolate mofetil 1000 milliGRAM(s) Oral every 12 hours  NIFEdipine XL 90 milliGRAM(s) Oral daily  nystatin    Suspension [id number] Unit(s) Swish and Swallow four times a day  oxybutynin 5 milliGRAM(s) Oral two times a day  polyethylene glycol 3350 17 Gram(s) Oral daily  predniSONE   Tablet 5 milliGRAM(s) Oral daily  senna 2 Tablet(s) Oral at bedtime  sodium bicarbonate  Infusion 0.206 mEq/kG/Hr IV Continuous <Continuous>  tacrolimus 6 milliGRAM(s) Oral two times a day  tamsulosin 0.4 milliGRAM(s) Oral at bedtime  trimethoprim   80 mG/sulfamethoxazole 400 mG 1 Tablet(s) Oral daily  valGANciclovir 450 milliGRAM(s) Oral daily    PRN Inpatient Medications  acetaminophen   Tablet. 650 milliGRAM(s) Oral every 6 hours PRN  diphenhydrAMINE   Capsule 25 milliGRAM(s) Oral every 6 hours PRN  lidocaine 2% Gel 1 Application(s) Topical three times a day PRN  metoclopramide Injectable 10 milliGRAM(s) IV Push once PRN  ondansetron Injectable 4 milliGRAM(s) IV Push every 6 hours PRN  ondansetron Injectable 4 milliGRAM(s) IV Push once PRN  simethicone 80 milliGRAM(s) Chew every 6 hours PRN  traMADol 50 milliGRAM(s) Oral every 4 hours PRN  traMADol 25 milliGRAM(s) Oral every 4 hours PRN  zinc oxide 20% Ointment 1 Application(s) Topical three times a day PRN      REVIEW OF SYSTEMS  --------------------------------------------------------------------------------  Gen: No weight changes, fatigue, fevers/chills, weakness  Skin: No rashes  Head/Eyes/Ears/Mouth: No headache; Normal hearing; Normal vision w/o blurriness; No sinus pain/discomfort, sore throat  Respiratory: No dyspnea, cough, wheezing, hemoptysis  CV: No chest pain, PND, orthopnea  GI: No abdominal pain, diarrhea, constipation, nausea, vomiting, melena, hematochezia  : No increased frequency, dysuria, hematuria, nocturia  MSK: No joint pain/swelling; no back pain; no edema  Neuro: No dizziness/lightheadedness, weakness, seizures, numbness, tingling  Heme: No easy bruising or bleeding  Endo: No heat/cold intolerance  Psych: No significant nervousness, anxiety, stress, depression    All other systems were reviewed and are negative, except as noted.    VITALS/PHYSICAL EXAM  --------------------------------------------------------------------------------  T(C): 36.8 (08-10-18 @ 05:55), Max: 36.8 (08-09-18 @ 13:42)  HR: 60 (08-10-18 @ 05:55) (60 - 73)  BP: 145/94 (08-10-18 @ 05:55) (115/74 - 145/94)  RR: 18 (08-10-18 @ 05:55) (18 - 18)  SpO2: 99% (08-10-18 @ 05:55) (98% - 100%)  Wt(kg): --        08-09-18 @ 07:01  -  08-10-18 @ 07:00  --------------------------------------------------------  IN: 1840 mL / OUT: 1939 mL / NET: -99 mL    08-10-18 @ 07:01  -  08-10-18 @ 12:10  --------------------------------------------------------  IN: 500 mL / OUT: 300 mL / NET: 200 mL      Physical Exam:  	Gen: NAD, well-appearing  	HEENT: PERRL, supple neck, clear oropharynx  	Pulm: CTA B/L  	CV: RRR, S1S2; no rub  	Back: No spinal or CVA tenderness; no sacral edema  	Abd: +BS, soft, nontender/nondistended  	: No suprapubic tenderness  	UE: Warm, FROM, no clubbing, intact strength; no edema; no asterixis  	LE: Warm, FROM, no clubbing, intact strength; no edema  	Neuro: No focal deficits, intact gait  	Psych: Normal affect and mood  	Skin: Warm, without rashes  	Vascular access:    LABS/STUDIES  --------------------------------------------------------------------------------              10.8   10.3  >-----------<  222      [08-10-18 @ 06:20]              34.5     136  |  106  |  38  ----------------------------<  114      [08-10-18 @ 06:20]  5.8   |  18  |  3.23        Ca     11.3     [08-10-18 @ 06:20]      Mg     1.8     [08-10-18 @ 06:20]      Phos  3.1     [08-10-18 @ 06:20]            Creatinine Trend:  SCr 3.23 [08-10 @ 06:20]  SCr 2.91 [08-09 @ 06:28]  SCr 2.93 [08-08 @ 06:26]  SCr 2.96 [08-07 @ 05:56]  SCr 3.08 [08-06 @ 06:11]    Urinalysis - [08-06-18 @ 21:43]      Color Yellow / Appearance Clear / SG 1.018 / pH 6.0      Gluc 100 / Ketone Negative  / Bili Negative / Urobili Negative       Blood Moderate / Protein 100 / Leuk Est Negative / Nitrite Negative      RBC 69 / WBC 10 / Hyaline 8 / Gran  / Sq Epi  / Non Sq Epi 3 / Bacteria Negative      HbA1c 5.1      [08-18-15 @ 18:19] [facility] DIVISION OF KIDNEY DISEASES AND HYPERTENSION -- FOLLOW UP NOTE  --------------------------------------------------------------------------------  Chief Complaint: DDRT    24 hour events/subjective:  Pt received Lasix 40 mg IV yesterday for hyperkalemia.  Scr increasing, UO decreasing.  Pt does not have any acute complaint.        PAST HISTORY  --------------------------------------------------------------------------------  No significant changes to PMH, PSH, FHx, SHx, unless otherwise noted    ALLERGIES & MEDICATIONS  --------------------------------------------------------------------------------  Allergies    IV Contrast (Rash)  nyquil (Rhinorrhea)  vancomycin (Pruritus; Hives)    Intolerances      Standing Inpatient Medications  docusate sodium 100 milliGRAM(s) Oral daily  famotidine    Tablet 20 milliGRAM(s) Oral daily  metoprolol tartrate 100 milliGRAM(s) Oral every 12 hours  mycophenolate mofetil 1000 milliGRAM(s) Oral every 12 hours  NIFEdipine XL 90 milliGRAM(s) Oral daily  nystatin    Suspension [id number] Unit(s) Swish and Swallow four times a day  oxybutynin 5 milliGRAM(s) Oral two times a day  polyethylene glycol 3350 17 Gram(s) Oral daily  predniSONE   Tablet 5 milliGRAM(s) Oral daily  senna 2 Tablet(s) Oral at bedtime  sodium bicarbonate  Infusion 0.206 mEq/kG/Hr IV Continuous <Continuous>  tacrolimus 6 milliGRAM(s) Oral two times a day  tamsulosin 0.4 milliGRAM(s) Oral at bedtime  trimethoprim   80 mG/sulfamethoxazole 400 mG 1 Tablet(s) Oral daily  valGANciclovir 450 milliGRAM(s) Oral daily    PRN Inpatient Medications  acetaminophen   Tablet. 650 milliGRAM(s) Oral every 6 hours PRN  diphenhydrAMINE   Capsule 25 milliGRAM(s) Oral every 6 hours PRN  lidocaine 2% Gel 1 Application(s) Topical three times a day PRN  metoclopramide Injectable 10 milliGRAM(s) IV Push once PRN  ondansetron Injectable 4 milliGRAM(s) IV Push every 6 hours PRN  ondansetron Injectable 4 milliGRAM(s) IV Push once PRN  simethicone 80 milliGRAM(s) Chew every 6 hours PRN  traMADol 50 milliGRAM(s) Oral every 4 hours PRN  traMADol 25 milliGRAM(s) Oral every 4 hours PRN  zinc oxide 20% Ointment 1 Application(s) Topical three times a day PRN      REVIEW OF SYSTEMS  --------------------------------------------------------------------------------  Gen: No weight changes, fatigue, fevers/chills, weakness  Skin: No rashes  Head/Eyes/Ears/Mouth: No headache; Normal hearing; Normal vision w/o blurriness; No sinus pain/discomfort, sore throat  Respiratory: No dyspnea, cough, wheezing, hemoptysis  CV: No chest pain, PND, orthopnea  GI: No abdominal pain, diarrhea, constipation, nausea, vomiting, melena, hematochezia  : No increased frequency, dysuria, hematuria, nocturia  MSK: No joint pain/swelling; no back pain; no edema  Neuro: No dizziness/lightheadedness, weakness, seizures, numbness, tingling  Heme: No easy bruising or bleeding  Endo: No heat/cold intolerance  Psych: No significant nervousness, anxiety, stress, depression    All other systems were reviewed and are negative, except as noted.    VITALS/PHYSICAL EXAM  --------------------------------------------------------------------------------  T(C): 36.8 (08-10-18 @ 05:55), Max: 36.8 (08-09-18 @ 13:42)  HR: 60 (08-10-18 @ 05:55) (60 - 73)  BP: 145/94 (08-10-18 @ 05:55) (115/74 - 145/94)  RR: 18 (08-10-18 @ 05:55) (18 - 18)  SpO2: 99% (08-10-18 @ 05:55) (98% - 100%)  Wt(kg): --        08-09-18 @ 07:01  -  08-10-18 @ 07:00  --------------------------------------------------------  IN: 1840 mL / OUT: 1939 mL / NET: -99 mL    08-10-18 @ 07:01  -  08-10-18 @ 12:10  --------------------------------------------------------  IN: 500 mL / OUT: 300 mL / NET: 200 mL      Physical Exam:  	Gen: NAD  	Pulm: CTA B/L  	CV: RRR, S1S2; no rub  	Abd: +BS, soft, nontender/nondistended  	: No suprapubic tenderness, marquez+  	UE: Warm, no asterixis  	LE: Warm, b/l LE edema, palpable DP pulses b/l   	Skin: Warm, without rashes  	Vascular access: NINI NORWOOD    LABS/STUDIES  --------------------------------------------------------------------------------              10.8   10.3  >-----------<  222      [08-10-18 @ 06:20]              34.5     136  |  106  |  38  ----------------------------<  114      [08-10-18 @ 06:20]  5.8   |  18  |  3.23        Ca     11.3     [08-10-18 @ 06:20]      Mg     1.8     [08-10-18 @ 06:20]      Phos  3.1     [08-10-18 @ 06:20]            Creatinine Trend:  SCr 3.23 [08-10 @ 06:20]  SCr 2.91 [08-09 @ 06:28]  SCr 2.93 [08-08 @ 06:26]  SCr 2.96 [08-07 @ 05:56]  SCr 3.08 [08-06 @ 06:11]    Urinalysis - [08-06-18 @ 21:43]      Color Yellow / Appearance Clear / SG 1.018 / pH 6.0      Gluc 100 / Ketone Negative  / Bili Negative / Urobili Negative       Blood Moderate / Protein 100 / Leuk Est Negative / Nitrite Negative      RBC 69 / WBC 10 / Hyaline 8 / Gran  / Sq Epi  / Non Sq Epi 3 / Bacteria Negative      HbA1c 5.1      [08-18-15 @ 18:19]

## 2018-08-10 NOTE — PROGRESS NOTE ADULT - SUBJECTIVE AND OBJECTIVE BOX
[facility] [place] [facility]   MD PERCY MORALES MD ANGELA WONG, PA    TEL:  OFFICE: [phone]  DR OCONNOR CELL: [phone]  DR. GAMBOA CELL: [phone]  ELMIRA PRINGLE CELL: [phone]        Patient is a 33y old  Male who presents with a chief complaint of DDRT (23 Jul 2018 09:25)      Patient seen and examined at bedside. No chest pain/sob    VITALS:  T(F): 98.3 (08-10-18 @ 05:55), Max: 98.3 (08-10-18 @ 05:55)  HR: 60 (08-10-18 @ 05:55)  BP: 145/94 (08-10-18 @ 05:55)  RR: 18 (08-10-18 @ 05:55)  SpO2: 99% (08-10-18 @ 05:55)  Wt(kg): --    08-09 @ 07:01  -  08-10 @ 07:00  --------------------------------------------------------  IN: 1840 mL / OUT: 1939 mL / NET: -99 mL    08-10 @ 07:01  -  08-10 @ 15:52  --------------------------------------------------------  IN: 740 mL / OUT: 502 mL / NET: 238 mL          PHYSICAL EXAM:  Constitutional: NAD  Neck: No JVD  Respiratory: CTAB, no wheezes, rales or rhonchi  Cardiovascular: S1, S2, RRR  Gastrointestinal: BS+, soft, NT/ND, +Sean drain, +NT  : +marquez  Extremities: No peripheral edema    Hospital Medications:   MEDICATIONS  (STANDING):  docusate sodium 100 milliGRAM(s) Oral daily  famotidine    Tablet 20 milliGRAM(s) Oral daily  metoprolol tartrate 100 milliGRAM(s) Oral every 12 hours  mycophenolate mofetil 1000 milliGRAM(s) Oral every 12 hours  NIFEdipine XL 90 milliGRAM(s) Oral daily  nystatin    Suspension 269831 Unit(s) Swish and Swallow four times a day  oxybutynin 5 milliGRAM(s) Oral two times a day  polyethylene glycol 3350 17 Gram(s) Oral daily  predniSONE   Tablet 5 milliGRAM(s) Oral daily  senna 2 Tablet(s) Oral at bedtime  sodium bicarbonate  Infusion 0.206 mEq/kG/Hr (100 mL/Hr) IV Continuous <Continuous>  tacrolimus 4 milliGRAM(s) Oral two times a day  tamsulosin 0.4 milliGRAM(s) Oral at bedtime  trimethoprim   80 mG/sulfamethoxazole 400 mG 1 Tablet(s) Oral daily  valGANciclovir 450 milliGRAM(s) Oral daily    Tacrolimus (), Serum: 11.9 ng/mL (08-10 @ 07:20)    LABS:  08-10    136  |  106  |  38<H>  ----------------------------<  114<H>  5.8<H>   |  18<L>  |  3.23<H>    Ca    11.3<H>      10 Aug 2018 06:20  Phos  3.1     08-10  Mg     1.8     08-10      Creatinine Trend: 3.23 <--, 2.91 <--, 2.93 <--, 2.96 <--, 3.08 <--, 3.06 <--, 3.02 <--    Phosphorus Level, Serum: 3.1 mg/dL (08-10 @ 06:20)                              10.8   10.3  )-----------( 222      ( 10 Aug 2018 06:20 )             34.5     Urine Studies:  Urinalysis - [08-06-18 @ 21:43]      Color Yellow / Appearance Clear / SG 1.018 / pH 6.0      Gluc 100 / Ketone Negative  / Bili Negative / Urobili Negative       Blood Moderate / Protein 100 / Leuk Est Negative / Nitrite Negative      RBC 69 / WBC 10 / Hyaline 8 / Gran  / Sq Epi  / Non Sq Epi 3 / Bacteria Negative      HbA1c 5.1      [08-18-15 @ 18:19]        RADIOLOGY & ADDITIONAL STUDIES:

## 2018-08-10 NOTE — PROGRESS NOTE ADULT - SUBJECTIVE AND OBJECTIVE BOX
Transplant Surgery - Multidisciplinary Rounds  --------------------------------------------------------------  DDRT 7/23/2018   POD 17  Reimplantation of ureter of transplanted kidney  07/29/2018   IR nephrostomy 7/31/2018    Present:  Patient seen with multidisciplinary team (surgeon, Nephrologist, pharmacist, NP, resident MD, MD student)  in am rounds and examined with Dr. Brown.    32 y/o gent suman POD#16 R sided DDRT anastomosed to R external iliac artery and vein Ureter anastomosed to bladder over double J stent.  Cold ischemia time14 hours.  CMV +, EBV +. Course complicated by post-operative ureteral leak s/p reimplantation of ureter of transplanted kidney on  07/29/2018 and subsequent IR placement of nephrostomy tube 7/31.     Patient examined at bedside. No acute events overnight.  Given increasing creatinine and low output from nephrostomy tube, patient scheduled for ultrasound today.    Potential Discharge date  8/13-14    Education: transplant medications    Plan of care: See assessment and plan of care    MEDICATIONS  (STANDING):  docusate sodium 100 milliGRAM(s) Oral daily  famotidine    Tablet 20 milliGRAM(s) Oral daily  metoprolol tartrate 100 milliGRAM(s) Oral every 12 hours  mycophenolate mofetil 1000 milliGRAM(s) Oral every 12 hours  NIFEdipine XL 90 milliGRAM(s) Oral daily  nystatin    Suspension 207571 Unit(s) Swish and Swallow four times a day  oxybutynin 5 milliGRAM(s) Oral two times a day  polyethylene glycol 3350 17 Gram(s) Oral daily  predniSONE   Tablet 5 milliGRAM(s) Oral daily  senna 2 Tablet(s) Oral at bedtime  sodium bicarbonate  Infusion 0.206 mEq/kG/Hr (100 mL/Hr) IV Continuous <Continuous>  tacrolimus 6 milliGRAM(s) Oral two times a day  tamsulosin 0.4 milliGRAM(s) Oral at bedtime  trimethoprim   80 mG/sulfamethoxazole 400 mG 1 Tablet(s) Oral daily  valGANciclovir 450 milliGRAM(s) Oral daily    MEDICATIONS  (PRN):  acetaminophen   Tablet. 650 milliGRAM(s) Oral every 6 hours PRN Mild Pain (1 - 3)  diphenhydrAMINE   Capsule 25 milliGRAM(s) Oral every 6 hours PRN Rash and/or Itching  lidocaine 2% Gel 1 Application(s) Topical three times a day PRN pain related to urinary catheter  metoclopramide Injectable 10 milliGRAM(s) IV Push once PRN Nausea and/or Vomiting  ondansetron Injectable 4 milliGRAM(s) IV Push every 6 hours PRN Nausea  ondansetron Injectable 4 milliGRAM(s) IV Push once PRN Nausea and/or Vomiting  simethicone 80 milliGRAM(s) Chew every 6 hours PRN Gas  traMADol 50 milliGRAM(s) Oral every 4 hours PRN Severe Pain (7 - 10)  traMADol 25 milliGRAM(s) Oral every 4 hours PRN Moderate Pain (4 - 6)  zinc oxide 20% Ointment 1 Application(s) Topical three times a day PRN rash        PAST MEDICAL & SURGICAL HISTORY:  ESRD on dialysis  HTN (hypertension)  No significant past surgical history      Vital Signs Last 24 Hrs  T(C): 36.8 (10 Aug 2018 05:55), Max: 36.8 (09 Aug 2018 13:42)  T(F): 98.3 (10 Aug 2018 05:55), Max: 98.3 (10 Aug 2018 05:55)  HR: 60 (10 Aug 2018 05:55) (60 - 73)  BP: 145/94 (10 Aug 2018 05:55) (115/74 - 145/94)  BP(mean): --  RR: 18 (10 Aug 2018 05:55) (18 - 18)  SpO2: 99% (10 Aug 2018 05:55) (98% - 100%)    I&O's Detail    09 Aug 2018 07:01  -  10 Aug 2018 07:00  --------------------------------------------------------  IN:    Oral Fluid: 1840 mL  Total IN: 1840 mL    OUT:    Bulb: 4 mL    Indwelling Catheter - Urethral: 125 mL    Nephrostomy Tube: 1810 mL  Total OUT: 1939 mL    Total NET: -99 mL                              10.8   10.3  )-----------( 222      ( 10 Aug 2018 06:20 )             34.5     08-10    136  |  106  |  38<H>  ----------------------------<  114<H>  5.8<H>   |  18<L>  |  3.23<H>    Ca    11.3<H>      10 Aug 2018 06:20  Phos  3.1     08-10  Mg     1.8     08-10          Tacrolimus (), Serum: 11.9 (8/10)    Review of systems  Gen: No weight changes, fatigue, fevers/chills, weakness  Skin: No rashes  Head/Eyes/Ears/Mouth: No headache; Normal hearing; Normal vision w/o blurriness; No sinus pain/discomfort, sore throat  Respiratory: No dyspnea, cough, wheezing, hemoptysis  CV: No chest pain, PND, orthopnea  GI: Reports incisional pain and TTP near drains, denies diarrhea, constipation, nausea, vomiting, melena, hematochezia.   : Marquez in situ, nephrostomy and LACEY patent  MSK: No joint pain/swelling; no back pain;   Neuro: No dizziness/lightheadedness, weakness, seizures, numbness, tingling  Heme: No easy bruising or bleeding  Endo: No heat/cold intolerance  Psych: No significant nervousness, anxiety, stress, depression  All other systems were reviewed and are negative, except as noted.    PHYSICAL EXAM:  Constitutional: Well developed / well nourished  Eyes: Anicteric, PERRLA  ENMT: nc/at  Neck: supple  Respiratory: CTA B/L  Cardiovascular: RRR  Gastrointestinal: Soft abdomen, mild tender to touch at surgical site, ND  Genitourinary: marquez in situ.  LACEY, nephrostomy tube patent  Extremities: SCD's in place and working bilaterally, BLE edema R>L  Vascular: Palpable dp pulses bilaterally  Neurological: A&O x3  Skin: wound open to air, no erythema and evidence of infection noted  Musculoskeletal: Moving all extremities  Psychiatric: Responsive

## 2018-08-10 NOTE — PROGRESS NOTE ADULT - ATTENDING COMMENTS
Still with XAVI of transplant kidney - worse today.  Possibly related to high tacro - will decrease dose.  Low output from drain.  Will also give fluid challenge to r/o mild dehydration.   Maintain nephrostomy and marquez for small leak  f/u DSA   hold off on further solumedrol for now.

## 2018-08-10 NOTE — PROGRESS NOTE ADULT - ATTENDING COMMENTS
sp DDRTx complicated by urine leak, reop with ureteral reimplant.    Neph tube in place    Graft with post-txp dysfunction.  Cr ~3.  Good UOP.    Immuno: tac/pred/cellcept.  Had rec'd solumedrol, will hold off for now in setting of Cr non-improvement.  Suspect ATN related to ureteral issues.

## 2018-08-10 NOTE — PROGRESS NOTE ADULT - PROBLEM SELECTOR PLAN 1
s/p DDRT 07/23 c/b post-operative ureteral leak s/p reimplantation of ureter of transplanted kidney on  07/29/2018 and subsequent IR placement of nephrostomy tube 7/31.   Renal function worsen today, on sodium bicarb gtt, prograf lower to 4mg bid  s/p pulse steroids 8/9.   Continue immunosuppressants per transplant team  Continue  Nystatin S&S, bactrim, and Valcyte for PPX  renal sonogram with no hydro/ RYAN, follow up transplant team  monitor bmp.

## 2018-08-10 NOTE — CHART NOTE - NSCHARTNOTEFT_GEN_A_CORE
Verbal consult received from RN for questions regarding diet    Source: Patient [X ]    Family [ ]     other [X ]; medical record    Hospital Course: 34 yo male with PMH of HTN, ESRD on HD (MWF). Pt POD#4 S/P right sided DDRT to right external iliac arty and vein on (7/23) with cold time of 14hrs. Course complicated by patient S/P reimplantation of ureter of transplanted kidney on (7/29) and S/P R nephrostomy tube placement with IR 2/2 anastomotic leak on (7/31). Plan for renal US today 2/2 rising creatinine & low nephrostomy tube output    Pt noted with 1935ml urine output in past 24-hours; current UO = 60-350ml/hr. Magnesium, sodium, and phosphorus WNL; potassium elevated.     Diet : no concentrated potassium diet  Patient, wife, and family seen by bedside. Reports good appetite and PO intakes during this admission remain, observed consuming food from outside supplied by family. Denied nausea/emesis or GI distress with last BM 8/10.  Patient with questions regarding diet after d/c home including whether juice/smoothies would be able to fit within recommended dietary restrictions. Discussed high sugar/CHO content of juices, which may contribute to high BG levels (especially while take steroids). Discussed consuming whole fruits or smoothies for added fiber content in place of juices. Reviewed high potassium content of certain fruits/vegetables, which may be present in smoothies. Encouraged patient to consume largely home-prepared foods to ensure proper handling of fresh fruits/vegetables & in order to choose foods lower in potassium content. Noted elevated potassium level, patient states he has been consuming tomato sauce+ketchup, which may be contributing to elevated level; encouraged to limit 2/2 high potassium content of tomato products. Also noted elevated BG level 8/9, patient states BG was elevated after consuming soda. Reviewed importance of restricting intake of concentrated sweets/beverages. Pt verbalized understanding and denied further questions at this time    Current Weight: 8/10 147.9 pounds. Weight appears to be downtrending; however, pt states he is now at his UBW & suspects wt loss 2/2 fluid shifts  Edema: none noted    Pertinent Medications: MEDICATIONS  (STANDING):  docusate sodium 100 milliGRAM(s) Oral daily  famotidine    Tablet 20 milliGRAM(s) Oral daily  metoprolol tartrate 100 milliGRAM(s) Oral every 12 hours  mycophenolate mofetil 1000 milliGRAM(s) Oral every 12 hours  NIFEdipine XL 90 milliGRAM(s) Oral daily  nystatin    Suspension 397550 Unit(s) Swish and Swallow four times a day  oxybutynin 5 milliGRAM(s) Oral two times a day  polyethylene glycol 3350 17 Gram(s) Oral daily  predniSONE   Tablet 5 milliGRAM(s) Oral daily  senna 2 Tablet(s) Oral at bedtime  sodium bicarbonate  Infusion 0.206 mEq/kG/Hr (100 mL/Hr) IV Continuous <Continuous>  tacrolimus 4 milliGRAM(s) Oral two times a day  tamsulosin 0.4 milliGRAM(s) Oral at bedtime  trimethoprim   80 mG/sulfamethoxazole 400 mG 1 Tablet(s) Oral daily  valGANciclovir 450 milliGRAM(s) Oral daily    MEDICATIONS  (PRN):  acetaminophen   Tablet. 650 milliGRAM(s) Oral every 6 hours PRN Mild Pain (1 - 3)  diphenhydrAMINE   Capsule 25 milliGRAM(s) Oral every 6 hours PRN Rash and/or Itching  lidocaine 2% Gel 1 Application(s) Topical three times a day PRN pain related to urinary catheter  metoclopramide Injectable 10 milliGRAM(s) IV Push once PRN Nausea and/or Vomiting  ondansetron Injectable 4 milliGRAM(s) IV Push every 6 hours PRN Nausea  ondansetron Injectable 4 milliGRAM(s) IV Push once PRN Nausea and/or Vomiting  simethicone 80 milliGRAM(s) Chew every 6 hours PRN Gas  traMADol 50 milliGRAM(s) Oral every 4 hours PRN Severe Pain (7 - 10)  traMADol 25 milliGRAM(s) Oral every 4 hours PRN Moderate Pain (4 - 6)  zinc oxide 20% Ointment 1 Application(s) Topical three times a day PRN rash      Pertinent Labs:  (8/10) POCT BG , BUN 38H, Ca 11.3H, K 5.8H, CO2 18L, Cr 3.23H, BG 114H; (8/9) POCT BG     Skin: no pressure injuries    Estimated Needs:   [X ] no change since previous assessment  [ ] recalculated:     Previous Nutrition Diagnosis:   [X ] Increased Nutrient Needs    Nutrition Diagnosis is [X ] ongoing; addressed with PO diet     New Nutrition Diagnosis: [X ] not applicable    Interventions:     Recommend:  1) Continue no concentrated potassium diet; encourage intake of high protein, nutrient dense foods   2) Monitor weight, lab values, skin, po intake and GI tolerance  3) Reinforce therapeutic diet education as able    Monitoring and Evaluation:   Follow up per protocol  RD to remain available for further nutritional interventions as indicated.   Poornima Madsen RD Pager #[phone]

## 2018-08-10 NOTE — PROGRESS NOTE ADULT - PROBLEM SELECTOR PLAN 1
7/23/18 DDRT   07/29/18 reimplantation of ureter of transplanted kidney for ureteral leak  7/31/18 nephrostomy tube  -Creatinine slightly increased - f/u renal ultrasound today  -Give 500 cc bolus and 1 L of d5W + bicarb at 100cc/hr  -FU DSA, sent 8/8  -Keep Rodriguez   -Do not compress LACEY. Drain and record output q 6 hours.   - Strict I/Os  -Continue Tylenol and tramadol PRN  -Continue bowel regimen, Flomax 0.4 daily   -daily BMP  - Blood sugars well controlled. Fingersticks before meals and bedtime.  -On Oxybutynin for bladder spasm  - SCDs, ambulate.

## 2018-08-11 LAB
ANION GAP SERPL CALC-SCNC: 11 MMOL/L — SIGNIFICANT CHANGE UP (ref 5–17)
BUN SERPL-MCNC: 34 MG/DL — HIGH (ref 7–23)
CALCIUM SERPL-MCNC: 10.5 MG/DL — SIGNIFICANT CHANGE UP (ref 8.4–10.5)
CHLORIDE SERPL-SCNC: 104 MMOL/L — SIGNIFICANT CHANGE UP (ref 96–108)
CO2 SERPL-SCNC: 22 MMOL/L — SIGNIFICANT CHANGE UP (ref 22–31)
CREAT SERPL-MCNC: 3.02 MG/DL — HIGH (ref 0.5–1.3)
GLUCOSE BLDC GLUCOMTR-MCNC: 101 MG/DL — HIGH (ref 70–99)
GLUCOSE BLDC GLUCOMTR-MCNC: 103 MG/DL — HIGH (ref 70–99)
GLUCOSE BLDC GLUCOMTR-MCNC: 106 MG/DL — HIGH (ref 70–99)
GLUCOSE BLDC GLUCOMTR-MCNC: 134 MG/DL — HIGH (ref 70–99)
GLUCOSE SERPL-MCNC: 89 MG/DL — SIGNIFICANT CHANGE UP (ref 70–99)
HCT VFR BLD CALC: 32.3 % — LOW (ref 39–50)
HGB BLD-MCNC: 10.3 G/DL — LOW (ref 13–17)
MAGNESIUM SERPL-MCNC: 1.6 MG/DL — SIGNIFICANT CHANGE UP (ref 1.6–2.6)
MCHC RBC-ENTMCNC: 28.2 PG — SIGNIFICANT CHANGE UP (ref 27–34)
MCHC RBC-ENTMCNC: 32 GM/DL — SIGNIFICANT CHANGE UP (ref 32–36)
MCV RBC AUTO: 88 FL — SIGNIFICANT CHANGE UP (ref 80–100)
PHOSPHATE SERPL-MCNC: 2.8 MG/DL — SIGNIFICANT CHANGE UP (ref 2.5–4.5)
PLATELET # BLD AUTO: 203 K/UL — SIGNIFICANT CHANGE UP (ref 150–400)
POTASSIUM SERPL-MCNC: 5 MMOL/L — SIGNIFICANT CHANGE UP (ref 3.5–5.3)
POTASSIUM SERPL-SCNC: 5 MMOL/L — SIGNIFICANT CHANGE UP (ref 3.5–5.3)
RBC # BLD: 3.67 M/UL — LOW (ref 4.2–5.8)
RBC # FLD: 14.1 % — SIGNIFICANT CHANGE UP (ref 10.3–14.5)
SODIUM SERPL-SCNC: 137 MMOL/L — SIGNIFICANT CHANGE UP (ref 135–145)
TACROLIMUS SERPL-MCNC: 10 NG/ML — SIGNIFICANT CHANGE UP
WBC # BLD: 6.5 K/UL — SIGNIFICANT CHANGE UP (ref 3.8–10.5)
WBC # FLD AUTO: 6.5 K/UL — SIGNIFICANT CHANGE UP (ref 3.8–10.5)

## 2018-08-11 PROCEDURE — 99233 SBSQ HOSP IP/OBS HIGH 50: CPT | Mod: GC,24

## 2018-08-11 PROCEDURE — 99233 SBSQ HOSP IP/OBS HIGH 50: CPT

## 2018-08-11 RX ORDER — METOPROLOL TARTRATE 50 MG
50 TABLET ORAL EVERY 12 HOURS
Qty: 0 | Refills: 0 | Status: DISCONTINUED | OUTPATIENT
Start: 2018-08-11 | End: 2018-08-12

## 2018-08-11 RX ORDER — VALGANCICLOVIR 450 MG/1
450 TABLET, FILM COATED ORAL
Qty: 0 | Refills: 0 | Status: DISCONTINUED | OUTPATIENT
Start: 2018-08-11 | End: 2018-08-14

## 2018-08-11 RX ORDER — SODIUM CHLORIDE 9 MG/ML
1000 INJECTION INTRAMUSCULAR; INTRAVENOUS; SUBCUTANEOUS
Qty: 0 | Refills: 0 | Status: DISCONTINUED | OUTPATIENT
Start: 2018-08-11 | End: 2018-08-12

## 2018-08-11 RX ORDER — SODIUM BICARBONATE 1 MEQ/ML
1300 SYRINGE (ML) INTRAVENOUS DAILY
Qty: 0 | Refills: 0 | Status: DISCONTINUED | OUTPATIENT
Start: 2018-08-11 | End: 2018-08-14

## 2018-08-11 RX ADMIN — Medication 5 MILLIGRAM(S): at 18:04

## 2018-08-11 RX ADMIN — Medication 50 MILLIGRAM(S): at 10:20

## 2018-08-11 RX ADMIN — TRAMADOL HYDROCHLORIDE 25 MILLIGRAM(S): 50 TABLET ORAL at 01:04

## 2018-08-11 RX ADMIN — TRAMADOL HYDROCHLORIDE 50 MILLIGRAM(S): 50 TABLET ORAL at 15:45

## 2018-08-11 RX ADMIN — Medication 500000 UNIT(S): at 22:21

## 2018-08-11 RX ADMIN — Medication 90 MILLIGRAM(S): at 06:01

## 2018-08-11 RX ADMIN — TACROLIMUS 4 MILLIGRAM(S): 5 CAPSULE ORAL at 06:01

## 2018-08-11 RX ADMIN — TRAMADOL HYDROCHLORIDE 50 MILLIGRAM(S): 50 TABLET ORAL at 16:25

## 2018-08-11 RX ADMIN — Medication 500000 UNIT(S): at 12:42

## 2018-08-11 RX ADMIN — SENNA PLUS 2 TABLET(S): 8.6 TABLET ORAL at 22:16

## 2018-08-11 RX ADMIN — Medication 1300 MILLIGRAM(S): at 12:42

## 2018-08-11 RX ADMIN — MYCOPHENOLATE MOFETIL 1000 MILLIGRAM(S): 250 CAPSULE ORAL at 18:04

## 2018-08-11 RX ADMIN — Medication 100 MILLIGRAM(S): at 12:43

## 2018-08-11 RX ADMIN — Medication 5 MILLIGRAM(S): at 06:01

## 2018-08-11 RX ADMIN — TRAMADOL HYDROCHLORIDE 50 MILLIGRAM(S): 50 TABLET ORAL at 02:44

## 2018-08-11 RX ADMIN — Medication 1 TABLET(S): at 12:42

## 2018-08-11 RX ADMIN — Medication 500000 UNIT(S): at 18:03

## 2018-08-11 RX ADMIN — FAMOTIDINE 20 MILLIGRAM(S): 10 INJECTION INTRAVENOUS at 12:43

## 2018-08-11 RX ADMIN — Medication 500000 UNIT(S): at 06:01

## 2018-08-11 RX ADMIN — TRAMADOL HYDROCHLORIDE 50 MILLIGRAM(S): 50 TABLET ORAL at 03:44

## 2018-08-11 RX ADMIN — Medication 50 MILLIGRAM(S): at 18:10

## 2018-08-11 RX ADMIN — TACROLIMUS 4 MILLIGRAM(S): 5 CAPSULE ORAL at 18:03

## 2018-08-11 RX ADMIN — TAMSULOSIN HYDROCHLORIDE 0.4 MILLIGRAM(S): 0.4 CAPSULE ORAL at 22:16

## 2018-08-11 RX ADMIN — MYCOPHENOLATE MOFETIL 1000 MILLIGRAM(S): 250 CAPSULE ORAL at 06:01

## 2018-08-11 NOTE — PROGRESS NOTE ADULT - SUBJECTIVE AND OBJECTIVE BOX
[facility] [place] [facility]   MD PERCY MORALES MD RUORU WONG, PA    TEL:  OFFICE: [phone]  DR OCONNOR CELL: [phone]  DAJA PRINGLE CELL: [phone]  DR. GAMBOA CELL: [phone]    RENAL FOLLOW UP NOTE  --------------------------------------------------------------------------------  HPI:  33M with PMH of HTN and ESRD since 2010 s/p DDRT 7/23.    Reimplantation of ureter of transplanted kidney  07/29/2018.   Had IR placement of nephrostomy tube on 7/31 for anastomotic leak  Pt seen and examined at bedside.  Denies sob, chest pain      PAST HISTORY  --------------------------------------------------------------------------------  No significant changes to PMH, PSH, FHx, SHx, unless otherwise noted    ALLERGIES & MEDICATIONS  --------------------------------------------------------------------------------  Allergies    IV Contrast (Rash)  nyquil (Rhinorrhea)  vancomycin (Pruritus; Hives)    Intolerances      Standing Inpatient Medications  docusate sodium 100 milliGRAM(s) Oral daily  famotidine    Tablet 20 milliGRAM(s) Oral daily  metoprolol tartrate 50 milliGRAM(s) Oral every 12 hours  mycophenolate mofetil 1000 milliGRAM(s) Oral every 12 hours  NIFEdipine XL 90 milliGRAM(s) Oral daily  nystatin    Suspension [id number] Unit(s) Swish and Swallow four times a day  oxybutynin 5 milliGRAM(s) Oral two times a day  polyethylene glycol 3350 17 Gram(s) Oral daily  predniSONE   Tablet 5 milliGRAM(s) Oral daily  senna 2 Tablet(s) Oral at bedtime  sodium bicarbonate 1300 milliGRAM(s) Oral daily  sodium bicarbonate  Infusion 0.206 mEq/kG/Hr IV Continuous <Continuous>  tacrolimus 4 milliGRAM(s) Oral two times a day  tamsulosin 0.4 milliGRAM(s) Oral at bedtime  trimethoprim   80 mG/sulfamethoxazole 400 mG 1 Tablet(s) Oral daily  valGANciclovir 450 milliGRAM(s) Oral <User Schedule>    PRN Inpatient Medications  acetaminophen   Tablet. 650 milliGRAM(s) Oral every 6 hours PRN  diphenhydrAMINE   Capsule 25 milliGRAM(s) Oral every 6 hours PRN  lidocaine 2% Gel 1 Application(s) Topical three times a day PRN  metoclopramide Injectable 10 milliGRAM(s) IV Push once PRN  ondansetron Injectable 4 milliGRAM(s) IV Push every 6 hours PRN  ondansetron Injectable 4 milliGRAM(s) IV Push once PRN  simethicone 80 milliGRAM(s) Chew every 6 hours PRN  traMADol 50 milliGRAM(s) Oral every 4 hours PRN  traMADol 25 milliGRAM(s) Oral every 4 hours PRN  zinc oxide 20% Ointment 1 Application(s) Topical three times a day PRN      REVIEW OF SYSTEMS  --------------------------------------------------------------------------------  General: no fever  CVS: no chest pain  RESP: no sob, no cough  ABD: no abdominal pain  : no dysuria,  MSK: no edema     VITALS/PHYSICAL EXAM  --------------------------------------------------------------------------------  T(C): 36.7 (08-11-18 @ 05:28), Max: 36.8 (08-10-18 @ 18:13)  HR: 60 (08-11-18 @ 05:28) (58 - 67)  BP: 136/87 (08-11-18 @ 05:28) (122/87 - 145/91)  RR: 18 (08-11-18 @ 05:28) (18 - 18)  SpO2: 99% (08-11-18 @ 05:28) (97% - 100%)  Wt(kg): --        08-10-18 @ 07:01  -  08-11-18 @ 07:00  --------------------------------------------------------  IN: 3280 mL / OUT: 1991 mL / NET: 1289 mL      Physical Exam:  	Gen: NAD  	Pulm: CTA B/L  	CV: RRR, S1S2; no rub  	Abd: +BS, soft, nontender/nondistended  	: No suprapubic tenderness, marquez+  	UE: Warm, no asterixis  	LE: Warm, b/l LE edema, palpable DP pulses b/l   	Skin: Warm, without rashes  	Vascular access: NINI NORWOOD  	    LABS/STUDIES  --------------------------------------------------------------------------------              10.3   6.5   >-----------<  203      [08-11-18 @ 06:42]              32.3     137  |  104  |  34  ----------------------------<  89      [08-11-18 @ 06:42]  5.0   |  22  |  3.02        Ca     10.5     [08-11-18 @ 06:42]      Mg     1.6     [08-11-18 @ 06:42]      Phos  2.8     [08-11-18 @ 06:42]            Creatinine Trend:  SCr 3.02 [08-11 @ 06:42]  SCr 3.23 [08-10 @ 06:20]  SCr 2.91 [08-09 @ 06:28]  SCr 2.93 [08-08 @ 06:26]  SCr 2.96 [08-07 @ 05:56]    Urinalysis - [08-06-18 @ 21:43]      Color Yellow / Appearance Clear / SG 1.018 / pH 6.0      Gluc 100 / Ketone Negative  / Bili Negative / Urobili Negative       Blood Moderate / Protein 100 / Leuk Est Negative / Nitrite Negative      RBC 69 / WBC 10 / Hyaline 8 / Gran  / Sq Epi  / Non Sq Epi 3 / Bacteria Negative      HbA1c 5.1      [08-18-15 @ 18:19]

## 2018-08-11 NOTE — PROGRESS NOTE ADULT - ATTENDING COMMENTS
Renal tx with lakshmi- cr stable and improving; change to NS  met acidosis- can dc bicarb fluids; cont bicarb tabs  IS-cont prograf/cellcept/pred  prophylaxis PCP/CMV with valcyte and bactrim respectively

## 2018-08-11 NOTE — PROGRESS NOTE ADULT - PROBLEM SELECTOR PLAN 1
s/p DDRT 07/23 c/b post-operative ureteral leak s/p reimplantation of ureter of transplanted kidney on  07/29/2018 and subsequent IR placement of nephrostomy tube 7/31.   Cr downtrending today

## 2018-08-11 NOTE — PROGRESS NOTE ADULT - SUBJECTIVE AND OBJECTIVE BOX
Transplant Surgery - Multidisciplinary Rounds  --------------------------------------------------------------  DDRT 7/23/2018   POD 18  Reimplantation of ureter of transplanted kidney  07/29/2018   IR nephrostomy 7/31/2018    Present:  Patient seen with multidisciplinary team (surgeon, Nephrologist, pharmacist, NP, resident MD, MD student)  in am rounds and examined with Dr. Brown.    32 y/o gent suman POD#16 R sided DDRT anastomosed to R external iliac artery and vein Ureter anastomosed to bladder over double J stent.  Cold ischemia time14 hours.  CMV +, EBV +. Course complicated by post-operative ureteral leak s/p reimplantation of ureter of transplanted kidney on  07/29/2018 and subsequent IR placement of nephrostomy tube 7/31.     Patient examined at bedside. No acute events overnight.  Ambulating, tolerating diet, and voiding.    Education: transplant medications    Plan of care: See assessment and plan of care    Vital Signs Last 24 Hrs  T(C): 36.7 (08-11-18 @ 05:28), Max: 36.8 (08-10-18 @ 18:13)  T(F): 98.1 (08-11-18 @ 05:28), Max: 98.3 (08-10-18 @ 18:13)  HR: 60 (08-11-18 @ 05:28) (58 - 67)  BP: 136/87 (08-11-18 @ 05:28) (122/87 - 145/91)  BP(mean): --  RR: 18 (08-11-18 @ 05:28) (18 - 18)  SpO2: 99% (08-11-18 @ 05:28) (97% - 100%)  I&O's Detail    10 Aug 2018 07:01  -  11 Aug 2018 07:00  --------------------------------------------------------  IN:    Oral Fluid: 1680 mL    sodium bicarbonate  Infusion: 1100 mL    Sodium Chloride 0.9% IV Bolus: 500 mL  Total IN: 3280 mL    OUT:    Bulb: 6 mL    Indwelling Catheter - Urethral: 660 mL    Nephrostomy Tube: 1325 mL  Total OUT: 1991 mL    Total NET: 1289 mL                              10.3   6.5   )-----------( 203      ( 11 Aug 2018 06:42 )             32.3     08-11    137  |  104  |  34<H>  ----------------------------<  89  5.0   |  22  |  3.02<H>    Ca    10.5      11 Aug 2018 06:42  Phos  2.8     08-11  Mg     1.6     08-11        CAPILLARY BLOOD GLUCOSE      POCT Blood Glucose.: 101 mg/dL (11 Aug 2018 08:10)  POCT Blood Glucose.: 103 mg/dL (10 Aug 2018 20:43)  POCT Blood Glucose.: 147 mg/dL (10 Aug 2018 16:31)  POCT Blood Glucose.: 97 mg/dL (10 Aug 2018 12:39)      MEDICATIONS  (STANDING):  docusate sodium 100 milliGRAM(s) Oral daily  famotidine    Tablet 20 milliGRAM(s) Oral daily  metoprolol tartrate 50 milliGRAM(s) Oral every 12 hours  mycophenolate mofetil 1000 milliGRAM(s) Oral every 12 hours  NIFEdipine XL 90 milliGRAM(s) Oral daily  nystatin    Suspension 392250 Unit(s) Swish and Swallow four times a day  oxybutynin 5 milliGRAM(s) Oral two times a day  polyethylene glycol 3350 17 Gram(s) Oral daily  predniSONE   Tablet 5 milliGRAM(s) Oral daily  senna 2 Tablet(s) Oral at bedtime  sodium bicarbonate 1300 milliGRAM(s) Oral daily  sodium bicarbonate  Infusion 0.206 mEq/kG/Hr (100 mL/Hr) IV Continuous <Continuous>  tacrolimus 4 milliGRAM(s) Oral two times a day  tamsulosin 0.4 milliGRAM(s) Oral at bedtime  trimethoprim   80 mG/sulfamethoxazole 400 mG 1 Tablet(s) Oral daily  valGANciclovir 450 milliGRAM(s) Oral <User Schedule>    MEDICATIONS  (PRN):  acetaminophen   Tablet. 650 milliGRAM(s) Oral every 6 hours PRN Mild Pain (1 - 3)  diphenhydrAMINE   Capsule 25 milliGRAM(s) Oral every 6 hours PRN Rash and/or Itching  lidocaine 2% Gel 1 Application(s) Topical three times a day PRN pain related to urinary catheter  metoclopramide Injectable 10 milliGRAM(s) IV Push once PRN Nausea and/or Vomiting  ondansetron Injectable 4 milliGRAM(s) IV Push every 6 hours PRN Nausea  ondansetron Injectable 4 milliGRAM(s) IV Push once PRN Nausea and/or Vomiting  simethicone 80 milliGRAM(s) Chew every 6 hours PRN Gas  traMADol 50 milliGRAM(s) Oral every 4 hours PRN Severe Pain (7 - 10)  traMADol 25 milliGRAM(s) Oral every 4 hours PRN Moderate Pain (4 - 6)  zinc oxide 20% Ointment 1 Application(s) Topical three times a day PRN rash      Review of systems  Gen: No weight changes, fatigue, fevers/chills, weakness  Skin: No rashes  Head/Eyes/Ears/Mouth: No headache; Normal hearing; Normal vision w/o blurriness; No sinus pain/discomfort, sore throat  Respiratory: No dyspnea, cough, wheezing, hemoptysis  CV: No chest pain, PND, orthopnea  GI: Reports incisional pain and TTP near drains, denies diarrhea, constipation, nausea, vomiting, melena, hematochezia.   : Marquez in situ, nephrostomy and LACEY patent  MSK: No joint pain/swelling; no back pain;   Neuro: No dizziness/lightheadedness, weakness, seizures, numbness, tingling  Heme: No easy bruising or bleeding  Endo: No heat/cold intolerance  Psych: No significant nervousness, anxiety, stress, depression  All other systems were reviewed and are negative, except as noted.    PHYSICAL EXAM:  Constitutional: Well developed / well nourished  Eyes: Anicteric, PERRLA  ENMT: nc/at  Neck: supple  Respiratory: CTA B/L  Cardiovascular: RRR  Gastrointestinal: Soft abdomen, mild tender to touch at surgical site, ND  Genitourinary: marquez in situ.  LACEY, nephrostomy tube patent  Extremities: SCD's in place and working bilaterally, BLE edema R>L  Vascular: Palpable dp pulses bilaterally  Neurological: A&O x3  Skin: wound open to air, no erythema and evidence of infection noted  Musculoskeletal: Moving all extremities  Psychiatric: Responsive

## 2018-08-11 NOTE — PROGRESS NOTE ADULT - SUBJECTIVE AND OBJECTIVE BOX
[facility] DIVISION OF KIDNEY DISEASES AND HYPERTENSION -- FOLLOW UP NOTE  --------------------------------------------------------------------------------  Chief Complaint:/subjective: no acute events  no complaints    24 hour events: has marquez        PAST HISTORY  --------------------------------------------------------------------------------  No significant changes to PMH, PSH, FHx, SHx, unless otherwise noted    ALLERGIES & MEDICATIONS  --------------------------------------------------------------------------------  Allergies    IV Contrast (Rash)  nyquil (Rhinorrhea)  vancomycin (Pruritus; Hives)    Intolerances      Standing Inpatient Medications  docusate sodium 100 milliGRAM(s) Oral daily  famotidine    Tablet 20 milliGRAM(s) Oral daily  metoprolol tartrate 50 milliGRAM(s) Oral every 12 hours  mycophenolate mofetil 1000 milliGRAM(s) Oral every 12 hours  NIFEdipine XL 90 milliGRAM(s) Oral daily  nystatin    Suspension 586730 Unit(s) Swish and Swallow four times a day  oxybutynin 5 milliGRAM(s) Oral two times a day  polyethylene glycol 3350 17 Gram(s) Oral daily  predniSONE   Tablet 5 milliGRAM(s) Oral daily  senna 2 Tablet(s) Oral at bedtime  sodium bicarbonate 1300 milliGRAM(s) Oral daily  sodium bicarbonate  Infusion 0.206 mEq/kG/Hr IV Continuous <Continuous>  tacrolimus 4 milliGRAM(s) Oral two times a day  tamsulosin 0.4 milliGRAM(s) Oral at bedtime  trimethoprim   80 mG/sulfamethoxazole 400 mG 1 Tablet(s) Oral daily  valGANciclovir 450 milliGRAM(s) Oral <User Schedule>    PRN Inpatient Medications  acetaminophen   Tablet. 650 milliGRAM(s) Oral every 6 hours PRN  diphenhydrAMINE   Capsule 25 milliGRAM(s) Oral every 6 hours PRN  lidocaine 2% Gel 1 Application(s) Topical three times a day PRN  metoclopramide Injectable 10 milliGRAM(s) IV Push once PRN  ondansetron Injectable 4 milliGRAM(s) IV Push every 6 hours PRN  ondansetron Injectable 4 milliGRAM(s) IV Push once PRN  simethicone 80 milliGRAM(s) Chew every 6 hours PRN  traMADol 50 milliGRAM(s) Oral every 4 hours PRN  traMADol 25 milliGRAM(s) Oral every 4 hours PRN  zinc oxide 20% Ointment 1 Application(s) Topical three times a day PRN      REVIEW OF SYSTEMS  --------------------------------------------------------------------------------  Gen: No weight changes, fatigue, fevers/chills, weakness  Skin: No rashes  Head/Eyes/Ears/Mouth: No headache;   Respiratory: No dyspnea, cough  CV: No chest pain, PND, orthopnea  GI: No abdominal pain, diarrhea, constipation, nausea, vomiting  : No increased frequency, dysuria, hematuria, nocturia  MSK: No joint pain/swelling; no back pain; no edema  Neuro: No dizziness/lightheadedness, weakness  Heme: No easy bruising or bleeding  Psych: No significant nervousness, anxiety, stress, depression    All other systems were reviewed and are negative, except as noted.    VITALS/PHYSICAL EXAM  --------------------------------------------------------------------------------  T(C): 36.7 (08-11-18 @ 13:39), Max: 36.8 (08-10-18 @ 18:13)  HR: 66 (08-11-18 @ 13:39) (58 - 70)  BP: 115/80 (08-11-18 @ 13:39) (115/80 - 145/91)  RR: 18 (08-11-18 @ 13:39) (18 - 18)  SpO2: 100% (08-11-18 @ 13:39) (97% - 100%)  Wt(kg): --  Adult Advanced Hemodynamics Last 24 Hrs  ABP: --  ABP(mean): --  CVP(mm Hg): --  CO: --  CI: --  PA: --  PA(mean): --  PCWP: --  SVR: --  SVRI: --        08-10-18 @ 07:01  -  08-11-18 @ 07:00  --------------------------------------------------------  IN: 3280 mL / OUT: 1991 mL / NET: 1289 mL    08-11-18 @ 07:01  -  08-11-18 @ 13:58  --------------------------------------------------------  IN: 720 mL / OUT: 803 mL / NET: -83 mL      Physical Exam:  	Gen: NAD, well-appearing  	HEENT: PERRL, supple neck, no jvp  	Pulm: CTA B/L  	CV: RRR, S1S2; no rub  	Back: No spinal or CVA tenderness; no sacral edema  	Abd: +BS, soft, nontender/nondistended  	: No suprapubic tenderness  	Ext: no edema  	Neuro: No focal deficits, intact gait  	Psych: Normal affect and mood  	Skin: Warm, without rashes  	Vascular access: left avf PA +Bruit+Thrill    LABS/STUDIES  --------------------------------------------------------------------------------              10.3   6.5   >-----------<  203      [08-11-18 @ 06:42]              32.3     Hemoglobin: 10.3 g/dL (08-11-18 @ 06:42)  Hemoglobin: 10.8 g/dL (08-10-18 @ 06:20)    Platelet Count - Automated: 203 K/uL (08-11-18 @ 06:42)  Platelet Count - Automated: 222 K/uL (08-10-18 @ 06:20)    137  |  104  |  34  ----------------------------<  89      [08-11-18 @ 06:42]  5.0   |  22  |  3.02        Ca     10.5     [08-11-18 @ 06:42]      Mg     1.6     [08-11-18 @ 06:42]      Phos  2.8     [08-11-18 @ 06:42]            Creatinine Trend:  SCr 3.02 [08-11 @ 06:42]  SCr 3.23 [08-10 @ 06:20]  SCr 2.91 [08-09 @ 06:28]  SCr 2.93 [08-08 @ 06:26]  SCr 2.96 [08-07 @ 05:56]    Urinalysis - [08-06-18 @ 21:43]      Color Yellow / Appearance Clear / SG 1.018 / pH 6.0      Gluc 100 / Ketone Negative  / Bili Negative / Urobili Negative       Blood Moderate / Protein 100 / Leuk Est Negative / Nitrite Negative      RBC 69 / WBC 10 / Hyaline 8 / Gran  / Sq Epi  / Non Sq Epi 3 / Bacteria Negative      HbA1c 5.1      [08-18-15 @ 18:19]

## 2018-08-11 NOTE — PROGRESS NOTE ADULT - PROBLEM SELECTOR PLAN 1
s/p DDRT 07/23 c/b post-operative ureteral leak s/p reimplantation of ureter of transplanted kidney on  07/29/2018 and subsequent IR placement of nephrostomy tube 7/31.   Renal function with mild improvment.   s/p pulse steroids 8/9.   Continue immunosuppressants per transplant team  Continue  Nystatin S&S, bactrim, and Valcyte for PPX  renal sonogram with no hydro/ RYAN, follow up transplant team  monitor bmp.

## 2018-08-11 NOTE — PROGRESS NOTE ADULT - PROBLEM SELECTOR PLAN 1
7/23/18 DDRT   07/29/18 reimplantation of ureter of transplanted kidney for ureteral leak  7/31/18 nephrostomy tube  -FU DSA, sent 8/8  -Keep Rodriguez   -Do not compress LACEY. Drain and record output q 6 hours.   - Strict I/Os  -Continue Tylenol and tramadol PRN  -Continue bowel regimen, Flomax 0.4 daily   -daily BMP  - Blood sugars well controlled. Fingersticks before meals and bedtime.  -On Oxybutynin for bladder spasm  - SCDs, ambulate.

## 2018-08-11 NOTE — PROGRESS NOTE ADULT - ATTENDING COMMENTS
Cr slightly improved.    Urinary tract decompressed.  Good UOP    Immuno: tac goal 8-10, cellcept, pred 5mg

## 2018-08-11 NOTE — PROGRESS NOTE ADULT - ASSESSMENT
34 y/o gentleman with PMH of HTN and ESRD since 2010, POD#18 R sided DDRT  with post-operative course complicated by ureteral leak requiring re-op and reimplantation of ureter of transplanted kidney and  Nephrostomy tube with creatinine plateaud ~3.0

## 2018-08-12 ENCOUNTER — TRANSCRIPTION ENCOUNTER (OUTPATIENT)
Age: 33
End: 2018-08-12

## 2018-08-12 LAB
ANION GAP SERPL CALC-SCNC: 9 MMOL/L — SIGNIFICANT CHANGE UP (ref 5–17)
BUN SERPL-MCNC: 35 MG/DL — HIGH (ref 7–23)
CALCIUM SERPL-MCNC: 10.4 MG/DL — SIGNIFICANT CHANGE UP (ref 8.4–10.5)
CHLORIDE SERPL-SCNC: 105 MMOL/L — SIGNIFICANT CHANGE UP (ref 96–108)
CO2 SERPL-SCNC: 20 MMOL/L — LOW (ref 22–31)
CREAT SERPL-MCNC: 2.95 MG/DL — HIGH (ref 0.5–1.3)
GLUCOSE BLDC GLUCOMTR-MCNC: 93 MG/DL — SIGNIFICANT CHANGE UP (ref 70–99)
GLUCOSE SERPL-MCNC: 95 MG/DL — SIGNIFICANT CHANGE UP (ref 70–99)
HCT VFR BLD CALC: 30.9 % — LOW (ref 39–50)
HGB BLD-MCNC: 9.9 G/DL — LOW (ref 13–17)
MAGNESIUM SERPL-MCNC: 1.6 MG/DL — SIGNIFICANT CHANGE UP (ref 1.6–2.6)
MCHC RBC-ENTMCNC: 28.4 PG — SIGNIFICANT CHANGE UP (ref 27–34)
MCHC RBC-ENTMCNC: 32.2 GM/DL — SIGNIFICANT CHANGE UP (ref 32–36)
MCV RBC AUTO: 88.2 FL — SIGNIFICANT CHANGE UP (ref 80–100)
PHOSPHATE SERPL-MCNC: 2.9 MG/DL — SIGNIFICANT CHANGE UP (ref 2.5–4.5)
PLATELET # BLD AUTO: 191 K/UL — SIGNIFICANT CHANGE UP (ref 150–400)
POTASSIUM SERPL-MCNC: 5.2 MMOL/L — SIGNIFICANT CHANGE UP (ref 3.5–5.3)
POTASSIUM SERPL-SCNC: 5.2 MMOL/L — SIGNIFICANT CHANGE UP (ref 3.5–5.3)
RBC # BLD: 3.5 M/UL — LOW (ref 4.2–5.8)
RBC # FLD: 14.1 % — SIGNIFICANT CHANGE UP (ref 10.3–14.5)
SODIUM SERPL-SCNC: 134 MMOL/L — LOW (ref 135–145)
TACROLIMUS SERPL-MCNC: 7.8 NG/ML — SIGNIFICANT CHANGE UP
WBC # BLD: 5.9 K/UL — SIGNIFICANT CHANGE UP (ref 3.8–10.5)
WBC # FLD AUTO: 5.9 K/UL — SIGNIFICANT CHANGE UP (ref 3.8–10.5)

## 2018-08-12 PROCEDURE — 99233 SBSQ HOSP IP/OBS HIGH 50: CPT | Mod: GC,24

## 2018-08-12 PROCEDURE — 99233 SBSQ HOSP IP/OBS HIGH 50: CPT

## 2018-08-12 RX ORDER — VALGANCICLOVIR 450 MG/1
1 TABLET, FILM COATED ORAL
Qty: 0 | Refills: 0 | COMMUNITY
Start: 2018-08-12

## 2018-08-12 RX ORDER — CARVEDILOL PHOSPHATE 80 MG/1
12.5 CAPSULE, EXTENDED RELEASE ORAL EVERY 12 HOURS
Qty: 0 | Refills: 0 | Status: DISCONTINUED | OUTPATIENT
Start: 2018-08-12 | End: 2018-08-14

## 2018-08-12 RX ORDER — MAGNESIUM SULFATE 500 MG/ML
2 VIAL (ML) INJECTION ONCE
Qty: 0 | Refills: 0 | Status: COMPLETED | OUTPATIENT
Start: 2018-08-12 | End: 2018-08-12

## 2018-08-12 RX ORDER — NIFEDIPINE 30 MG
1 TABLET, EXTENDED RELEASE 24 HR ORAL
Qty: 0 | Refills: 0 | COMMUNITY
Start: 2018-08-12

## 2018-08-12 RX ORDER — ACETAMINOPHEN 500 MG
2 TABLET ORAL
Qty: 0 | Refills: 0 | COMMUNITY
Start: 2018-08-12

## 2018-08-12 RX ORDER — FAMOTIDINE 10 MG/ML
1 INJECTION INTRAVENOUS
Qty: 0 | Refills: 0 | COMMUNITY
Start: 2018-08-12

## 2018-08-12 RX ORDER — SIMETHICONE 80 MG/1
1 TABLET, CHEWABLE ORAL
Qty: 0 | Refills: 0 | COMMUNITY
Start: 2018-08-12

## 2018-08-12 RX ORDER — POLYETHYLENE GLYCOL 3350 17 G/17G
17 POWDER, FOR SOLUTION ORAL
Qty: 0 | Refills: 0 | COMMUNITY
Start: 2018-08-12

## 2018-08-12 RX ORDER — OXYBUTYNIN CHLORIDE 5 MG
1 TABLET ORAL
Qty: 0 | Refills: 0 | COMMUNITY
Start: 2018-08-12

## 2018-08-12 RX ORDER — DOCUSATE SODIUM 100 MG
1 CAPSULE ORAL
Qty: 0 | Refills: 0 | COMMUNITY
Start: 2018-08-12

## 2018-08-12 RX ORDER — SENNA PLUS 8.6 MG/1
2 TABLET ORAL
Qty: 0 | Refills: 0 | COMMUNITY
Start: 2018-08-12

## 2018-08-12 RX ORDER — NYSTATIN 500MM UNIT
5 POWDER (EA) MISCELLANEOUS
Qty: 0 | Refills: 0 | COMMUNITY
Start: 2018-08-12

## 2018-08-12 RX ORDER — MYCOPHENOLATE MOFETIL 250 MG/1
0 CAPSULE ORAL
Qty: 0 | Refills: 0 | DISCHARGE
Start: 2018-08-12

## 2018-08-12 RX ADMIN — TRAMADOL HYDROCHLORIDE 50 MILLIGRAM(S): 50 TABLET ORAL at 00:06

## 2018-08-12 RX ADMIN — Medication 5 MILLIGRAM(S): at 06:00

## 2018-08-12 RX ADMIN — TACROLIMUS 4 MILLIGRAM(S): 5 CAPSULE ORAL at 06:00

## 2018-08-12 RX ADMIN — Medication 100 MILLIGRAM(S): at 12:42

## 2018-08-12 RX ADMIN — MYCOPHENOLATE MOFETIL 1000 MILLIGRAM(S): 250 CAPSULE ORAL at 06:00

## 2018-08-12 RX ADMIN — Medication 500000 UNIT(S): at 17:59

## 2018-08-12 RX ADMIN — MYCOPHENOLATE MOFETIL 1000 MILLIGRAM(S): 250 CAPSULE ORAL at 18:00

## 2018-08-12 RX ADMIN — Medication 500000 UNIT(S): at 21:59

## 2018-08-12 RX ADMIN — SENNA PLUS 2 TABLET(S): 8.6 TABLET ORAL at 21:58

## 2018-08-12 RX ADMIN — TAMSULOSIN HYDROCHLORIDE 0.4 MILLIGRAM(S): 0.4 CAPSULE ORAL at 21:58

## 2018-08-12 RX ADMIN — TRAMADOL HYDROCHLORIDE 25 MILLIGRAM(S): 50 TABLET ORAL at 02:52

## 2018-08-12 RX ADMIN — Medication 50 GRAM(S): at 07:51

## 2018-08-12 RX ADMIN — Medication 500000 UNIT(S): at 06:00

## 2018-08-12 RX ADMIN — Medication 1300 MILLIGRAM(S): at 12:42

## 2018-08-12 RX ADMIN — CARVEDILOL PHOSPHATE 12.5 MILLIGRAM(S): 80 CAPSULE, EXTENDED RELEASE ORAL at 17:59

## 2018-08-12 RX ADMIN — Medication 5 MILLIGRAM(S): at 18:00

## 2018-08-12 RX ADMIN — FAMOTIDINE 20 MILLIGRAM(S): 10 INJECTION INTRAVENOUS at 12:43

## 2018-08-12 RX ADMIN — TACROLIMUS 4 MILLIGRAM(S): 5 CAPSULE ORAL at 18:00

## 2018-08-12 RX ADMIN — Medication 1 TABLET(S): at 12:43

## 2018-08-12 RX ADMIN — TRAMADOL HYDROCHLORIDE 50 MILLIGRAM(S): 50 TABLET ORAL at 22:32

## 2018-08-12 RX ADMIN — TRAMADOL HYDROCHLORIDE 50 MILLIGRAM(S): 50 TABLET ORAL at 16:15

## 2018-08-12 RX ADMIN — TRAMADOL HYDROCHLORIDE 50 MILLIGRAM(S): 50 TABLET ORAL at 23:02

## 2018-08-12 RX ADMIN — Medication 90 MILLIGRAM(S): at 06:00

## 2018-08-12 RX ADMIN — Medication 500000 UNIT(S): at 12:42

## 2018-08-12 RX ADMIN — TRAMADOL HYDROCHLORIDE 25 MILLIGRAM(S): 50 TABLET ORAL at 02:22

## 2018-08-12 RX ADMIN — TRAMADOL HYDROCHLORIDE 50 MILLIGRAM(S): 50 TABLET ORAL at 15:27

## 2018-08-12 RX ADMIN — TRAMADOL HYDROCHLORIDE 50 MILLIGRAM(S): 50 TABLET ORAL at 00:36

## 2018-08-12 NOTE — PROGRESS NOTE ADULT - PROBLEM SELECTOR PLAN 3
BP stable  -Continue to taper Lopressor to off today and start Coreg 12.5mg BID tonight.   -Continue Nifedipine XL 90mg daily.

## 2018-08-12 NOTE — PROGRESS NOTE ADULT - SUBJECTIVE AND OBJECTIVE BOX
[facility] [place] [facility]   MD PERCY MORALES MD RUORU WONG, PA    TEL:  OFFICE: [phone]  DR OCONNOR CELL: [phone]  DAJA PRINGLE CELL: [phone]  DR. GAMBOA CELL: [phone]    RENAL FOLLOW UP NOTE  --------------------------------------------------------------------------------  HPI:  33M with PMH of HTN and ESRD since 2010 s/p DDRT 7/23.    Reimplantation of ureter of transplanted kidney  07/29/2018.   Had IR placement of nephrostomy tube on 7/31 for anastomotic leak  Pt seen and examined at bedside.  Denies sob, chest pain        PAST HISTORY  --------------------------------------------------------------------------------  No significant changes to PMH, PSH, FHx, SHx, unless otherwise noted    ALLERGIES & MEDICATIONS  --------------------------------------------------------------------------------  Allergies    IV Contrast (Rash)  nyquil (Rhinorrhea)  vancomycin (Pruritus; Hives)    Intolerances      Standing Inpatient Medications  carvedilol 12.5 milliGRAM(s) Oral every 12 hours  docusate sodium 100 milliGRAM(s) Oral daily  famotidine    Tablet 20 milliGRAM(s) Oral daily  mycophenolate mofetil 1000 milliGRAM(s) Oral every 12 hours  NIFEdipine XL 90 milliGRAM(s) Oral daily  nystatin    Suspension [id number] Unit(s) Swish and Swallow four times a day  oxybutynin 5 milliGRAM(s) Oral two times a day  polyethylene glycol 3350 17 Gram(s) Oral daily  predniSONE   Tablet 5 milliGRAM(s) Oral daily  senna 2 Tablet(s) Oral at bedtime  sodium bicarbonate 1300 milliGRAM(s) Oral daily  sodium bicarbonate  Infusion 0.206 mEq/kG/Hr IV Continuous <Continuous>  tacrolimus 4 milliGRAM(s) Oral two times a day  tamsulosin 0.4 milliGRAM(s) Oral at bedtime  trimethoprim   80 mG/sulfamethoxazole 400 mG 1 Tablet(s) Oral daily  valGANciclovir 450 milliGRAM(s) Oral <User Schedule>    PRN Inpatient Medications  acetaminophen   Tablet. 650 milliGRAM(s) Oral every 6 hours PRN  diphenhydrAMINE   Capsule 25 milliGRAM(s) Oral every 6 hours PRN  lidocaine 2% Gel 1 Application(s) Topical three times a day PRN  metoclopramide Injectable 10 milliGRAM(s) IV Push once PRN  ondansetron Injectable 4 milliGRAM(s) IV Push every 6 hours PRN  ondansetron Injectable 4 milliGRAM(s) IV Push once PRN  simethicone 80 milliGRAM(s) Chew every 6 hours PRN  traMADol 50 milliGRAM(s) Oral every 4 hours PRN  traMADol 25 milliGRAM(s) Oral every 4 hours PRN  zinc oxide 20% Ointment 1 Application(s) Topical three times a day PRN      REVIEW OF SYSTEMS  --------------------------------------------------------------------------------  General: no fever  CVS: no chest pain  RESP: no sob, no cough  ABD: no abdominal pain  : no dysuria,  MSK: no edema     VITALS/PHYSICAL EXAM  --------------------------------------------------------------------------------  T(C): 36.6 (08-12-18 @ 10:03), Max: 37 (08-11-18 @ 18:02)  HR: 55 (08-12-18 @ 10:03) (54 - 78)  BP: 143/89 (08-12-18 @ 10:03) (115/80 - 153/95)  RR: 18 (08-12-18 @ 10:03) (16 - 18)  SpO2: 100% (08-12-18 @ 10:03) (98% - 100%)  Wt(kg): --    Weight (kg): 67.4 (08-12-18 @ 05:40)      08-11-18 @ 07:01  -  08-12-18 @ 07:00  --------------------------------------------------------  IN: 2040 mL / OUT: 2037 mL / NET: 3 mL    08-12-18 @ 07:01  -  08-12-18 @ 10:27  --------------------------------------------------------  IN: 0 mL / OUT: 200 mL / NET: -200 mL      Physical Exam:  	  Gen: NAD  	Pulm: CTA B/L  	CV: RRR, S1S2; no rub  	Abd: +BS, soft, nontender/nondistended  	: No suprapubic tenderness, marquez+  	UE: Warm, no asterixis  	LE: Warm, b/l LE edema, palpable DP pulses b/l   	Skin: Warm, without rashes  	Vascular access: NINI NORWOOD    LABS/STUDIES  --------------------------------------------------------------------------------              9.9    5.9   >-----------<  191      [08-12-18 @ 06:00]              30.9     134  |  105  |  35  ----------------------------<  95      [08-12-18 @ 06:00]  5.2   |  20  |  2.95        Ca     10.4     [08-12-18 @ 06:00]      Mg     1.6     [08-12-18 @ 06:00]      Phos  2.9     [08-12-18 @ 06:00]            Creatinine Trend:  SCr 2.95 [08-12 @ 06:00]  SCr 3.02 [08-11 @ 06:42]  SCr 3.23 [08-10 @ 06:20]  SCr 2.91 [08-09 @ 06:28]  SCr 2.93 [08-08 @ 06:26]    Urinalysis - [08-06-18 @ 21:43]      Color Yellow / Appearance Clear / SG 1.018 / pH 6.0      Gluc 100 / Ketone Negative  / Bili Negative / Urobili Negative       Blood Moderate / Protein 100 / Leuk Est Negative / Nitrite Negative      RBC 69 / WBC 10 / Hyaline 8 / Gran  / Sq Epi  / Non Sq Epi 3 / Bacteria Negative      HbA1c 5.1      [08-18-15 @ 18:19]

## 2018-08-12 NOTE — PROGRESS NOTE ADULT - ATTENDING COMMENTS
Renal tx with lakshmi- cr stable and improving; dc ivf  met acidosis- can dc bicarb fluids; cont bicarb tabs  IS-cont prograf/cellcept/pred  prophylaxis PCP/CMV with valcyte and bactrim respectively

## 2018-08-12 NOTE — PROGRESS NOTE ADULT - SUBJECTIVE AND OBJECTIVE BOX
Transplant Surgery - Multidisciplinary Rounds  --------------------------------------------------------------  DDRT 7/23/2018   POD 20  Reimplantation of ureter of transplanted kidney  07/29/2018   IR nephrostomy 7/31/2018    Present:  Patient seen with multidisciplinary team (surgeon, Nephrologist, pharmacist, NP, resident MD, MD student)  in am rounds and examined with Dr. Brown.    34 y/o gent suman POD#16 R sided DDRT anastomosed to R external iliac artery and vein Ureter anastomosed to bladder over double J stent.  Cold ischemia time14 hours.  CMV +, EBV +. Course complicated by post-operative ureteral leak s/p reimplantation of ureter of transplanted kidney on  07/29/2018 and subsequent IR placement of nephrostomy tube 7/31.     Patient examined at bedside. No acute events overnight.  Ambulating, tolerating diet, and voiding.    Education: transplant medications    Plan of care: See assessment and plan of care      MEDICATIONS  (STANDING):  carvedilol 12.5 milliGRAM(s) Oral every 12 hours  docusate sodium 100 milliGRAM(s) Oral daily  famotidine    Tablet 20 milliGRAM(s) Oral daily  mycophenolate mofetil 1000 milliGRAM(s) Oral every 12 hours  NIFEdipine XL 90 milliGRAM(s) Oral daily  nystatin    Suspension 260568 Unit(s) Swish and Swallow four times a day  oxybutynin 5 milliGRAM(s) Oral two times a day  polyethylene glycol 3350 17 Gram(s) Oral daily  predniSONE   Tablet 5 milliGRAM(s) Oral daily  senna 2 Tablet(s) Oral at bedtime  sodium bicarbonate 1300 milliGRAM(s) Oral daily  sodium bicarbonate  Infusion 0.206 mEq/kG/Hr (100 mL/Hr) IV Continuous <Continuous>  tacrolimus 4 milliGRAM(s) Oral two times a day  tamsulosin 0.4 milliGRAM(s) Oral at bedtime  trimethoprim   80 mG/sulfamethoxazole 400 mG 1 Tablet(s) Oral daily  valGANciclovir 450 milliGRAM(s) Oral <User Schedule>    MEDICATIONS  (PRN):  acetaminophen   Tablet. 650 milliGRAM(s) Oral every 6 hours PRN Mild Pain (1 - 3)  diphenhydrAMINE   Capsule 25 milliGRAM(s) Oral every 6 hours PRN Rash and/or Itching  lidocaine 2% Gel 1 Application(s) Topical three times a day PRN pain related to urinary catheter  metoclopramide Injectable 10 milliGRAM(s) IV Push once PRN Nausea and/or Vomiting  ondansetron Injectable 4 milliGRAM(s) IV Push every 6 hours PRN Nausea  ondansetron Injectable 4 milliGRAM(s) IV Push once PRN Nausea and/or Vomiting  simethicone 80 milliGRAM(s) Chew every 6 hours PRN Gas  traMADol 50 milliGRAM(s) Oral every 4 hours PRN Severe Pain (7 - 10)  traMADol 25 milliGRAM(s) Oral every 4 hours PRN Moderate Pain (4 - 6)  zinc oxide 20% Ointment 1 Application(s) Topical three times a day PRN rash      PAST MEDICAL & SURGICAL HISTORY:  ESRD on dialysis  HTN (hypertension)  No significant past surgical history      Vital Signs Last 24 Hrs  T(C): 36.6 (12 Aug 2018 10:03), Max: 37 (11 Aug 2018 18:02)  T(F): 97.9 (12 Aug 2018 10:03), Max: 98.6 (11 Aug 2018 18:02)  HR: 55 (12 Aug 2018 10:03) (54 - 78)  BP: 143/89 (12 Aug 2018 10:03) (115/80 - 153/95)  BP(mean): --  RR: 18 (12 Aug 2018 10:03) (16 - 18)  SpO2: 100% (12 Aug 2018 10:03) (98% - 100%)    I&O's Summary    11 Aug 2018 07:01  -  12 Aug 2018 07:00  --------------------------------------------------------  IN: 2040 mL / OUT: 2037 mL / NET: 3 mL    12 Aug 2018 07:01  -  12 Aug 2018 11:25  --------------------------------------------------------  IN: 0 mL / OUT: 200 mL / NET: -200 mL                              9.9    5.9   )-----------( 191      ( 12 Aug 2018 06:00 )             30.9     08-12    134<L>  |  105  |  35<H>  ----------------------------<  95  5.2   |  20<L>  |  2.95<H>    Ca    10.4      12 Aug 2018 06:00  Phos  2.9     08-12  Mg     1.6     08-12      Tacrolimus (), Serum: 7.8 ng/mL (08-12 @ 06:37)      EXAM:  US KIDNEY TRANSPLANT W DOPP RT                       PROCEDURE DATE:  08/10/2018    INTERPRETATION:  CLINICAL INFORMATION: Increased creatinine. Status post   renal transplant on 7/23/2018. Small perinephric fluid collection seen on   renal duplex from 8/6/2018. History of postoperative ureteral leak on   7/29/2018 and nephrostomy tube placement on 7/31/2018.    COMPARISON: Transplant kidney duplex and ultrasound from 7/24/2018,   7/28/2018, and 8/6/2018.    TECHNIQUE: Grayscale, Color and spectral Doppler evaluation of a RIGHT   renal transplant.     FINDINGS:    Renal Transplant: 11.2 cm. Small residual perinephric fluid collection is   decreased in size compared to 8/6/2018. No renal mass, hydronephrosis or   calculi. Redemonstration of a stent in the collecting system.    Urinary bladder: Collapsed bladder with Marquez catheter in place.    Color and spectral Doppler reveals homogeneous flow throughout the   transplant.    Peak iliac artery velocity is 89 cm/sec pre-anastomosis, 124 cm/sec at   the anastomosis, and 124 cm/sec post anastomosis.    Transplant Renal Artery:   Peak systolic velocity is 246 cm/sec anastomosis, 207 cm/sec proximal,   157 cm/sec mid, 81 cm/sec distal and 70 cm/sec hilum.   Resistive Indices Range: 0.62-0.71    Transplant Renal Vein: Peak systolic velocities= 95.6 cm/s with normal   phasic flow. Previously seen area of narrowing appears improved since the   prior exam.    IMPRESSION:     Interval reduction of in size of a small perinephric fluid collection.   No evidence of renal artery stenosis.  Transplant renal vein appears patent with peak systolic velocity   unchanged compared with 8/6/2018.      Review of systems  Gen: No weight changes, fatigue, fevers/chills, weakness  Skin: No rashes  Head/Eyes/Ears/Mouth: No headache; Normal hearing; Normal vision w/o blurriness; No sinus pain/discomfort, sore throat  Respiratory: No dyspnea, cough, wheezing, hemoptysis  CV: No chest pain, PND, orthopnea  GI: Reports incisional pain and TTP near drains, denies diarrhea, constipation, nausea, vomiting, melena, hematochezia.   : Marquez in situ, nephrostomy and LACEY patent  MSK: No joint pain/swelling; no back pain;   Neuro: No dizziness/lightheadedness, weakness, seizures, numbness, tingling  Heme: No easy bruising or bleeding  Endo: No heat/cold intolerance  Psych: No significant nervousness, anxiety, stress, depression  All other systems were reviewed and are negative, except as noted.    PHYSICAL EXAM:  Constitutional: Well developed / well nourished  Eyes: Anicteric, PERRLA  ENMT: nc/at  Neck: supple  Respiratory: CTA B/L  Cardiovascular: RRR  Gastrointestinal: Soft abdomen, mild tender to touch at surgical site, ND  Genitourinary: marquez in situ.  LACEY, nephrostomy tube patent  Extremities: SCD's in place and working bilaterally, BLE edema R>L  Vascular: Palpable dp pulses bilaterally  Neurological: A&O x3  Skin: wound open to air, no erythema and evidence of infection noted  Musculoskeletal: Moving all extremities  Psychiatric: Responsive

## 2018-08-12 NOTE — PROGRESS NOTE ADULT - PROBLEM SELECTOR PLAN 1
s/p DDRT 07/23 c/b post-operative ureteral leak s/p reimplantation of ureter of transplanted kidney on  07/29/2018 and subsequent IR placement of nephrostomy tube 7/31.   Renal function with mild improvement.   s/p pulse steroids 8/9.   Continue immunosuppressants per transplant team  Continue  Nystatin S&S, bactrim, and Valcyte for PPX  renal sonogram with no hydro/ RYAN, follow up transplant team  monitor bmp.

## 2018-08-12 NOTE — DISCHARGE NOTE ADULT - CARE PROVIDER_API CALL
Rosamaria Hdz), Surgery  [address] [place] [address]  Phone: [phone]  Fax: [phone]    Jose Jean), Nephrology  [address] [place] [address]  Phone: [phone]  Fax: [phone]

## 2018-08-12 NOTE — PROGRESS NOTE ADULT - PROBLEM SELECTOR PLAN 1
s/p DDRT 07/23 c/b post-operative ureteral leak s/p reimplantation of ureter of transplanted kidney on  07/29/2018 and subsequent IR placement of nephrostomy tube 7/31. Cr downtrending today, monitor trend

## 2018-08-12 NOTE — PROGRESS NOTE ADULT - ASSESSMENT
32 y/o gentleman with PMH of HTN and ESRD since 2010, POD#20 R sided DDRT  with post-operative course complicated by ureteral leak requiring re-op and reimplantation of ureter of transplanted kidney and  Nephrostomy tube with creatinine plateaued ~3.0

## 2018-08-12 NOTE — DISCHARGE NOTE ADULT - MEDICATION SUMMARY - MEDICATIONS TO STOP TAKING
I will STOP taking the medications listed below when I get home from the hospital:    losartan 100 mg oral tablet  -- 1 tab(s) by mouth once a day    metoprolol tartrate 100 mg oral tablet  -- 1 tab(s) by mouth 2 times a day    amLODIPine 10 mg oral tablet  -- 1 tab(s) by mouth once a day    lanthanum carbonate 500 mg oral tablet, chewable  -- 1 tab(s) by mouth 3 times a day (with meals)    calcium acetate 667 mg oral tablet  --  by mouth    Tamiflu 30 mg oral capsule  -- 1 tab(s) by mouth Monday, Wednesday, and Friday After each dialysis session.  Stop after 5 doses.    hydrOXYzine hydrochloride 25 mg oral tablet  -- 1 tab(s) by mouth every 6 hours, As Needed -for allergy symptoms  -- May cause drowsiness.  Alcohol may intensify this effect.  Use care when operating dangerous machinery.  Obtain medical advice before taking any non-prescription drugs as some may affect the action of this medication.

## 2018-08-12 NOTE — DISCHARGE NOTE ADULT - ADDITIONAL INSTRUCTIONS
1. Please call to make a follow-up appointment with Dr. Hdz early next week  2. Please follow up with your primary care physician in one week regarding your hospitalization. 1. Please call to make a follow-up appointment with Dr. Jean this Friday at 930am  2. Please follow up with your primary care physician in one week regarding your hospitalization.

## 2018-08-12 NOTE — DISCHARGE NOTE ADULT - CARE PROVIDERS DIRECT ADDRESSES
[email or web address]

## 2018-08-12 NOTE — DISCHARGE NOTE ADULT - HOSPITAL COURSE
33M with PMH of HTN and ESRD since 2010 who on 7/23/2018 underwent  R sided DDRT anastomosed to R external iliac artery and vein Ureter anastomosed to bladder over double J stent.  Cold ischemia time14 hours.  CMV +, EBV +.  Post-operative course was complicated by post-operative ureteral leak s/p reimplantation of ureter of transplanted kidney on  07/29/2018 an subsequent IR placement of nephrostomy tube 7/31. Renal ultrasound revealed interval reduction of in size of a small perinephric fluid collection, no renal artery stenosis and patent transplant renal vein.  His creatinine remained elevated, likely from XAVI.  He did receive solumedrol 250mg with concern for possible low grade rejection.  DSA revealed++++++++++++++++++.  Rodriguez was removed 2 weeks following re-implatation of ureter and he passed a trial of void. LACEY and nephrostomy tube were maintained and plan was for removal at later date as outpatient. His immunosuppression was optimized. He was ambulatory, tolerating a regular diet and had bowel function. He was seen by the multi-disciplinary team including surgeon, nephrologist, NP, pharmacist, social work and nutrition and was deemed stable for discharge to home with close outpatient follow-up. 33M with PMH of HTN and ESRD since 2010 who on 7/23/2018 underwent  R sided DDRT anastomosed to R external iliac artery and vein Ureter anastomosed to bladder over double J stent.  Cold ischemia time14 hours.  CMV +, EBV +.  Post-operative course was complicated by post-operative ureteral leak s/p reimplantation of ureter of transplanted kidney on  07/29/2018 an subsequent IR placement of nephrostomy tube 7/31. Renal ultrasound revealed interval reduction in size of a small perinephric fluid collection, no renal artery stenosis and patent transplant renal vein, creatinine remained elevated, likely from XAVI, received solumedrol 250mg with concern for possible low grade rejection. Rodriguez removed 2 days ago voiding adequate urine. Nephrostomy tube maintained and plan for removal at later date as outpatient, LACEY removed today site with dressing intact, immunosuppression  optimized, is ambulatory, tolerating a regular diet and had bowel function. Seen by the multi-disciplinary team daily including surgeon, nephrologist, NP, pharmacist, social work and nutrition and was deemed stable for discharge today to home with close outpatient follow-up.

## 2018-08-12 NOTE — PROGRESS NOTE ADULT - ATTENDING COMMENTS
Cr dec.  Urinary tract remains decompressed.  LACEY output nil.    No abd pain.  Tolerating diet.    Immuno: tac goal 8-10, cellcept 1gm BID, pred

## 2018-08-12 NOTE — DISCHARGE NOTE ADULT - PATIENT PORTAL LINK FT
You can access the [facility] Patient Portal, offered by [facility], by registering with the following website: [email or web address]

## 2018-08-12 NOTE — DISCHARGE NOTE ADULT - CARE PLAN
Principal Discharge DX:	Kidney replaced by transplant  Goal:	To be free on infection and rejection  Assessment and plan of treatment:	No heavy lifting anything more than 10-15lbs or straining. Otherwise, you may return to your usual level of physical activity. If you are taking narcotic pain medication (such as Percocet), do NOT drive a car, operate machinery or make important decisions.  Call transplant clinic If you developed any of the following, fever, pain, redness, swelling at incision site, cough, nausea, vomiting, painful urination, difficulty urination, or not making any urine.  NOTIFY YOUR SURGEON IF: You have any bleeding that does not stop, any pus draining from your wound, any fever (over 100.4 F) or chills, persistent nausea/vomiting with inability to tolerate food or liquids, persistent diarrhea, or if your pain is not controlled on your discharge pain medications.  Secondary Diagnosis:	Immunosuppressive management encounter following kidney transplant  Assessment and plan of treatment:	Keep away from people who have cough, cold, and symptom of flu.  Only take medications that are on your discharge list  If you missed your medications call the transplant office, do not double up medication because you missed a dose.  If you have any question regarding your medication please call transplant office.  Secondary Diagnosis:	HTN (hypertension)  Assessment and plan of treatment:	Please continue to take only the medications listed on your discharge paperwork and follow-up with your cardiologist for long term management

## 2018-08-12 NOTE — PROGRESS NOTE ADULT - PROBLEM SELECTOR PLAN 1
7/23/18 DDRT   07/29/18 reimplantation of ureter of transplanted kidney for ureteral leak  7/31/18 nephrostomy tube  Creatinine bumped, now back to ~3, romario SHELL.  -HCO3 infusion discontinued. Continue PO NaHCO3 daily  -FU DSA, sent 8/8  - DC NS IVF  -Keep Rodriguez   -Do not compress LACEY. Drain and record output q 6 hours.   - Strict I/Os  -Continue Tylenol and tramadol PRN  -Continue bowel regimen, Flomax 0.4 daily   -daily BMP  - Blood sugars well controlled. Will DC fingersticks   -On Oxybutynin for bladder spasm  - SCDs, ambulate.

## 2018-08-12 NOTE — DISCHARGE NOTE ADULT - MEDICATION SUMMARY - MEDICATIONS TO CHANGE
I will SWITCH the dose or number of times a day I take the medications listed below when I get home from the hospital:    predniSONE 50 mg oral tablet  -- 1 tab(s) by mouth once a day MDD:1 tablet  -- It is very important that you take or use this exactly as directed.  Do not skip doses or discontinue unless directed by your doctor.  Obtain medical advice before taking any non-prescription drugs as some may affect the action of this medication.  Take with food or milk.

## 2018-08-12 NOTE — PROGRESS NOTE ADULT - SUBJECTIVE AND OBJECTIVE BOX
[facility] DIVISION OF KIDNEY DISEASES AND HYPERTENSION -- FOLLOW UP NOTE TRANSPLANT TEAM  --------------------------------------------------------------------------------  Chief Complaint:/subjective: no complaints. eating drinking well    24 hour events: no acute events; IVF stopped        PAST HISTORY  --------------------------------------------------------------------------------  No significant changes to PMH, PSH, FHx, SHx, unless otherwise noted    ALLERGIES & MEDICATIONS  --------------------------------------------------------------------------------  Allergies    IV Contrast (Rash)  nyquil (Rhinorrhea)  vancomycin (Pruritus; Hives)    Intolerances      Standing Inpatient Medications  carvedilol 12.5 milliGRAM(s) Oral every 12 hours  docusate sodium 100 milliGRAM(s) Oral daily  famotidine    Tablet 20 milliGRAM(s) Oral daily  mycophenolate mofetil 1000 milliGRAM(s) Oral every 12 hours  NIFEdipine XL 90 milliGRAM(s) Oral daily  nystatin    Suspension 101473 Unit(s) Swish and Swallow four times a day  oxybutynin 5 milliGRAM(s) Oral two times a day  polyethylene glycol 3350 17 Gram(s) Oral daily  predniSONE   Tablet 5 milliGRAM(s) Oral daily  senna 2 Tablet(s) Oral at bedtime  sodium bicarbonate 1300 milliGRAM(s) Oral daily  sodium bicarbonate  Infusion 0.206 mEq/kG/Hr IV Continuous <Continuous>  tacrolimus 4 milliGRAM(s) Oral two times a day  tamsulosin 0.4 milliGRAM(s) Oral at bedtime  trimethoprim   80 mG/sulfamethoxazole 400 mG 1 Tablet(s) Oral daily  valGANciclovir 450 milliGRAM(s) Oral <User Schedule>    PRN Inpatient Medications  acetaminophen   Tablet. 650 milliGRAM(s) Oral every 6 hours PRN  diphenhydrAMINE   Capsule 25 milliGRAM(s) Oral every 6 hours PRN  lidocaine 2% Gel 1 Application(s) Topical three times a day PRN  metoclopramide Injectable 10 milliGRAM(s) IV Push once PRN  ondansetron Injectable 4 milliGRAM(s) IV Push every 6 hours PRN  ondansetron Injectable 4 milliGRAM(s) IV Push once PRN  simethicone 80 milliGRAM(s) Chew every 6 hours PRN  traMADol 50 milliGRAM(s) Oral every 4 hours PRN  traMADol 25 milliGRAM(s) Oral every 4 hours PRN  zinc oxide 20% Ointment 1 Application(s) Topical three times a day PRN      REVIEW OF SYSTEMS  --------------------------------------------------------------------------------  Gen: No weight changes, fatigue, fevers/chills, weakness  Skin: No rashes  Head/Eyes/Ears/Mouth: No headache;   Respiratory: No dyspnea, cough  CV: No chest pain, PND, orthopnea  GI: No abdominal pain, diarrhea, constipation, nausea, vomiting  : No increased frequency, dysuria, hematuria, nocturia  MSK: No joint pain/swelling; no back pain; no edema  Neuro: No dizziness/lightheadedness, weakness  Heme: No easy bruising or bleeding  Psych: No significant nervousness, anxiety, stress, depression    All other systems were reviewed and are negative, except as noted.    VITALS/PHYSICAL EXAM  --------------------------------------------------------------------------------  T(C): 36.6 (08-12-18 @ 10:03), Max: 37 (08-11-18 @ 18:02)  HR: 55 (08-12-18 @ 10:03) (54 - 78)  BP: 143/89 (08-12-18 @ 10:03) (115/80 - 153/95)  RR: 18 (08-12-18 @ 10:03) (16 - 18)  SpO2: 100% (08-12-18 @ 10:03) (98% - 100%)  Wt(kg): --  Adult Advanced Hemodynamics Last 24 Hrs  ABP: --  ABP(mean): --  CVP(mm Hg): --  CO: --  CI: --  PA: --  PA(mean): --  PCWP: --  SVR: --  SVRI: --    Weight (kg): 67.4 (08-12-18 @ 05:40)      08-11-18 @ 07:01  -  08-12-18 @ 07:00  --------------------------------------------------------  IN: 2040 mL / OUT: 2037 mL / NET: 3 mL    08-12-18 @ 07:01  -  08-12-18 @ 12:40  --------------------------------------------------------  IN: 0 mL / OUT: 200 mL / NET: -200 mL      Physical Exam:  	Gen: NAD, well-appearing  	HEENT: PERRL, supple neck, no jvp  	Pulm: CTA B/L  	CV: RRR, S1S2; no rub  	Back: No spinal or CVA tenderness; no sacral edema  	Abd: +BS, soft, nontender/nondistended  	: +marquez  	Ext: no edema  	Neuro: No focal deficits, intact gait  	Psych: Normal affect and mood  	Skin: Warm, without rashes  	Vascular access:    LABS/STUDIES  --------------------------------------------------------------------------------              9.9    5.9   >-----------<  191      [08-12-18 @ 06:00]              30.9     Hemoglobin: 9.9 g/dL (08-12-18 @ 06:00)  Hemoglobin: 10.3 g/dL (08-11-18 @ 06:42)    Platelet Count - Automated: 191 K/uL (08-12-18 @ 06:00)  Platelet Count - Automated: 203 K/uL (08-11-18 @ 06:42)    134  |  105  |  35  ----------------------------<  95      [08-12-18 @ 06:00]  5.2   |  20  |  2.95        Ca     10.4     [08-12-18 @ 06:00]      Mg     1.6     [08-12-18 @ 06:00]      Phos  2.9     [08-12-18 @ 06:00]            Creatinine Trend:  SCr 2.95 [08-12 @ 06:00]  SCr 3.02 [08-11 @ 06:42]  SCr 3.23 [08-10 @ 06:20]  SCr 2.91 [08-09 @ 06:28]  SCr 2.93 [08-08 @ 06:26]    Urinalysis - [08-06-18 @ 21:43]      Color Yellow / Appearance Clear / SG 1.018 / pH 6.0      Gluc 100 / Ketone Negative  / Bili Negative / Urobili Negative       Blood Moderate / Protein 100 / Leuk Est Negative / Nitrite Negative      RBC 69 / WBC 10 / Hyaline 8 / Gran  / Sq Epi  / Non Sq Epi 3 / Bacteria Negative      HbA1c 5.1      [08-18-15 @ 18:19]

## 2018-08-12 NOTE — DISCHARGE NOTE ADULT - PLAN OF CARE
To be free on infection and rejection No heavy lifting anything more than 10-15lbs or straining. Otherwise, you may return to your usual level of physical activity. If you are taking narcotic pain medication (such as Percocet), do NOT drive a car, operate machinery or make important decisions.  Call transplant clinic If you developed any of the following, fever, pain, redness, swelling at incision site, cough, nausea, vomiting, painful urination, difficulty urination, or not making any urine.  NOTIFY YOUR SURGEON IF: You have any bleeding that does not stop, any pus draining from your wound, any fever (over 100.4 F) or chills, persistent nausea/vomiting with inability to tolerate food or liquids, persistent diarrhea, or if your pain is not controlled on your discharge pain medications. Keep away from people who have cough, cold, and symptom of flu.  Only take medications that are on your discharge list  If you missed your medications call the transplant office, do not double up medication because you missed a dose.  If you have any question regarding your medication please call transplant office. Please continue to take only the medications listed on your discharge paperwork and follow-up with your cardiologist for long term management

## 2018-08-12 NOTE — DISCHARGE NOTE ADULT - NS AS ACTIVITY OBS
Do not drive or operate machinery/Bathing allowed/No Heavy lifting/straining/Do not make important decisions/Showering allowed

## 2018-08-13 ENCOUNTER — MEDICATION RENEWAL (OUTPATIENT)
Age: 33
End: 2018-08-13

## 2018-08-13 DIAGNOSIS — D89.9 DISORDER INVOLVING THE IMMUNE MECHANISM, UNSPECIFIED: ICD-10-CM

## 2018-08-13 DIAGNOSIS — E87.2 ACIDOSIS: ICD-10-CM

## 2018-08-13 LAB
ANION GAP SERPL CALC-SCNC: 11 MMOL/L — SIGNIFICANT CHANGE UP (ref 5–17)
BUN SERPL-MCNC: 32 MG/DL — HIGH (ref 7–23)
CALCIUM SERPL-MCNC: 10.6 MG/DL — HIGH (ref 8.4–10.5)
CHLORIDE SERPL-SCNC: 107 MMOL/L — SIGNIFICANT CHANGE UP (ref 96–108)
CO2 SERPL-SCNC: 18 MMOL/L — LOW (ref 22–31)
CREAT SERPL-MCNC: 2.86 MG/DL — HIGH (ref 0.5–1.3)
GLUCOSE SERPL-MCNC: 87 MG/DL — SIGNIFICANT CHANGE UP (ref 70–99)
HCT VFR BLD CALC: 31.4 % — LOW (ref 39–50)
HGB BLD-MCNC: 10.1 G/DL — LOW (ref 13–17)
MAGNESIUM SERPL-MCNC: 2 MG/DL — SIGNIFICANT CHANGE UP (ref 1.6–2.6)
MCHC RBC-ENTMCNC: 28.3 PG — SIGNIFICANT CHANGE UP (ref 27–34)
MCHC RBC-ENTMCNC: 32.2 GM/DL — SIGNIFICANT CHANGE UP (ref 32–36)
MCV RBC AUTO: 88 FL — SIGNIFICANT CHANGE UP (ref 80–100)
PHOSPHATE SERPL-MCNC: 3.1 MG/DL — SIGNIFICANT CHANGE UP (ref 2.5–4.5)
PLATELET # BLD AUTO: 206 K/UL — SIGNIFICANT CHANGE UP (ref 150–400)
POTASSIUM SERPL-MCNC: 5.8 MMOL/L — HIGH (ref 3.5–5.3)
POTASSIUM SERPL-SCNC: 5.8 MMOL/L — HIGH (ref 3.5–5.3)
RBC # BLD: 3.57 M/UL — LOW (ref 4.2–5.8)
RBC # FLD: 14.2 % — SIGNIFICANT CHANGE UP (ref 10.3–14.5)
SODIUM SERPL-SCNC: 136 MMOL/L — SIGNIFICANT CHANGE UP (ref 135–145)
TACROLIMUS SERPL-MCNC: 8 NG/ML — SIGNIFICANT CHANGE UP
WBC # BLD: 5.9 K/UL — SIGNIFICANT CHANGE UP (ref 3.8–10.5)
WBC # FLD AUTO: 5.9 K/UL — SIGNIFICANT CHANGE UP (ref 3.8–10.5)

## 2018-08-13 PROCEDURE — 99232 SBSQ HOSP IP/OBS MODERATE 35: CPT | Mod: GC

## 2018-08-13 PROCEDURE — 99233 SBSQ HOSP IP/OBS HIGH 50: CPT | Mod: GC,24

## 2018-08-13 RX ORDER — TRAMADOL HYDROCHLORIDE 50 MG/1
25 TABLET ORAL EVERY 4 HOURS
Qty: 0 | Refills: 0 | Status: DISCONTINUED | OUTPATIENT
Start: 2018-08-13 | End: 2018-08-14

## 2018-08-13 RX ORDER — TRAMADOL HYDROCHLORIDE 50 MG/1
50 TABLET ORAL EVERY 4 HOURS
Qty: 0 | Refills: 0 | Status: DISCONTINUED | OUTPATIENT
Start: 2018-08-13 | End: 2018-08-14

## 2018-08-13 RX ADMIN — CARVEDILOL PHOSPHATE 12.5 MILLIGRAM(S): 80 CAPSULE, EXTENDED RELEASE ORAL at 06:24

## 2018-08-13 RX ADMIN — TACROLIMUS 4 MILLIGRAM(S): 5 CAPSULE ORAL at 06:20

## 2018-08-13 RX ADMIN — Medication 1 TABLET(S): at 14:53

## 2018-08-13 RX ADMIN — MYCOPHENOLATE MOFETIL 1000 MILLIGRAM(S): 250 CAPSULE ORAL at 17:48

## 2018-08-13 RX ADMIN — FAMOTIDINE 20 MILLIGRAM(S): 10 INJECTION INTRAVENOUS at 12:30

## 2018-08-13 RX ADMIN — Medication 500000 UNIT(S): at 17:49

## 2018-08-13 RX ADMIN — Medication 1300 MILLIGRAM(S): at 12:35

## 2018-08-13 RX ADMIN — SENNA PLUS 2 TABLET(S): 8.6 TABLET ORAL at 21:55

## 2018-08-13 RX ADMIN — Medication 500000 UNIT(S): at 12:30

## 2018-08-13 RX ADMIN — MYCOPHENOLATE MOFETIL 1000 MILLIGRAM(S): 250 CAPSULE ORAL at 06:20

## 2018-08-13 RX ADMIN — TRAMADOL HYDROCHLORIDE 50 MILLIGRAM(S): 50 TABLET ORAL at 12:38

## 2018-08-13 RX ADMIN — TRAMADOL HYDROCHLORIDE 50 MILLIGRAM(S): 50 TABLET ORAL at 13:08

## 2018-08-13 RX ADMIN — CARVEDILOL PHOSPHATE 12.5 MILLIGRAM(S): 80 CAPSULE, EXTENDED RELEASE ORAL at 17:48

## 2018-08-13 RX ADMIN — Medication 5 MILLIGRAM(S): at 06:20

## 2018-08-13 RX ADMIN — Medication 500000 UNIT(S): at 06:21

## 2018-08-13 RX ADMIN — Medication 90 MILLIGRAM(S): at 06:20

## 2018-08-13 RX ADMIN — VALGANCICLOVIR 450 MILLIGRAM(S): 450 TABLET, FILM COATED ORAL at 12:33

## 2018-08-13 RX ADMIN — Medication 100 MILLIGRAM(S): at 12:29

## 2018-08-13 RX ADMIN — Medication 5 MILLIGRAM(S): at 17:49

## 2018-08-13 RX ADMIN — TAMSULOSIN HYDROCHLORIDE 0.4 MILLIGRAM(S): 0.4 CAPSULE ORAL at 21:56

## 2018-08-13 RX ADMIN — TACROLIMUS 4 MILLIGRAM(S): 5 CAPSULE ORAL at 17:48

## 2018-08-13 NOTE — PROGRESS NOTE ADULT - SUBJECTIVE AND OBJECTIVE BOX
[facility] DIVISION OF KIDNEY DISEASES AND HYPERTENSION -- FOLLOW UP NOTE  --------------------------------------------------------------------------------  Chief Complaint: DDRT  24 hour events/subjective:  No acute events.  Pt states he feels well.        PAST HISTORY  --------------------------------------------------------------------------------  No significant changes to PMH, PSH, FHx, SHx, unless otherwise noted    ALLERGIES & MEDICATIONS  --------------------------------------------------------------------------------  Allergies    IV Contrast (Rash)  nyquil (Rhinorrhea)  vancomycin (Pruritus; Hives)    Intolerances      Standing Inpatient Medications  carvedilol 12.5 milliGRAM(s) Oral every 12 hours  docusate sodium 100 milliGRAM(s) Oral daily  famotidine    Tablet 20 milliGRAM(s) Oral daily  mycophenolate mofetil 1000 milliGRAM(s) Oral every 12 hours  NIFEdipine XL 90 milliGRAM(s) Oral daily  nystatin    Suspension 403315 Unit(s) Swish and Swallow four times a day  oxybutynin 5 milliGRAM(s) Oral two times a day  polyethylene glycol 3350 17 Gram(s) Oral daily  predniSONE   Tablet 5 milliGRAM(s) Oral daily  senna 2 Tablet(s) Oral at bedtime  sodium bicarbonate 1300 milliGRAM(s) Oral daily  sodium bicarbonate  Infusion 0.206 mEq/kG/Hr IV Continuous <Continuous>  tacrolimus 4 milliGRAM(s) Oral two times a day  tamsulosin 0.4 milliGRAM(s) Oral at bedtime  trimethoprim   80 mG/sulfamethoxazole 400 mG 1 Tablet(s) Oral daily  valGANciclovir 450 milliGRAM(s) Oral <User Schedule>    PRN Inpatient Medications  acetaminophen   Tablet. 650 milliGRAM(s) Oral every 6 hours PRN  diphenhydrAMINE   Capsule 25 milliGRAM(s) Oral every 6 hours PRN  lidocaine 2% Gel 1 Application(s) Topical three times a day PRN  metoclopramide Injectable 10 milliGRAM(s) IV Push once PRN  ondansetron Injectable 4 milliGRAM(s) IV Push every 6 hours PRN  ondansetron Injectable 4 milliGRAM(s) IV Push once PRN  simethicone 80 milliGRAM(s) Chew every 6 hours PRN  zinc oxide 20% Ointment 1 Application(s) Topical three times a day PRN      REVIEW OF SYSTEMS  --------------------------------------------------------------------------------  Gen: No weight changes, fatigue, fevers/chills, weakness  Skin: No rashes  Head/Eyes/Ears/Mouth: No headache; Normal hearing; Normal vision w/o blurriness; No sinus pain/discomfort, sore throat  Respiratory: No dyspnea, cough, wheezing, hemoptysis  CV: No chest pain, PND, orthopnea  GI: No abdominal pain, diarrhea, constipation, nausea, vomiting, melena, hematochezia  : No increased frequency, dysuria, hematuria, nocturia  MSK: No joint pain/swelling; no back pain; no edema  Neuro: No dizziness/lightheadedness, weakness, seizures, numbness, tingling      All other systems were reviewed and are negative, except as noted.    VITALS/PHYSICAL EXAM  --------------------------------------------------------------------------------  T(C): 36.5 (08-13-18 @ 06:05), Max: 36.8 (08-12-18 @ 22:11)  HR: 65 (08-13-18 @ 06:05) (55 - 76)  BP: 136/87 (08-13-18 @ 06:05) (125/78 - 143/90)  RR: 18 (08-13-18 @ 06:05) (18 - 18)  SpO2: 99% (08-13-18 @ 06:05) (99% - 100%)  Wt(kg): --    Weight (kg): 67.4 (08-12-18 @ 05:40)      08-12-18 @ 07:01  -  08-13-18 @ 07:00  --------------------------------------------------------  IN: 1250 mL / OUT: 1859.5 mL / NET: -609.5 mL      Physical Exam:  		                Gen: NAD  	Pulm: CTA B/L  	CV: RRR, S1S2; no rub  	Abd: +BS, soft, nontender/nondistended  	: No suprapubic tenderness, marquez+  	UE: Warm, no asterixis  	LE: Warm, b/l LE edema, palpable DP pulses b/l   	Skin: Warm, without rashes  	Vascular access: NINI NORWOOD    LABS/STUDIES  --------------------------------------------------------------------------------              10.1   5.9   >-----------<  206      [08-13-18 @ 06:15]              31.4     136  |  107  |  32  ----------------------------<  87      [08-13-18 @ 06:14]  5.8   |  18  |  2.86        Ca     10.6     [08-13-18 @ 06:14]      Mg     2.0     [08-13-18 @ 06:14]      Phos  3.1     [08-13-18 @ 06:14]            Creatinine Trend:  SCr 2.86 [08-13 @ 06:14]  SCr 2.95 [08-12 @ 06:00]  SCr 3.02 [08-11 @ 06:42]  SCr 3.23 [08-10 @ 06:20]  SCr 2.91 [08-09 @ 06:28]    Urinalysis - [08-06-18 @ 21:43]      Color Yellow / Appearance Clear / SG 1.018 / pH 6.0      Gluc 100 / Ketone Negative  / Bili Negative / Urobili Negative       Blood Moderate / Protein 100 / Leuk Est Negative / Nitrite Negative      RBC 69 / WBC 10 / Hyaline 8 / Gran  / Sq Epi  / Non Sq Epi 3 / Bacteria Negative      HbA1c 5.1      [08-18-15 @ 18:19]

## 2018-08-13 NOTE — PROGRESS NOTE ADULT - PROBLEM SELECTOR PLAN 1
7/23/18 DDRT   07/29/18 reimplantation of ureter of transplanted kidney for ureteral leak  7/31/18 nephrostomy tube  Creatinine bumped, now back to ~3, likely ATN.  -HCO3 infusion discontinued with improved metabolic acidosis. Continue PO NaHCO3 daily  -FU DSA, sent 8/8  - DC Marquez today.  If no leak from LACEY noted, will DC LACEY either tonight or tomorrow am.  When stable, plan for discharge to home with nephrostomy drain and outpatient follow up.  -Do not compress LACEY. Monitor output following marquez removal. Record output q 6 hours.   - Strict I/Os  -Continue Tylenol and tramadol PRN  -Continue bowel regimen, Flomax 0.4 daily   -daily BMP  - Blood sugars well controlled. Will DC fingersticks   -On Oxybutynin for bladder spasm  - SCDs, ambulate.

## 2018-08-13 NOTE — PROGRESS NOTE ADULT - PROBLEM SELECTOR PLAN 3
BP stable  -Lopressor tapered to off, continue Coreg 12.5mg BID and titrate up as needed.    -Continue Nifedipine XL 90mg daily.

## 2018-08-13 NOTE — PROGRESS NOTE ADULT - PROBLEM SELECTOR PLAN 1
s/p DDRT 07/23 c/b post-operative ureteral leak s/p reimplantation of ureter of transplanted kidney on  07/29/2018 and subsequent IR placement of nephrostomy tube 7/31. Cr downtrending today, monitor trend.  remove marquez.  f/u DSA

## 2018-08-13 NOTE — PROGRESS NOTE ADULT - ATTENDING COMMENTS
Still with XAVI of transplant kidney but improved.  Plan to d/c marquez today.  Possible discharge in near future.    BP stable   Cont current immunosuppression.

## 2018-08-13 NOTE — PROGRESS NOTE ADULT - ASSESSMENT
32 y/o gentleman with PMH of HTN and ESRD since 2010, POD#21 R sided DDRT  with post-operative course complicated by ureteral leak requiring re-op and reimplantation of ureter of transplanted kidney and  Nephrostomy tube with good uop and creatinine plateaued ~3.0

## 2018-08-13 NOTE — PROGRESS NOTE ADULT - SUBJECTIVE AND OBJECTIVE BOX
Transplant Surgery - Multidisciplinary Rounds  --------------------------------------------------------------  DDRT 7/23/2018   POD 21  Reimplantation of ureter of transplanted kidney  07/29/2018   IR nephrostomy 7/31/2018    Present:  Patient seen with multidisciplinary team (surgeon, Nephrologist, pharmacist, NP, resident MD, MD student)  in am rounds and examined with Dr. Brown.    32 y/o juliann will s/p  R sided DDRT anastomosed to R external iliac artery and vein Ureter anastomosed to bladder over double J stent.  Cold ischemia time14 hours.  CMV +, EBV +. Course complicated by post-operative ureteral leak s/p reimplantation of ureter of transplanted kidney on  07/29/2018 and subsequent IR placement of nephrostomy tube 7/31.     Patient examined at bedside. No acute events overnight.  Ambulating, tolerating diet, and voiding.    Education: transplant medications    Plan of care: See assessment and plan of care    MEDICATIONS  (STANDING):  carvedilol 12.5 milliGRAM(s) Oral every 12 hours  docusate sodium 100 milliGRAM(s) Oral daily  famotidine    Tablet 20 milliGRAM(s) Oral daily  mycophenolate mofetil 1000 milliGRAM(s) Oral every 12 hours  NIFEdipine XL 90 milliGRAM(s) Oral daily  nystatin    Suspension 898664 Unit(s) Swish and Swallow four times a day  oxybutynin 5 milliGRAM(s) Oral two times a day  polyethylene glycol 3350 17 Gram(s) Oral daily  predniSONE   Tablet 5 milliGRAM(s) Oral daily  senna 2 Tablet(s) Oral at bedtime  sodium bicarbonate 1300 milliGRAM(s) Oral daily  sodium bicarbonate  Infusion 0.206 mEq/kG/Hr (100 mL/Hr) IV Continuous <Continuous>  tacrolimus 4 milliGRAM(s) Oral two times a day  tamsulosin 0.4 milliGRAM(s) Oral at bedtime  trimethoprim   80 mG/sulfamethoxazole 400 mG 1 Tablet(s) Oral daily  valGANciclovir 450 milliGRAM(s) Oral <User Schedule>    MEDICATIONS  (PRN):  acetaminophen   Tablet. 650 milliGRAM(s) Oral every 6 hours PRN Mild Pain (1 - 3)  diphenhydrAMINE   Capsule 25 milliGRAM(s) Oral every 6 hours PRN Rash and/or Itching  lidocaine 2% Gel 1 Application(s) Topical three times a day PRN pain related to urinary catheter  metoclopramide Injectable 10 milliGRAM(s) IV Push once PRN Nausea and/or Vomiting  ondansetron Injectable 4 milliGRAM(s) IV Push every 6 hours PRN Nausea  ondansetron Injectable 4 milliGRAM(s) IV Push once PRN Nausea and/or Vomiting  simethicone 80 milliGRAM(s) Chew every 6 hours PRN Gas  zinc oxide 20% Ointment 1 Application(s) Topical three times a day PRN rash      PAST MEDICAL & SURGICAL HISTORY:  ESRD on dialysis  HTN (hypertension)  No significant past surgical history      Vital Signs Last 24 Hrs  T(C): 36.5 (13 Aug 2018 06:05), Max: 36.8 (12 Aug 2018 22:11)  T(F): 97.7 (13 Aug 2018 06:05), Max: 98.2 (12 Aug 2018 22:11)  HR: 65 (13 Aug 2018 06:05) (63 - 76)  BP: 136/87 (13 Aug 2018 06:05) (125/78 - 143/90)  BP(mean): --  RR: 18 (13 Aug 2018 06:05) (18 - 18)  SpO2: 99% (13 Aug 2018 06:05) (99% - 100%)    I&O's Summary    12 Aug 2018 07:01  -  13 Aug 2018 07:00  --------------------------------------------------------  IN: 1250 mL / OUT: 1859.5 mL / NET: -609.5 mL                              10.1   5.9   )-----------( 206      ( 13 Aug 2018 06:15 )             31.4     08-13    136  |  107  |  32<H>  ----------------------------<  87  5.8<H>   |  18<L>  |  2.86<H>    Ca    10.6<H>      13 Aug 2018 06:14  Phos  3.1     08-13  Mg     2.0     08-13      Tacrolimus (), Serum: 8.0 ng/mL (08-13 @ 07:33)    Review of systems  Gen: No weight changes, fatigue, fevers/chills, weakness  Skin: No rashes  Head/Eyes/Ears/Mouth: No headache; Normal hearing; Normal vision w/o blurriness; No sinus pain/discomfort, sore throat  Respiratory: No dyspnea, cough, wheezing, hemoptysis  CV: No chest pain, PND, orthopnea  GI: Reports incisional pain and TTP near drains, denies diarrhea, constipation, nausea, vomiting, melena, hematochezia.   : Marquez in situ, nephrostomy and LACEY patent  MSK: No joint pain/swelling; no back pain;   Neuro: No dizziness/lightheadedness, weakness, seizures, numbness, tingling  Heme: No easy bruising or bleeding  Endo: No heat/cold intolerance  Psych: No significant nervousness, anxiety, stress, depression  All other systems were reviewed and are negative, except as noted.    PHYSICAL EXAM:  Constitutional: Well developed / well nourished  Eyes: Anicteric, PERRLA  ENMT: nc/at  Neck: supple  Respiratory: CTA B/L  Cardiovascular: RRR  Gastrointestinal: Soft abdomen, mild tender to touch at surgical site, ND  Genitourinary: marquez in situ.  LACEY, nephrostomy tube patent  Extremities: SCD's in place and working bilaterally, BLE edema R>L  Vascular: Palpable dp pulses bilaterally  Neurological: A&O x3  Skin: wound open to air, no erythema and evidence of infection noted  Musculoskeletal: Moving all extremities  Psychiatric: Responsive

## 2018-08-13 NOTE — PROGRESS NOTE ADULT - PROBLEM SELECTOR PLAN 2
Continue Tacrolimus, Cellcept, prednisone 5 mg,  Nystatin S&S, bactrim, and Valcyte   Goal FK levels 8-10.

## 2018-08-13 NOTE — PROGRESS NOTE ADULT - SUBJECTIVE AND OBJECTIVE BOX
[facility] [place] [facility]   MD PERCY MORALES MD ANGELA WONG, PA    TEL:  OFFICE: [phone]  DR OCONNOR CELL: [phone]  DR. GAMBOA CELL: [phone]  ELMIRA PRINGLE CELL: [phone]        Patient is a 33y old  Male who presents with a chief complaint of kidney transplant (12 Aug 2018 18:05)      Patient seen and examined at bedside. No chest pain/sob    VITALS:  T(F): 98 (08-13-18 @ 15:03), Max: 98.2 (08-12-18 @ 22:11)  HR: 65 (08-13-18 @ 15:03)  BP: 124/79 (08-13-18 @ 15:03)  RR: 18 (08-13-18 @ 15:03)  SpO2: 98% (08-13-18 @ 15:03)  Wt(kg): --    08-12 @ 07:01  -  08-13 @ 07:00  --------------------------------------------------------  IN: 1250 mL / OUT: 1859.5 mL / NET: -609.5 mL    08-13 @ 07:01  -  08-13 @ 16:21  --------------------------------------------------------  IN: 1080 mL / OUT: 500 mL / NET: 580 mL          PHYSICAL EXAM:  Constitutional: NAD  Neck: No JVD  Respiratory: CTAB, no wheezes, rales or rhonchi  Cardiovascular: S1, S2, RRR  Gastrointestinal: BS+, soft, NT/ND  Extremities: trace  peripheral edema    Hospital Medications:   MEDICATIONS  (STANDING):  carvedilol 12.5 milliGRAM(s) Oral every 12 hours  docusate sodium 100 milliGRAM(s) Oral daily  famotidine    Tablet 20 milliGRAM(s) Oral daily  mycophenolate mofetil 1000 milliGRAM(s) Oral every 12 hours  NIFEdipine XL 90 milliGRAM(s) Oral daily  nystatin    Suspension 628424 Unit(s) Swish and Swallow four times a day  oxybutynin 5 milliGRAM(s) Oral two times a day  polyethylene glycol 3350 17 Gram(s) Oral daily  predniSONE   Tablet 5 milliGRAM(s) Oral daily  senna 2 Tablet(s) Oral at bedtime  sodium bicarbonate 1300 milliGRAM(s) Oral daily  tacrolimus 4 milliGRAM(s) Oral two times a day  tamsulosin 0.4 milliGRAM(s) Oral at bedtime  trimethoprim   80 mG/sulfamethoxazole 400 mG 1 Tablet(s) Oral daily  valGANciclovir 450 milliGRAM(s) Oral <User Schedule>    Tacrolimus (), Serum: 8.0 ng/mL (08-13 @ 07:33)    LABS:  08-13    136  |  107  |  32<H>  ----------------------------<  87  5.8<H>   |  18<L>  |  2.86<H>    Ca    10.6<H>      13 Aug 2018 06:14  Phos  3.1     08-13  Mg     2.0     08-13      Creatinine Trend: 2.86 <--, 2.95 <--, 3.02 <--, 3.23 <--, 2.91 <--, 2.93 <--, 2.96 <--    Phosphorus Level, Serum: 3.1 mg/dL (08-13 @ 06:14)                              10.1   5.9   )-----------( 206      ( 13 Aug 2018 06:15 )             31.4     Urine Studies:  Urinalysis - [08-06-18 @ 21:43]      Color Yellow / Appearance Clear / SG 1.018 / pH 6.0      Gluc 100 / Ketone Negative  / Bili Negative / Urobili Negative       Blood Moderate / Protein 100 / Leuk Est Negative / Nitrite Negative      RBC 69 / WBC 10 / Hyaline 8 / Gran  / Sq Epi  / Non Sq Epi 3 / Bacteria Negative      HbA1c 5.1      [08-18-15 @ 18:19]        RADIOLOGY & ADDITIONAL STUDIES:

## 2018-08-14 VITALS
OXYGEN SATURATION: 99 % | HEART RATE: 66 BPM | RESPIRATION RATE: 18 BRPM | TEMPERATURE: 98 F | SYSTOLIC BLOOD PRESSURE: 153 MMHG | DIASTOLIC BLOOD PRESSURE: 894 MMHG

## 2018-08-14 DIAGNOSIS — E83.52 HYPERCALCEMIA: ICD-10-CM

## 2018-08-14 LAB
ANION GAP SERPL CALC-SCNC: 12 MMOL/L — SIGNIFICANT CHANGE UP (ref 5–17)
ANION GAP SERPL CALC-SCNC: 13 MMOL/L — SIGNIFICANT CHANGE UP (ref 5–17)
BUN SERPL-MCNC: 33 MG/DL — HIGH (ref 7–23)
BUN SERPL-MCNC: 36 MG/DL — HIGH (ref 7–23)
CALCIUM SERPL-MCNC: 10.3 MG/DL — SIGNIFICANT CHANGE UP (ref 8.4–10.5)
CALCIUM SERPL-MCNC: 11.1 MG/DL — HIGH (ref 8.4–10.5)
CHLORIDE SERPL-SCNC: 100 MMOL/L — SIGNIFICANT CHANGE UP (ref 96–108)
CHLORIDE SERPL-SCNC: 108 MMOL/L — SIGNIFICANT CHANGE UP (ref 96–108)
CO2 SERPL-SCNC: 16 MMOL/L — LOW (ref 22–31)
CO2 SERPL-SCNC: 25 MMOL/L — SIGNIFICANT CHANGE UP (ref 22–31)
CREAT SERPL-MCNC: 3 MG/DL — HIGH (ref 0.5–1.3)
CREAT SERPL-MCNC: 3.12 MG/DL — HIGH (ref 0.5–1.3)
GLUCOSE SERPL-MCNC: 119 MG/DL — HIGH (ref 70–99)
GLUCOSE SERPL-MCNC: 91 MG/DL — SIGNIFICANT CHANGE UP (ref 70–99)
HCT VFR BLD CALC: 30.2 % — LOW (ref 39–50)
HGB BLD-MCNC: 9.8 G/DL — LOW (ref 13–17)
MAGNESIUM SERPL-MCNC: 1.7 MG/DL — SIGNIFICANT CHANGE UP (ref 1.6–2.6)
MCHC RBC-ENTMCNC: 28.6 PG — SIGNIFICANT CHANGE UP (ref 27–34)
MCHC RBC-ENTMCNC: 32.4 GM/DL — SIGNIFICANT CHANGE UP (ref 32–36)
MCV RBC AUTO: 88.1 FL — SIGNIFICANT CHANGE UP (ref 80–100)
PHOSPHATE SERPL-MCNC: 3.5 MG/DL — SIGNIFICANT CHANGE UP (ref 2.5–4.5)
PLATELET # BLD AUTO: 191 K/UL — SIGNIFICANT CHANGE UP (ref 150–400)
POTASSIUM SERPL-MCNC: 5.1 MMOL/L — SIGNIFICANT CHANGE UP (ref 3.5–5.3)
POTASSIUM SERPL-MCNC: 5.7 MMOL/L — HIGH (ref 3.5–5.3)
POTASSIUM SERPL-SCNC: 5.1 MMOL/L — SIGNIFICANT CHANGE UP (ref 3.5–5.3)
POTASSIUM SERPL-SCNC: 5.7 MMOL/L — HIGH (ref 3.5–5.3)
RBC # BLD: 3.43 M/UL — LOW (ref 4.2–5.8)
RBC # FLD: 14.1 % — SIGNIFICANT CHANGE UP (ref 10.3–14.5)
SODIUM SERPL-SCNC: 136 MMOL/L — SIGNIFICANT CHANGE UP (ref 135–145)
SODIUM SERPL-SCNC: 138 MMOL/L — SIGNIFICANT CHANGE UP (ref 135–145)
TACROLIMUS SERPL-MCNC: 8.2 NG/ML — SIGNIFICANT CHANGE UP
WBC # BLD: 5 K/UL — SIGNIFICANT CHANGE UP (ref 3.8–10.5)
WBC # FLD AUTO: 5 K/UL — SIGNIFICANT CHANGE UP (ref 3.8–10.5)

## 2018-08-14 PROCEDURE — 99238 HOSP IP/OBS DSCHRG MGMT 30/<: CPT | Mod: 24

## 2018-08-14 PROCEDURE — 99232 SBSQ HOSP IP/OBS MODERATE 35: CPT

## 2018-08-14 RX ORDER — SODIUM BICARBONATE 1 MEQ/ML
2 SYRINGE (ML) INTRAVENOUS
Qty: 120 | Refills: 0 | OUTPATIENT
Start: 2018-08-14 | End: 2018-09-12

## 2018-08-14 RX ORDER — SODIUM BICARBONATE 1 MEQ/ML
0.56 SYRINGE (ML) INTRAVENOUS
Qty: 150 | Refills: 0 | Status: COMPLETED | OUTPATIENT
Start: 2018-08-14 | End: 2018-08-14

## 2018-08-14 RX ORDER — FUROSEMIDE 40 MG
80 TABLET ORAL ONCE
Qty: 0 | Refills: 0 | Status: COMPLETED | OUTPATIENT
Start: 2018-08-14 | End: 2018-08-14

## 2018-08-14 RX ORDER — TAMSULOSIN HYDROCHLORIDE 0.4 MG/1
1 CAPSULE ORAL
Qty: 30 | Refills: 0 | OUTPATIENT
Start: 2018-08-14 | End: 2018-09-12

## 2018-08-14 RX ORDER — TACROLIMUS 5 MG/1
4 CAPSULE ORAL
Qty: 0 | Refills: 0 | COMMUNITY
Start: 2018-08-14

## 2018-08-14 RX ORDER — MAGNESIUM SULFATE 500 MG/ML
2 VIAL (ML) INJECTION ONCE
Qty: 0 | Refills: 0 | Status: COMPLETED | OUTPATIENT
Start: 2018-08-14 | End: 2018-08-14

## 2018-08-14 RX ORDER — SODIUM BICARBONATE 1 MEQ/ML
1300 SYRINGE (ML) INTRAVENOUS
Qty: 0 | Refills: 0 | Status: DISCONTINUED | OUTPATIENT
Start: 2018-08-14 | End: 2018-08-14

## 2018-08-14 RX ORDER — CARVEDILOL PHOSPHATE 80 MG/1
1 CAPSULE, EXTENDED RELEASE ORAL
Qty: 60 | Refills: 0 | OUTPATIENT
Start: 2018-08-14 | End: 2018-09-12

## 2018-08-14 RX ADMIN — Medication 90 MILLIGRAM(S): at 05:59

## 2018-08-14 RX ADMIN — CARVEDILOL PHOSPHATE 12.5 MILLIGRAM(S): 80 CAPSULE, EXTENDED RELEASE ORAL at 05:59

## 2018-08-14 RX ADMIN — Medication 1 TABLET(S): at 11:49

## 2018-08-14 RX ADMIN — Medication 100 MILLIGRAM(S): at 11:49

## 2018-08-14 RX ADMIN — TACROLIMUS 4 MILLIGRAM(S): 5 CAPSULE ORAL at 05:58

## 2018-08-14 RX ADMIN — Medication 500000 UNIT(S): at 11:49

## 2018-08-14 RX ADMIN — Medication 80 MILLIGRAM(S): at 08:57

## 2018-08-14 RX ADMIN — Medication 500000 UNIT(S): at 17:40

## 2018-08-14 RX ADMIN — Medication 5 MILLIGRAM(S): at 17:40

## 2018-08-14 RX ADMIN — CARVEDILOL PHOSPHATE 12.5 MILLIGRAM(S): 80 CAPSULE, EXTENDED RELEASE ORAL at 17:40

## 2018-08-14 RX ADMIN — Medication 5 MILLIGRAM(S): at 05:58

## 2018-08-14 RX ADMIN — Medication 500000 UNIT(S): at 05:59

## 2018-08-14 RX ADMIN — Medication 500000 UNIT(S): at 00:20

## 2018-08-14 RX ADMIN — Medication 5 MILLIGRAM(S): at 05:59

## 2018-08-14 RX ADMIN — Medication 50 GRAM(S): at 08:57

## 2018-08-14 RX ADMIN — FAMOTIDINE 20 MILLIGRAM(S): 10 INJECTION INTRAVENOUS at 11:48

## 2018-08-14 RX ADMIN — Medication 250 MEQ/KG/HR: at 10:12

## 2018-08-14 RX ADMIN — TACROLIMUS 4 MILLIGRAM(S): 5 CAPSULE ORAL at 17:40

## 2018-08-14 RX ADMIN — MYCOPHENOLATE MOFETIL 1000 MILLIGRAM(S): 250 CAPSULE ORAL at 05:59

## 2018-08-14 RX ADMIN — Medication 1300 MILLIGRAM(S): at 11:48

## 2018-08-14 RX ADMIN — MYCOPHENOLATE MOFETIL 1000 MILLIGRAM(S): 250 CAPSULE ORAL at 17:40

## 2018-08-14 NOTE — PROGRESS NOTE ADULT - ATTENDING COMMENTS
Cr remains elevated.  Rodriguez removed yesterday.  LACEY with low output.     Feels well, no pain, tolerating diet.    Immuno: tac goal 8-10, cellcept 1gm BID,  pred 5mg qd

## 2018-08-14 NOTE — PROGRESS NOTE ADULT - NSHPATTENDINGPLANDISCUSS_GEN_ALL_CORE
NP, resident, nephrologist, nurse
Transplant Team
Transplant surgery/NP
Transplant team and outpatient nephrologist Dr. Wagner
primary team
NP, resident, nephrologist, nurse
NP, resident, nephrologist, nurse
NP/transplant surgery
Patient seen on multidisciplinary rounds with Transplant surgeons, nephrologist, NP, pharmacist, and nurse.
Patient seen on multidisciplinary rounds with Transplant surgeons, nephrologist, pharmacist, and nurse.
Transplant team and outpatient nephrologist Dr. Wagner
Transplant team and outpatient nephrologist Dr. Wagner
House staff
House staff, surgeon
patient, transplant team and nursing team
Transplant team
transplant fellow
House staff, transplant team
Transplant team and outpatient nephrologist Dr. Wagner

## 2018-08-14 NOTE — PROGRESS NOTE ADULT - PROBLEM SELECTOR PROBLEM 4
Hyperphosphatemia
Hyperkalemia
Hyperphosphatemia
Hyperkalemia

## 2018-08-14 NOTE — PROGRESS NOTE ADULT - PROBLEM SELECTOR PLAN 4
improved   monitor PO4
Monitor K level  Low K diet
improved   monitor PO4
in setting of Rf vs bactrim   Monitor K level  Low K diet
Monitor K level  Low K diet
improved  Monitor K level  Low K diet
improved  Monitor K level  Low K diet
in setting of Rf vs bactrim   Monitor K level  Low K diet
in setting of Rf vs bactrim   Monitor K level  Low K diet
in setting of Rf vs bactrim   Monitor K level  Low K diet.   consider lasix
in setting of Rf vs bactrim   consider lasix   Monitor K level  Low K diet ( on regular diet )
in setting of Rf vs bactrim   improved , given 1 dose lasix yesterday  Monitor K level  Low K diet.

## 2018-08-14 NOTE — PROGRESS NOTE ADULT - PROBLEM SELECTOR PLAN 5
Lasix 60 mg IV x1  Start IV fluid (D5W+ bicarb)   start po sodium bicarb 650 mg bid.  Monitor K level  Low K diet

## 2018-08-14 NOTE — PROGRESS NOTE ADULT - PROBLEM SELECTOR PROBLEM 3
Hyperphosphatemia
Hyperphosphatemia
Hyperkalemia
Hypertension, unspecified type
HTN (hypertension)
Hyperkalemia
Hyperphosphatemia
Hyperphosphatemia
Hypertension, unspecified type
HTN (hypertension)
Hyperkalemia
Hyperphosphatemia
Hypertension, unspecified type
Hyperkalemia
Hyperphosphatemia
Hyperphosphatemia
Hypertension, unspecified type

## 2018-08-14 NOTE — PROGRESS NOTE ADULT - PROBLEM SELECTOR PLAN 1
s/p DDRT 07/23 c/b post-operative ureteral leak s/p reimplantation of ureter of transplanted kidney on  07/29/2018 and subsequent IR placement of nephrostomy tube 7/31.   Renal function bump again today, marquez d/c yesterday, has good urine output.   s/p pulse steroids 8/9.   Continue immunosuppressants per transplant team  Continue  Nystatin S&S, bactrim, and Valcyte for PPX  renal sonogram with no hydro/ RYAN, follow up transplant team  monitor bmp. s/p DDRT 07/23 c/b post-operative ureteral leak s/p reimplantation of ureter of transplanted kidney on  07/29/2018 and subsequent IR placement of nephrostomy tube 7/31.   scr elevated today,  marquez d/c yesterday, has good urine output.   s/p pulse steroids 8/9.   Continue immunosuppressants per transplant team  Continue  Nystatin S&S, bactrim, and Valcyte for PPX  renal sonogram with no hydro/ RYAN, follow up transplant team  monitor bmp.

## 2018-08-14 NOTE — PROGRESS NOTE ADULT - PROBLEM SELECTOR PLAN 1
s/p DDRT 07/23 c/b post-operative ureteral leak s/p reimplantation of ureter of transplanted kidney on  07/29/2018 and subsequent IR placement of nephrostomy tube 7/31.   Cr up to 3.1.  Monitor trend.

## 2018-08-14 NOTE — PROGRESS NOTE ADULT - SUBJECTIVE AND OBJECTIVE BOX
Learner: Patient  Barriers: None    Patient received a renal transplant on 7/23/18    Method used: Verbal discussion and written material    Medication safety was discussed with the patient: allergies, herbals, adherence, interactions, medication precautions, miss dose instructions, purpose, side effects, signs and symptoms to report, and storage & handling    Patient was able to repeat and verbalize key points    Education Summary: Discharge immunosuppressant medications and prophylatic anti-infective agents reviewed with the patient. Outpatient medication schedule was discussed in detail including: medication name, indication, dose, administration times, treatment duration, side effects, drug interactions, and special instructions. Patient questions and concerns were answered and addressed. Patient demonstrated understanding.    Time spent discharge education: 60 minutes    Renal Transplant medications:  Tacrolimus adjusted to trough   Mycophenolate mofetil 1g BID  Prednisone taper to 5mg daily  Sulfamethoxazole/Trimethoprim 1SS daily  Nystatin swish and swallow four times a day  Valganciclovir 450 mg Mon Wed Fri  Pepcid 20 mg daily  Docusate and Senna  Other meds: Coreg 12.5 mg BID, Nifedipine xl 90 mg daily, flomax 0.4 mg daily, oxybutynin 5 mg BID

## 2018-08-14 NOTE — PROGRESS NOTE ADULT - ATTENDING COMMENTS
Still with XAVI of transplant kidney.  Sodium bicarb and lasix for hyperkalemia.  If normalizes later pt with possible discharge.  Likely will need kidney biopsy in the near future.    BP stable   Cont current immunosuppression.

## 2018-08-14 NOTE — PROGRESS NOTE ADULT - PROBLEM SELECTOR PROBLEM 1
Kidney replaced by transplant

## 2018-08-14 NOTE — PROGRESS NOTE ADULT - SUBJECTIVE AND OBJECTIVE BOX
[facility] DIVISION OF KIDNEY DISEASES AND HYPERTENSION -- FOLLOW UP NOTE  --------------------------------------------------------------------------------  Chief Complaint: DDRT    24 hour events/subjective:  Rodriguez removed; pt voiding without difficulty.  States he feels better.      PAST HISTORY  --------------------------------------------------------------------------------  No significant changes to PMH, PSH, FHx, SHx, unless otherwise noted    ALLERGIES & MEDICATIONS  --------------------------------------------------------------------------------  Allergies    IV Contrast (Rash)  nyquil (Rhinorrhea)  vancomycin (Pruritus; Hives)    Intolerances      Standing Inpatient Medications  carvedilol 12.5 milliGRAM(s) Oral every 12 hours  docusate sodium 100 milliGRAM(s) Oral daily  famotidine    Tablet 20 milliGRAM(s) Oral daily  mycophenolate mofetil 1000 milliGRAM(s) Oral every 12 hours  NIFEdipine XL 90 milliGRAM(s) Oral daily  nystatin    Suspension 212506 Unit(s) Swish and Swallow four times a day  oxybutynin 5 milliGRAM(s) Oral two times a day  polyethylene glycol 3350 17 Gram(s) Oral daily  predniSONE   Tablet 5 milliGRAM(s) Oral daily  senna 2 Tablet(s) Oral at bedtime  sodium bicarbonate 1300 milliGRAM(s) Oral daily  sodium bicarbonate 1300 milliGRAM(s) Oral two times a day  tacrolimus 4 milliGRAM(s) Oral two times a day  tamsulosin 0.4 milliGRAM(s) Oral at bedtime  trimethoprim   80 mG/sulfamethoxazole 400 mG 1 Tablet(s) Oral daily  valGANciclovir 450 milliGRAM(s) Oral <User Schedule>    PRN Inpatient Medications  acetaminophen   Tablet. 650 milliGRAM(s) Oral every 6 hours PRN  diphenhydrAMINE   Capsule 25 milliGRAM(s) Oral every 6 hours PRN  lidocaine 2% Gel 1 Application(s) Topical three times a day PRN  ondansetron Injectable 4 milliGRAM(s) IV Push every 6 hours PRN  ondansetron Injectable 4 milliGRAM(s) IV Push once PRN  traMADol 25 milliGRAM(s) Oral every 4 hours PRN  traMADol 50 milliGRAM(s) Oral every 4 hours PRN  zinc oxide 20% Ointment 1 Application(s) Topical three times a day PRN      REVIEW OF SYSTEMS  --------------------------------------------------------------------------------  Gen: No weight changes, fatigue, fevers/chills, weakness  Skin: No rashes  Head/Eyes/Ears/Mouth: No headache; Normal hearing; Normal vision w/o blurriness  Respiratory: No dyspnea, cough, wheezing, hemoptysis  CV: No chest pain, PND, orthopnea  GI: No abdominal pain, diarrhea, constipation, nausea, vomiting, melena, hematochezia  : No increased frequency, dysuria, hematuria, nocturia  MSK: No joint pain/swelling; no back pain;  edema+  Neuro: No dizziness/lightheadedness, weakness, seizures, numbness, tingling      VITALS/PHYSICAL EXAM  --------------------------------------------------------------------------------  T(C): 36.8 (08-14-18 @ 10:10), Max: 36.8 (08-14-18 @ 10:10)  HR: 68 (08-14-18 @ 10:10) (65 - 72)  BP: 120/81 (08-14-18 @ 10:10) (120/81 - 167/91)  RR: 18 (08-14-18 @ 10:10) (18 - 18)  SpO2: 98% (08-14-18 @ 10:10) (98% - 99%)  Wt(kg): --        08-13-18 @ 07:01  -  08-14-18 @ 07:00  --------------------------------------------------------  IN: 2760 mL / OUT: 1385 mL / NET: 1375 mL    08-14-18 @ 07:01  -  08-14-18 @ 11:05  --------------------------------------------------------  IN: 1230 mL / OUT: 1100 mL / NET: 130 mL      Physical Exam:  	Gen: NAD  	Pulm: CTA B/L  	CV: RRR, S1S2; no rub  	Abd: +BS, soft, nontender/nondistended  	: No suprapubic tenderness  	UE: Warm, no edema; no asterixis  	LE: Warm, 1+ edema  	Neuro: No focal deficits, intact gait  	Psych: Normal affect and mood  	Skin: Warm, without rashes  	Vascular access: NINI NORWOOD    LABS/STUDIES  --------------------------------------------------------------------------------              9.8    5.0   >-----------<  191      [08-14-18 @ 06:28]              30.2     136  |  108  |  36  ----------------------------<  91      [08-14-18 @ 06:26]  5.7   |  16  |  3.12        Ca     10.3     [08-14-18 @ 06:26]      Mg     1.7     [08-14-18 @ 06:26]      Phos  3.5     [08-14-18 @ 06:26]            Creatinine Trend:  SCr 3.12 [08-14 @ 06:26]  SCr 2.86 [08-13 @ 06:14]  SCr 2.95 [08-12 @ 06:00]  SCr 3.02 [08-11 @ 06:42]  SCr 3.23 [08-10 @ 06:20]    Urinalysis - [08-06-18 @ 21:43]      Color Yellow / Appearance Clear / SG 1.018 / pH 6.0      Gluc 100 / Ketone Negative  / Bili Negative / Urobili Negative       Blood Moderate / Protein 100 / Leuk Est Negative / Nitrite Negative      RBC 69 / WBC 10 / Hyaline 8 / Gran  / Sq Epi  / Non Sq Epi 3 / Bacteria Negative      HbA1c 5.1      [08-18-15 @ 18:19]

## 2018-08-14 NOTE — PROGRESS NOTE ADULT - PROBLEM SELECTOR PROBLEM 2
Hypertension, unspecified type
Hypertension, unspecified type
Immunosuppressive management encounter following kidney transplant
Hypertension, unspecified type
Immunosuppression
Immunosuppressive management encounter following kidney transplant
Hypertension, unspecified type
Immunosuppressive management encounter following kidney transplant
Hypertension, unspecified type
Immunosuppression

## 2018-08-14 NOTE — PROGRESS NOTE ADULT - SUBJECTIVE AND OBJECTIVE BOX
INTERVAL HPI/OVERNIGHT EVENTS:  Patient seen with multidisciplinary team (surgeon, Nephrologist, pharmacist, NP, resident MD, MD student)  in am rounds and examined with Dr. Valdes.    34 y/o gent suman s/p  R sided DDRT anastomosed to R external iliac artery and vein Ureter anastomosed to bladder over double J stent.  Cold ischemia time14 hours.  CMV +, EBV +. Course complicated by post-operative ureteral leak s/p reimplantation of ureter of transplanted kidney on  07/29/2018 and subsequent IR placement of nephrostomy tube 7/31.  Patient examined at bedside. No acute events overnight.  Ambulating, tolerating diet, and voiding.      MEDICATIONS  (STANDING):  carvedilol 12.5 milliGRAM(s) Oral every 12 hours  docusate sodium 100 milliGRAM(s) Oral daily  famotidine    Tablet 20 milliGRAM(s) Oral daily  mycophenolate mofetil 1000 milliGRAM(s) Oral every 12 hours  NIFEdipine XL 90 milliGRAM(s) Oral daily  nystatin    Suspension 650881 Unit(s) Swish and Swallow four times a day  oxybutynin 5 milliGRAM(s) Oral two times a day  polyethylene glycol 3350 17 Gram(s) Oral daily  predniSONE   Tablet 5 milliGRAM(s) Oral daily  senna 2 Tablet(s) Oral at bedtime  sodium bicarbonate 1300 milliGRAM(s) Oral daily  sodium bicarbonate 1300 milliGRAM(s) Oral two times a day  tacrolimus 4 milliGRAM(s) Oral two times a day  tamsulosin 0.4 milliGRAM(s) Oral at bedtime  trimethoprim   80 mG/sulfamethoxazole 400 mG 1 Tablet(s) Oral daily  valGANciclovir 450 milliGRAM(s) Oral <User Schedule>    MEDICATIONS  (PRN):  acetaminophen   Tablet. 650 milliGRAM(s) Oral every 6 hours PRN Mild Pain (1 - 3)  diphenhydrAMINE   Capsule 25 milliGRAM(s) Oral every 6 hours PRN Rash and/or Itching  lidocaine 2% Gel 1 Application(s) Topical three times a day PRN pain related to urinary catheter  ondansetron Injectable 4 milliGRAM(s) IV Push every 6 hours PRN Nausea  ondansetron Injectable 4 milliGRAM(s) IV Push once PRN Nausea and/or Vomiting  traMADol 25 milliGRAM(s) Oral every 4 hours PRN Moderate Pain (4 - 6)  traMADol 50 milliGRAM(s) Oral every 4 hours PRN Severe Pain (7 - 10)  zinc oxide 20% Ointment 1 Application(s) Topical three times a day PRN rash      Allergies    IV Contrast (Rash)  nyquil (Rhinorrhea)  vancomycin (Pruritus; Hives)    Intolerances        Vital Signs Last 24 Hrs  T(C): 36.8 (14 Aug 2018 10:10), Max: 36.8 (14 Aug 2018 10:10)  T(F): 98.2 (14 Aug 2018 10:10), Max: 98.2 (14 Aug 2018 10:10)  HR: 68 (14 Aug 2018 10:10) (65 - 72)  BP: 120/81 (14 Aug 2018 10:10) (120/81 - 167/91)  BP(mean): --  RR: 18 (14 Aug 2018 10:10) (18 - 18)  SpO2: 98% (14 Aug 2018 10:10) (98% - 99%)    LABS:                        9.8    5.0   )-----------( 191      ( 14 Aug 2018 06:28 )             30.2     08-14    136  |  108  |  36<H>  ----------------------------<  91  5.7<H>   |  16<L>  |  3.12<H>    Ca    10.3      14 Aug 2018 06:26  Phos  3.5     08-14  Mg     1.7     08-14    RADIOLOGY & ADDITIONAL TESTS:     Review of systems  Gen: No weight changes, fatigue, fevers/chills, weakness  Skin: No rashes  Head/Eyes/Ears/Mouth: No headache; Normal hearing; Normal vision w/o blurriness; No sinus pain/discomfort, sore throat  Respiratory: No dyspnea, cough, wheezing, hemoptysis  CV: No chest pain, PND, orthopnea  GI: Reports incisional pain and TTP near drains, denies diarrhea, constipation, nausea, vomiting, melena, hematochezia.   : Rodriguez in situ, nephrostomy and LACEY patent  MSK: No joint pain/swelling; no back pain;   Neuro: No dizziness/lightheadedness, weakness, seizures, numbness, tingling  Heme: No easy bruising or bleeding  Endo: No heat/cold intolerance  Psych: No significant nervousness, anxiety, stress, depression  All other systems were reviewed and are negative, except as noted.    PHYSICAL EXAM:  Constitutional: Well developed / well nourished  Eyes: Anicteric, PERRLA  ENMT: nc/at  Neck: supple  Respiratory: CTA B/L  Cardiovascular: RRR  Gastrointestinal: Soft abdomen, mild tender to touch at surgical site, ND  Genitourinary: Voiding spontaneously  LACEY, nephrostomy tube patent  Extremities: SCD's in place and working bilaterally, BLE edema R>L  Vascular: Palpable dp pulses bilaterally  Neurological: A&O x3  Skin: wound open to air, no erythema and evidence of infection noted  Musculoskeletal: Moving all extremities  Psychiatric: Responsive

## 2018-08-14 NOTE — PROGRESS NOTE ADULT - PROBLEM SELECTOR PLAN 1
7/23/18 DDRT   07/29/18 reimplantation of ureter of transplanted kidney for ureteral leak  7/31/18 nephrostomy tube  Creatinine bumped, now back to ~3, likely ATN.  Potassium elevated give one dose sodium bicarb 3 amps in one liter D5 over 4 hours  Increase bicarb to 1300mg BID  Lasix 80mg IV X1 now  F/U BMP @2pm  -FU DSA, sent 8/8  When stable, plan for discharge to home with nephrostomy drain, LACEY and outpatient follow up.  -Do not compress LACEY. Monitor output ,Record output q 6 hours.   - Strict I/Os  -Continue Tylenol and tramadol PRN  -Continue bowel regimen, Flomax 0.4 daily  - Blood sugars well controlled. Will DC fingersticks   -On Oxybutynin for bladder spasm  - SCDs, ambulate.

## 2018-08-14 NOTE — PROGRESS NOTE ADULT - ASSESSMENT
32 y/o gentleman with PMH of HTN and ESRD since 2010, POD#21 R sided DDRT  with post-operative course complicated by ureteral leak requiring re-op and reimplantation of ureter of transplanted kidney and  Nephrostomy tube with good uop and creatinine plateaued ~3.0 . Discharge plan in progress possible today.

## 2018-08-14 NOTE — CHART NOTE - NSCHARTNOTEFT_GEN_A_CORE
Pt seen for nutrition follow up S/P kidney transplant, as per departmental protocol.     Source: Patient [X]    Family [x- wife]     other [X ]; medical record    Hospital Course: 32 yo male with PMH of HTN, ESRD on HD (MWF). Pt POD#4 S/P right sided DDRT to right external iliac arty and vein on (7/23) with cold time of 14hrs. Course complicated by patient S/P reimplantation of ureter of transplanted kidney on (7/29), S/P R nephrostomy tube placement with IR 2/2 anastomotic leak on (7/31), S/P pulse steroids (8/9), and acidosis on oral bicarbonate    Pt noted with 1380ml urine output in past 24-hours; current UO = 50-250ml/hr. Magnesium, sodium, and phosphorus WNL; potassium elevated.     Diet : no concentrated potassium diet  Pt and wife seen at bedside. Reports good appetite & PO intakes with at least 2-3 full meals daily. States he is largely consuming foods from home/outside supplied by wife. 1 day diet recall reviewed with patient, revealing adequate PO intakes & appropriate food selections. Continues to follow low potassium diet with good teachback despite slightly elevated level. Denied nausea/emesis. Last BM 8/13, no GI distress    Current Weight: (8/14) 146.3 pounds, weight appears to be downtrending; however, pt endorses a good appetite & appears appropriately developed. Will continue to monitor/trend  Edema: none    Pertinent Medications: MEDICATIONS  (STANDING):  carvedilol 12.5 milliGRAM(s) Oral every 12 hours  docusate sodium 100 milliGRAM(s) Oral daily  famotidine    Tablet 20 milliGRAM(s) Oral daily  mycophenolate mofetil 1000 milliGRAM(s) Oral every 12 hours  NIFEdipine XL 90 milliGRAM(s) Oral daily  nystatin    Suspension 044066 Unit(s) Swish and Swallow four times a day  oxybutynin 5 milliGRAM(s) Oral two times a day  polyethylene glycol 3350 17 Gram(s) Oral daily  predniSONE   Tablet 5 milliGRAM(s) Oral daily  senna 2 Tablet(s) Oral at bedtime  sodium bicarbonate 1300 milliGRAM(s) Oral daily  sodium bicarbonate 1300 milliGRAM(s) Oral two times a day  tacrolimus 4 milliGRAM(s) Oral two times a day  tamsulosin 0.4 milliGRAM(s) Oral at bedtime  trimethoprim   80 mG/sulfamethoxazole 400 mG 1 Tablet(s) Oral daily  valGANciclovir 450 milliGRAM(s) Oral <User Schedule>    MEDICATIONS  (PRN):  acetaminophen   Tablet. 650 milliGRAM(s) Oral every 6 hours PRN Mild Pain (1 - 3)  diphenhydrAMINE   Capsule 25 milliGRAM(s) Oral every 6 hours PRN Rash and/or Itching  lidocaine 2% Gel 1 Application(s) Topical three times a day PRN pain related to urinary catheter  ondansetron Injectable 4 milliGRAM(s) IV Push every 6 hours PRN Nausea  ondansetron Injectable 4 milliGRAM(s) IV Push once PRN Nausea and/or Vomiting  traMADol 25 milliGRAM(s) Oral every 4 hours PRN Moderate Pain (4 - 6)  traMADol 50 milliGRAM(s) Oral every 4 hours PRN Severe Pain (7 - 10)  zinc oxide 20% Ointment 1 Application(s) Topical three times a day PRN rash    Pertinent Labs:  (8/14) K 5.7H, CO2 16L, BUN 36H, Cr 3.12H, Phos+Mg WDL    Skin: no pressure injuries    Estimated Needs:   [X ] no change since previous assessment  [ ] recalculated:     Previous Nutrition Diagnosis:   [X ] Increased Nutrient Needs    Nutrition Diagnosis is [X ] ongoing; addressed with PO diet     New Nutrition Diagnosis: [X ] not applicable    Interventions:     Recommend:  1) Continue no concentrated potassium diet; encourage intake of high protein, nutrient dense foods. Therapeutic diet is to continue after discharge until patient is able to be seen by outpatient RD at transplant center  2) Monitor weight, lab values, skin, po intake and GI tolerance  3) Reinforce therapeutic diet education as able    Monitoring and Evaluation:   Follow up per protocol  RD to remain available for further nutritional interventions as indicated.   Poornima Madsen, BORIS Pager #[phone]

## 2018-08-14 NOTE — PROGRESS NOTE ADULT - SUBJECTIVE AND OBJECTIVE BOX
[facility]   MD PERCY MORALES MD ANGELA WONG, PA    TEL:  OFFICE: [phone]  DR OCONNOR CELL: [phone]  DR. GAMBOA CELL: [phone]  ELMIRA PRINGLE CELL: [phone]        Patient is a 33y old  Male who presents with a chief complaint of kidney transplant (12 Aug 2018 18:05)      Patient seen and examined at bedside. No chest pain/sob    VITALS:  T(F): 98.1 (08-14-18 @ 13:58), Max: 98.2 (08-14-18 @ 10:10)  HR: 74 (08-14-18 @ 13:58)  BP: 128/83 (08-14-18 @ 13:58)  RR: 18 (08-14-18 @ 13:58)  SpO2: 100% (08-14-18 @ 13:58)  Wt(kg): --    08-13 @ 07:01  -  08-14 @ 07:00  --------------------------------------------------------  IN: 2760 mL / OUT: 1385 mL / NET: 1375 mL    08-14 @ 07:01  -  08-14 @ 16:20  --------------------------------------------------------  IN: 1530 mL / OUT: 2375 mL / NET: -845 mL          PHYSICAL EXAM:  Constitutional: NAD  Neck: No JVD  Respiratory: CTAB, no wheezes, rales or rhonchi  Cardiovascular: S1, S2, RRR  Gastrointestinal: BS+, soft, NT/ND  Extremities: No peripheral edema    Hospital Medications:   MEDICATIONS  (STANDING):  carvedilol 12.5 milliGRAM(s) Oral every 12 hours  docusate sodium 100 milliGRAM(s) Oral daily  famotidine    Tablet 20 milliGRAM(s) Oral daily  mycophenolate mofetil 1000 milliGRAM(s) Oral every 12 hours  NIFEdipine XL 90 milliGRAM(s) Oral daily  nystatin    Suspension 567677 Unit(s) Swish and Swallow four times a day  oxybutynin 5 milliGRAM(s) Oral two times a day  polyethylene glycol 3350 17 Gram(s) Oral daily  predniSONE   Tablet 5 milliGRAM(s) Oral daily  senna 2 Tablet(s) Oral at bedtime  sodium bicarbonate 1300 milliGRAM(s) Oral two times a day  tacrolimus 4 milliGRAM(s) Oral two times a day  tamsulosin 0.4 milliGRAM(s) Oral at bedtime  trimethoprim   80 mG/sulfamethoxazole 400 mG 1 Tablet(s) Oral daily  valGANciclovir 450 milliGRAM(s) Oral <User Schedule>    Tacrolimus (), Serum: 8.2 ng/mL (08-14 @ 07:25)    LABS:  08-14    136  |  108  |  36<H>  ----------------------------<  91  5.7<H>   |  16<L>  |  3.12<H>    Ca    10.3      14 Aug 2018 06:26  Phos  3.5     08-14  Mg     1.7     08-14      Creatinine Trend: 3.12 <--, 2.86 <--, 2.95 <--, 3.02 <--, 3.23 <--, 2.91 <--, 2.93 <--    Phosphorus Level, Serum: 3.5 mg/dL (08-14 @ 06:26)                              9.8    5.0   )-----------( 191      ( 14 Aug 2018 06:28 )             30.2     Urine Studies:  Urinalysis - [08-06-18 @ 21:43]      Color Yellow / Appearance Clear / SG 1.018 / pH 6.0      Gluc 100 / Ketone Negative  / Bili Negative / Urobili Negative       Blood Moderate / Protein 100 / Leuk Est Negative / Nitrite Negative      RBC 69 / WBC 10 / Hyaline 8 / Gran  / Sq Epi  / Non Sq Epi 3 / Bacteria Negative      HbA1c 5.1      [08-18-15 @ 18:19]        RADIOLOGY & ADDITIONAL STUDIES:

## 2018-08-17 ENCOUNTER — APPOINTMENT (OUTPATIENT)
Dept: NEPHROLOGY | Facility: CLINIC | Age: 33
End: 2018-08-17
Payer: MEDICARE

## 2018-08-17 VITALS
DIASTOLIC BLOOD PRESSURE: 77 MMHG | WEIGHT: 145 LBS | TEMPERATURE: 98.2 F | SYSTOLIC BLOOD PRESSURE: 121 MMHG | OXYGEN SATURATION: 99 % | HEIGHT: 65.75 IN | HEART RATE: 75 BPM | RESPIRATION RATE: 16 BRPM | BODY MASS INDEX: 23.58 KG/M2

## 2018-08-17 DIAGNOSIS — Z87.448 PERSONAL HISTORY OF OTHER DISEASES OF URINARY SYSTEM: ICD-10-CM

## 2018-08-17 DIAGNOSIS — Z76.82 AWAITING ORGAN TRANSPLANT STATUS: ICD-10-CM

## 2018-08-17 LAB
ALBUMIN SERPL ELPH-MCNC: 4.4 G/DL
ALP BLD-CCNC: 71 U/L
ALT SERPL-CCNC: 6 U/L
ANION GAP SERPL CALC-SCNC: 14 MMOL/L
APPEARANCE: CLEAR
AST SERPL-CCNC: 12 U/L
BACTERIA: NEGATIVE
BASOPHILS # BLD AUTO: 0.01 K/UL
BASOPHILS NFR BLD AUTO: 0.2 %
BILIRUB SERPL-MCNC: 0.3 MG/DL
BILIRUBIN URINE: NEGATIVE
BLOOD URINE: ABNORMAL
BUN SERPL-MCNC: 34 MG/DL
CALCIUM SERPL-MCNC: 11.4 MG/DL
CHLORIDE SERPL-SCNC: 107 MMOL/L
CO2 SERPL-SCNC: 19 MMOL/L
COLOR: YELLOW
CREAT SERPL-MCNC: 3.17 MG/DL
CREAT SPEC-SCNC: 114 MG/DL
CREAT/PROT UR: 0.4 RATIO
EOSINOPHIL # BLD AUTO: 0.25 K/UL
EOSINOPHIL NFR BLD AUTO: 4.2 %
GLUCOSE QUALITATIVE U: NEGATIVE MG/DL
GLUCOSE SERPL-MCNC: 103 MG/DL
HCT VFR BLD CALC: 35.3 %
HGB BLD-MCNC: 11.3 G/DL
HYALINE CASTS: 4 /LPF
IMM GRANULOCYTES NFR BLD AUTO: 0.2 %
KETONES URINE: NEGATIVE
LDH SERPL-CCNC: 166 U/L
LEUKOCYTE ESTERASE URINE: NEGATIVE
LYMPHOCYTES # BLD AUTO: 1.71 K/UL
LYMPHOCYTES NFR BLD AUTO: 28.6 %
MAGNESIUM SERPL-MCNC: 1.6 MG/DL
MAN DIFF?: NORMAL
MCHC RBC-ENTMCNC: 27.6 PG
MCHC RBC-ENTMCNC: 32 GM/DL
MCV RBC AUTO: 86.1 FL
MICROSCOPIC-UA: NORMAL
MONOCYTES # BLD AUTO: 0.39 K/UL
MONOCYTES NFR BLD AUTO: 6.5 %
NEUTROPHILS # BLD AUTO: 3.61 K/UL
NEUTROPHILS NFR BLD AUTO: 60.3 %
NITRITE URINE: NEGATIVE
PH URINE: 5.5
PHOSPHATE SERPL-MCNC: 2.5 MG/DL
PLATELET # BLD AUTO: 238 K/UL
POTASSIUM SERPL-SCNC: 4.7 MMOL/L
PROT SERPL-MCNC: 6.7 G/DL
PROT UR-MCNC: 44 MG/DL
PROTEIN URINE: 30 MG/DL
RBC # BLD: 4.1 M/UL
RBC # FLD: 15 %
RED BLOOD CELLS URINE: 8 /HPF
SODIUM SERPL-SCNC: 140 MMOL/L
SPECIFIC GRAVITY URINE: 1.01
SQUAMOUS EPITHELIAL CELLS: 1 /HPF
TACROLIMUS SERPL-MCNC: 8.3 NG/ML
URATE SERPL-MCNC: 7.8 MG/DL
UROBILINOGEN URINE: NEGATIVE MG/DL
WBC # FLD AUTO: 5.98 K/UL
WHITE BLOOD CELLS URINE: 3 /HPF

## 2018-08-17 PROCEDURE — 99215 OFFICE O/P EST HI 40 MIN: CPT

## 2018-08-17 RX ORDER — METOPROLOL TARTRATE 100 MG/1
100 TABLET, FILM COATED ORAL
Qty: 180 | Refills: 3 | Status: DISCONTINUED | COMMUNITY
Start: 2018-07-26 | End: 2018-08-17

## 2018-08-17 RX ORDER — TACROLIMUS 0.5 MG/1
0.5 CAPSULE ORAL
Qty: 180 | Refills: 3 | Status: DISCONTINUED | COMMUNITY
Start: 2018-07-26 | End: 2018-08-17

## 2018-08-17 RX ORDER — TAMSULOSIN HYDROCHLORIDE 0.4 MG/1
0.4 CAPSULE ORAL
Qty: 30 | Refills: 11 | Status: DISCONTINUED | COMMUNITY
Start: 2018-07-27 | End: 2018-08-17

## 2018-08-17 RX ORDER — TACROLIMUS 5 MG/1
5 CAPSULE ORAL
Qty: 180 | Refills: 3 | Status: DISCONTINUED | COMMUNITY
Start: 2018-07-26 | End: 2018-08-17

## 2018-08-20 ENCOUNTER — APPOINTMENT (OUTPATIENT)
Dept: TRANSPLANT | Facility: CLINIC | Age: 33
End: 2018-08-20

## 2018-08-20 VITALS
HEART RATE: 73 BPM | SYSTOLIC BLOOD PRESSURE: 115 MMHG | DIASTOLIC BLOOD PRESSURE: 76 MMHG | TEMPERATURE: 97.8 F | WEIGHT: 144 LBS | OXYGEN SATURATION: 100 % | BODY MASS INDEX: 23.14 KG/M2 | HEIGHT: 66 IN | RESPIRATION RATE: 13 BRPM

## 2018-08-21 LAB
ALBUMIN SERPL ELPH-MCNC: 4.5 G/DL
ALP BLD-CCNC: 75 U/L
ALT SERPL-CCNC: 7 U/L
ANION GAP SERPL CALC-SCNC: 13 MMOL/L
APPEARANCE: CLEAR
AST SERPL-CCNC: 13 U/L
BACTERIA: NEGATIVE
BASOPHILS # BLD AUTO: 0.01 K/UL
BASOPHILS NFR BLD AUTO: 0.2 %
BILIRUB SERPL-MCNC: 0.3 MG/DL
BILIRUBIN URINE: NEGATIVE
BLOOD URINE: NEGATIVE
BUN SERPL-MCNC: 36 MG/DL
CALCIUM SERPL-MCNC: 11.3 MG/DL
CHLORIDE SERPL-SCNC: 109 MMOL/L
CO2 SERPL-SCNC: 18 MMOL/L
COLOR: YELLOW
CREAT SERPL-MCNC: 3.1 MG/DL
CREAT SPEC-SCNC: 124 MG/DL
CREAT/PROT UR: 0.2 RATIO
EOSINOPHIL # BLD AUTO: 0.22 K/UL
EOSINOPHIL NFR BLD AUTO: 3.9 %
GLUCOSE QUALITATIVE U: NEGATIVE MG/DL
GLUCOSE SERPL-MCNC: 102 MG/DL
HCT VFR BLD CALC: 35.3 %
HGB BLD-MCNC: 11.2 G/DL
HYALINE CASTS: 2 /LPF
IMM GRANULOCYTES NFR BLD AUTO: 0.5 %
KETONES URINE: NEGATIVE
LDH SERPL-CCNC: 187 U/L
LEUKOCYTE ESTERASE URINE: NEGATIVE
LYMPHOCYTES # BLD AUTO: 2.12 K/UL
LYMPHOCYTES NFR BLD AUTO: 38 %
MAGNESIUM SERPL-MCNC: 1.5 MG/DL
MAN DIFF?: NORMAL
MCHC RBC-ENTMCNC: 28.1 PG
MCHC RBC-ENTMCNC: 31.7 GM/DL
MCV RBC AUTO: 88.7 FL
MICROSCOPIC-UA: NORMAL
MONOCYTES # BLD AUTO: 0.43 K/UL
MONOCYTES NFR BLD AUTO: 7.7 %
NEUTROPHILS # BLD AUTO: 2.77 K/UL
NEUTROPHILS NFR BLD AUTO: 49.7 %
NITRITE URINE: NEGATIVE
PH URINE: 5.5
PHOSPHATE SERPL-MCNC: 2.2 MG/DL
PLATELET # BLD AUTO: 264 K/UL
POTASSIUM SERPL-SCNC: 5 MMOL/L
PROT SERPL-MCNC: 6.6 G/DL
PROT UR-MCNC: 20 MG/DL
PROTEIN URINE: ABNORMAL MG/DL
RBC # BLD: 3.98 M/UL
RBC # FLD: 15.1 %
RED BLOOD CELLS URINE: 4 /HPF
SODIUM SERPL-SCNC: 140 MMOL/L
SPECIFIC GRAVITY URINE: 1.02
SQUAMOUS EPITHELIAL CELLS: 1 /HPF
TACROLIMUS SERPL-MCNC: 11.8 NG/ML
URATE SERPL-MCNC: 8.1 MG/DL
UROBILINOGEN URINE: NEGATIVE MG/DL
WBC # FLD AUTO: 5.58 K/UL
WHITE BLOOD CELLS URINE: 4 /HPF

## 2018-08-23 ENCOUNTER — APPOINTMENT (OUTPATIENT)
Dept: TRANSPLANT | Facility: CLINIC | Age: 33
End: 2018-08-23
Payer: MEDICARE

## 2018-08-23 VITALS
HEIGHT: 66 IN | OXYGEN SATURATION: 100 % | HEART RATE: 72 BPM | RESPIRATION RATE: 13 BRPM | BODY MASS INDEX: 23.46 KG/M2 | TEMPERATURE: 97.9 F | DIASTOLIC BLOOD PRESSURE: 75 MMHG | WEIGHT: 146 LBS | SYSTOLIC BLOOD PRESSURE: 116 MMHG

## 2018-08-23 LAB — BKV DNA SPEC QL NAA+PROBE: NORMAL

## 2018-08-23 PROCEDURE — 99213 OFFICE O/P EST LOW 20 MIN: CPT | Mod: 24

## 2018-08-24 LAB
ALBUMIN SERPL ELPH-MCNC: 4.3 G/DL
ALP BLD-CCNC: 70 U/L
ALT SERPL-CCNC: 7 U/L
ANION GAP SERPL CALC-SCNC: 10 MMOL/L
APPEARANCE: CLEAR
AST SERPL-CCNC: 11 U/L
BACTERIA: NEGATIVE
BASOPHILS # BLD AUTO: 0 K/UL
BASOPHILS NFR BLD AUTO: 0 %
BILIRUB SERPL-MCNC: 0.4 MG/DL
BILIRUBIN URINE: NEGATIVE
BLOOD URINE: NEGATIVE
BUN SERPL-MCNC: 27 MG/DL
CALCIUM SERPL-MCNC: 11.1 MG/DL
CHLORIDE SERPL-SCNC: 110 MMOL/L
CO2 SERPL-SCNC: 19 MMOL/L
COLOR: YELLOW
CREAT SERPL-MCNC: 2.48 MG/DL
CREAT SPEC-SCNC: 170 MG/DL
CREAT/PROT UR: 0.2 RATIO
EOSINOPHIL # BLD AUTO: 0.15 K/UL
EOSINOPHIL NFR BLD AUTO: 2.6 %
GLUCOSE QUALITATIVE U: NEGATIVE MG/DL
GLUCOSE SERPL-MCNC: 113 MG/DL
HCT VFR BLD CALC: 35.9 %
HGB BLD-MCNC: 11.5 G/DL
HYALINE CASTS: 4 /LPF
IMM GRANULOCYTES NFR BLD AUTO: 0.2 %
KETONES URINE: NEGATIVE
LDH SERPL-CCNC: 163 U/L
LEUKOCYTE ESTERASE URINE: NEGATIVE
LYMPHOCYTES # BLD AUTO: 1.48 K/UL
LYMPHOCYTES NFR BLD AUTO: 25.3 %
MAGNESIUM SERPL-MCNC: 1.5 MG/DL
MAN DIFF?: NORMAL
MCHC RBC-ENTMCNC: 28.2 PG
MCHC RBC-ENTMCNC: 32 GM/DL
MCV RBC AUTO: 88 FL
MICROSCOPIC-UA: NORMAL
MONOCYTES # BLD AUTO: 0.43 K/UL
MONOCYTES NFR BLD AUTO: 7.3 %
NEUTROPHILS # BLD AUTO: 3.79 K/UL
NEUTROPHILS NFR BLD AUTO: 64.6 %
NITRITE URINE: NEGATIVE
PH URINE: 5.5
PHOSPHATE SERPL-MCNC: 1.8 MG/DL
PLATELET # BLD AUTO: 256 K/UL
POTASSIUM SERPL-SCNC: 4.9 MMOL/L
PROT SERPL-MCNC: 6.4 G/DL
PROT UR-MCNC: 36 MG/DL
PROTEIN URINE: 30 MG/DL
RBC # BLD: 4.08 M/UL
RBC # FLD: 15.2 %
RED BLOOD CELLS URINE: 5 /HPF
SODIUM SERPL-SCNC: 139 MMOL/L
SPECIFIC GRAVITY URINE: 1.02
SQUAMOUS EPITHELIAL CELLS: 2 /HPF
TACROLIMUS SERPL-MCNC: 8.1 NG/ML
URATE SERPL-MCNC: 7.2 MG/DL
UROBILINOGEN URINE: NEGATIVE MG/DL
WBC # FLD AUTO: 5.86 K/UL
WHITE BLOOD CELLS URINE: 7 /HPF

## 2018-08-27 ENCOUNTER — APPOINTMENT (OUTPATIENT)
Dept: TRANSPLANT | Facility: CLINIC | Age: 33
End: 2018-08-27
Payer: MEDICARE

## 2018-08-27 VITALS
WEIGHT: 146 LBS | RESPIRATION RATE: 16 BRPM | DIASTOLIC BLOOD PRESSURE: 89 MMHG | BODY MASS INDEX: 23.46 KG/M2 | HEIGHT: 66 IN | SYSTOLIC BLOOD PRESSURE: 135 MMHG | TEMPERATURE: 97.5 F | HEART RATE: 74 BPM | OXYGEN SATURATION: 94 %

## 2018-08-27 LAB
ALBUMIN SERPL ELPH-MCNC: 4.4 G/DL
ALP BLD-CCNC: 69 U/L
ALT SERPL-CCNC: 7 U/L
ANION GAP SERPL CALC-SCNC: 11 MMOL/L
APPEARANCE: CLEAR
AST SERPL-CCNC: 15 U/L
BASOPHILS # BLD AUTO: 0.01 K/UL
BASOPHILS NFR BLD AUTO: 0.2 %
BILIRUB SERPL-MCNC: 0.3 MG/DL
BILIRUBIN URINE: NEGATIVE
BKV DNA SPEC QL NAA+PROBE: NORMAL
BLOOD URINE: NEGATIVE
BUN SERPL-MCNC: 28 MG/DL
CALCIUM SERPL-MCNC: 10.9 MG/DL
CHLORIDE SERPL-SCNC: 109 MMOL/L
CO2 SERPL-SCNC: 19 MMOL/L
COLOR: YELLOW
CREAT SERPL-MCNC: 2.3 MG/DL
CREAT SPEC-SCNC: 104 MG/DL
CREAT/PROT UR: 0.1 RATIO
EOSINOPHIL # BLD AUTO: 0.15 K/UL
EOSINOPHIL NFR BLD AUTO: 2.5 %
GLUCOSE QUALITATIVE U: NEGATIVE MG/DL
GLUCOSE SERPL-MCNC: 100 MG/DL
HCT VFR BLD CALC: 35.5 %
HGB BLD-MCNC: 11 G/DL
IMM GRANULOCYTES NFR BLD AUTO: 0.7 %
KETONES URINE: NEGATIVE
LDH SERPL-CCNC: 186 U/L
LEUKOCYTE ESTERASE URINE: NEGATIVE
LYMPHOCYTES # BLD AUTO: 1.64 K/UL
LYMPHOCYTES NFR BLD AUTO: 27.1 %
MAGNESIUM SERPL-MCNC: 1.5 MG/DL
MAN DIFF?: NORMAL
MCHC RBC-ENTMCNC: 26.7 PG
MCHC RBC-ENTMCNC: 31 GM/DL
MCV RBC AUTO: 86.2 FL
MONOCYTES # BLD AUTO: 0.37 K/UL
MONOCYTES NFR BLD AUTO: 6.1 %
NEUTROPHILS # BLD AUTO: 3.84 K/UL
NEUTROPHILS NFR BLD AUTO: 63.4 %
NITRITE URINE: NEGATIVE
PH URINE: 5.5
PHOSPHATE SERPL-MCNC: 2 MG/DL
PLATELET # BLD AUTO: 231 K/UL
POTASSIUM SERPL-SCNC: 5.1 MMOL/L
PROT SERPL-MCNC: 6.5 G/DL
PROT UR-MCNC: 11 MG/DL
PROTEIN URINE: NEGATIVE MG/DL
RBC # BLD: 4.12 M/UL
RBC # FLD: 15.3 %
SODIUM SERPL-SCNC: 139 MMOL/L
SPECIFIC GRAVITY URINE: 1.02
TACROLIMUS SERPL-MCNC: 4.7 NG/ML
URATE SERPL-MCNC: 7.9 MG/DL
UROBILINOGEN URINE: NEGATIVE MG/DL
WBC # FLD AUTO: 6.05 K/UL

## 2018-08-27 PROCEDURE — 99213 OFFICE O/P EST LOW 20 MIN: CPT

## 2018-08-30 ENCOUNTER — APPOINTMENT (OUTPATIENT)
Dept: TRANSPLANT | Facility: CLINIC | Age: 33
End: 2018-08-30
Payer: MEDICARE

## 2018-08-30 PROCEDURE — XXXXX: CPT

## 2018-08-31 LAB
ALBUMIN SERPL ELPH-MCNC: 4.8 G/DL
ALP BLD-CCNC: 74 U/L
ALT SERPL-CCNC: 8 U/L
ANION GAP SERPL CALC-SCNC: 11 MMOL/L
APPEARANCE: CLEAR
AST SERPL-CCNC: 9 U/L
BACTERIA: NEGATIVE
BASOPHILS # BLD AUTO: 0 K/UL
BASOPHILS NFR BLD AUTO: 0 %
BILIRUB SERPL-MCNC: 0.3 MG/DL
BILIRUBIN URINE: NEGATIVE
BLOOD URINE: ABNORMAL
BUN SERPL-MCNC: 32 MG/DL
CALCIUM SERPL-MCNC: 11.7 MG/DL
CHLORIDE SERPL-SCNC: 109 MMOL/L
CO2 SERPL-SCNC: 17 MMOL/L
COLOR: YELLOW
CREAT SERPL-MCNC: 2.41 MG/DL
CREAT SPEC-SCNC: 134 MG/DL
CREAT/PROT UR: 0.1 RATIO
EOSINOPHIL # BLD AUTO: 0.08 K/UL
EOSINOPHIL NFR BLD AUTO: 1.1 %
GLUCOSE QUALITATIVE U: NEGATIVE MG/DL
GLUCOSE SERPL-MCNC: 107 MG/DL
HCT VFR BLD CALC: 36.7 %
HGB BLD-MCNC: 11.5 G/DL
HYALINE CASTS: 0 /LPF
IMM GRANULOCYTES NFR BLD AUTO: 0.4 %
KETONES URINE: NEGATIVE
LDH SERPL-CCNC: 157 U/L
LEUKOCYTE ESTERASE URINE: NEGATIVE
LYMPHOCYTES # BLD AUTO: 1.53 K/UL
LYMPHOCYTES NFR BLD AUTO: 21.5 %
MAGNESIUM SERPL-MCNC: 1.6 MG/DL
MAN DIFF?: NORMAL
MCHC RBC-ENTMCNC: 27 PG
MCHC RBC-ENTMCNC: 31.3 GM/DL
MCV RBC AUTO: 86.2 FL
MICROSCOPIC-UA: NORMAL
MONOCYTES # BLD AUTO: 0.46 K/UL
MONOCYTES NFR BLD AUTO: 6.5 %
NEUTROPHILS # BLD AUTO: 5.01 K/UL
NEUTROPHILS NFR BLD AUTO: 70.5 %
NITRITE URINE: NEGATIVE
PH URINE: 5.5
PHOSPHATE SERPL-MCNC: 1.6 MG/DL
PLATELET # BLD AUTO: 240 K/UL
POTASSIUM SERPL-SCNC: 5.2 MMOL/L
PROT SERPL-MCNC: 7.2 G/DL
PROT UR-MCNC: 12 MG/DL
PROTEIN URINE: ABNORMAL MG/DL
RBC # BLD: 4.26 M/UL
RBC # FLD: 15.3 %
RED BLOOD CELLS URINE: 3 /HPF
SODIUM SERPL-SCNC: 137 MMOL/L
SPECIFIC GRAVITY URINE: 1.02
SQUAMOUS EPITHELIAL CELLS: 1 /HPF
TACROLIMUS SERPL-MCNC: 5.3 NG/ML
URATE SERPL-MCNC: 8.5 MG/DL
UROBILINOGEN URINE: NEGATIVE MG/DL
WBC # FLD AUTO: 7.11 K/UL
WHITE BLOOD CELLS URINE: 3 /HPF

## 2018-09-04 ENCOUNTER — OUTPATIENT (OUTPATIENT)
Dept: OUTPATIENT SERVICES | Facility: HOSPITAL | Age: 33
LOS: 1 days | End: 2018-09-04
Payer: MEDICARE

## 2018-09-04 DIAGNOSIS — Z94.0 KIDNEY TRANSPLANT STATUS: ICD-10-CM

## 2018-09-04 LAB — BKV DNA SPEC QL NAA+PROBE: NORMAL

## 2018-09-04 PROCEDURE — 50431 NJX PX NFROSGRM &/URTRGRM: CPT

## 2018-09-04 PROCEDURE — 50431 NJX PX NFROSGRM &/URTRGRM: CPT | Mod: RT

## 2018-09-06 ENCOUNTER — APPOINTMENT (OUTPATIENT)
Dept: TRANSPLANT | Facility: CLINIC | Age: 33
End: 2018-09-06
Payer: MEDICARE

## 2018-09-06 VITALS
HEART RATE: 71 BPM | TEMPERATURE: 97.6 F | OXYGEN SATURATION: 99 % | WEIGHT: 153 LBS | BODY MASS INDEX: 24.59 KG/M2 | HEIGHT: 66 IN | SYSTOLIC BLOOD PRESSURE: 112 MMHG | DIASTOLIC BLOOD PRESSURE: 68 MMHG | RESPIRATION RATE: 16 BRPM

## 2018-09-06 LAB — BKV DNA SPEC QL NAA+PROBE: NORMAL

## 2018-09-06 PROCEDURE — 99212 OFFICE O/P EST SF 10 MIN: CPT | Mod: 24

## 2018-09-07 LAB
ALBUMIN SERPL ELPH-MCNC: 4.9 G/DL
ALP BLD-CCNC: 75 U/L
ALT SERPL-CCNC: 6 U/L
ANION GAP SERPL CALC-SCNC: 16 MMOL/L
APPEARANCE: CLEAR
AST SERPL-CCNC: 11 U/L
BACTERIA: NEGATIVE
BASOPHILS # BLD AUTO: 0.01 K/UL
BASOPHILS NFR BLD AUTO: 0.1 %
BILIRUB SERPL-MCNC: 0.3 MG/DL
BILIRUBIN URINE: NEGATIVE
BLOOD URINE: NEGATIVE
BUN SERPL-MCNC: 32 MG/DL
CALCIUM SERPL-MCNC: 11.6 MG/DL
CHLORIDE SERPL-SCNC: 107 MMOL/L
CO2 SERPL-SCNC: 18 MMOL/L
COLOR: YELLOW
CREAT SERPL-MCNC: 2.52 MG/DL
CREAT SPEC-SCNC: 171 MG/DL
CREAT/PROT UR: 0.1 RATIO
EOSINOPHIL # BLD AUTO: 0.12 K/UL
EOSINOPHIL NFR BLD AUTO: 1.4 %
GLUCOSE QUALITATIVE U: NEGATIVE MG/DL
GLUCOSE SERPL-MCNC: 121 MG/DL
HCT VFR BLD CALC: 37.7 %
HGB BLD-MCNC: 12.2 G/DL
HYALINE CASTS: 3 /LPF
IMM GRANULOCYTES NFR BLD AUTO: 0.2 %
KETONES URINE: NEGATIVE
LDH SERPL-CCNC: 169 U/L
LEUKOCYTE ESTERASE URINE: ABNORMAL
LYMPHOCYTES # BLD AUTO: 1.54 K/UL
LYMPHOCYTES NFR BLD AUTO: 18 %
MAGNESIUM SERPL-MCNC: 1.6 MG/DL
MAN DIFF?: NORMAL
MCHC RBC-ENTMCNC: 27.9 PG
MCHC RBC-ENTMCNC: 32.4 GM/DL
MCV RBC AUTO: 86.1 FL
MICROSCOPIC-UA: NORMAL
MONOCYTES # BLD AUTO: 0.39 K/UL
MONOCYTES NFR BLD AUTO: 4.6 %
NEUTROPHILS # BLD AUTO: 6.49 K/UL
NEUTROPHILS NFR BLD AUTO: 75.7 %
NITRITE URINE: NEGATIVE
PH URINE: 5
PHOSPHATE SERPL-MCNC: 2.7 MG/DL
PLATELET # BLD AUTO: 209 K/UL
POTASSIUM SERPL-SCNC: 4.7 MMOL/L
PROT SERPL-MCNC: 7.4 G/DL
PROT UR-MCNC: 24 MG/DL
PROTEIN URINE: ABNORMAL MG/DL
RBC # BLD: 4.38 M/UL
RBC # FLD: 15.5 %
RED BLOOD CELLS URINE: 4 /HPF
SODIUM SERPL-SCNC: 141 MMOL/L
SPECIFIC GRAVITY URINE: 1.02
SQUAMOUS EPITHELIAL CELLS: 2 /HPF
TACROLIMUS SERPL-MCNC: 11.2 NG/ML
URATE SERPL-MCNC: 7.9 MG/DL
UROBILINOGEN URINE: NEGATIVE MG/DL
WBC # FLD AUTO: 8.57 K/UL
WHITE BLOOD CELLS URINE: 8 /HPF

## 2018-09-10 ENCOUNTER — APPOINTMENT (OUTPATIENT)
Dept: NEPHROLOGY | Facility: CLINIC | Age: 33
End: 2018-09-10
Payer: MEDICARE

## 2018-09-10 VITALS
WEIGHT: 149 LBS | HEIGHT: 66 IN | HEART RATE: 73 BPM | RESPIRATION RATE: 13 BRPM | SYSTOLIC BLOOD PRESSURE: 107 MMHG | BODY MASS INDEX: 23.95 KG/M2 | DIASTOLIC BLOOD PRESSURE: 61 MMHG | TEMPERATURE: 98.1 F | OXYGEN SATURATION: 96 %

## 2018-09-10 LAB — BKV DNA SPEC QL NAA+PROBE: NORMAL

## 2018-09-10 PROCEDURE — 99215 OFFICE O/P EST HI 40 MIN: CPT

## 2018-09-11 LAB
ALBUMIN SERPL ELPH-MCNC: 4.6 G/DL
ALP BLD-CCNC: 64 U/L
ALT SERPL-CCNC: 7 U/L
ANION GAP SERPL CALC-SCNC: 11 MMOL/L
APPEARANCE: CLEAR
AST SERPL-CCNC: 12 U/L
BACTERIA: NEGATIVE
BASOPHILS # BLD AUTO: 0 K/UL
BASOPHILS NFR BLD AUTO: 0 %
BILIRUB SERPL-MCNC: 0.2 MG/DL
BILIRUBIN URINE: NEGATIVE
BKV DNA SPEC QL NAA+PROBE: NORMAL
BLOOD URINE: NEGATIVE
BUN SERPL-MCNC: 29 MG/DL
CALCIUM SERPL-MCNC: 10.6 MG/DL
CHLORIDE SERPL-SCNC: 107 MMOL/L
CO2 SERPL-SCNC: 19 MMOL/L
COLOR: YELLOW
CREAT SERPL-MCNC: 2.39 MG/DL
CREAT SPEC-SCNC: 102 MG/DL
CREAT/PROT UR: 0.2 RATIO
EOSINOPHIL # BLD AUTO: 0.19 K/UL
EOSINOPHIL NFR BLD AUTO: 2.9 %
GLUCOSE QUALITATIVE U: NEGATIVE MG/DL
GLUCOSE SERPL-MCNC: 104 MG/DL
HCT VFR BLD CALC: 37 %
HGB BLD-MCNC: 11.8 G/DL
HYALINE CASTS: 0 /LPF
IMM GRANULOCYTES NFR BLD AUTO: 0.2 %
KETONES URINE: NEGATIVE
LDH SERPL-CCNC: 153 U/L
LEUKOCYTE ESTERASE URINE: NEGATIVE
LYMPHOCYTES # BLD AUTO: 1.33 K/UL
LYMPHOCYTES NFR BLD AUTO: 20.6 %
MAGNESIUM SERPL-MCNC: 1.4 MG/DL
MAN DIFF?: NORMAL
MCHC RBC-ENTMCNC: 27.3 PG
MCHC RBC-ENTMCNC: 31.9 GM/DL
MCV RBC AUTO: 85.5 FL
MICROSCOPIC-UA: NORMAL
MONOCYTES # BLD AUTO: 0.53 K/UL
MONOCYTES NFR BLD AUTO: 8.2 %
NEUTROPHILS # BLD AUTO: 4.41 K/UL
NEUTROPHILS NFR BLD AUTO: 68.1 %
NITRITE URINE: NEGATIVE
PH URINE: 6
PHOSPHATE SERPL-MCNC: 3.2 MG/DL
PLATELET # BLD AUTO: 187 K/UL
POTASSIUM SERPL-SCNC: 4.6 MMOL/L
PROT SERPL-MCNC: 7.2 G/DL
PROT UR-MCNC: 15 MG/DL
PROTEIN URINE: ABNORMAL MG/DL
RBC # BLD: 4.33 M/UL
RBC # FLD: 15 %
RED BLOOD CELLS URINE: 2 /HPF
SODIUM SERPL-SCNC: 137 MMOL/L
SPECIFIC GRAVITY URINE: 1.01
SQUAMOUS EPITHELIAL CELLS: 1 /HPF
TACROLIMUS SERPL-MCNC: 9.4 NG/ML
URATE SERPL-MCNC: 7.2 MG/DL
UROBILINOGEN URINE: NEGATIVE MG/DL
WBC # FLD AUTO: 6.47 K/UL
WHITE BLOOD CELLS URINE: 13 /HPF

## 2018-09-12 ENCOUNTER — FORM ENCOUNTER (OUTPATIENT)
Age: 33
End: 2018-09-12

## 2018-09-13 ENCOUNTER — APPOINTMENT (OUTPATIENT)
Dept: ULTRASOUND IMAGING | Facility: HOSPITAL | Age: 33
End: 2018-09-13

## 2018-09-13 ENCOUNTER — OUTPATIENT (OUTPATIENT)
Dept: OUTPATIENT SERVICES | Facility: HOSPITAL | Age: 33
LOS: 1 days | End: 2018-09-13
Payer: MEDICARE

## 2018-09-13 ENCOUNTER — RESULT REVIEW (OUTPATIENT)
Age: 33
End: 2018-09-13

## 2018-09-13 ENCOUNTER — APPOINTMENT (OUTPATIENT)
Dept: TRANSPLANT | Facility: CLINIC | Age: 33
End: 2018-09-13

## 2018-09-13 DIAGNOSIS — Z94.0 KIDNEY TRANSPLANT STATUS: ICD-10-CM

## 2018-09-13 DIAGNOSIS — Z43.6 ENCOUNTER FOR ATTENTION TO OTHER ARTIFICIAL OPENINGS OF URINARY TRACT: ICD-10-CM

## 2018-09-13 LAB
APTT BLD: 36.3 SEC — SIGNIFICANT CHANGE UP (ref 27.5–37.4)
INR BLD: 1.08 RATIO — SIGNIFICANT CHANGE UP (ref 0.88–1.16)
PROTHROM AB SERPL-ACNC: 11.8 SEC — SIGNIFICANT CHANGE UP (ref 9.8–12.7)

## 2018-09-13 PROCEDURE — 88342 IMHCHEM/IMCYTCHM 1ST ANTB: CPT

## 2018-09-13 PROCEDURE — 88305 TISSUE EXAM BY PATHOLOGIST: CPT

## 2018-09-13 PROCEDURE — 88313 SPECIAL STAINS GROUP 2: CPT | Mod: 26

## 2018-09-13 PROCEDURE — 88350 IMFLUOR EA ADDL 1ANTB STN PX: CPT | Mod: 26

## 2018-09-13 PROCEDURE — 88342 IMHCHEM/IMCYTCHM 1ST ANTB: CPT | Mod: 26

## 2018-09-13 PROCEDURE — 88312 SPECIAL STAINS GROUP 1: CPT | Mod: 26

## 2018-09-13 PROCEDURE — 76705 ECHO EXAM OF ABDOMEN: CPT

## 2018-09-13 PROCEDURE — 76705 ECHO EXAM OF ABDOMEN: CPT | Mod: 26

## 2018-09-13 PROCEDURE — 88313 SPECIAL STAINS GROUP 2: CPT

## 2018-09-13 PROCEDURE — 88305 TISSUE EXAM BY PATHOLOGIST: CPT | Mod: 26

## 2018-09-13 PROCEDURE — 50200 RENAL BIOPSY PERQ: CPT

## 2018-09-13 PROCEDURE — 88348 ELECTRON MICROSCOPY DX: CPT | Mod: 26

## 2018-09-13 PROCEDURE — 85730 THROMBOPLASTIN TIME PARTIAL: CPT

## 2018-09-13 PROCEDURE — 88346 IMFLUOR 1ST 1ANTB STAIN PX: CPT

## 2018-09-13 PROCEDURE — 88346 IMFLUOR 1ST 1ANTB STAIN PX: CPT | Mod: 26

## 2018-09-13 PROCEDURE — 88312 SPECIAL STAINS GROUP 1: CPT

## 2018-09-13 PROCEDURE — 85610 PROTHROMBIN TIME: CPT

## 2018-09-13 PROCEDURE — 88350 IMFLUOR EA ADDL 1ANTB STN PX: CPT

## 2018-09-13 PROCEDURE — 88348 ELECTRON MICROSCOPY DX: CPT

## 2018-09-13 PROCEDURE — 76942 ECHO GUIDE FOR BIOPSY: CPT

## 2018-09-14 LAB
ALBUMIN SERPL ELPH-MCNC: 4.5 G/DL
ALP BLD-CCNC: 64 U/L
ALT SERPL-CCNC: 10 U/L
ANION GAP SERPL CALC-SCNC: 13 MMOL/L
APPEARANCE: CLEAR
AST SERPL-CCNC: 12 U/L
BACTERIA: NEGATIVE
BASOPHILS # BLD AUTO: 0.01 K/UL
BASOPHILS NFR BLD AUTO: 0.1 %
BILIRUB SERPL-MCNC: 0.2 MG/DL
BILIRUBIN URINE: NEGATIVE
BLOOD URINE: ABNORMAL
BUN SERPL-MCNC: 25 MG/DL
CALCIUM SERPL-MCNC: 10.8 MG/DL
CHLORIDE SERPL-SCNC: 107 MMOL/L
CMV DNA SPEC QL NAA+PROBE: NOT DETECTED IU/ML
CO2 SERPL-SCNC: 18 MMOL/L
COLOR: YELLOW
CREAT SERPL-MCNC: 2.38 MG/DL
CREAT SPEC-SCNC: 105 MG/DL
CREAT/PROT UR: 0.2 RATIO
EOSINOPHIL # BLD AUTO: 0.22 K/UL
EOSINOPHIL NFR BLD AUTO: 3.1 %
GLUCOSE QUALITATIVE U: NEGATIVE MG/DL
GLUCOSE SERPL-MCNC: 99 MG/DL
HCT VFR BLD CALC: 36.6 %
HGB BLD-MCNC: 11.5 G/DL
HYALINE CASTS: 1 /LPF
IMM GRANULOCYTES NFR BLD AUTO: 0.1 %
KETONES URINE: NEGATIVE
LDH SERPL-CCNC: 170 U/L
LEUKOCYTE ESTERASE URINE: NEGATIVE
LYMPHOCYTES # BLD AUTO: 1.92 K/UL
LYMPHOCYTES NFR BLD AUTO: 27.3 %
MAGNESIUM SERPL-MCNC: 1.4 MG/DL
MAN DIFF?: NORMAL
MCHC RBC-ENTMCNC: 26.8 PG
MCHC RBC-ENTMCNC: 31.4 GM/DL
MCV RBC AUTO: 85.3 FL
MICROSCOPIC-UA: NORMAL
MONOCYTES # BLD AUTO: 0.65 K/UL
MONOCYTES NFR BLD AUTO: 9.2 %
NEUTROPHILS # BLD AUTO: 4.22 K/UL
NEUTROPHILS NFR BLD AUTO: 60.2 %
NITRITE URINE: NEGATIVE
PH URINE: 5.5
PHOSPHATE SERPL-MCNC: 3.4 MG/DL
PLATELET # BLD AUTO: 206 K/UL
POTASSIUM SERPL-SCNC: 5.1 MMOL/L
PROT SERPL-MCNC: 7.1 G/DL
PROT UR-MCNC: 18 MG/DL
PROTEIN URINE: NEGATIVE MG/DL
RBC # BLD: 4.29 M/UL
RBC # FLD: 14.7 %
RED BLOOD CELLS URINE: 2 /HPF
SODIUM SERPL-SCNC: 138 MMOL/L
SPECIFIC GRAVITY URINE: 1.01
SQUAMOUS EPITHELIAL CELLS: 2 /HPF
TACROLIMUS SERPL-MCNC: 11.7 NG/ML
UROBILINOGEN URINE: NEGATIVE MG/DL
WBC # FLD AUTO: 7.03 K/UL
WHITE BLOOD CELLS URINE: 18 /HPF

## 2018-09-18 ENCOUNTER — OUTPATIENT (OUTPATIENT)
Dept: OUTPATIENT SERVICES | Facility: HOSPITAL | Age: 33
LOS: 1 days | End: 2018-09-18
Payer: MEDICARE

## 2018-09-18 VITALS
TEMPERATURE: 98 F | WEIGHT: 151.02 LBS | HEART RATE: 68 BPM | OXYGEN SATURATION: 100 % | HEIGHT: 66 IN | DIASTOLIC BLOOD PRESSURE: 84 MMHG | RESPIRATION RATE: 20 BRPM | SYSTOLIC BLOOD PRESSURE: 136 MMHG

## 2018-09-18 DIAGNOSIS — Z98.890 OTHER SPECIFIED POSTPROCEDURAL STATES: Chronic | ICD-10-CM

## 2018-09-18 DIAGNOSIS — Z93.6 OTHER ARTIFICIAL OPENINGS OF URINARY TRACT STATUS: ICD-10-CM

## 2018-09-18 DIAGNOSIS — S72.451A DISPLACED SUPRACONDYLAR FRACTURE WITHOUT INTRACONDYLAR EXTENSION OF LOWER END OF RIGHT FEMUR, INITIAL ENCOUNTER FOR CLOSED FRACTURE: ICD-10-CM

## 2018-09-18 DIAGNOSIS — I10 ESSENTIAL (PRIMARY) HYPERTENSION: ICD-10-CM

## 2018-09-18 DIAGNOSIS — Z94.0 KIDNEY TRANSPLANT STATUS: Chronic | ICD-10-CM

## 2018-09-18 DIAGNOSIS — Z01.818 ENCOUNTER FOR OTHER PREPROCEDURAL EXAMINATION: ICD-10-CM

## 2018-09-18 DIAGNOSIS — Z94.0 KIDNEY TRANSPLANT STATUS: ICD-10-CM

## 2018-09-18 DIAGNOSIS — I77.0 ARTERIOVENOUS FISTULA, ACQUIRED: Chronic | ICD-10-CM

## 2018-09-18 PROCEDURE — G0463: CPT

## 2018-09-18 RX ORDER — CEFAZOLIN SODIUM 1 G
2000 VIAL (EA) INJECTION ONCE
Qty: 0 | Refills: 0 | Status: DISCONTINUED | OUTPATIENT
Start: 2018-09-24 | End: 2018-09-25

## 2018-09-18 RX ORDER — SODIUM CHLORIDE 9 MG/ML
3 INJECTION INTRAMUSCULAR; INTRAVENOUS; SUBCUTANEOUS EVERY 8 HOURS
Qty: 0 | Refills: 0 | Status: DISCONTINUED | OUTPATIENT
Start: 2018-09-24 | End: 2018-09-25

## 2018-09-18 NOTE — H&P PST ADULT - NSANTHOSAYNRD_GEN_A_CORE
No. ALDO screening performed.  STOP BANG Legend: 0-2 = LOW Risk; 3-4 = INTERMEDIATE Risk; 5-8 = HIGH Risk

## 2018-09-18 NOTE — H&P PST ADULT - PSH
H/O arthroscopy of right knee    H/O kidney transplant  7/23/2018 AV fistula  left arm fistula  H/O arthroscopy of right knee    H/O kidney transplant  7/23/2018

## 2018-09-18 NOTE — H&P PST ADULT - PMH
ESRD on dialysis    HTN (hypertension) ESRD on dialysis  s/p cadaver kidney transplant recipient 7/23/2018  H/O kidney transplant  double JJ stent in place  HTN (hypertension)    Nephrostomy status  capped nephrostomy tube in place

## 2018-09-18 NOTE — H&P PST ADULT - PROBLEM SELECTOR PLAN 1
cystoscopy, double JJ stent removal   patient on cipro 500 mg orally twice a day for urinary infection started on 9/15/2018 for 14 days.  instructed to continue antirejection medication gissell-op

## 2018-09-18 NOTE — H&P PST ADULT - HISTORY OF PRESENT ILLNESS
33 year old male with h/o kidney transplant recipient July 23, 2018, is being seen for scheduled cystoscopy/ double J stent removal on 9/24/2018. Patient had h/o ESRD on HD x 9 years, had cadaver kidney transplant in July which was complicated by urinary leak requiring nephrostomy tube (capped at present) and JJ stent placement.

## 2018-09-19 ENCOUNTER — APPOINTMENT (OUTPATIENT)
Dept: TRANSPLANT | Facility: CLINIC | Age: 33
End: 2018-09-19

## 2018-09-19 LAB — BKV DNA SPEC QL NAA+PROBE: NORMAL

## 2018-09-20 ENCOUNTER — APPOINTMENT (OUTPATIENT)
Dept: TRANSPLANT | Facility: CLINIC | Age: 33
End: 2018-09-20

## 2018-09-20 PROBLEM — Z94.0 KIDNEY TRANSPLANT STATUS: Chronic | Status: ACTIVE | Noted: 2018-09-18

## 2018-09-20 PROBLEM — Z93.6 OTHER ARTIFICIAL OPENINGS OF URINARY TRACT STATUS: Chronic | Status: ACTIVE | Noted: 2018-09-18

## 2018-09-21 ENCOUNTER — APPOINTMENT (OUTPATIENT)
Dept: TRANSPLANT | Facility: CLINIC | Age: 33
End: 2018-09-21

## 2018-09-21 LAB
ALBUMIN SERPL ELPH-MCNC: 4.5 G/DL
ALP BLD-CCNC: 62 U/L
ALT SERPL-CCNC: 5 U/L
ANION GAP SERPL CALC-SCNC: 14 MMOL/L
APPEARANCE: CLEAR
AST SERPL-CCNC: 10 U/L
BACTERIA: NEGATIVE
BASOPHILS # BLD AUTO: 0.01 K/UL
BASOPHILS NFR BLD AUTO: 0.1 %
BILIRUB SERPL-MCNC: 0.2 MG/DL
BILIRUBIN URINE: NEGATIVE
BLOOD URINE: NEGATIVE
BUN SERPL-MCNC: 29 MG/DL
CALCIUM SERPL-MCNC: 10.5 MG/DL
CHLORIDE SERPL-SCNC: 107 MMOL/L
CO2 SERPL-SCNC: 18 MMOL/L
COLOR: YELLOW
CREAT SERPL-MCNC: 2.67 MG/DL
CREAT SPEC-SCNC: 62 MG/DL
CREAT/PROT UR: 0.1 RATIO
EOSINOPHIL # BLD AUTO: 0.22 K/UL
EOSINOPHIL NFR BLD AUTO: 2.7 %
GLUCOSE QUALITATIVE U: NEGATIVE MG/DL
GLUCOSE SERPL-MCNC: 91 MG/DL
HCT VFR BLD CALC: 36.2 %
HGB BLD-MCNC: 11.1 G/DL
HYALINE CASTS: 0 /LPF
IMM GRANULOCYTES NFR BLD AUTO: 0.2 %
KETONES URINE: NEGATIVE
LDH SERPL-CCNC: 187 U/L
LEUKOCYTE ESTERASE URINE: ABNORMAL
LYMPHOCYTES # BLD AUTO: 2.16 K/UL
LYMPHOCYTES NFR BLD AUTO: 26.7 %
MAGNESIUM SERPL-MCNC: 1.5 MG/DL
MAN DIFF?: NORMAL
MCHC RBC-ENTMCNC: 26.4 PG
MCHC RBC-ENTMCNC: 30.7 GM/DL
MCV RBC AUTO: 86 FL
MICROSCOPIC-UA: NORMAL
MONOCYTES # BLD AUTO: 0.6 K/UL
MONOCYTES NFR BLD AUTO: 7.4 %
NEUTROPHILS # BLD AUTO: 5.09 K/UL
NEUTROPHILS NFR BLD AUTO: 62.9 %
NITRITE URINE: NEGATIVE
PH URINE: 5.5
PHOSPHATE SERPL-MCNC: 4.3 MG/DL
PLATELET # BLD AUTO: 235 K/UL
POTASSIUM SERPL-SCNC: 4.9 MMOL/L
PROT SERPL-MCNC: 6.6 G/DL
PROT UR-MCNC: 8 MG/DL
PROTEIN URINE: NEGATIVE MG/DL
RBC # BLD: 4.21 M/UL
RBC # FLD: 14.6 %
RED BLOOD CELLS URINE: 1 /HPF
SODIUM SERPL-SCNC: 139 MMOL/L
SPECIFIC GRAVITY URINE: 1.01
SQUAMOUS EPITHELIAL CELLS: 1 /HPF
TACROLIMUS SERPL-MCNC: 9.3 NG/ML
URATE SERPL-MCNC: 7.3 MG/DL
UROBILINOGEN URINE: NEGATIVE MG/DL
WBC # FLD AUTO: 8.1 K/UL
WHITE BLOOD CELLS URINE: 24 /HPF

## 2018-09-24 ENCOUNTER — APPOINTMENT (OUTPATIENT)
Dept: TRANSPLANT | Facility: CLINIC | Age: 33
End: 2018-09-24

## 2018-09-24 ENCOUNTER — INPATIENT (INPATIENT)
Facility: HOSPITAL | Age: 33
LOS: 2 days | Discharge: HOME | DRG: 699 | End: 2018-09-27
Attending: TRANSPLANT SURGERY | Admitting: TRANSPLANT SURGERY
Payer: MEDICARE

## 2018-09-24 ENCOUNTER — TRANSCRIPTION ENCOUNTER (OUTPATIENT)
Age: 33
End: 2018-09-24

## 2018-09-24 VITALS
HEIGHT: 66 IN | HEART RATE: 79 BPM | SYSTOLIC BLOOD PRESSURE: 125 MMHG | TEMPERATURE: 98 F | OXYGEN SATURATION: 99 % | WEIGHT: 153 LBS | RESPIRATION RATE: 18 BRPM | DIASTOLIC BLOOD PRESSURE: 84 MMHG

## 2018-09-24 DIAGNOSIS — Z01.818 ENCOUNTER FOR OTHER PREPROCEDURAL EXAMINATION: ICD-10-CM

## 2018-09-24 DIAGNOSIS — I77.0 ARTERIOVENOUS FISTULA, ACQUIRED: Chronic | ICD-10-CM

## 2018-09-24 DIAGNOSIS — Z98.890 OTHER SPECIFIED POSTPROCEDURAL STATES: Chronic | ICD-10-CM

## 2018-09-24 DIAGNOSIS — Z94.0 KIDNEY TRANSPLANT STATUS: Chronic | ICD-10-CM

## 2018-09-24 DIAGNOSIS — S72.451A DISPLACED SUPRACONDYLAR FRACTURE WITHOUT INTRACONDYLAR EXTENSION OF LOWER END OF RIGHT FEMUR, INITIAL ENCOUNTER FOR CLOSED FRACTURE: ICD-10-CM

## 2018-09-24 PROCEDURE — 99222 1ST HOSP IP/OBS MODERATE 55: CPT | Mod: GC,24

## 2018-09-24 RX ORDER — SODIUM CHLORIDE 9 MG/ML
500 INJECTION INTRAMUSCULAR; INTRAVENOUS; SUBCUTANEOUS ONCE
Qty: 0 | Refills: 0 | Status: COMPLETED | OUTPATIENT
Start: 2018-09-24 | End: 2018-09-24

## 2018-09-24 RX ORDER — TAMSULOSIN HYDROCHLORIDE 0.4 MG/1
0.4 CAPSULE ORAL AT BEDTIME
Qty: 0 | Refills: 0 | Status: DISCONTINUED | OUTPATIENT
Start: 2018-09-24 | End: 2018-09-25

## 2018-09-24 RX ORDER — SODIUM CHLORIDE 9 MG/ML
1000 INJECTION INTRAMUSCULAR; INTRAVENOUS; SUBCUTANEOUS
Qty: 0 | Refills: 0 | Status: DISCONTINUED | OUTPATIENT
Start: 2018-09-25 | End: 2018-09-25

## 2018-09-24 RX ORDER — OXYBUTYNIN CHLORIDE 5 MG
5 TABLET ORAL EVERY 12 HOURS
Qty: 0 | Refills: 0 | Status: DISCONTINUED | OUTPATIENT
Start: 2018-09-24 | End: 2018-09-25

## 2018-09-24 RX ORDER — CARVEDILOL PHOSPHATE 80 MG/1
12.5 CAPSULE, EXTENDED RELEASE ORAL EVERY 12 HOURS
Qty: 0 | Refills: 0 | Status: DISCONTINUED | OUTPATIENT
Start: 2018-09-24 | End: 2018-09-25

## 2018-09-24 RX ORDER — TACROLIMUS 5 MG/1
5 CAPSULE ORAL
Qty: 0 | Refills: 0 | Status: DISCONTINUED | OUTPATIENT
Start: 2018-09-24 | End: 2018-09-25

## 2018-09-24 RX ORDER — MYCOPHENOLATE MOFETIL 250 MG/1
1000 CAPSULE ORAL
Qty: 0 | Refills: 0 | Status: DISCONTINUED | OUTPATIENT
Start: 2018-09-24 | End: 2018-09-25

## 2018-09-24 RX ORDER — NIFEDIPINE 30 MG
30 TABLET, EXTENDED RELEASE 24 HR ORAL ONCE
Qty: 0 | Refills: 0 | Status: COMPLETED | OUTPATIENT
Start: 2018-09-24 | End: 2018-09-24

## 2018-09-24 RX ORDER — FAMOTIDINE 10 MG/ML
20 INJECTION INTRAVENOUS DAILY
Qty: 0 | Refills: 0 | Status: DISCONTINUED | OUTPATIENT
Start: 2018-09-24 | End: 2018-09-25

## 2018-09-24 RX ORDER — DOCUSATE SODIUM 100 MG
100 CAPSULE ORAL THREE TIMES A DAY
Qty: 0 | Refills: 0 | Status: DISCONTINUED | OUTPATIENT
Start: 2018-09-24 | End: 2018-09-25

## 2018-09-24 RX ORDER — SENNA PLUS 8.6 MG/1
2 TABLET ORAL AT BEDTIME
Qty: 0 | Refills: 0 | Status: DISCONTINUED | OUTPATIENT
Start: 2018-09-24 | End: 2018-09-25

## 2018-09-24 RX ORDER — NYSTATIN 500MM UNIT
100000 POWDER (EA) MISCELLANEOUS EVERY 6 HOURS
Qty: 0 | Refills: 0 | Status: DISCONTINUED | OUTPATIENT
Start: 2018-09-24 | End: 2018-09-25

## 2018-09-24 RX ORDER — NIFEDIPINE 30 MG
30 TABLET, EXTENDED RELEASE 24 HR ORAL
Qty: 0 | Refills: 0 | Status: DISCONTINUED | OUTPATIENT
Start: 2018-09-24 | End: 2018-09-25

## 2018-09-24 RX ORDER — LIDOCAINE HCL 20 MG/ML
0.2 VIAL (ML) INJECTION ONCE
Qty: 0 | Refills: 0 | Status: DISCONTINUED | OUTPATIENT
Start: 2018-09-24 | End: 2018-09-25

## 2018-09-24 RX ORDER — SODIUM,POTASSIUM PHOSPHATES 278-250MG
1 POWDER IN PACKET (EA) ORAL EVERY 12 HOURS
Qty: 0 | Refills: 0 | Status: DISCONTINUED | OUTPATIENT
Start: 2018-09-24 | End: 2018-09-25

## 2018-09-24 RX ADMIN — Medication 5 MILLIGRAM(S): at 23:57

## 2018-09-24 RX ADMIN — Medication 30 MILLIGRAM(S): at 23:24

## 2018-09-24 RX ADMIN — SODIUM CHLORIDE 500 MILLILITER(S): 9 INJECTION INTRAMUSCULAR; INTRAVENOUS; SUBCUTANEOUS at 21:09

## 2018-09-24 RX ADMIN — Medication 100000 UNIT(S): at 23:58

## 2018-09-24 RX ADMIN — CARVEDILOL PHOSPHATE 12.5 MILLIGRAM(S): 80 CAPSULE, EXTENDED RELEASE ORAL at 22:17

## 2018-09-24 RX ADMIN — TAMSULOSIN HYDROCHLORIDE 0.4 MILLIGRAM(S): 0.4 CAPSULE ORAL at 22:17

## 2018-09-24 RX ADMIN — FAMOTIDINE 20 MILLIGRAM(S): 10 INJECTION INTRAVENOUS at 22:17

## 2018-09-24 RX ADMIN — SODIUM CHLORIDE 3 MILLILITER(S): 9 INJECTION INTRAMUSCULAR; INTRAVENOUS; SUBCUTANEOUS at 23:28

## 2018-09-24 RX ADMIN — Medication 5 MILLIGRAM(S): at 23:24

## 2018-09-25 DIAGNOSIS — N12 TUBULO-INTERSTITIAL NEPHRITIS, NOT SPECIFIED AS ACUTE OR CHRONIC: ICD-10-CM

## 2018-09-25 DIAGNOSIS — I10 ESSENTIAL (PRIMARY) HYPERTENSION: ICD-10-CM

## 2018-09-25 DIAGNOSIS — Z94.0 KIDNEY TRANSPLANT STATUS: ICD-10-CM

## 2018-09-25 DIAGNOSIS — D89.9 DISORDER INVOLVING THE IMMUNE MECHANISM, UNSPECIFIED: ICD-10-CM

## 2018-09-25 LAB
ANION GAP SERPL CALC-SCNC: 9 MMOL/L — SIGNIFICANT CHANGE UP (ref 5–17)
APTT BLD: 34.2 SEC — SIGNIFICANT CHANGE UP (ref 27.5–37.4)
BUN SERPL-MCNC: 33 MG/DL — HIGH (ref 7–23)
CALCIUM SERPL-MCNC: 10.9 MG/DL — HIGH (ref 8.4–10.5)
CHLORIDE SERPL-SCNC: 112 MMOL/L — HIGH (ref 96–108)
CO2 SERPL-SCNC: 17 MMOL/L — LOW (ref 22–31)
CREAT SERPL-MCNC: 2.7 MG/DL — HIGH (ref 0.5–1.3)
GLUCOSE SERPL-MCNC: 100 MG/DL — HIGH (ref 70–99)
HCT VFR BLD CALC: 39.2 % — SIGNIFICANT CHANGE UP (ref 39–50)
HGB BLD-MCNC: 12.5 G/DL — LOW (ref 13–17)
INR BLD: 1.13 RATIO — SIGNIFICANT CHANGE UP (ref 0.88–1.16)
MCHC RBC-ENTMCNC: 26.9 PG — LOW (ref 27–34)
MCHC RBC-ENTMCNC: 31.9 GM/DL — LOW (ref 32–36)
MCV RBC AUTO: 84.4 FL — SIGNIFICANT CHANGE UP (ref 80–100)
PLATELET # BLD AUTO: 211 K/UL — SIGNIFICANT CHANGE UP (ref 150–400)
POTASSIUM SERPL-MCNC: 5.9 MMOL/L — HIGH (ref 3.5–5.3)
POTASSIUM SERPL-SCNC: 5.9 MMOL/L — HIGH (ref 3.5–5.3)
PROTHROM AB SERPL-ACNC: 12.3 SEC — SIGNIFICANT CHANGE UP (ref 9.8–12.7)
RBC # BLD: 4.65 M/UL — SIGNIFICANT CHANGE UP (ref 4.2–5.8)
RBC # FLD: 13.6 % — SIGNIFICANT CHANGE UP (ref 10.3–14.5)
SODIUM SERPL-SCNC: 138 MMOL/L — SIGNIFICANT CHANGE UP (ref 135–145)
TACROLIMUS SERPL-MCNC: 31.4 NG/ML — SIGNIFICANT CHANGE UP
WBC # BLD: 6.4 K/UL — SIGNIFICANT CHANGE UP (ref 3.8–10.5)
WBC # FLD AUTO: 6.4 K/UL — SIGNIFICANT CHANGE UP (ref 3.8–10.5)

## 2018-09-25 PROCEDURE — 99223 1ST HOSP IP/OBS HIGH 75: CPT | Mod: GC

## 2018-09-25 PROCEDURE — 99232 SBSQ HOSP IP/OBS MODERATE 35: CPT | Mod: GC,24

## 2018-09-25 RX ORDER — DOCUSATE SODIUM 100 MG
100 CAPSULE ORAL THREE TIMES A DAY
Qty: 0 | Refills: 0 | Status: DISCONTINUED | OUTPATIENT
Start: 2018-09-25 | End: 2018-09-27

## 2018-09-25 RX ORDER — SODIUM CHLORIDE 9 MG/ML
1000 INJECTION INTRAMUSCULAR; INTRAVENOUS; SUBCUTANEOUS
Qty: 0 | Refills: 0 | Status: DISCONTINUED | OUTPATIENT
Start: 2018-09-26 | End: 2018-09-26

## 2018-09-25 RX ORDER — FAMOTIDINE 10 MG/ML
20 INJECTION INTRAVENOUS DAILY
Qty: 0 | Refills: 0 | Status: DISCONTINUED | OUTPATIENT
Start: 2018-09-25 | End: 2018-09-27

## 2018-09-25 RX ORDER — AZTREONAM 2 G
1000 VIAL (EA) INJECTION EVERY 12 HOURS
Qty: 0 | Refills: 0 | Status: DISCONTINUED | OUTPATIENT
Start: 2018-09-25 | End: 2018-09-25

## 2018-09-25 RX ORDER — DEXTROSE 50 % IN WATER 50 %
50 SYRINGE (ML) INTRAVENOUS ONCE
Qty: 0 | Refills: 0 | Status: COMPLETED | OUTPATIENT
Start: 2018-09-25 | End: 2018-09-25

## 2018-09-25 RX ORDER — OXYBUTYNIN CHLORIDE 5 MG
5 TABLET ORAL EVERY 12 HOURS
Qty: 0 | Refills: 0 | Status: DISCONTINUED | OUTPATIENT
Start: 2018-09-25 | End: 2018-09-27

## 2018-09-25 RX ORDER — TACROLIMUS 5 MG/1
5 CAPSULE ORAL
Qty: 0 | Refills: 0 | Status: DISCONTINUED | OUTPATIENT
Start: 2018-09-25 | End: 2018-09-27

## 2018-09-25 RX ORDER — CALCIUM GLUCONATE 100 MG/ML
1 VIAL (ML) INTRAVENOUS ONCE
Qty: 0 | Refills: 0 | Status: COMPLETED | OUTPATIENT
Start: 2018-09-25 | End: 2018-09-25

## 2018-09-25 RX ORDER — MYCOPHENOLATE MOFETIL 250 MG/1
1000 CAPSULE ORAL
Qty: 0 | Refills: 0 | Status: DISCONTINUED | OUTPATIENT
Start: 2018-09-25 | End: 2018-09-27

## 2018-09-25 RX ORDER — AZTREONAM 2 G
1000 VIAL (EA) INJECTION EVERY 12 HOURS
Qty: 0 | Refills: 0 | Status: DISCONTINUED | OUTPATIENT
Start: 2018-09-25 | End: 2018-09-27

## 2018-09-25 RX ORDER — CEFEPIME 1 G/1
2000 INJECTION, POWDER, FOR SOLUTION INTRAMUSCULAR; INTRAVENOUS EVERY 12 HOURS
Qty: 0 | Refills: 0 | Status: DISCONTINUED | OUTPATIENT
Start: 2018-09-25 | End: 2018-09-27

## 2018-09-25 RX ORDER — NYSTATIN 500MM UNIT
500000 POWDER (EA) MISCELLANEOUS EVERY 6 HOURS
Qty: 0 | Refills: 0 | Status: DISCONTINUED | OUTPATIENT
Start: 2018-09-25 | End: 2018-09-25

## 2018-09-25 RX ORDER — TAMSULOSIN HYDROCHLORIDE 0.4 MG/1
0.4 CAPSULE ORAL AT BEDTIME
Qty: 0 | Refills: 0 | Status: DISCONTINUED | OUTPATIENT
Start: 2018-09-25 | End: 2018-09-27

## 2018-09-25 RX ORDER — INSULIN HUMAN 100 [IU]/ML
10 INJECTION, SOLUTION SUBCUTANEOUS ONCE
Qty: 0 | Refills: 0 | Status: COMPLETED | OUTPATIENT
Start: 2018-09-25 | End: 2018-09-25

## 2018-09-25 RX ORDER — CEFEPIME 1 G/1
2000 INJECTION, POWDER, FOR SOLUTION INTRAMUSCULAR; INTRAVENOUS EVERY 12 HOURS
Qty: 0 | Refills: 0 | Status: DISCONTINUED | OUTPATIENT
Start: 2018-09-25 | End: 2018-09-25

## 2018-09-25 RX ORDER — CARVEDILOL PHOSPHATE 80 MG/1
12.5 CAPSULE, EXTENDED RELEASE ORAL EVERY 12 HOURS
Qty: 0 | Refills: 0 | Status: DISCONTINUED | OUTPATIENT
Start: 2018-09-25 | End: 2018-09-27

## 2018-09-25 RX ORDER — VALGANCICLOVIR 450 MG/1
450 TABLET, FILM COATED ORAL
Qty: 0 | Refills: 0 | Status: DISCONTINUED | OUTPATIENT
Start: 2018-09-25 | End: 2018-09-25

## 2018-09-25 RX ORDER — NIFEDIPINE 30 MG
30 TABLET, EXTENDED RELEASE 24 HR ORAL
Qty: 0 | Refills: 0 | Status: DISCONTINUED | OUTPATIENT
Start: 2018-09-25 | End: 2018-09-27

## 2018-09-25 RX ORDER — VALGANCICLOVIR 450 MG/1
450 TABLET, FILM COATED ORAL
Qty: 0 | Refills: 0 | Status: DISCONTINUED | OUTPATIENT
Start: 2018-09-25 | End: 2018-09-27

## 2018-09-25 RX ORDER — NYSTATIN 500MM UNIT
500000 POWDER (EA) MISCELLANEOUS EVERY 6 HOURS
Qty: 0 | Refills: 0 | Status: DISCONTINUED | OUTPATIENT
Start: 2018-09-25 | End: 2018-09-27

## 2018-09-25 RX ORDER — SENNA PLUS 8.6 MG/1
2 TABLET ORAL AT BEDTIME
Qty: 0 | Refills: 0 | Status: DISCONTINUED | OUTPATIENT
Start: 2018-09-25 | End: 2018-09-27

## 2018-09-25 RX ADMIN — SENNA PLUS 2 TABLET(S): 8.6 TABLET ORAL at 21:08

## 2018-09-25 RX ADMIN — Medication 30 MILLIGRAM(S): at 21:08

## 2018-09-25 RX ADMIN — Medication 5 MILLIGRAM(S): at 21:22

## 2018-09-25 RX ADMIN — CEFEPIME 100 MILLIGRAM(S): 1 INJECTION, POWDER, FOR SOLUTION INTRAMUSCULAR; INTRAVENOUS at 11:58

## 2018-09-25 RX ADMIN — MYCOPHENOLATE MOFETIL 1000 MILLIGRAM(S): 250 CAPSULE ORAL at 06:35

## 2018-09-25 RX ADMIN — CARVEDILOL PHOSPHATE 12.5 MILLIGRAM(S): 80 CAPSULE, EXTENDED RELEASE ORAL at 06:16

## 2018-09-25 RX ADMIN — SODIUM CHLORIDE 3 MILLILITER(S): 9 INJECTION INTRAMUSCULAR; INTRAVENOUS; SUBCUTANEOUS at 06:39

## 2018-09-25 RX ADMIN — Medication 1 TABLET(S): at 14:02

## 2018-09-25 RX ADMIN — TACROLIMUS 5 MILLIGRAM(S): 5 CAPSULE ORAL at 18:04

## 2018-09-25 RX ADMIN — Medication 5 MILLIGRAM(S): at 06:17

## 2018-09-25 RX ADMIN — Medication 5 MILLIGRAM(S): at 06:16

## 2018-09-25 RX ADMIN — Medication 200 GRAM(S): at 14:01

## 2018-09-25 RX ADMIN — SODIUM CHLORIDE 3 MILLILITER(S): 9 INJECTION INTRAMUSCULAR; INTRAVENOUS; SUBCUTANEOUS at 13:28

## 2018-09-25 RX ADMIN — Medication 500000 UNIT(S): at 11:58

## 2018-09-25 RX ADMIN — Medication 50 MILLILITER(S): at 13:57

## 2018-09-25 RX ADMIN — MYCOPHENOLATE MOFETIL 1000 MILLIGRAM(S): 250 CAPSULE ORAL at 18:04

## 2018-09-25 RX ADMIN — CEFEPIME 100 MILLIGRAM(S): 1 INJECTION, POWDER, FOR SOLUTION INTRAMUSCULAR; INTRAVENOUS at 23:13

## 2018-09-25 RX ADMIN — TACROLIMUS 5 MILLIGRAM(S): 5 CAPSULE ORAL at 06:15

## 2018-09-25 RX ADMIN — Medication 1 TABLET(S): at 06:17

## 2018-09-25 RX ADMIN — CARVEDILOL PHOSPHATE 12.5 MILLIGRAM(S): 80 CAPSULE, EXTENDED RELEASE ORAL at 18:04

## 2018-09-25 RX ADMIN — Medication 5 MILLIGRAM(S): at 18:04

## 2018-09-25 RX ADMIN — Medication 50 MILLIGRAM(S): at 11:59

## 2018-09-25 RX ADMIN — TAMSULOSIN HYDROCHLORIDE 0.4 MILLIGRAM(S): 0.4 CAPSULE ORAL at 21:08

## 2018-09-25 RX ADMIN — INSULIN HUMAN 10 UNIT(S): 100 INJECTION, SOLUTION SUBCUTANEOUS at 13:59

## 2018-09-25 RX ADMIN — Medication 500000 UNIT(S): at 18:05

## 2018-09-25 RX ADMIN — Medication 100000 UNIT(S): at 06:16

## 2018-09-25 RX ADMIN — Medication 100 MILLIGRAM(S): at 21:08

## 2018-09-25 RX ADMIN — Medication 100 MILLIGRAM(S): at 06:15

## 2018-09-25 RX ADMIN — SODIUM CHLORIDE 100 MILLILITER(S): 9 INJECTION INTRAMUSCULAR; INTRAVENOUS; SUBCUTANEOUS at 00:02

## 2018-09-25 RX ADMIN — FAMOTIDINE 20 MILLIGRAM(S): 10 INJECTION INTRAVENOUS at 11:58

## 2018-09-25 RX ADMIN — Medication 30 MILLIGRAM(S): at 10:25

## 2018-09-25 NOTE — BRIEF OPERATIVE NOTE - PROCEDURE
<<-----Click on this checkbox to enter Procedure Cystoscopic removal of right ureteral stent  09/25/2018    Active  [id number]

## 2018-09-25 NOTE — CONSULT NOTE ADULT - SUBJECTIVE AND OBJECTIVE BOX
[facility] DIVISION OF KIDNEY DISEASES AND HYPERTENSION -- INITIAL CONSULT NOTE  --------------------------------------------------------------------------------  HPI:    33 year old male with a PMH of ESRD on HD (MWF) from LUE AVF, HTN s/p DDRT 7/23/18 with pos-operative course complicated bu ureteral leak rquiring double J stent and nephrostomy tube placement.  He is now admitted for removal of stent in OR followed by nephrostomy tube removal in IR.    PAST HISTORY  --------------------------------------------------------------------------------  PAST MEDICAL & SURGICAL HISTORY:  H/O kidney transplant: double JJ stent in place  Nephrostomy status: capped nephrostomy tube in place  ESRD on dialysis: s/p cadaver kidney transplant recipient 7/23/2018  HTN (hypertension)  AV fistula: left arm fistula  H/O kidney transplant: 7/23/2018  H/O arthroscopy of right knee    FAMILY HISTORY:    PAST SOCIAL HISTORY:    ALLERGIES & MEDICATIONS  --------------------------------------------------------------------------------  Allergies    IV Contrast (Rash)  nyquil (Rhinorrhea)  vancomycin (Pruritus; Hives)    Intolerances      Standing Inpatient Medications  aztreonam  IVPB 1000 milliGRAM(s) IV Intermittent every 12 hours  calcium gluconate IVPB 1 Gram(s) IV Intermittent once  carvedilol 12.5 milliGRAM(s) Oral every 12 hours  cefepime   IVPB 2000 milliGRAM(s) IV Intermittent every 12 hours  dextrose 50% Injectable 50 milliLiter(s) IV Push once  docusate sodium 100 milliGRAM(s) Oral three times a day  famotidine    Tablet 20 milliGRAM(s) Oral daily  insulin regular  human recombinant. 10 Unit(s) IV Push once  lidocaine 1% Injectable 0.2 milliLiter(s) Local Injection once  mycophenolate mofetil 1000 milliGRAM(s) Oral two times a day  NIFEdipine XL 30 milliGRAM(s) Oral <User Schedule>  nystatin    Suspension 732443 Unit(s) Oral every 6 hours  oxybutynin 5 milliGRAM(s) Oral every 12 hours  potassium acid phosphate/sodium acid phosphate tablet (K-PHOS No. 2) 1 Tablet(s) Oral every 12 hours  predniSONE   Tablet 5 milliGRAM(s) Oral daily  senna 2 Tablet(s) Oral at bedtime  sodium chloride 0.9% lock flush 3 milliLiter(s) IV Push every 8 hours  sodium chloride 0.9%. 1000 milliLiter(s) IV Continuous <Continuous>  tacrolimus 5 milliGRAM(s) Oral two times a day  tamsulosin 0.4 milliGRAM(s) Oral at bedtime  trimethoprim   80 mG/sulfamethoxazole 400 mG 1 Tablet(s) Oral daily  valGANciclovir 450 milliGRAM(s) Oral <User Schedule>    PRN Inpatient Medications      REVIEW OF SYSTEMS  --------------------------------------------------------------------------------  Gen: No weight changes, fatigue, fevers/chills, weakness  Skin: No rashes  Head/Eyes/Ears/Mouth: No headache; Normal hearing; Normal vision w/o blurriness; No sinus pain/discomfort, sore throat  Respiratory: No dyspnea, cough, wheezing, hemoptysis  CV: No chest pain, PND, orthopnea  GI: No abdominal pain, diarrhea, constipation, nausea, vomiting, melena, hematochezia  : No increased frequency, dysuria, hematuria, nocturia  MSK: No joint pain/swelling; no back pain; no edema  Neuro: No dizziness/lightheadedness, weakness, seizures, numbness, tingling  Heme: No easy bruising or bleeding  Endo: No heat/cold intolerance  Psych: No significant nervousness, anxiety, stress, depression    All other systems were reviewed and are negative, except as noted.    VITALS/PHYSICAL EXAM  --------------------------------------------------------------------------------  T(C): 36.8 (09-25-18 @ 10:23), Max: 36.9 (09-24-18 @ 20:30)  HR: 69 (09-25-18 @ 10:23) (67 - 79)  BP: 132/87 (09-25-18 @ 10:23) (125/84 - 162/98)  RR: 18 (09-25-18 @ 10:23) (18 - 18)  SpO2: 99% (09-25-18 @ 10:23) (97% - 99%)  Wt(kg): --  Height (cm): 167.64 (09-24-18 @ 13:59)  Weight (kg): 69.4 (09-24-18 @ 13:59)  BMI (kg/m2): 24.7 (09-24-18 @ 13:59)  BSA (m2): 1.78 (09-24-18 @ 13:59)      09-24-18 @ 07:01  -  09-25-18 @ 07:00  --------------------------------------------------------  IN: 800 mL / OUT: 250 mL / NET: 550 mL    09-25-18 @ 07:01  -  09-25-18 @ 13:42  --------------------------------------------------------  IN: 700 mL / OUT: 525 mL / NET: 175 mL      Physical Exam:  	Gen: NAD, well-appearing  	HEENT: PERRL, supple neck, clear oropharynx  	Pulm: CTA B/L  	CV: RRR, S1S2; no rub  	Back: No spinal or CVA tenderness; no sacral edema  	Abd: +BS, soft, nontender/nondistended  	: No suprapubic tenderness  	UE: Warm, FROM, no clubbing, intact strength; no edema; no asterixis  	LE: Warm, FROM, no clubbing, intact strength; no edema  	Neuro: No focal deficits, intact gait  	Psych: Normal affect and mood  	Skin: Warm, without rashes  	Vascular access:    LABS/STUDIES  --------------------------------------------------------------------------------              12.5   6.4   >-----------<  211      [09-25-18 @ 08:38]              39.2     138  |  112  |  33  ----------------------------<  100      [09-25-18 @ 08:38]  5.9   |  17  |  2.70        Ca     10.9     [09-25-18 @ 08:38]      PT/INR: PT 12.3 , INR 1.13       [09-25-18 @ 08:38]  PTT: 34.2       [09-25-18 @ 08:38]      Creatinine Trend:  SCr 2.70 [09-25 @ 08:38]    Urinalysis - [08-06-18 @ 21:43]      Color Yellow / Appearance Clear / SG 1.018 / pH 6.0      Gluc 100 / Ketone Negative  / Bili Negative / Urobili Negative       Blood Moderate / Protein 100 / Leuk Est Negative / Nitrite Negative      RBC 69 / WBC 10 / Hyaline 8 / Gran  / Sq Epi  / Non Sq Epi 3 / Bacteria Negative      HbA1c 5.1      [08-18-15 @ 18:19]    HBsAb 734.7      [08-18-15 @ 18:19]  HBsAb Reactive      [08-18-15 @ 18:19]  HBsAg Nonreact      [08-18-15 @ 18:19]  HBcAb Nonreact      [08-18-15 @ 18:19]  HCV 0.14, Nonreact      [08-18-15 @ 18:19] [facility] DIVISION OF KIDNEY DISEASES AND HYPERTENSION -- INITIAL CONSULT NOTE  --------------------------------------------------------------------------------  HPI:    33 year old male with a PMH of ESRD on HD (MWF) from LUE AVF, HTN s/p DDRT 7/23/18 with pos-operative course complicated bu ureteral leak rquiring double J stent and nephrostomy tube placement. Admitted for removal of stent in OR followed by nephrostomy tube removal in IR.    PAST HISTORY  --------------------------------------------------------------------------------  PAST MEDICAL & SURGICAL HISTORY:  H/O kidney transplant: double JJ stent in place  Nephrostomy status: capped nephrostomy tube in place  ESRD on dialysis: s/p cadaver kidney transplant recipient 7/23/2018  HTN (hypertension)  AV fistula: left arm fistula  H/O kidney transplant: 7/23/2018  H/O arthroscopy of right knee    FAMILY HISTORY:    PAST SOCIAL HISTORY:    ALLERGIES & MEDICATIONS  --------------------------------------------------------------------------------  Allergies    IV Contrast (Rash)  nyquil (Rhinorrhea)  vancomycin (Pruritus; Hives)    Intolerances      Standing Inpatient Medications  aztreonam  IVPB 1000 milliGRAM(s) IV Intermittent every 12 hours  calcium gluconate IVPB 1 Gram(s) IV Intermittent once  carvedilol 12.5 milliGRAM(s) Oral every 12 hours  cefepime   IVPB 2000 milliGRAM(s) IV Intermittent every 12 hours  dextrose 50% Injectable 50 milliLiter(s) IV Push once  docusate sodium 100 milliGRAM(s) Oral three times a day  famotidine    Tablet 20 milliGRAM(s) Oral daily  insulin regular  human recombinant. 10 Unit(s) IV Push once  lidocaine 1% Injectable 0.2 milliLiter(s) Local Injection once  mycophenolate mofetil 1000 milliGRAM(s) Oral two times a day  NIFEdipine XL 30 milliGRAM(s) Oral <User Schedule>  nystatin    Suspension 345401 Unit(s) Oral every 6 hours  oxybutynin 5 milliGRAM(s) Oral every 12 hours  potassium acid phosphate/sodium acid phosphate tablet (K-PHOS No. 2) 1 Tablet(s) Oral every 12 hours  predniSONE   Tablet 5 milliGRAM(s) Oral daily  senna 2 Tablet(s) Oral at bedtime  sodium chloride 0.9% lock flush 3 milliLiter(s) IV Push every 8 hours  sodium chloride 0.9%. 1000 milliLiter(s) IV Continuous <Continuous>  tacrolimus 5 milliGRAM(s) Oral two times a day  tamsulosin 0.4 milliGRAM(s) Oral at bedtime  trimethoprim   80 mG/sulfamethoxazole 400 mG 1 Tablet(s) Oral daily  valGANciclovir 450 milliGRAM(s) Oral <User Schedule>    PRN Inpatient Medications      REVIEW OF SYSTEMS  --------------------------------------------------------------------------------  Gen: No weight changes, fatigue, fevers/chills, weakness  Skin: No rashes  Head/Eyes/Ears/Mouth: No headache; Normal hearing; Normal vision w/o blurriness; No sinus pain/discomfort, sore throat  Respiratory: No dyspnea, cough, wheezing, hemoptysis  CV: No chest pain, PND, orthopnea  GI: No abdominal pain, constipation, diarrhea, nausea, vomiting, melena, hematochezia.   : voiding  MSK: No joint pain/swelling; no back pain; no edema  Neuro: No dizziness/lightheadedness, weakness, seizures, numbness, tingling  Heme: No easy bruising or bleeding  Endo: No heat/cold intolerance  Psych: No significant nervousness, anxiety, stress, depression      VITALS/PHYSICAL EXAM  --------------------------------------------------------------------------------  T(C): 36.8 (09-25-18 @ 10:23), Max: 36.9 (09-24-18 @ 20:30)  HR: 69 (09-25-18 @ 10:23) (67 - 79)  BP: 132/87 (09-25-18 @ 10:23) (125/84 - 162/98)  RR: 18 (09-25-18 @ 10:23) (18 - 18)  SpO2: 99% (09-25-18 @ 10:23) (97% - 99%)  Wt(kg): --  Height (cm): 167.64 (09-24-18 @ 13:59)  Weight (kg): 69.4 (09-24-18 @ 13:59)  BMI (kg/m2): 24.7 (09-24-18 @ 13:59)  BSA (m2): 1.78 (09-24-18 @ 13:59)      09-24-18 @ 07:01  -  09-25-18 @ 07:00  --------------------------------------------------------  IN: 800 mL / OUT: 250 mL / NET: 550 mL    09-25-18 @ 07:01  -  09-25-18 @ 13:42  --------------------------------------------------------  IN: 700 mL / OUT: 525 mL / NET: 175 mL      Physical Exam:  	Gen: NAD, well-appearing  	HEENT: PERRL, supple neck, clear oropharynx  	Pulm: CTA B/L  	CV: RRR, S1S2; no rub  	Back: No spinal or CVA tenderness; no sacral edema  	Abd: +BS, soft, nontender/nondistended. Nephrostomy tube noted.   	: No suprapubic tenderness  	UE: Warm, FROM, no clubbing, intact strength; no edema; no asterixis  	LE: Warm, FROM, no clubbing, intact strength; no edema  	Neuro: No focal deficits, intact gait  	Psych: Normal affect and mood  	Skin: Warm, without rashes    LABS/STUDIES  --------------------------------------------------------------------------------              12.5   6.4   >-----------<  211      [09-25-18 @ 08:38]              39.2     138  |  112  |  33  ----------------------------<  100      [09-25-18 @ 08:38]  5.9   |  17  |  2.70        Ca     10.9     [09-25-18 @ 08:38]      PT/INR: PT 12.3 , INR 1.13       [09-25-18 @ 08:38]  PTT: 34.2       [09-25-18 @ 08:38]    Creatinine Trend:  SCr 2.70 [09-25 @ 08:38]

## 2018-09-25 NOTE — CONSULT NOTE ADULT - ATTENDING COMMENTS
Patient with pyelo on biopsy admitted for removal of ureteral stent and nephrostomy on antibiotic cover.  Discussed with transplant team. K being shifted pre procedure today  I was present during and reviewed clinical and lab data as well as assessment and plan as documented by the housestaff as noted. Please contact if any additional questions with any change in clinical condition or on availability of any additional information or reports.

## 2018-09-25 NOTE — CONSULT NOTE ADULT - ASSESSMENT
33 year old male with a PMH of ESRD on HD (MWF) from LUE AVF, HTN s/p DDRT 7/23/18, admitted for nephrostomy and double J stent removal.

## 2018-09-25 NOTE — CONSULT NOTE ADULT - PROBLEM SELECTOR RECOMMENDATION 3
Pt had a biopsy done as outpatient showing widespread neutrophils inflammation, with focal severe tubulitis and collection of intraluminal neutrophils. suggestive of acute on chronic  pyelonephritis.   Started on IV cefepime today.

## 2018-09-25 NOTE — CONSULT NOTE ADULT - PROBLEM SELECTOR RECOMMENDATION 9
S/P DDRT 07/23 c/b post-operative ureteral leak, needing nephrostomy and stent placement.   Allograft functioning well.   OR stent removal today.   IR tomorrow for nephrostomy removal.   Monitor I&o  Continue with IV fluids.

## 2018-09-26 DIAGNOSIS — I10 ESSENTIAL (PRIMARY) HYPERTENSION: ICD-10-CM

## 2018-09-26 LAB
ANION GAP SERPL CALC-SCNC: 6 MMOL/L — SIGNIFICANT CHANGE UP (ref 5–17)
ANION GAP SERPL CALC-SCNC: 9 MMOL/L — SIGNIFICANT CHANGE UP (ref 5–17)
APTT BLD: 32.2 SEC — SIGNIFICANT CHANGE UP (ref 27.5–37.4)
BUN SERPL-MCNC: 29 MG/DL — HIGH (ref 7–23)
BUN SERPL-MCNC: 31 MG/DL — HIGH (ref 7–23)
CALCIUM SERPL-MCNC: 10.2 MG/DL — SIGNIFICANT CHANGE UP (ref 8.4–10.5)
CALCIUM SERPL-MCNC: 11.1 MG/DL — HIGH (ref 8.4–10.5)
CHLORIDE SERPL-SCNC: 108 MMOL/L — SIGNIFICANT CHANGE UP (ref 96–108)
CHLORIDE SERPL-SCNC: 113 MMOL/L — HIGH (ref 96–108)
CO2 SERPL-SCNC: 18 MMOL/L — LOW (ref 22–31)
CO2 SERPL-SCNC: 19 MMOL/L — LOW (ref 22–31)
CREAT SERPL-MCNC: 2.43 MG/DL — HIGH (ref 0.5–1.3)
CREAT SERPL-MCNC: 2.47 MG/DL — HIGH (ref 0.5–1.3)
GLUCOSE SERPL-MCNC: 102 MG/DL — HIGH (ref 70–99)
GLUCOSE SERPL-MCNC: 109 MG/DL — HIGH (ref 70–99)
HCT VFR BLD CALC: 33.4 % — LOW (ref 39–50)
HGB BLD-MCNC: 10.5 G/DL — LOW (ref 13–17)
INR BLD: 1.12 RATIO — SIGNIFICANT CHANGE UP (ref 0.88–1.16)
MAGNESIUM SERPL-MCNC: 1.4 MG/DL — LOW (ref 1.6–2.6)
MCHC RBC-ENTMCNC: 26.6 PG — LOW (ref 27–34)
MCHC RBC-ENTMCNC: 31.4 GM/DL — LOW (ref 32–36)
MCV RBC AUTO: 84.8 FL — SIGNIFICANT CHANGE UP (ref 80–100)
PHOSPHATE SERPL-MCNC: 2.9 MG/DL — SIGNIFICANT CHANGE UP (ref 2.5–4.5)
PLATELET # BLD AUTO: 200 K/UL — SIGNIFICANT CHANGE UP (ref 150–400)
POTASSIUM SERPL-MCNC: 5.8 MMOL/L — HIGH (ref 3.5–5.3)
POTASSIUM SERPL-MCNC: 6.3 MMOL/L — CRITICAL HIGH (ref 3.5–5.3)
POTASSIUM SERPL-SCNC: 5.8 MMOL/L — HIGH (ref 3.5–5.3)
POTASSIUM SERPL-SCNC: 6.3 MMOL/L — CRITICAL HIGH (ref 3.5–5.3)
PROTHROM AB SERPL-ACNC: 12.7 SEC — SIGNIFICANT CHANGE UP (ref 10–13.1)
RBC # BLD: 3.94 M/UL — LOW (ref 4.2–5.8)
RBC # FLD: 14.3 % — SIGNIFICANT CHANGE UP (ref 10.3–14.5)
SODIUM SERPL-SCNC: 136 MMOL/L — SIGNIFICANT CHANGE UP (ref 135–145)
SODIUM SERPL-SCNC: 137 MMOL/L — SIGNIFICANT CHANGE UP (ref 135–145)
TACROLIMUS SERPL-MCNC: 9 NG/ML — SIGNIFICANT CHANGE UP
WBC # BLD: 7.21 K/UL — SIGNIFICANT CHANGE UP (ref 3.8–10.5)
WBC # FLD AUTO: 7.21 K/UL — SIGNIFICANT CHANGE UP (ref 3.8–10.5)

## 2018-09-26 PROCEDURE — 99232 SBSQ HOSP IP/OBS MODERATE 35: CPT | Mod: GC,24

## 2018-09-26 PROCEDURE — 93010 ELECTROCARDIOGRAM REPORT: CPT

## 2018-09-26 PROCEDURE — 99233 SBSQ HOSP IP/OBS HIGH 50: CPT | Mod: GC

## 2018-09-26 PROCEDURE — 50389 REMOVE RENAL TUBE W/FLUORO: CPT | Mod: RT

## 2018-09-26 RX ORDER — MAGNESIUM SULFATE 500 MG/ML
2 VIAL (ML) INJECTION ONCE
Qty: 0 | Refills: 0 | Status: COMPLETED | OUTPATIENT
Start: 2018-09-26 | End: 2018-09-26

## 2018-09-26 RX ORDER — FUROSEMIDE 40 MG
80 TABLET ORAL ONCE
Qty: 0 | Refills: 0 | Status: COMPLETED | OUTPATIENT
Start: 2018-09-26 | End: 2018-09-26

## 2018-09-26 RX ADMIN — Medication 500000 UNIT(S): at 18:40

## 2018-09-26 RX ADMIN — CEFEPIME 100 MILLIGRAM(S): 1 INJECTION, POWDER, FOR SOLUTION INTRAMUSCULAR; INTRAVENOUS at 11:05

## 2018-09-26 RX ADMIN — Medication 100 MILLIGRAM(S): at 15:15

## 2018-09-26 RX ADMIN — CARVEDILOL PHOSPHATE 12.5 MILLIGRAM(S): 80 CAPSULE, EXTENDED RELEASE ORAL at 18:40

## 2018-09-26 RX ADMIN — Medication 500000 UNIT(S): at 00:15

## 2018-09-26 RX ADMIN — MYCOPHENOLATE MOFETIL 1000 MILLIGRAM(S): 250 CAPSULE ORAL at 06:02

## 2018-09-26 RX ADMIN — Medication 500000 UNIT(S): at 06:02

## 2018-09-26 RX ADMIN — Medication 50 MILLIGRAM(S): at 11:04

## 2018-09-26 RX ADMIN — Medication 30 MILLIGRAM(S): at 06:03

## 2018-09-26 RX ADMIN — MYCOPHENOLATE MOFETIL 1000 MILLIGRAM(S): 250 CAPSULE ORAL at 18:41

## 2018-09-26 RX ADMIN — Medication 30 MILLIGRAM(S): at 21:37

## 2018-09-26 RX ADMIN — FAMOTIDINE 20 MILLIGRAM(S): 10 INJECTION INTRAVENOUS at 11:02

## 2018-09-26 RX ADMIN — Medication 1 TABLET(S): at 11:02

## 2018-09-26 RX ADMIN — Medication 50 GRAM(S): at 18:39

## 2018-09-26 RX ADMIN — Medication 500000 UNIT(S): at 23:20

## 2018-09-26 RX ADMIN — SODIUM CHLORIDE 75 MILLILITER(S): 9 INJECTION INTRAMUSCULAR; INTRAVENOUS; SUBCUTANEOUS at 01:31

## 2018-09-26 RX ADMIN — Medication 5 MILLIGRAM(S): at 06:02

## 2018-09-26 RX ADMIN — Medication 80 MILLIGRAM(S): at 11:01

## 2018-09-26 RX ADMIN — TACROLIMUS 5 MILLIGRAM(S): 5 CAPSULE ORAL at 06:02

## 2018-09-26 RX ADMIN — CARVEDILOL PHOSPHATE 12.5 MILLIGRAM(S): 80 CAPSULE, EXTENDED RELEASE ORAL at 06:03

## 2018-09-26 RX ADMIN — CEFEPIME 100 MILLIGRAM(S): 1 INJECTION, POWDER, FOR SOLUTION INTRAMUSCULAR; INTRAVENOUS at 22:39

## 2018-09-26 RX ADMIN — Medication 50 MILLIGRAM(S): at 23:18

## 2018-09-26 RX ADMIN — VALGANCICLOVIR 450 MILLIGRAM(S): 450 TABLET, FILM COATED ORAL at 06:07

## 2018-09-26 RX ADMIN — Medication 50 MILLIGRAM(S): at 00:07

## 2018-09-26 RX ADMIN — Medication 100 MILLIGRAM(S): at 06:03

## 2018-09-26 RX ADMIN — TAMSULOSIN HYDROCHLORIDE 0.4 MILLIGRAM(S): 0.4 CAPSULE ORAL at 21:37

## 2018-09-26 RX ADMIN — Medication 5 MILLIGRAM(S): at 18:40

## 2018-09-26 RX ADMIN — TACROLIMUS 5 MILLIGRAM(S): 5 CAPSULE ORAL at 18:41

## 2018-09-27 ENCOUNTER — TRANSCRIPTION ENCOUNTER (OUTPATIENT)
Age: 33
End: 2018-09-27

## 2018-09-27 VITALS
RESPIRATION RATE: 18 BRPM | HEART RATE: 81 BPM | DIASTOLIC BLOOD PRESSURE: 90 MMHG | TEMPERATURE: 98 F | SYSTOLIC BLOOD PRESSURE: 142 MMHG | OXYGEN SATURATION: 99 %

## 2018-09-27 LAB
ANION GAP SERPL CALC-SCNC: 10 MMOL/L — SIGNIFICANT CHANGE UP (ref 5–17)
BUN SERPL-MCNC: 37 MG/DL — HIGH (ref 7–23)
CALCIUM SERPL-MCNC: 10.9 MG/DL — HIGH (ref 8.4–10.5)
CHLORIDE SERPL-SCNC: 108 MMOL/L — SIGNIFICANT CHANGE UP (ref 96–108)
CO2 SERPL-SCNC: 16 MMOL/L — LOW (ref 22–31)
CREAT SERPL-MCNC: 2.48 MG/DL — HIGH (ref 0.5–1.3)
GLUCOSE SERPL-MCNC: 103 MG/DL — HIGH (ref 70–99)
HCT VFR BLD CALC: 35.5 % — LOW (ref 39–50)
HGB BLD-MCNC: 11.4 G/DL — LOW (ref 13–17)
INR BLD: 1.14 RATIO — SIGNIFICANT CHANGE UP (ref 0.88–1.16)
MAGNESIUM SERPL-MCNC: 1.8 MG/DL — SIGNIFICANT CHANGE UP (ref 1.6–2.6)
MCHC RBC-ENTMCNC: 26.9 PG — LOW (ref 27–34)
MCHC RBC-ENTMCNC: 32 GM/DL — SIGNIFICANT CHANGE UP (ref 32–36)
MCV RBC AUTO: 84.2 FL — SIGNIFICANT CHANGE UP (ref 80–100)
PHOSPHATE SERPL-MCNC: 3.1 MG/DL — SIGNIFICANT CHANGE UP (ref 2.5–4.5)
PLATELET # BLD AUTO: 190 K/UL — SIGNIFICANT CHANGE UP (ref 150–400)
POTASSIUM SERPL-MCNC: 4.8 MMOL/L — SIGNIFICANT CHANGE UP (ref 3.5–5.3)
POTASSIUM SERPL-SCNC: 4.8 MMOL/L — SIGNIFICANT CHANGE UP (ref 3.5–5.3)
PROTHROM AB SERPL-ACNC: 12.3 SEC — SIGNIFICANT CHANGE UP (ref 9.8–12.7)
RBC # BLD: 4.22 M/UL — SIGNIFICANT CHANGE UP (ref 4.2–5.8)
RBC # FLD: 13.7 % — SIGNIFICANT CHANGE UP (ref 10.3–14.5)
SODIUM SERPL-SCNC: 134 MMOL/L — LOW (ref 135–145)
TACROLIMUS SERPL-MCNC: 9.6 NG/ML — SIGNIFICANT CHANGE UP
WBC # BLD: 6.8 K/UL — SIGNIFICANT CHANGE UP (ref 3.8–10.5)
WBC # FLD AUTO: 6.8 K/UL — SIGNIFICANT CHANGE UP (ref 3.8–10.5)

## 2018-09-27 PROCEDURE — 99233 SBSQ HOSP IP/OBS HIGH 50: CPT | Mod: GC

## 2018-09-27 PROCEDURE — 36558 INSERT TUNNELED CV CATH: CPT

## 2018-09-27 PROCEDURE — 85027 COMPLETE CBC AUTOMATED: CPT

## 2018-09-27 PROCEDURE — C1751: CPT

## 2018-09-27 PROCEDURE — 77001 FLUOROGUIDE FOR VEIN DEVICE: CPT

## 2018-09-27 PROCEDURE — 77001 FLUOROGUIDE FOR VEIN DEVICE: CPT | Mod: 26

## 2018-09-27 PROCEDURE — 85610 PROTHROMBIN TIME: CPT

## 2018-09-27 PROCEDURE — 76937 US GUIDE VASCULAR ACCESS: CPT

## 2018-09-27 PROCEDURE — 83735 ASSAY OF MAGNESIUM: CPT

## 2018-09-27 PROCEDURE — 76937 US GUIDE VASCULAR ACCESS: CPT | Mod: 26

## 2018-09-27 PROCEDURE — 84100 ASSAY OF PHOSPHORUS: CPT

## 2018-09-27 PROCEDURE — 93005 ELECTROCARDIOGRAM TRACING: CPT

## 2018-09-27 PROCEDURE — 80197 ASSAY OF TACROLIMUS: CPT

## 2018-09-27 PROCEDURE — 50389 REMOVE RENAL TUBE W/FLUORO: CPT

## 2018-09-27 PROCEDURE — 80048 BASIC METABOLIC PNL TOTAL CA: CPT

## 2018-09-27 PROCEDURE — 85730 THROMBOPLASTIN TIME PARTIAL: CPT

## 2018-09-27 RX ORDER — SENNA PLUS 8.6 MG/1
2 TABLET ORAL
Qty: 0 | Refills: 0 | DISCHARGE
Start: 2018-09-27

## 2018-09-27 RX ORDER — VALGANCICLOVIR 450 MG/1
1 TABLET, FILM COATED ORAL
Qty: 0 | Refills: 0 | DISCHARGE
Start: 2018-09-27

## 2018-09-27 RX ORDER — DOCUSATE SODIUM 100 MG
1 CAPSULE ORAL
Qty: 0 | Refills: 0 | DISCHARGE
Start: 2018-09-27

## 2018-09-27 RX ORDER — NYSTATIN 500MM UNIT
5 POWDER (EA) MISCELLANEOUS
Qty: 0 | Refills: 0 | DISCHARGE
Start: 2018-09-27

## 2018-09-27 RX ORDER — TAMSULOSIN HYDROCHLORIDE 0.4 MG/1
1 CAPSULE ORAL
Qty: 0 | Refills: 0 | DISCHARGE
Start: 2018-09-27

## 2018-09-27 RX ORDER — POTASSIUM PHOSPHATE, MONOBASIC POTASSIUM PHOSPHATE, DIBASIC 236; 224 MG/ML; MG/ML
0 INJECTION, SOLUTION INTRAVENOUS
Qty: 0 | Refills: 0 | COMMUNITY

## 2018-09-27 RX ORDER — FAMOTIDINE 10 MG/ML
1 INJECTION INTRAVENOUS
Qty: 0 | Refills: 0 | DISCHARGE
Start: 2018-09-27

## 2018-09-27 RX ORDER — CARVEDILOL PHOSPHATE 80 MG/1
1 CAPSULE, EXTENDED RELEASE ORAL
Qty: 0 | Refills: 0 | DISCHARGE
Start: 2018-09-27

## 2018-09-27 RX ORDER — TACROLIMUS 5 MG/1
1 CAPSULE ORAL
Qty: 0 | Refills: 0 | DISCHARGE
Start: 2018-09-27

## 2018-09-27 RX ORDER — OXYBUTYNIN CHLORIDE 5 MG
1 TABLET ORAL
Qty: 0 | Refills: 0 | DISCHARGE
Start: 2018-09-27

## 2018-09-27 RX ORDER — TACROLIMUS 5 MG/1
1 CAPSULE ORAL
Qty: 0 | Refills: 0 | COMMUNITY

## 2018-09-27 RX ORDER — NIFEDIPINE 30 MG
1 TABLET, EXTENDED RELEASE 24 HR ORAL
Qty: 0 | Refills: 0 | DISCHARGE
Start: 2018-09-27

## 2018-09-27 RX ADMIN — CEFEPIME 100 MILLIGRAM(S): 1 INJECTION, POWDER, FOR SOLUTION INTRAMUSCULAR; INTRAVENOUS at 15:21

## 2018-09-27 RX ADMIN — Medication 30 MILLIGRAM(S): at 06:23

## 2018-09-27 RX ADMIN — FAMOTIDINE 20 MILLIGRAM(S): 10 INJECTION INTRAVENOUS at 12:10

## 2018-09-27 RX ADMIN — Medication 5 MILLIGRAM(S): at 06:22

## 2018-09-27 RX ADMIN — TACROLIMUS 5 MILLIGRAM(S): 5 CAPSULE ORAL at 06:22

## 2018-09-27 RX ADMIN — Medication 100 MILLIGRAM(S): at 15:22

## 2018-09-27 RX ADMIN — Medication 1 TABLET(S): at 12:12

## 2018-09-27 RX ADMIN — MYCOPHENOLATE MOFETIL 1000 MILLIGRAM(S): 250 CAPSULE ORAL at 06:22

## 2018-09-27 RX ADMIN — Medication 500000 UNIT(S): at 06:24

## 2018-09-27 RX ADMIN — Medication 50 MILLIGRAM(S): at 12:11

## 2018-09-27 RX ADMIN — CARVEDILOL PHOSPHATE 12.5 MILLIGRAM(S): 80 CAPSULE, EXTENDED RELEASE ORAL at 06:23

## 2018-09-27 NOTE — DISCHARGE NOTE ADULT - HOSPITAL COURSE
34 y/o gentleman with h/o HTN and ESRD s/p DDRT 7/23/18 with post-operative course complicated by ureteral leak requiring placement of double J stent and nephrostomy tube now doing well s/p removal of ureteral stent and nephrostomy tube, currently on Aztreonam and Cefepime for pyelonephritis. S/P IR placement of osorio catheter for IV antibiotics x 2 weeks in total. Pt evaluated by the disciplinary team including nephrologist, pharmacist, nutrition, social work, NP, and surgeon daily where plan of care reviewed and discussed.  Now pt doing well clear for discharge home and follow up at transplant clinic Next Monday 10/1/18.

## 2018-09-27 NOTE — DISCHARGE NOTE ADULT - PLAN OF CARE
To be free from infection HOME CARE INSTRUCTIONS  f you were prescribed antibiotics, take them exactly as your caregiver instructs you. Finish the medication even if you feel better after you have only taken some of the medication.  Drink enough water and fluids to keep your urine clear or pale yellow.  Avoid caffeine, tea, and carbonated beverages. They tend to irritate your bladder.  Empty your bladder often. Avoid holding urine for long periods of time.  Empty your bladder before and after sexual intercourse.  After a bowel movement, women should cleanse from front to back. Use each tissue only once.  SEEK MEDICAL CARE IF:  You have back pain.  You develop a fever.  Your symptoms do not begin to resolve within 3 days.  SEEK IMMEDIATE MEDICAL CARE IF:  You have severe back pain or lower abdominal pain.  You develop chills.  You have nausea or vomiting.  You have continued burning or discomfort with urination. Low salt diet  Activity as tolerated.  Take all medication as prescribed.  Follow up with your medical doctor for routine blood pressure monitoring at your next visit.  Notify your doctor if you have any of the following symptoms:   Dizziness, Lightheadedness, Blurry vision, Headache, Chest pain, Shortness of breath No heavy lifting anything more than 10-15lbs or straining. Otherwise, you may return to your usual level of physical activity. If you are taking narcotic pain medication (such as Percocet), do NOT drive a car, operate machinery or make important decisions.  Call trnansplant clinic If you developed any of the following, fever, pain, redness, swelling at incision site, cough, nausea, vomiting, painfull urination, difficulty urination, or not making any urine.  NOTIFY YOUR SURGEON IF: You have any bleeding that does not stop, any pus draining from your wound, any fever (over 100.4 F) or chills, persistent nausea/vomiting with inability to tolerate food or liquids, persistent diarrhea, or if your pain is not controlled on your discharge pain medications.  Keep away from people who have cough, cold, and symptom of flu.  Only take medications that are on your discharge list  If you missed your medications call the transplant office, do not double up medication because you missed a dose.  If you have any question regarding your medication please call transplant office.

## 2018-09-27 NOTE — PROGRESS NOTE ADULT - PROVIDER SPECIALTY LIST ADULT
Intervent Radiology
Nephrology
Surgery
Transplant Surgery
Nephrology

## 2018-09-27 NOTE — PROGRESS NOTE ADULT - NSHPATTENDINGPLANDISCUSS_GEN_ALL_CORE
patient, transplant team and nursing team
House staff, transplant team
Patient seen on multidisciplinary rounds with Transplant surgeons, nephrologist, NP, pharmacist, and nurse.
Transplant team

## 2018-09-27 NOTE — DISCHARGE NOTE ADULT - ADDITIONAL INSTRUCTIONS
FOLLOW-UP:  1. Please call to make a follow-up appointment with Dr. Valdes on Monday 10/1/18  2. Please follow up with your primary care physician in one week regarding your hospitalization.

## 2018-09-27 NOTE — DISCHARGE NOTE ADULT - CARE PLAN
Principal Discharge DX:	Pyelonephritis  Goal:	To be free from infection  Assessment and plan of treatment:	HOME CARE INSTRUCTIONS  f you were prescribed antibiotics, take them exactly as your caregiver instructs you. Finish the medication even if you feel better after you have only taken some of the medication.  Drink enough water and fluids to keep your urine clear or pale yellow.  Avoid caffeine, tea, and carbonated beverages. They tend to irritate your bladder.  Empty your bladder often. Avoid holding urine for long periods of time.  Empty your bladder before and after sexual intercourse.  After a bowel movement, women should cleanse from front to back. Use each tissue only once.  SEEK MEDICAL CARE IF:  You have back pain.  You develop a fever.  Your symptoms do not begin to resolve within 3 days.  SEEK IMMEDIATE MEDICAL CARE IF:  You have severe back pain or lower abdominal pain.  You develop chills.  You have nausea or vomiting.  You have continued burning or discomfort with urination.  Secondary Diagnosis:	Essential hypertension  Assessment and plan of treatment:	Low salt diet  Activity as tolerated.  Take all medication as prescribed.  Follow up with your medical doctor for routine blood pressure monitoring at your next visit.  Notify your doctor if you have any of the following symptoms:   Dizziness, Lightheadedness, Blurry vision, Headache, Chest pain, Shortness of breath  Secondary Diagnosis:	Renal transplant recipient  Assessment and plan of treatment:	No heavy lifting anything more than 10-15lbs or straining. Otherwise, you may return to your usual level of physical activity. If you are taking narcotic pain medication (such as Percocet), do NOT drive a car, operate machinery or make important decisions.  Call trnansplant clinic If you developed any of the following, fever, pain, redness, swelling at incision site, cough, nausea, vomiting, painfull urination, difficulty urination, or not making any urine.  NOTIFY YOUR SURGEON IF: You have any bleeding that does not stop, any pus draining from your wound, any fever (over 100.4 F) or chills, persistent nausea/vomiting with inability to tolerate food or liquids, persistent diarrhea, or if your pain is not controlled on your discharge pain medications.  Keep away from people who have cough, cold, and symptom of flu.  Only take medications that are on your discharge list  If you missed your medications call the transplant office, do not double up medication because you missed a dose.  If you have any question regarding your medication please call transplant office.

## 2018-09-27 NOTE — DISCHARGE NOTE ADULT - PATIENT PORTAL LINK FT
You can access the [facility] Patient Portal, offered by [facility], by registering with the following website: [email or web address]

## 2018-09-27 NOTE — PROGRESS NOTE ADULT - SUBJECTIVE AND OBJECTIVE BOX
[facility] DIVISION OF KIDNEY DISEASES AND HYPERTENSION -- FOLLOW UP NOTE  --------------------------------------------------------------------------------  Chief Complaint:  s/p DDRT 7/23/18,    24 hour events/subjective:  Pt examined at bed side not in distress, stent was removed yesterday w/o complications. No overnight events.       PAST HISTORY  --------------------------------------------------------------------------------  No significant changes to PMH, PSH, FHx, SHx, unless otherwise noted    ALLERGIES & MEDICATIONS  --------------------------------------------------------------------------------  Allergies    IV Contrast (Rash)  nyquil (Rhinorrhea)  vancomycin (Pruritus; Hives)    Intolerances      Standing Inpatient Medications  aztreonam  IVPB 1000 milliGRAM(s) IV Intermittent every 12 hours  carvedilol 12.5 milliGRAM(s) Oral every 12 hours  cefepime   IVPB 2000 milliGRAM(s) IV Intermittent every 12 hours  docusate sodium 100 milliGRAM(s) Oral three times a day  famotidine    Tablet 20 milliGRAM(s) Oral daily  mycophenolate mofetil 1000 milliGRAM(s) Oral two times a day  NIFEdipine XL 30 milliGRAM(s) Oral <User Schedule>  nystatin    Suspension 108678 Unit(s) Oral every 6 hours  oxybutynin 5 milliGRAM(s) Oral every 12 hours  predniSONE   Tablet 5 milliGRAM(s) Oral daily  senna 2 Tablet(s) Oral at bedtime  sodium chloride 0.9%. 1000 milliLiter(s) IV Continuous <Continuous>  tacrolimus 5 milliGRAM(s) Oral two times a day  tamsulosin 0.4 milliGRAM(s) Oral at bedtime  trimethoprim   80 mG/sulfamethoxazole 400 mG 1 Tablet(s) Oral daily  valGANciclovir 450 milliGRAM(s) Oral <User Schedule>    PRN Inpatient Medications      REVIEW OF SYSTEMS  --------------------------------------------------------------------------------  Gen: No weight changes, fatigue, fevers/chills, weakness  Skin: No rashes  Head/Eyes/Ears/Mouth: No headache; Normal hearing; Normal vision w/o blurriness; No sinus pain/discomfort, sore throat  Respiratory: No dyspnea, cough, wheezing, hemoptysis  CV: No chest pain, PND, orthopnea  GI: No abdominal pain, constipation, diarrhea, nausea, vomiting, melena, hematochezia.   : voiding  MSK: No joint pain/swelling; no back pain; no edema  Neuro: No dizziness/lightheadedness, weakness, seizures, numbness, tingling  Heme: No easy bruising or bleeding  Endo: No heat/cold intolerance  Psych: No significant nervousness, anxiety, stress, depression    All other systems were reviewed and are negative, except as noted.    VITALS/PHYSICAL EXAM  --------------------------------------------------------------------------------  T(C): 36.6 (09-26-18 @ 05:29), Max: 36.8 (09-25-18 @ 13:52)  HR: 76 (09-26-18 @ 05:29) (57 - 77)  BP: 138/91 (09-26-18 @ 05:29) (125/75 - 161/98)  RR: 18 (09-26-18 @ 05:29) (16 - 18)  SpO2: 98% (09-26-18 @ 05:29) (96% - 100%)  Wt(kg): --  Height (cm): 167.64 (09-25-18 @ 14:53)  Weight (kg): 69.4 (09-25-18 @ 14:53)  BMI (kg/m2): 24.7 (09-25-18 @ 14:53)  BSA (m2): 1.78 (09-25-18 @ 14:53)      09-25-18 @ 07:01  -  09-26-18 @ 07:00  --------------------------------------------------------  IN: 2095 mL / OUT: 1675 mL / NET: 420 mL      Physical Exam:  	Gen: NAD, well-appearing  	HEENT: PERRL, supple neck, clear oropharynx  	Pulm: CTA B/L  	CV: RRR, S1S2; no rub  	Back: No spinal or CVA tenderness; no sacral edema  	Abd: +BS, soft, nontender/nondistended. Nephrostomy tube noted.   	: No suprapubic tenderness  	UE: Warm, FROM, no clubbing, intact strength; no edema; no asterixis  	LE: Warm, FROM, no clubbing, intact strength; no edema  	Neuro: No focal deficits, intact gait  	Psych: Normal affect and mood  	Skin: Warm, without rashes    LABS/STUDIES  --------------------------------------------------------------------------------              10.5   7.21  >-----------<  200      [09-26-18 @ 08:00]              33.4     137  |  113  |  29  ----------------------------<  109      [09-26-18 @ 06:39]  6.3   |  18  |  2.43        Ca     10.2     [09-26-18 @ 06:39]      Mg     1.4     [09-26-18 @ 06:39]      Phos  2.9     [09-26-18 @ 06:39]      PT/INR: PT 12.7 , INR 1.12       [09-26-18 @ 08:06]  PTT: 32.2       [09-26-18 @ 08:06]      Creatinine Trend:  SCr 2.43 [09-26 @ 06:39]  SCr 2.70 [09-25 @ 08:38]        HbA1c 5.1      [08-18-15 @ 18:19]
Interventional Radiology Pre-Procedure Note    This is a 33y Male with right pelvic transplanted kidney, s/p right transplant kidney nephrostomy and ureteral stent. Ureteral stent was removed yesterday in the OR. Presenting for right transplant nephrostomy tube check and removal.    Procedure:    Diagnosis/Indication: Patient is a 33y old  Male who s/p right pelvic renal transplant, presenting for nephrostomy tube check and removal.    PAST MEDICAL & SURGICAL HISTORY:  H/O kidney transplant: double JJ stent in place  Nephrostomy status: capped nephrostomy tube in place  ESRD on dialysis: s/p cadaver kidney transplant recipient 7/23/2018  HTN (hypertension)  AV fistula: left arm fistula  H/O kidney transplant: 7/23/2018  H/O arthroscopy of right knee       CBC Full  -  ( 26 Sep 2018 08:00 )  WBC Count : 7.21 K/uL  Hemoglobin : 10.5 g/dL  Hematocrit : 33.4 %  Platelet Count - Automated : 200 K/uL  Mean Cell Volume : 84.8 fl  Mean Cell Hemoglobin : 26.6 pg  Mean Cell Hemoglobin Concentration : 31.4 gm/dL  Auto Neutrophil # : x  Auto Lymphocyte # : x  Auto Monocyte # : x  Auto Eosinophil # : x  Auto Basophil # : x  Auto Neutrophil % : x  Auto Lymphocyte % : x  Auto Monocyte % : x  Auto Eosinophil % : x  Auto Basophil % : x    09-26    137  |  113<H>  |  29<H>  ----------------------------<  109<H>  6.3<HH>   |  18<L>  |  2.43<H>    Ca    10.2      26 Sep 2018 06:39  Phos  2.9     09-26  Mg     1.4     09-26      PT/INR - ( 26 Sep 2018 08:06 )   PT: 12.7 sec;   INR: 1.12 ratio         PTT - ( 26 Sep 2018 08:06 )  PTT:32.2 sec    Procedure/ risks/ benefits were explained, informed consent obtained from patient, verbalizes understanding.
[facility] DIVISION OF KIDNEY DISEASES AND HYPERTENSION -- FOLLOW UP NOTE  --------------------------------------------------------------------------------  Chief Complaint:  s/p DDRT 7/23/18,  24 hour events/subjective:  Pt examined at bed side, not in acute distress. No overnight events. Had nephrostomy removed w/o complications.       PAST HISTORY  --------------------------------------------------------------------------------  No significant changes to PMH, PSH, FHx, SHx, unless otherwise noted    ALLERGIES & MEDICATIONS  --------------------------------------------------------------------------------  Allergies    IV Contrast (Rash)  nyquil (Rhinorrhea)  vancomycin (Pruritus; Hives)    Intolerances      Standing Inpatient Medications  aztreonam  IVPB 1000 milliGRAM(s) IV Intermittent every 12 hours  carvedilol 12.5 milliGRAM(s) Oral every 12 hours  cefepime   IVPB 2000 milliGRAM(s) IV Intermittent every 12 hours  docusate sodium 100 milliGRAM(s) Oral three times a day  famotidine    Tablet 20 milliGRAM(s) Oral daily  mycophenolate mofetil 1000 milliGRAM(s) Oral two times a day  NIFEdipine XL 30 milliGRAM(s) Oral <User Schedule>  nystatin    Suspension 118491 Unit(s) Oral every 6 hours  oxybutynin 5 milliGRAM(s) Oral every 12 hours  predniSONE   Tablet 5 milliGRAM(s) Oral daily  senna 2 Tablet(s) Oral at bedtime  tacrolimus 5 milliGRAM(s) Oral two times a day  tamsulosin 0.4 milliGRAM(s) Oral at bedtime  trimethoprim   80 mG/sulfamethoxazole 400 mG 1 Tablet(s) Oral daily  valGANciclovir 450 milliGRAM(s) Oral <User Schedule>    PRN Inpatient Medications      REVIEW OF SYSTEMS  --------------------------------------------------------------------------------  Gen: No weight changes, fatigue, fevers/chills, weakness  Skin: No rashes  Head/Eyes/Ears/Mouth: No headache; Normal hearing; Normal vision w/o blurriness; No sinus pain/discomfort, sore throat  Respiratory: No dyspnea, cough, wheezing, hemoptysis  CV: No chest pain, PND, orthopnea  GI: No abdominal pain, constipation, diarrhea, nausea, vomiting, melena, hematochezia.   : voiding  MSK: No joint pain/swelling; no back pain; no edema  Neuro: No dizziness/lightheadedness, weakness, seizures, numbness, tingling  Heme: No easy bruising or bleeding  Endo: No heat/cold intolerance  Psych: No significant nervousness, anxiety, stress, depression    All other systems were reviewed and are negative, except as noted.    VITALS/PHYSICAL EXAM  --------------------------------------------------------------------------------  T(C): 36.6 (09-27-18 @ 15:25), Max: 36.8 (09-26-18 @ 18:43)  HR: 81 (09-27-18 @ 15:25) (73 - 83)  BP: 142/90 (09-27-18 @ 15:25) (137/93 - 150/98)  RR: 18 (09-27-18 @ 15:25) (18 - 18)  SpO2: 99% (09-27-18 @ 15:25) (93% - 99%)  Wt(kg): --        09-26-18 @ 07:01  -  09-27-18 @ 07:00  --------------------------------------------------------  IN: 300 mL / OUT: 1100 mL / NET: -800 mL      Physical Exam:  	Gen: NAD, well-appearing  	HEENT: PERRL, supple neck, clear oropharynx  	Pulm: CTA B/L  	CV: RRR, S1S2; no rub  	Abd: +BS, soft, nontender/nondistended.   	: No suprapubic tenderness  	UE: Warm, FROM, no clubbing, intact strength; no edema; no asterixis  	LE: Warm, FROM, no clubbing, intact strength; no edema  	Neuro: No focal deficits, intact gait  	Psych: Normal affect and mood  	Skin: Warm, without rashes    LABS/STUDIES  --------------------------------------------------------------------------------              11.4   6.8   >-----------<  190      [09-27-18 @ 06:10]              35.5     134  |  108  |  37  ----------------------------<  103      [09-27-18 @ 06:10]  4.8   |  16  |  2.48        Ca     10.9     [09-27-18 @ 06:10]      Mg     1.8     [09-27-18 @ 06:10]      Phos  3.1     [09-27-18 @ 06:10]      PT/INR: PT 12.3 , INR 1.14       [09-27-18 @ 12:19]  PTT: 32.2       [09-26-18 @ 08:06]    Creatinine Trend:  SCr 2.48 [09-27 @ 06:10]  SCr 2.47 [09-26 @ 16:21]  SCr 2.43 [09-26 @ 06:39]  SCr 2.70 [09-25 @ 08:38]
Transplant Surgery - Multidisciplinary Rounds  --------------------------------------------------------------  DDRT   7/23/18    Present:  Patient seen with multidisciplinary team (surgeon, Nephrologist, pharmacist, nurse, nurse manager, NP, renal fellow MD)  in am rounds and examined with Dr. Valdes. 32 y/o gentleman with h/o HTN and ESRD s/p DDRT 7/23/18 with pos-operative course complicated bu ureteral leak requiring double J stent and nephrostomy tube placement.  He is now admitted for removal of stent in OR followed by nephrostomy tube removal in IR.    No acute events overnight. Treated for hyperkalemia with insulin and dextrose. Ureteral stent removed in OR. Remained NPO after MN for nephrostomy removal in IR today. Started on Aztreonam and Cefepime for acute on chronic pyelonephritis.    Potential Discharge date: 9/27    Education: plan of care    Plan of care: see below      MEDICATIONS  (STANDING):  aztreonam  IVPB 1000 milliGRAM(s) IV Intermittent every 12 hours  carvedilol 12.5 milliGRAM(s) Oral every 12 hours  cefepime   IVPB 2000 milliGRAM(s) IV Intermittent every 12 hours  docusate sodium 100 milliGRAM(s) Oral three times a day  famotidine    Tablet 20 milliGRAM(s) Oral daily  mycophenolate mofetil 1000 milliGRAM(s) Oral two times a day  NIFEdipine XL 30 milliGRAM(s) Oral <User Schedule>  nystatin    Suspension 775386 Unit(s) Oral every 6 hours  oxybutynin 5 milliGRAM(s) Oral every 12 hours  predniSONE   Tablet 5 milliGRAM(s) Oral daily  senna 2 Tablet(s) Oral at bedtime  sodium chloride 0.9%. 1000 milliLiter(s) (75 mL/Hr) IV Continuous <Continuous>  tacrolimus 5 milliGRAM(s) Oral two times a day  tamsulosin 0.4 milliGRAM(s) Oral at bedtime  trimethoprim   80 mG/sulfamethoxazole 400 mG 1 Tablet(s) Oral daily  valGANciclovir 450 milliGRAM(s) Oral <User Schedule>    MEDICATIONS  (PRN):      PAST MEDICAL & SURGICAL HISTORY:  H/O kidney transplant: double JJ stent in place  Nephrostomy status: capped nephrostomy tube in place  ESRD on dialysis: s/p cadaver kidney transplant recipient 7/23/2018  HTN (hypertension)  AV fistula: left arm fistula  H/O kidney transplant: 7/23/2018  H/O arthroscopy of right knee      Vital Signs Last 24 Hrs  T(C): 36.9 (26 Sep 2018 13:30), Max: 36.9 (26 Sep 2018 13:30)  T(F): 98.4 (26 Sep 2018 13:30), Max: 98.4 (26 Sep 2018 13:30)  HR: 70 (26 Sep 2018 13:30) (57 - 77)  BP: 145/95 (26 Sep 2018 13:30) (125/75 - 161/98)  BP(mean): 95 (25 Sep 2018 17:00) (95 - 104)  RR: 18 (26 Sep 2018 13:30) (16 - 18)  SpO2: 98% (26 Sep 2018 13:30) (96% - 100%)    I&O's Summary    25 Sep 2018 07:01  -  26 Sep 2018 07:00  --------------------------------------------------------  IN: 2095 mL / OUT: 1675 mL / NET: 420 mL                              10.5   7.21  )-----------( 200      ( 26 Sep 2018 08:00 )             33.4     09-26    137  |  113<H>  |  29<H>  ----------------------------<  109<H>  6.3<HH>   |  18<L>  |  2.43<H>    Ca    10.2      26 Sep 2018 06:39  Phos  2.9     09-26  Mg     1.4     09-26      Tacrolimus (), Serum: 9.0 ng/mL (09-26 @ 08:00)    Review of systems  Gen: No weight changes, fatigue, fevers/chills, weakness  Skin: No rashes  Head/Eyes/Ears/Mouth: No headache; Normal hearing; Normal vision w/o blurriness; No sinus pain/discomfort, sore throat  Respiratory: No dyspnea, cough, wheezing, hemoptysis  CV: No chest pain, PND, orthopnea  GI: No abdominal pain, constipation, diarrhea, nausea, vomiting, melena, hematochezia.   : voiding  MSK: No joint pain/swelling; no back pain; no edema  Neuro: No dizziness/lightheadedness, weakness, seizures, numbness, tingling  Heme: No easy bruising or bleeding  Endo: No heat/cold intolerance  Psych: No significant nervousness, anxiety, stress, depression  All other systems were reviewed and are negative, except as noted.    PHYSICAL EXAM:  Constitutional: Well developed / well nourished  Eyes: Anicteric, PERRLA  ENMT: nc/at  Neck: supple  Respiratory: Clear BL  Cardiovascular: RRR  Gastrointestinal: Soft abdomen, ND, NT  Genitourinary: voiding  Extremities: SCD's in place and working bilaterally  Vascular: Palpable dp pulses bilaterally  Neurological: A&O x3  Skin: wound healed  Musculoskeletal: Moving all extremities  Psychiatric: Responsive
Transplant Surgery - Multidisciplinary Rounds  --------------------------------------------------------------  DDRT   7/23/18    Present:  Patient seen with multidisciplinary team (surgeon, Nephrologist, pharmacist, nurse, nurse manager, NP, renal fellow MD)  in am rounds and examined with Dr. Valdes. 34 y/o gentleman with h/o HTN and ESRD s/p DDRT 7/23/18 with pos-operative course complicated bu ureteral leak rquiring double J stent and nephrostomy tube placement.  He is now admitted for removal of stent in OR followed by nephrostomy tube removal in IR.    Potential Discharge date: 9/27    Education: plan of care    Plan of care: see below    MEDICATIONS  (STANDING):  aztreonam  IVPB 1000 milliGRAM(s) IV Intermittent every 12 hours  carvedilol 12.5 milliGRAM(s) Oral every 12 hours  cefepime   IVPB 2000 milliGRAM(s) IV Intermittent every 12 hours  docusate sodium 100 milliGRAM(s) Oral three times a day  famotidine    Tablet 20 milliGRAM(s) Oral daily  lidocaine 1% Injectable 0.2 milliLiter(s) Local Injection once  mycophenolate mofetil 1000 milliGRAM(s) Oral two times a day  NIFEdipine XL 30 milliGRAM(s) Oral <User Schedule>  nystatin    Suspension 319577 Unit(s) Oral every 6 hours  oxybutynin 5 milliGRAM(s) Oral every 12 hours  potassium acid phosphate/sodium acid phosphate tablet (K-PHOS No. 2) 1 Tablet(s) Oral every 12 hours  predniSONE   Tablet 5 milliGRAM(s) Oral daily  senna 2 Tablet(s) Oral at bedtime  sodium chloride 0.9% lock flush 3 milliLiter(s) IV Push every 8 hours  sodium chloride 0.9%. 1000 milliLiter(s) (100 mL/Hr) IV Continuous <Continuous>  tacrolimus 5 milliGRAM(s) Oral two times a day  tamsulosin 0.4 milliGRAM(s) Oral at bedtime    MEDICATIONS  (PRN):      PAST MEDICAL & SURGICAL HISTORY:  H/O kidney transplant: double JJ stent in place  Nephrostomy status: capped nephrostomy tube in place  ESRD on dialysis: s/p cadaver kidney transplant recipient  HTN (hypertension)  AV fistula: left arm fistula  H/O kidney transplant: 7/23/2018  H/O arthroscopy of right knee      Vital Signs Last 24 Hrs  T(C): 36.8 (25 Sep 2018 10:23), Max: 36.9 (24 Sep 2018 20:30)  T(F): 98.2 (25 Sep 2018 10:23), Max: 98.5 (24 Sep 2018 20:30)  HR: 69 (25 Sep 2018 10:23) (67 - 79)  BP: 132/87 (25 Sep 2018 10:23) (125/84 - 162/98)  BP(mean): --  RR: 18 (25 Sep 2018 10:23) (18 - 18)  SpO2: 99% (25 Sep 2018 10:23) (97% - 99%)    I&O's Summary    24 Sep 2018 07:01  -  25 Sep 2018 07:00  --------------------------------------------------------  IN: 800 mL / OUT: 250 mL / NET: 550 mL    25 Sep 2018 07:01  -  25 Sep 2018 11:31  --------------------------------------------------------  IN: 0 mL / OUT: 225 mL / NET: -225 mL                              12.5   6.4   )-----------( 211      ( 25 Sep 2018 08:38 )             39.2     09-25    138  |  112<H>  |  33<H>  ----------------------------<  100<H>  5.9<H>   |  17<L>  |  2.70<H>    Ca    10.9<H>      25 Sep 2018 08:38      Review of systems  Gen: No weight changes, fatigue, fevers/chills, weakness  Skin: No rashes  Head/Eyes/Ears/Mouth: No headache; Normal hearing; Normal vision w/o blurriness; No sinus pain/discomfort, sore throat  Respiratory: No dyspnea, cough, wheezing, hemoptysis  CV: No chest pain, PND, orthopnea  GI: No abdominal pain, constipation, diarrhea, nausea, vomiting, melena, hematochezia.   : voiding  MSK: No joint pain/swelling; no back pain; no edema  Neuro: No dizziness/lightheadedness, weakness, seizures, numbness, tingling  Heme: No easy bruising or bleeding  Endo: No heat/cold intolerance  Psych: No significant nervousness, anxiety, stress, depression  All other systems were reviewed and are negative, except as noted.    PHYSICAL EXAM:  Constitutional: Well developed / well nourished  Eyes: Anicteric, PERRLA  ENMT: nc/at  Neck: supple  Respiratory: Clear BL  Cardiovascular: RRR  Gastrointestinal: Soft abdomen, ND, NT  Genitourinary: voiding  Extremities: SCD's in place and working bilaterally  Vascular: Palpable dp pulses bilaterally  Neurological: A&O x3  Skin: wound healed  Musculoskeletal: Moving all extremities  Psychiatric: Responsive
Vascular & Interventional Radiology Post-Procedure Note    Pre-Procedure Diagnosis:  pyelonephritis  Post-Procedure Diagnosis: Same as pre.  Indications for Procedure:  long term antibiotics    : misa  Assistant(s):    Procedure Details/Findings: 5 fr single lumen tunneled catheter via R IJ. ok for use. tip at SVC/RA junction     Complications:  none  Estimated Blood Loss: Minimal  Specimen:  none  Contrast:  none  Sedation:  none  Patient Condition/Disposition: to floor    Plan:  to floor
Vascular & Interventional Radiology Pre-Procedure Note    Procedure Name: __Hickman catheter___    HPI: 33y Male with h/o HTN and ESRD s/p DDRT 18 with post-operative course complicated by ureteral leak requiring placement of double J stent and nephrostomy tube status post removal of ureteral stent and nephrostomy tube, currently on Aztreonam and Cefepime for pyelonephritis.    Allergies: IV Contrast (Rash)  nyquil (Rhinorrhea)  vancomycin (Pruritus; Hives)    Medications:    aztreonam  IVPB: 50 mL/Hr IV Intermittent ( @ 12:11)  carvedilol: 12.5 milliGRAM(s) Oral ( @ 06:23)  cefepime   IVPB: 100 mL/Hr IV Intermittent ( @ 22:39)  furosemide   Injectable: 80 milliGRAM(s) IV Push ( @ 11:01)  NIFEdipine XL: 30 milliGRAM(s) Oral ( @ 06:23)  nystatin    Suspension: 810130 Unit(s) Oral ( @ 06:24)  tamsulosin: 0.4 milliGRAM(s) Oral ( @ 21:37)  trimethoprim   80 mG/sulfamethoxazole 400 m Tablet(s) Oral ( @ 14:02)  trimethoprim   80 mG/sulfamethoxazole 400 m Tablet(s) Oral ( @ 12:12)  valGANciclovir: 450 milliGRAM(s) Oral ( @ 06:07)    Data:    T(C): 36.3  HR: 83  BP: 149/82  RR: 18  SpO2: 99%    -WBC 6.8 / HgB 11.4 / Hct 35.5 / Plt 190  -Na 134 / Cl 108 / BUN 37 / Glucose 103  -K 4.8 / CO2 16 / Cr 2.48  -ALT -- / Alk Phos -- / T.Bili --  -INR1.14    Plan:   -33y Male presents for ___Hickman catheter for IV antibiotics____  -Risks/Benefits/alternatives explained with the patient and/or healthcare proxy and witnessed informed consent obtained.
post op check note surgery     Subjective: pt was seen and examined.  mild pain in the urethrae. small amount of blood when pt initiates urine. no nausea or vomiting.       Vital Signs Last 24 Hrs  T(C): 36.8 (25 Sep 2018 21:03), Max: 36.8 (25 Sep 2018 07:39)  T(F): 98.2 (25 Sep 2018 21:03), Max: 98.2 (25 Sep 2018 07:39)  HR: 70 (25 Sep 2018 21:03) (57 - 77)  BP: 144/90 (25 Sep 2018 21:03) (125/75 - 162/98)  BP(mean): 95 (25 Sep 2018 17:00) (95 - 104)  RR: 18 (25 Sep 2018 21:03) (16 - 18)  SpO2: 100% (25 Sep 2018 21:03) (96% - 100%)    I&O's Detail    24 Sep 2018 07:01  -  25 Sep 2018 07:00  --------------------------------------------------------  IN:    sodium chloride 0.9%: 800 mL  Total IN: 800 mL    OUT:    Voided: 250 mL  Total OUT: 250 mL    Total NET: 550 mL      25 Sep 2018 07:01  -  25 Sep 2018 21:20  --------------------------------------------------------  IN:    Oral Fluid: 720 mL    sodium chloride 0.9%: 700 mL    sodium chloride 0.9%.: 75 mL  Total IN: 1495 mL    OUT:    Voided: 1325 mL  Total OUT: 1325 mL    Total NET: 170 mL          MEDICATIONS  (STANDING):  aztreonam  IVPB 1000 milliGRAM(s) IV Intermittent every 12 hours  carvedilol 12.5 milliGRAM(s) Oral every 12 hours  cefepime   IVPB 2000 milliGRAM(s) IV Intermittent every 12 hours  docusate sodium 100 milliGRAM(s) Oral three times a day  famotidine    Tablet 20 milliGRAM(s) Oral daily  mycophenolate mofetil 1000 milliGRAM(s) Oral two times a day  NIFEdipine XL 30 milliGRAM(s) Oral <User Schedule>  nystatin    Suspension 574001 Unit(s) Oral every 6 hours  oxybutynin 5 milliGRAM(s) Oral every 12 hours  predniSONE   Tablet 5 milliGRAM(s) Oral daily  senna 2 Tablet(s) Oral at bedtime  tacrolimus 5 milliGRAM(s) Oral two times a day  tamsulosin 0.4 milliGRAM(s) Oral at bedtime  trimethoprim   80 mG/sulfamethoxazole 400 mG 1 Tablet(s) Oral daily  valGANciclovir 450 milliGRAM(s) Oral <User Schedule>    MEDICATIONS  (PRN):      LABS:                        12.5   6.4   )-----------( 211      ( 25 Sep 2018 08:38 )             39.2     09-25    138  |  112<H>  |  33<H>  ----------------------------<  100<H>  5.9<H>   |  17<L>  |  2.70<H>    Ca    10.9<H>      25 Sep 2018 08:38      PT/INR - ( 25 Sep 2018 08:38 )   PT: 12.3 sec;   INR: 1.13 ratio         PTT - ( 25 Sep 2018 08:38 )  PTT:34.2 sec
Transplant Surgery - Multidisciplinary Rounds  --------------------------------------------------------------  DDRT / LDRT POD#  Present:  Patient seen with multidisciplinary team including ( Transplant Surgeon: Dr. Hdz, Dr. Diaz, Dr. Valdes, third year resident Meghana Zimmerman, Transplant Nephrologist: Dr. Higgins, renal fellow, NP Deepthi Tello, Pharmacist: Manav Le, RN Lynne, RN manager Lucy) in am rounds and examined with Dr. Diaz. 32 y/o gentleman with h/o HTN and ESRD s/p DDRT 7/23/18 with pos-operative course complicated bu ureteral leak requiring double J stent and nephrostomy tube placement. S/P stent in OR followed by nephrostomy tube removal in IR.    No acute events overnight.   On Aztreonam and Cefepime for acute on chronic pyelonephritis.    Potential Discharge date: 9/28    Education: plan of care    Plan of care: see below      MEDICATIONS  (STANDING):  aztreonam  IVPB 1000 milliGRAM(s) IV Intermittent every 12 hours  carvedilol 12.5 milliGRAM(s) Oral every 12 hours  cefepime   IVPB 2000 milliGRAM(s) IV Intermittent every 12 hours  docusate sodium 100 milliGRAM(s) Oral three times a day  famotidine    Tablet 20 milliGRAM(s) Oral daily  mycophenolate mofetil 1000 milliGRAM(s) Oral two times a day  NIFEdipine XL 30 milliGRAM(s) Oral <User Schedule>  nystatin    Suspension 026629 Unit(s) Oral every 6 hours  oxybutynin 5 milliGRAM(s) Oral every 12 hours  predniSONE   Tablet 5 milliGRAM(s) Oral daily  senna 2 Tablet(s) Oral at bedtime  tacrolimus 5 milliGRAM(s) Oral two times a day  tamsulosin 0.4 milliGRAM(s) Oral at bedtime  trimethoprim   80 mG/sulfamethoxazole 400 mG 1 Tablet(s) Oral daily  valGANciclovir 450 milliGRAM(s) Oral <User Schedule>    MEDICATIONS  (PRN):      PAST MEDICAL & SURGICAL HISTORY:  H/O kidney transplant: double JJ stent in place  Nephrostomy status: capped nephrostomy tube in place  ESRD on dialysis: s/p cadaver kidney transplant recipient 7/23/2018  HTN (hypertension)  AV fistula: left arm fistula  H/O kidney transplant: 7/23/2018  H/O arthroscopy of right knee      Vital Signs Last 24 Hrs  T(C): 36.3 (27 Sep 2018 05:35), Max: 36.9 (26 Sep 2018 13:30)  T(F): 97.4 (27 Sep 2018 05:35), Max: 98.4 (26 Sep 2018 13:30)  HR: 83 (27 Sep 2018 05:35) (70 - 83)  BP: 149/82 (27 Sep 2018 05:35) (137/93 - 150/98)  BP(mean): --  RR: 18 (27 Sep 2018 05:35) (18 - 18)  SpO2: 99% (27 Sep 2018 05:35) (98% - 99%)    I&O's Summary    26 Sep 2018 07:01  -  27 Sep 2018 07:00  --------------------------------------------------------  IN: 300 mL / OUT: 1100 mL / NET: -800 mL                              11.4   6.8   )-----------( 190      ( 27 Sep 2018 06:10 )             35.5     09-27    134<L>  |  108  |  37<H>  ----------------------------<  103<H>  4.8   |  16<L>  |  2.48<H>    Ca    10.9<H>      27 Sep 2018 06:10  Phos  3.1     09-27  Mg     1.8     09-27      Tacrolimus (), Serum: 9.6 ng/mL (09-27 @ 08:45)    Review of systems  Gen: No weight changes, fatigue, fevers/chills, weakness  Skin: No rashes  Head/Eyes/Ears/Mouth: No headache; Normal hearing; Normal vision w/o blurriness; No sinus pain/discomfort, sore throat  Respiratory: No dyspnea, cough, wheezing, hemoptysis  CV: No chest pain, PND, orthopnea  GI: No abdominal pain, constipation, diarrhea, nausea, vomiting, melena, hematochezia.   : voiding  MSK: No joint pain/swelling; no back pain; no edema  Neuro: No dizziness/lightheadedness, weakness, seizures, numbness, tingling  Heme: No easy bruising or bleeding  Endo: No heat/cold intolerance  Psych: No significant nervousness, anxiety, stress, depression  All other systems were reviewed and are negative, except as noted.    PHYSICAL EXAM:  Constitutional: Well developed / well nourished  Eyes: Anicteric, PERRLA  ENMT: nc/at  Neck: supple  Respiratory: Clear BL  Cardiovascular: RRR  Gastrointestinal: Soft abdomen, ND, NT  Genitourinary: voiding  Extremities: SCD's in place and working bilaterally  Vascular: Palpable dp pulses bilaterally  Neurological: A&O x3  Skin: wound healed  Musculoskeletal: Moving all extremities  Psychiatric: Responsive

## 2018-09-27 NOTE — DISCHARGE NOTE ADULT - MEDICATION SUMMARY - MEDICATIONS TO STOP TAKING
I will STOP taking the medications listed below when I get home from the hospital:    sodium bicarbonate 650 mg oral tablet  -- 2 tab(s) by mouth 2 times a day

## 2018-09-27 NOTE — DISCHARGE NOTE ADULT - MEDICATION SUMMARY - MEDICATIONS TO TAKE
I will START or STAY ON the medications listed below when I get home from the hospital:    predniSONE 5 mg oral tablet  -- 1 tab(s) by mouth once a day  -- Indication: For Immunosuppression    tamsulosin 0.4 mg oral capsule  -- 1 cap(s) by mouth once a day (at bedtime)  -- Indication: For gu    nystatin 100,000 units/mL oral suspension  -- 5 milliliter(s) by mouth every 6 hours  -- Indication: For Ppx    valGANciclovir 450 mg oral tablet  -- 1 tab(s) by mouth   -- Indication: For Ppx    carvedilol 12.5 mg oral tablet  -- 1 tab(s) by mouth every 12 hours  -- Indication: For HTN (hypertension)    NIFEdipine 30 mg oral tablet, extended release  -- 1 tab(s) by mouth   -- Indication: For HTN (hypertension)    cefepime 2 g intravenous injection  -- 2 gram(s) intravenous every 12 hours  for 13 days total of 26grams. End date 10/9/18  -- Indication: For Infaction    famotidine 20 mg oral tablet  -- 1 tab(s) by mouth once a day  -- Indication: For gi    tacrolimus 5 mg oral capsule  -- 1 cap(s) by mouth 2 times a day  -- Indication: For Immunosuppression    mycophenolate mofetil 250 mg oral capsule  --  1000mg by mouth two times daily  -- Indication: For Immunosuppression    docusate sodium 100 mg oral capsule  -- 1 cap(s) by mouth 3 times a day  -- Indication: For gi    senna oral tablet  -- 2 tab(s) by mouth once a day (at bedtime)  -- Indication: For gi    sulfamethoxazole-trimethoprim 400 mg-80 mg oral tablet  -- 1 tab(s) by mouth once a day  -- Indication: For Ppx    oxybutynin 5 mg oral tablet  -- 1 tab(s) by mouth every 12 hours  -- Indication: For gu

## 2018-09-27 NOTE — PROGRESS NOTE ADULT - PROBLEM SELECTOR PROBLEM 1
Renal transplant recipient
Pyelonephritis

## 2018-09-27 NOTE — PROGRESS NOTE ADULT - ATTENDING COMMENTS
Allograft function and Immunosuppression Reviewed.  Will discharge on IV cefepime  F/u in Transplant clinic  DSA sent  I was present during and reviewed clinical and lab data as well as assessment and plan as documented by the house staff as noted. Please contact if any additional questions with any change in clinical condition or on availability of any additional information or reports.
S/p Ureteral stent removal  Reviewed allograft function and immunosuppression.  I was present during and reviewed clinical and lab data as well as assessment and plan as documented by the house staff as noted. Please contact if any additional questions with any change in clinical condition or on availability of any additional information or reports.  Plan:  Continue IV antibiotics  Will follow
Seen and assessed on 9/26.     sp stent removal.  IR to take out neph tube.     Immuno: tac goal 8-10, cellcept 1gm BID, pred 5mg
s/p DDRT with kidney biopsy showing pyelonephritis, receiving IV abx  Immunosuppression- Cont Prograf 5mg bid; Prednisone 5mg daily; Cellcept 1gm bid  Will check tacrolimus level and adjust dose accordingly  IR for catheter to receive abx at home, then discharge home with VNS

## 2018-09-27 NOTE — PROGRESS NOTE ADULT - PROBLEM SELECTOR PLAN 2
Continue Nystatin, Valcyte, Cellcept, Prednisone, Tacrolimus  Daily Tac levels, goal 8-10.
Continue Nystatin, Valcyte, Cellcept, Prednisone, Tacrolimus  Daily Tac levels, goal 8-10
Continue Nystatin, Valcyte, Cellcept, Prednisone, Tacrolimus  Daily Tac levels, goal 8-10.
Continue Nystatin, Valcyte, Cellcept, Prednisone, Tacrolimus  Daily Tac levels, goal 8-10.
Stable creatinine with good uop  9/25 stent removal  9/26 Nephrostomy tube removal  Strict I/Os  Send DSA today  Continue Flomax, Ditropan.

## 2018-09-27 NOTE — DISCHARGE NOTE ADULT - CARE PROVIDERS DIRECT ADDRESSES
[email or web address]

## 2018-09-27 NOTE — DISCHARGE NOTE ADULT - CARE PROVIDER_API CALL
Jose Jean (DO), Nephrology  [address] [place] [address]  Phone: [phone]  Fax: [phone]    Johnie Valdes), Surgery  [address] [place] [address]  Phone: [phone]  Fax: [phone]

## 2018-09-27 NOTE — PROGRESS NOTE ADULT - PROBLEM SELECTOR PLAN 1
S/P DDRT 07/23 c/b post-operative ureteral leak, needing nephrostomy and stent placement.   Allograft functioning well.   IR today for nephrostomy removal.   Monitor I&o  Continue with IV fluids.
S/P DDRT 07/23 c/b post-operative ureteral leak, needing nephrostomy and stent placement.   Allograft functioning well.   Nephrostomy and stent was removed. Good urine output.   Monitor I&o  DC plannnig for today
Stable creatinine with good uop  9/25 stent removal  9/26 Nephrostomy tube removal  Strict I/Os  Continue Flomax, Ditropan
Stable creatinine with good uop  Plan OR today for stent removal  Plan IR tomorrow for nephrostomy tube removal  Start Cefepime and Aztreonam for bacteria noted on biopsy  NPO for OR  IVF while NPO  Strict I/Os  Continue Flomax
Continue Cefepime and Aztreonam for acute on chronic pyelonephritis   Will place Reeves catheter by IR for long term antibiotics today.  Plan for discharge today on cefepime

## 2018-09-27 NOTE — PROGRESS NOTE ADULT - ASSESSMENT
33 year old male with a PMH of ESRD on HD (MWF) from LUE AVF, HTN s/p DDRT 7/23/18, admitted for nephrostomy and double J stent removal.
32 y/o gentleman with h/o HTN and ESRD s/p DDRT 7/23/18 with post-operative course complicated by ureteral leak requiring placement of double J stent and nephrostomy tube.  He is now admitted for removal of stent in OR followed by nephrostomy tube removal in IR.
32 yo st Cystoscopic removal of right ureteral stent    - pain management   - monitor urine quality and output
33 year old male with a PMH of ESRD on HD (MWF) from LUE AVF, HTN s/p DDRT 7/23/18, admitted for nephrostomy and double J stent removal.
34 y/o gentleman with h/o HTN and ESRD s/p DDRT 7/23/18 with post-operative course complicated by ureteral leak requiring placement of double J stent and nephrostomy tube now doing well s/p removal of ureteral stent and nephrostomy tube, currently on Aztreonam and Cefepime for pyelonephritis.
32 y/o gentleman with h/o HTN and ESRD s/p DDRT 7/23/18 with post-operative course complicated by ureteral leak requiring placement of double J stent and nephrostomy tube now doing well s/p removal of ureteral stent and nephrostomy tube, currently on Aztreonam and Cefepime for pyelonephritis. Discharge planning in progress for today post IR placement of osorio catheter for IV antibiotics.

## 2018-09-27 NOTE — PROGRESS NOTE ADULT - PROBLEM SELECTOR PLAN 4
BP well controlled.  Continue Carvedilol and Nifedipine.
BP controlled.
BP controlled.
BP well controlled.  Continue Carvedilol and Nifedipine.

## 2018-09-27 NOTE — DISCHARGE NOTE ADULT - NSFTFHOME1RD_GEN_ALL_CORE
Pain greater than 7 on scale of 10 on ambulation/Chest pain/weakness during/after ambulation greater than 20 feet

## 2018-10-01 ENCOUNTER — APPOINTMENT (OUTPATIENT)
Dept: NEPHROLOGY | Facility: CLINIC | Age: 33
End: 2018-10-01
Payer: MEDICARE

## 2018-10-01 VITALS
BODY MASS INDEX: 25.16 KG/M2 | SYSTOLIC BLOOD PRESSURE: 154 MMHG | HEIGHT: 65 IN | OXYGEN SATURATION: 99 % | TEMPERATURE: 97.4 F | RESPIRATION RATE: 16 BRPM | WEIGHT: 151 LBS | HEART RATE: 62 BPM | DIASTOLIC BLOOD PRESSURE: 97 MMHG

## 2018-10-01 LAB
ALBUMIN SERPL ELPH-MCNC: 4.7 G/DL
ALP BLD-CCNC: 65 U/L
ALT SERPL-CCNC: 14 U/L
ANION GAP SERPL CALC-SCNC: 9 MMOL/L
APPEARANCE: CLEAR
AST SERPL-CCNC: 15 U/L
BACTERIA UR CULT: NORMAL
BACTERIA: NEGATIVE
BASOPHILS # BLD AUTO: 0.01 K/UL
BASOPHILS NFR BLD AUTO: 0.2 %
BILIRUB SERPL-MCNC: 0.2 MG/DL
BILIRUBIN URINE: NEGATIVE
BKV DNA SPEC QL NAA+PROBE: NORMAL
BLOOD URINE: NEGATIVE
BUN SERPL-MCNC: 37 MG/DL
CALCIUM SERPL-MCNC: 11.4 MG/DL
CHLORIDE SERPL-SCNC: 109 MMOL/L
CO2 SERPL-SCNC: 17 MMOL/L
COLOR: YELLOW
CREAT SERPL-MCNC: 2.1 MG/DL
CREAT SPEC-SCNC: 50 MG/DL
CREAT/PROT UR: 0.2 RATIO
EOSINOPHIL # BLD AUTO: 0.21 K/UL
EOSINOPHIL NFR BLD AUTO: 3.2 %
GLUCOSE QUALITATIVE U: NEGATIVE MG/DL
GLUCOSE SERPL-MCNC: 93 MG/DL
HCT VFR BLD CALC: 36.7 %
HGB BLD-MCNC: 11.4 G/DL
IMM GRANULOCYTES NFR BLD AUTO: 0.5 %
KETONES URINE: NEGATIVE
LDH SERPL-CCNC: 177 U/L
LEUKOCYTE ESTERASE URINE: NEGATIVE
LYMPHOCYTES # BLD AUTO: 1.8 K/UL
LYMPHOCYTES NFR BLD AUTO: 27.8 %
MAGNESIUM SERPL-MCNC: 1.5 MG/DL
MAN DIFF?: NORMAL
MCHC RBC-ENTMCNC: 26.3 PG
MCHC RBC-ENTMCNC: 31.1 GM/DL
MCV RBC AUTO: 84.6 FL
MICROSCOPIC-UA: NORMAL
MONOCYTES # BLD AUTO: 0.43 K/UL
MONOCYTES NFR BLD AUTO: 6.6 %
NEUTROPHILS # BLD AUTO: 4 K/UL
NEUTROPHILS NFR BLD AUTO: 61.7 %
NITRITE URINE: NEGATIVE
PH URINE: 6
PHOSPHATE SERPL-MCNC: 2.5 MG/DL
PLATELET # BLD AUTO: 208 K/UL
POTASSIUM SERPL-SCNC: 5.8 MMOL/L
PROT SERPL-MCNC: 7.2 G/DL
PROT UR-MCNC: 8 MG/DL
PROTEIN URINE: NEGATIVE MG/DL
RBC # BLD: 4.34 M/UL
RBC # FLD: 14.2 %
RED BLOOD CELLS URINE: 0 /HPF
SODIUM SERPL-SCNC: 135 MMOL/L
SPECIFIC GRAVITY URINE: 1.01
SQUAMOUS EPITHELIAL CELLS: 0 /HPF
TACROLIMUS SERPL-MCNC: 9.9 NG/ML
URATE SERPL-MCNC: 6.6 MG/DL
UROBILINOGEN URINE: NEGATIVE MG/DL
WBC # FLD AUTO: 6.48 K/UL
WHITE BLOOD CELLS URINE: 1 /HPF

## 2018-10-01 PROCEDURE — 99215 OFFICE O/P EST HI 40 MIN: CPT

## 2018-10-01 RX ORDER — CIPROFLOXACIN HYDROCHLORIDE 500 MG/1
500 TABLET, FILM COATED ORAL
Qty: 28 | Refills: 0 | Status: DISCONTINUED | COMMUNITY
Start: 2018-09-14 | End: 2018-10-01

## 2018-10-01 RX ORDER — DOCUSATE SODIUM 100 MG/1
100 CAPSULE ORAL
Qty: 180 | Refills: 0 | Status: DISCONTINUED | COMMUNITY
Start: 2018-07-26 | End: 2018-10-01

## 2018-10-04 LAB — BKV DNA SPEC QL NAA+PROBE: NORMAL

## 2018-10-08 RX ORDER — CEFEPIME HYDROCHLORIDE 1 G/1
1 INJECTION, POWDER, FOR SOLUTION INTRAMUSCULAR; INTRAVENOUS
Refills: 0 | Status: DISCONTINUED | COMMUNITY
Start: 2018-10-01 | End: 2018-10-08

## 2018-10-10 ENCOUNTER — APPOINTMENT (OUTPATIENT)
Dept: TRANSPLANT | Facility: CLINIC | Age: 33
End: 2018-10-10
Payer: MEDICARE

## 2018-10-10 VITALS
TEMPERATURE: 97.8 F | WEIGHT: 149 LBS | OXYGEN SATURATION: 100 % | DIASTOLIC BLOOD PRESSURE: 90 MMHG | HEIGHT: 65 IN | SYSTOLIC BLOOD PRESSURE: 149 MMHG | BODY MASS INDEX: 24.83 KG/M2 | RESPIRATION RATE: 14 BRPM | HEART RATE: 73 BPM

## 2018-10-10 PROCEDURE — 99213 OFFICE O/P EST LOW 20 MIN: CPT | Mod: 24

## 2018-10-11 LAB
ALBUMIN SERPL ELPH-MCNC: 4.7 G/DL
ALP BLD-CCNC: 61 U/L
ALT SERPL-CCNC: 10 U/L
ANION GAP SERPL CALC-SCNC: 10 MMOL/L
APPEARANCE: CLEAR
AST SERPL-CCNC: 19 U/L
BACTERIA: NEGATIVE
BASOPHILS # BLD AUTO: 0 K/UL
BASOPHILS NFR BLD AUTO: 0 %
BILIRUB SERPL-MCNC: 0.2 MG/DL
BILIRUBIN URINE: NEGATIVE
BLOOD URINE: ABNORMAL
BUN SERPL-MCNC: 28 MG/DL
CALCIUM SERPL-MCNC: 11.5 MG/DL
CHLORIDE SERPL-SCNC: 108 MMOL/L
CMV DNA SPEC QL NAA+PROBE: NOT DETECTED IU/ML
CMVPCR LOG: NOT DETECTED LOGIU/ML
CO2 SERPL-SCNC: 19 MMOL/L
COLOR: YELLOW
CREAT SERPL-MCNC: 2.03 MG/DL
CREAT SPEC-SCNC: 115 MG/DL
CREAT/PROT UR: 0.5 RATIO
EOSINOPHIL # BLD AUTO: 0.07 K/UL
EOSINOPHIL NFR BLD AUTO: 1.5 %
GLUCOSE QUALITATIVE U: 100 MG/DL
GLUCOSE SERPL-MCNC: 103 MG/DL
HCT VFR BLD CALC: 38 %
HGB BLD-MCNC: 11.7 G/DL
HYALINE CASTS: 1 /LPF
IMM GRANULOCYTES NFR BLD AUTO: 0.6 %
KETONES URINE: NEGATIVE
LDH SERPL-CCNC: 200 U/L
LEUKOCYTE ESTERASE URINE: NEGATIVE
LYMPHOCYTES # BLD AUTO: 0.97 K/UL
LYMPHOCYTES NFR BLD AUTO: 20.3 %
MAGNESIUM SERPL-MCNC: 1.5 MG/DL
MAN DIFF?: NORMAL
MCHC RBC-ENTMCNC: 25.8 PG
MCHC RBC-ENTMCNC: 30.8 GM/DL
MCV RBC AUTO: 83.7 FL
MICROSCOPIC-UA: NORMAL
MONOCYTES # BLD AUTO: 0.19 K/UL
MONOCYTES NFR BLD AUTO: 4 %
NEUTROPHILS # BLD AUTO: 3.51 K/UL
NEUTROPHILS NFR BLD AUTO: 73.6 %
NITRITE URINE: NEGATIVE
PH URINE: 6
PHOSPHATE SERPL-MCNC: 1.6 MG/DL
PLATELET # BLD AUTO: 171 K/UL
POTASSIUM SERPL-SCNC: 6.1 MMOL/L
PROT SERPL-MCNC: 7.1 G/DL
PROT UR-MCNC: 55 MG/DL
PROTEIN URINE: 30 MG/DL
RBC # BLD: 4.54 M/UL
RBC # FLD: 14.3 %
RED BLOOD CELLS URINE: 1 /HPF
SODIUM SERPL-SCNC: 137 MMOL/L
SPECIFIC GRAVITY URINE: 1.02
SQUAMOUS EPITHELIAL CELLS: 1 /HPF
TACROLIMUS SERPL-MCNC: 7.9 NG/ML
UROBILINOGEN URINE: NEGATIVE MG/DL
WBC # FLD AUTO: 4.77 K/UL
WHITE BLOOD CELLS URINE: 2 /HPF

## 2018-10-18 ENCOUNTER — LABORATORY RESULT (OUTPATIENT)
Age: 33
End: 2018-10-18

## 2018-10-18 ENCOUNTER — APPOINTMENT (OUTPATIENT)
Dept: NEPHROLOGY | Facility: CLINIC | Age: 33
End: 2018-10-18
Payer: MEDICARE

## 2018-10-18 VITALS
SYSTOLIC BLOOD PRESSURE: 134 MMHG | TEMPERATURE: 97.8 F | HEIGHT: 64 IN | DIASTOLIC BLOOD PRESSURE: 94 MMHG | WEIGHT: 150 LBS | OXYGEN SATURATION: 98 % | HEART RATE: 55 BPM | RESPIRATION RATE: 16 BRPM | BODY MASS INDEX: 25.61 KG/M2

## 2018-10-18 PROCEDURE — 99215 OFFICE O/P EST HI 40 MIN: CPT

## 2018-10-18 RX ORDER — OXYBUTYNIN CHLORIDE 5 MG/1
5 TABLET ORAL
Qty: 60 | Refills: 1 | Status: DISCONTINUED | COMMUNITY
Start: 2018-07-31 | End: 2018-10-18

## 2018-10-18 RX ORDER — SULFAMETHOXAZOLE AND TRIMETHOPRIM 400; 80 MG/1; MG/1
400-80 TABLET ORAL
Qty: 90 | Refills: 3 | Status: DISCONTINUED | COMMUNITY
Start: 2018-07-26 | End: 2018-10-18

## 2018-10-19 ENCOUNTER — EMERGENCY (EMERGENCY)
Facility: HOSPITAL | Age: 33
LOS: 1 days | Discharge: HOME | End: 2018-10-19
Attending: EMERGENCY MEDICINE
Payer: MEDICARE

## 2018-10-19 VITALS
TEMPERATURE: 98 F | RESPIRATION RATE: 17 BRPM | DIASTOLIC BLOOD PRESSURE: 99 MMHG | HEIGHT: 66 IN | WEIGHT: 149.91 LBS | OXYGEN SATURATION: 99 % | SYSTOLIC BLOOD PRESSURE: 153 MMHG | HEART RATE: 71 BPM

## 2018-10-19 DIAGNOSIS — Z94.0 KIDNEY TRANSPLANT STATUS: Chronic | ICD-10-CM

## 2018-10-19 DIAGNOSIS — I77.0 ARTERIOVENOUS FISTULA, ACQUIRED: Chronic | ICD-10-CM

## 2018-10-19 DIAGNOSIS — Z98.890 OTHER SPECIFIED POSTPROCEDURAL STATES: Chronic | ICD-10-CM

## 2018-10-19 LAB
ALBUMIN SERPL ELPH-MCNC: 4.8 G/DL — SIGNIFICANT CHANGE UP (ref 3.3–5)
ALBUMIN SERPL ELPH-MCNC: 5.1 G/DL
ALP BLD-CCNC: 65 U/L
ALP SERPL-CCNC: 63 U/L — SIGNIFICANT CHANGE UP (ref 40–120)
ALT FLD-CCNC: 10 U/L — SIGNIFICANT CHANGE UP (ref 10–45)
ALT SERPL-CCNC: 13 U/L
ANION GAP SERPL CALC-SCNC: 10 MMOL/L — SIGNIFICANT CHANGE UP (ref 5–17)
ANION GAP SERPL CALC-SCNC: 10 MMOL/L — SIGNIFICANT CHANGE UP (ref 5–17)
ANION GAP SERPL CALC-SCNC: 12 MMOL/L
APPEARANCE UR: CLEAR — SIGNIFICANT CHANGE UP
APPEARANCE: CLEAR
APTT BLD: 31 SEC — SIGNIFICANT CHANGE UP (ref 27.5–37.4)
AST SERPL-CCNC: 18 U/L
AST SERPL-CCNC: 19 U/L — SIGNIFICANT CHANGE UP (ref 10–40)
BACTERIA # UR AUTO: NEGATIVE — SIGNIFICANT CHANGE UP
BASOPHILS # BLD AUTO: 0.01 K/UL
BASOPHILS NFR BLD AUTO: 0.1 %
BILIRUB SERPL-MCNC: 0.2 MG/DL
BILIRUB SERPL-MCNC: 0.2 MG/DL — SIGNIFICANT CHANGE UP (ref 0.2–1.2)
BILIRUB UR-MCNC: NEGATIVE — SIGNIFICANT CHANGE UP
BILIRUBIN URINE: NEGATIVE
BKV DNA SPEC QL NAA+PROBE: NORMAL
BLOOD URINE: ABNORMAL
BUN SERPL-MCNC: 29 MG/DL — HIGH (ref 7–23)
BUN SERPL-MCNC: 29 MG/DL — HIGH (ref 7–23)
BUN SERPL-MCNC: 30 MG/DL
CA-I BLD-SCNC: 1.55 MMOL/L — HIGH (ref 1.12–1.3)
CALCIUM SERPL-MCNC: 10.9 MG/DL — HIGH (ref 8.4–10.5)
CALCIUM SERPL-MCNC: 11.5 MG/DL — HIGH (ref 8.4–10.5)
CALCIUM SERPL-MCNC: 11.7 MG/DL
CHLORIDE SERPL-SCNC: 109 MMOL/L
CHLORIDE SERPL-SCNC: 110 MMOL/L — HIGH (ref 96–108)
CHLORIDE SERPL-SCNC: 111 MMOL/L — HIGH (ref 96–108)
CO2 SERPL-SCNC: 17 MMOL/L — LOW (ref 22–31)
CO2 SERPL-SCNC: 19 MMOL/L — LOW (ref 22–31)
CO2 SERPL-SCNC: 20 MMOL/L
COLOR SPEC: SIGNIFICANT CHANGE UP
COLOR: YELLOW
CREAT SERPL-MCNC: 1.66 MG/DL — HIGH (ref 0.5–1.3)
CREAT SERPL-MCNC: 1.79 MG/DL — HIGH (ref 0.5–1.3)
CREAT SERPL-MCNC: 2.12 MG/DL
CREAT SPEC-SCNC: 88 MG/DL
CREAT/PROT UR: 0.4 RATIO
DIFF PNL FLD: ABNORMAL
EOSINOPHIL # BLD AUTO: 0.16 K/UL
EOSINOPHIL NFR BLD AUTO: 2.4 %
EPI CELLS # UR: 1 /HPF — SIGNIFICANT CHANGE UP
GAS PNL BLDV: SIGNIFICANT CHANGE UP
GLUCOSE QUALITATIVE U: 250 MG/DL
GLUCOSE SERPL-MCNC: 59 MG/DL
GLUCOSE SERPL-MCNC: 87 MG/DL — SIGNIFICANT CHANGE UP (ref 70–99)
GLUCOSE SERPL-MCNC: 98 MG/DL — SIGNIFICANT CHANGE UP (ref 70–99)
GLUCOSE UR QL: ABNORMAL
HCT VFR BLD CALC: 39.1 % — SIGNIFICANT CHANGE UP (ref 39–50)
HCT VFR BLD CALC: 41.2 %
HGB BLD-MCNC: 12.4 G/DL — LOW (ref 13–17)
HGB BLD-MCNC: 12.8 G/DL
HYALINE CASTS # UR AUTO: 5 /LPF — HIGH (ref 0–2)
IMM GRANULOCYTES NFR BLD AUTO: 1.6 %
INR BLD: 1.07 RATIO — SIGNIFICANT CHANGE UP (ref 0.88–1.16)
KETONES UR-MCNC: NEGATIVE — SIGNIFICANT CHANGE UP
KETONES URINE: NEGATIVE
LDH SERPL-CCNC: 244 U/L
LEUKOCYTE ESTERASE UR-ACNC: NEGATIVE — SIGNIFICANT CHANGE UP
LEUKOCYTE ESTERASE URINE: NEGATIVE
LYMPHOCYTES # BLD AUTO: 1 K/UL
LYMPHOCYTES NFR BLD AUTO: 14.9 %
MAGNESIUM SERPL-MCNC: 1.4 MG/DL
MAGNESIUM SERPL-MCNC: 1.4 MG/DL — LOW (ref 1.6–2.6)
MAN DIFF?: NORMAL
MCHC RBC-ENTMCNC: 26.2 PG
MCHC RBC-ENTMCNC: 26.2 PG — LOW (ref 27–34)
MCHC RBC-ENTMCNC: 31.1 GM/DL
MCHC RBC-ENTMCNC: 31.6 GM/DL — LOW (ref 32–36)
MCV RBC AUTO: 82.9 FL — SIGNIFICANT CHANGE UP (ref 80–100)
MCV RBC AUTO: 84.4 FL
MONOCYTES # BLD AUTO: 0.46 K/UL
MONOCYTES NFR BLD AUTO: 6.9 %
NEUTROPHILS # BLD AUTO: 4.96 K/UL
NEUTROPHILS NFR BLD AUTO: 74.1 %
NITRITE UR-MCNC: NEGATIVE — SIGNIFICANT CHANGE UP
NITRITE URINE: NEGATIVE
PH UR: 6 — SIGNIFICANT CHANGE UP (ref 5–8)
PH URINE: 5.5
PHOSPHATE SERPL-MCNC: 1.6 MG/DL
PLATELET # BLD AUTO: 130 K/UL — LOW (ref 150–400)
PLATELET # BLD AUTO: 139 K/UL
POTASSIUM SERPL-MCNC: 6.1 MMOL/L — HIGH (ref 3.5–5.3)
POTASSIUM SERPL-MCNC: 7 MMOL/L — CRITICAL HIGH (ref 3.5–5.3)
POTASSIUM SERPL-SCNC: 6.1 MMOL/L — HIGH (ref 3.5–5.3)
POTASSIUM SERPL-SCNC: 6.6 MMOL/L
POTASSIUM SERPL-SCNC: 7 MMOL/L — CRITICAL HIGH (ref 3.5–5.3)
PROT SERPL-MCNC: 7.2 G/DL — SIGNIFICANT CHANGE UP (ref 6–8.3)
PROT SERPL-MCNC: 7.5 G/DL
PROT UR-MCNC: 38 MG/DL
PROT UR-MCNC: ABNORMAL
PROTEIN URINE: 30 MG/DL
PROTHROM AB SERPL-ACNC: 11.6 SEC — SIGNIFICANT CHANGE UP (ref 9.8–12.7)
RBC # BLD: 4.72 M/UL — SIGNIFICANT CHANGE UP (ref 4.2–5.8)
RBC # BLD: 4.88 M/UL
RBC # FLD: 14.7 %
RBC # FLD: 14.9 % — HIGH (ref 10.3–14.5)
RBC CASTS # UR COMP ASSIST: 3 /HPF — SIGNIFICANT CHANGE UP (ref 0–4)
SODIUM SERPL-SCNC: 138 MMOL/L — SIGNIFICANT CHANGE UP (ref 135–145)
SODIUM SERPL-SCNC: 139 MMOL/L — SIGNIFICANT CHANGE UP (ref 135–145)
SODIUM SERPL-SCNC: 140 MMOL/L
SP GR SPEC: 1.02 — SIGNIFICANT CHANGE UP (ref 1.01–1.02)
SPECIFIC GRAVITY URINE: 1.02
TACROLIMUS SERPL-MCNC: 8.5 NG/ML
URATE SERPL-MCNC: 6.7 MG/DL
UROBILINOGEN FLD QL: NEGATIVE — SIGNIFICANT CHANGE UP
UROBILINOGEN URINE: NEGATIVE MG/DL
WBC # BLD: 6.8 K/UL — SIGNIFICANT CHANGE UP (ref 3.8–10.5)
WBC # FLD AUTO: 6.7 K/UL
WBC # FLD AUTO: 6.8 K/UL — SIGNIFICANT CHANGE UP (ref 3.8–10.5)
WBC UR QL: 4 /HPF — SIGNIFICANT CHANGE UP (ref 0–5)

## 2018-10-19 PROCEDURE — 93010 ELECTROCARDIOGRAM REPORT: CPT

## 2018-10-19 PROCEDURE — 71045 X-RAY EXAM CHEST 1 VIEW: CPT | Mod: 26

## 2018-10-19 PROCEDURE — 99284 EMERGENCY DEPT VISIT MOD MDM: CPT | Mod: GC,25

## 2018-10-19 RX ORDER — MYCOPHENOLATE MOFETIL 250 MG/1
1000 CAPSULE ORAL ONCE
Qty: 0 | Refills: 0 | Status: COMPLETED | OUTPATIENT
Start: 2018-10-19 | End: 2018-10-19

## 2018-10-19 RX ORDER — CARVEDILOL PHOSPHATE 80 MG/1
12.5 CAPSULE, EXTENDED RELEASE ORAL ONCE
Qty: 0 | Refills: 0 | Status: COMPLETED | OUTPATIENT
Start: 2018-10-19 | End: 2018-10-19

## 2018-10-19 RX ORDER — OXYBUTYNIN CHLORIDE 5 MG
5 TABLET ORAL ONCE
Qty: 0 | Refills: 0 | Status: COMPLETED | OUTPATIENT
Start: 2018-10-19 | End: 2018-10-19

## 2018-10-19 RX ORDER — SODIUM CHLORIDE 9 MG/ML
1000 INJECTION INTRAMUSCULAR; INTRAVENOUS; SUBCUTANEOUS ONCE
Qty: 0 | Refills: 0 | Status: COMPLETED | OUTPATIENT
Start: 2018-10-19 | End: 2018-10-19

## 2018-10-19 RX ORDER — TAMSULOSIN HYDROCHLORIDE 0.4 MG/1
0.4 CAPSULE ORAL AT BEDTIME
Qty: 0 | Refills: 0 | Status: DISCONTINUED | OUTPATIENT
Start: 2018-10-19 | End: 2018-10-23

## 2018-10-19 RX ORDER — CALCIUM GLUCONATE 100 MG/ML
1 VIAL (ML) INTRAVENOUS ONCE
Qty: 0 | Refills: 0 | Status: COMPLETED | OUTPATIENT
Start: 2018-10-19 | End: 2018-10-19

## 2018-10-19 RX ORDER — TACROLIMUS 5 MG/1
5 CAPSULE ORAL ONCE
Qty: 0 | Refills: 0 | Status: COMPLETED | OUTPATIENT
Start: 2018-10-19 | End: 2018-10-19

## 2018-10-19 RX ORDER — NIFEDIPINE 30 MG
60 TABLET, EXTENDED RELEASE 24 HR ORAL ONCE
Qty: 0 | Refills: 0 | Status: COMPLETED | OUTPATIENT
Start: 2018-10-19 | End: 2018-10-19

## 2018-10-19 RX ADMIN — CARVEDILOL PHOSPHATE 12.5 MILLIGRAM(S): 80 CAPSULE, EXTENDED RELEASE ORAL at 21:54

## 2018-10-19 RX ADMIN — Medication 200 GRAM(S): at 18:39

## 2018-10-19 RX ADMIN — MYCOPHENOLATE MOFETIL 1000 MILLIGRAM(S): 250 CAPSULE ORAL at 21:53

## 2018-10-19 RX ADMIN — SODIUM CHLORIDE 1000 MILLILITER(S): 9 INJECTION INTRAMUSCULAR; INTRAVENOUS; SUBCUTANEOUS at 17:50

## 2018-10-19 RX ADMIN — TACROLIMUS 5 MILLIGRAM(S): 5 CAPSULE ORAL at 21:55

## 2018-10-19 RX ADMIN — Medication 60 MILLIGRAM(S): at 21:54

## 2018-10-19 RX ADMIN — SODIUM CHLORIDE 1000 MILLILITER(S): 9 INJECTION INTRAMUSCULAR; INTRAVENOUS; SUBCUTANEOUS at 21:52

## 2018-10-19 NOTE — ED PROVIDER NOTE - PHYSICAL EXAMINATION
General: NAD, good hygiene, well developed  HENT: Atraumatic, PERRLA, no conjunctivae injection, no post. oropharynx erythema, exudates  Neck: normal ROM and trachea midline   Cardiovascular: RRR, S1&2, no murmurs, rubs, port in right chest, radial pulses equal and b/l  Respiratory: CTABL, no wheezes or crackles, no decreased breath sounds  Abdominal:  soft and non-tender, kidney transplant on the RLQ   Extremities: no edema of the legs/feet  Skin: warm, well perfused, fistula on the left forearm   Neurologic: nonfocal, AAOx3  Psych: normal mood and affect

## 2018-10-19 NOTE — ED PROVIDER NOTE - PMH
ESRD on dialysis  s/p cadaver kidney transplant recipient 7/23/2018  H/O kidney transplant  double JJ stent in place  HTN (hypertension)    Nephrostomy status  capped nephrostomy tube in place

## 2018-10-19 NOTE — ED PROVIDER NOTE - PROGRESS NOTE DETAILS
spoke with the transplant nurse practitioner and discussed outpatient follow-up if able to bring down potassium <6. rehydrate and vbg

## 2018-10-19 NOTE — ED ADULT NURSE NOTE - OBJECTIVE STATEMENT
pt with renal transplant from cadaver donor done on 7/23/2018  presets to er for elevated potassium pt on iv antibiotics at home via chest wall osorio pt afebrile hx htn pt denies any chest pain or asob states urinating normally skin warm dry no vomiting or diarrhea pt with renal transplant from cadaver donor done on 7/23/2018  presets to er for elevated potassium pt on iv antibiotics at home via chest wall osorio pt afebrile hx htn pt denies any chest pain or asob states urinating normally skin warm dry no vomiting or diarrhea p placed on cardiac monitering with ekg completed labs were felicia 2 days ago in md office per patient

## 2018-10-19 NOTE — ED PROVIDER NOTE - NS ED ROS FT
General: denies fever, chills, fatigue  HENT: denies nasal congestion, sore throat, ear pain, hearing loss  Eyes: denies visual changes  Neck: denies neck pain, swelling, stiffness  CV: denies chest pain, palpitations  Resp: denies difficulty breathing, cough  GI: denies nausea, vomiting, diarrhea, abdominal pain, constipation  Urinary: denies pain on urination change  MSK: denies joint and muscle pain  Neuro: denies headaches, lightheadedness  Skin: denies rashes

## 2018-10-19 NOTE — ED ADULT NURSE NOTE - NSIMPLEMENTINTERV_GEN_ALL_ED
Implemented All Universal Safety Interventions:  [place] to call system. Call bell, personal items and telephone within reach. Instruct patient to call for assistance. Room bathroom lighting operational. Non-slip footwear when patient is off stretcher. Physically safe environment: no spills, clutter or unnecessary equipment. Stretcher in lowest position, wheels locked, appropriate side rails in place.

## 2018-10-19 NOTE — ED PROVIDER NOTE - OBJECTIVE STATEMENT
33M, ESRD s/p renal transplant from cadaver donor in july c/o hyperkalemia of 6.6 in outpatient clinic. Pt is followed by transplant team and potassium last week was 6.0. Asymptomatic patient who is currently on cefepime 3wk for abscess s/p renal biopsy. Patient has mild rhinorrhea but denied any coughing. No Nausea, vomit, diarrhea, constipation, chestpain, shortness of breath, loc or vertigo, dizziness.

## 2018-10-19 NOTE — ED PROVIDER NOTE - ATTENDING CONTRIBUTION TO CARE
I performed a history and physical exam of the patient and discussed their management with the resident and /or advanced care provider. I reviewed the resident and /or ACP's note and agree with the documented findings and plan of care. My medical decision making and observations are found above.  Abd soft, ext edema.

## 2018-10-19 NOTE — ED PROVIDER NOTE - MEDICAL DECISION MAKING DETAILS
33M ESRD sp transplant w. elevated potassium, asymptomatic, concern for arrhythemia, will get labs, fluids, and start Ca drip, EKG has peak Twaves. Consult transplant and admit for further workup 33M ESRD sp transplant w. elevated potassium, asymptomatic, concern for arrhythemia, will get labs, fluids, and start Ca drip, EKG has peak Twaves. Consult transplant and admit for further workup  Thony: repeat K+. likely high will need to be admitted for hyperk and eval for dialysis.  feeling ok. will discuss with transplant team. very high risk pt. ekg peaked but not symetrical.

## 2018-10-19 NOTE — ED ADULT NURSE REASSESSMENT NOTE - NS ED NURSE REASSESS COMMENT FT1
md zepeda aware of vitals. repeat vbg drawn and sent to lab. pt ambulated to bathroom with steady gait. pt denies chest pain/sob/n/v/dizziness/weakness/numbness/tingling/muscle cramping. pt now resting comfortably in bed, pending blood work results for dispo. will continue to monitor.

## 2018-10-19 NOTE — ED ADULT NURSE REASSESSMENT NOTE - NS ED NURSE REASSESS COMMENT FT1
1900: report received from saritha palacios rn. pt resting comfortably in bed. safety maintained. pt denies chest pain or pressure/palpitations/n/v/dizziness/weakness. pt states "I feel fine, I am only here because my doctor told me to.  2041: repeat cmp drawn and sent to lab as per md zepeda order. pt hypertensive, pt denies headache/vision chances/chest pain/sob/n/v/dizziness/weakness/numbness/tingling. md zepeda aware of vital signs. pending repeat cmp results. safety maintained. will continue to monitor.

## 2018-10-20 VITALS
HEART RATE: 73 BPM | TEMPERATURE: 98 F | RESPIRATION RATE: 16 BRPM | SYSTOLIC BLOOD PRESSURE: 170 MMHG | OXYGEN SATURATION: 100 % | DIASTOLIC BLOOD PRESSURE: 101 MMHG

## 2018-10-20 LAB
POTASSIUM SERPL-MCNC: 5.3 MMOL/L — SIGNIFICANT CHANGE UP (ref 3.5–5.3)
POTASSIUM SERPL-SCNC: 5.3 MMOL/L — SIGNIFICANT CHANGE UP (ref 3.5–5.3)

## 2018-10-20 PROCEDURE — 71045 X-RAY EXAM CHEST 1 VIEW: CPT

## 2018-10-20 PROCEDURE — 85014 HEMATOCRIT: CPT

## 2018-10-20 PROCEDURE — 96375 TX/PRO/DX INJ NEW DRUG ADDON: CPT

## 2018-10-20 PROCEDURE — 83605 ASSAY OF LACTIC ACID: CPT

## 2018-10-20 PROCEDURE — 85730 THROMBOPLASTIN TIME PARTIAL: CPT

## 2018-10-20 PROCEDURE — 83735 ASSAY OF MAGNESIUM: CPT

## 2018-10-20 PROCEDURE — 96374 THER/PROPH/DIAG INJ IV PUSH: CPT

## 2018-10-20 PROCEDURE — 82947 ASSAY GLUCOSE BLOOD QUANT: CPT

## 2018-10-20 PROCEDURE — 81001 URINALYSIS AUTO W/SCOPE: CPT

## 2018-10-20 PROCEDURE — 80053 COMPREHEN METABOLIC PANEL: CPT

## 2018-10-20 PROCEDURE — 82330 ASSAY OF CALCIUM: CPT

## 2018-10-20 PROCEDURE — 99284 EMERGENCY DEPT VISIT MOD MDM: CPT | Mod: 25

## 2018-10-20 PROCEDURE — 84295 ASSAY OF SERUM SODIUM: CPT

## 2018-10-20 PROCEDURE — 82435 ASSAY OF BLOOD CHLORIDE: CPT

## 2018-10-20 PROCEDURE — 85610 PROTHROMBIN TIME: CPT

## 2018-10-20 PROCEDURE — 93005 ELECTROCARDIOGRAM TRACING: CPT

## 2018-10-20 PROCEDURE — 85027 COMPLETE CBC AUTOMATED: CPT

## 2018-10-20 PROCEDURE — 82803 BLOOD GASES ANY COMBINATION: CPT

## 2018-10-20 PROCEDURE — 80048 BASIC METABOLIC PNL TOTAL CA: CPT

## 2018-10-20 PROCEDURE — 84132 ASSAY OF SERUM POTASSIUM: CPT

## 2018-10-20 RX ORDER — CEFEPIME 1 G/1
2000 INJECTION, POWDER, FOR SOLUTION INTRAMUSCULAR; INTRAVENOUS ONCE
Qty: 0 | Refills: 0 | Status: COMPLETED | OUTPATIENT
Start: 2018-10-20 | End: 2018-10-20

## 2018-10-20 RX ADMIN — CEFEPIME 100 MILLIGRAM(S): 1 INJECTION, POWDER, FOR SOLUTION INTRAMUSCULAR; INTRAVENOUS at 00:34

## 2018-10-20 NOTE — ED ADULT NURSE REASSESSMENT NOTE - NS ED NURSE REASSESS COMMENT FT1
repeat blood work drawn and sent to lab as per md middleton, pt medicated as per md middleton. pt states 'I feel fine, I am ready to go home." pending repeat blood work for dispo. safety maintained. family at bedside. will continue to monitor.

## 2018-10-24 PROCEDURE — 93005 ELECTROCARDIOGRAM TRACING: CPT

## 2018-10-24 PROCEDURE — 99261: CPT

## 2018-10-24 PROCEDURE — 84100 ASSAY OF PHOSPHORUS: CPT

## 2018-10-24 PROCEDURE — 76776 US EXAM K TRANSPL W/DOPPLER: CPT

## 2018-10-24 PROCEDURE — 82962 GLUCOSE BLOOD TEST: CPT

## 2018-10-24 PROCEDURE — C1894: CPT

## 2018-10-24 PROCEDURE — 81001 URINALYSIS AUTO W/SCOPE: CPT

## 2018-10-24 PROCEDURE — 86901 BLOOD TYPING SEROLOGIC RH(D): CPT

## 2018-10-24 PROCEDURE — 83605 ASSAY OF LACTIC ACID: CPT

## 2018-10-24 PROCEDURE — C2617: CPT

## 2018-10-24 PROCEDURE — 87086 URINE CULTURE/COLONY COUNT: CPT

## 2018-10-24 PROCEDURE — 80197 ASSAY OF TACROLIMUS: CPT

## 2018-10-24 PROCEDURE — 82570 ASSAY OF URINE CREATININE: CPT

## 2018-10-24 PROCEDURE — 50432 PLMT NEPHROSTOMY CATHETER: CPT

## 2018-10-24 PROCEDURE — 86850 RBC ANTIBODY SCREEN: CPT

## 2018-10-24 PROCEDURE — 83615 LACTATE (LD) (LDH) ENZYME: CPT

## 2018-10-24 PROCEDURE — 85610 PROTHROMBIN TIME: CPT

## 2018-10-24 PROCEDURE — 86900 BLOOD TYPING SEROLOGIC ABO: CPT

## 2018-10-24 PROCEDURE — 85730 THROMBOPLASTIN TIME PARTIAL: CPT

## 2018-10-24 PROCEDURE — 85027 COMPLETE CBC AUTOMATED: CPT

## 2018-10-24 PROCEDURE — 93971 EXTREMITY STUDY: CPT

## 2018-10-24 PROCEDURE — 71045 X-RAY EXAM CHEST 1 VIEW: CPT

## 2018-10-24 PROCEDURE — 83735 ASSAY OF MAGNESIUM: CPT

## 2018-10-24 PROCEDURE — C1769: CPT

## 2018-10-24 PROCEDURE — 94640 AIRWAY INHALATION TREATMENT: CPT

## 2018-10-24 PROCEDURE — 80053 COMPREHEN METABOLIC PANEL: CPT

## 2018-10-24 PROCEDURE — 80048 BASIC METABOLIC PNL TOTAL CA: CPT

## 2018-10-24 PROCEDURE — C1729: CPT

## 2018-10-26 LAB — BKV DNA SPEC QL NAA+PROBE: NORMAL

## 2018-10-27 RX ORDER — CEFEPIME 1 G/1
2 INJECTION, POWDER, FOR SOLUTION INTRAMUSCULAR; INTRAVENOUS
Qty: 26 | Refills: 0
Start: 2018-10-27 | End: 2018-11-08

## 2018-10-29 ENCOUNTER — APPOINTMENT (OUTPATIENT)
Dept: NEPHROLOGY | Facility: CLINIC | Age: 33
End: 2018-10-29
Payer: MEDICARE

## 2018-10-29 VITALS
BODY MASS INDEX: 25.61 KG/M2 | SYSTOLIC BLOOD PRESSURE: 137 MMHG | OXYGEN SATURATION: 98 % | TEMPERATURE: 97.8 F | RESPIRATION RATE: 18 BRPM | HEART RATE: 73 BPM | DIASTOLIC BLOOD PRESSURE: 86 MMHG | WEIGHT: 150 LBS | HEIGHT: 64 IN

## 2018-10-29 PROCEDURE — 99215 OFFICE O/P EST HI 40 MIN: CPT

## 2018-10-30 LAB
ALBUMIN SERPL ELPH-MCNC: 4.9 G/DL
ALP BLD-CCNC: 63 U/L
ALT SERPL-CCNC: 15 U/L
ANION GAP SERPL CALC-SCNC: 12 MMOL/L
APPEARANCE: CLEAR
AST SERPL-CCNC: 15 U/L
BACTERIA: NEGATIVE
BASOPHILS # BLD AUTO: 0.01 K/UL
BASOPHILS NFR BLD AUTO: 0.2 %
BILIRUB SERPL-MCNC: 0.3 MG/DL
BILIRUBIN URINE: NEGATIVE
BLOOD URINE: NEGATIVE
BUN SERPL-MCNC: 30 MG/DL
CALCIUM SERPL-MCNC: 11.9 MG/DL
CHLORIDE SERPL-SCNC: 108 MMOL/L
CO2 SERPL-SCNC: 19 MMOL/L
COLOR: YELLOW
CREAT SERPL-MCNC: 1.62 MG/DL
CREAT SPEC-SCNC: 156 MG/DL
CREAT/PROT UR: 0.3 RATIO
EOSINOPHIL # BLD AUTO: 0.09 K/UL
EOSINOPHIL NFR BLD AUTO: 2.2 %
GLUCOSE QUALITATIVE U: NEGATIVE MG/DL
GLUCOSE SERPL-MCNC: 96 MG/DL
HCT VFR BLD CALC: 40.6 %
HGB BLD-MCNC: 13 G/DL
HYALINE CASTS: 4 /LPF
IMM GRANULOCYTES NFR BLD AUTO: 0.7 %
KETONES URINE: NEGATIVE
LDH SERPL-CCNC: 264 U/L
LEUKOCYTE ESTERASE URINE: NEGATIVE
LYMPHOCYTES # BLD AUTO: 1.14 K/UL
LYMPHOCYTES NFR BLD AUTO: 28.1 %
MAGNESIUM SERPL-MCNC: 1.3 MG/DL
MAN DIFF?: NORMAL
MCHC RBC-ENTMCNC: 26.1 PG
MCHC RBC-ENTMCNC: 32 GM/DL
MCV RBC AUTO: 81.4 FL
MICROSCOPIC-UA: NORMAL
MONOCYTES # BLD AUTO: 0.22 K/UL
MONOCYTES NFR BLD AUTO: 5.4 %
NEUTROPHILS # BLD AUTO: 2.57 K/UL
NEUTROPHILS NFR BLD AUTO: 63.4 %
NITRITE URINE: NEGATIVE
PH URINE: 5.5
PHOSPHATE SERPL-MCNC: 1.1 MG/DL
PLATELET # BLD AUTO: 155 K/UL
POTASSIUM SERPL-SCNC: 5.4 MMOL/L
PROT SERPL-MCNC: 7.2 G/DL
PROT UR-MCNC: 50 MG/DL
PROTEIN URINE: 30 MG/DL
RBC # BLD: 4.99 M/UL
RBC # FLD: 15 %
RED BLOOD CELLS URINE: 2 /HPF
SODIUM SERPL-SCNC: 139 MMOL/L
SPECIFIC GRAVITY URINE: 1.02
SQUAMOUS EPITHELIAL CELLS: 0 /HPF
TACROLIMUS SERPL-MCNC: 9.3 NG/ML
URATE SERPL-MCNC: 6.8 MG/DL
UROBILINOGEN URINE: NEGATIVE MG/DL
WBC # FLD AUTO: 4.06 K/UL
WHITE BLOOD CELLS URINE: 4 /HPF

## 2018-11-01 ENCOUNTER — OTHER (OUTPATIENT)
Age: 33
End: 2018-11-01

## 2018-11-01 LAB — BKV DNA SPEC QL NAA+PROBE: NORMAL

## 2018-11-04 ENCOUNTER — FORM ENCOUNTER (OUTPATIENT)
Age: 33
End: 2018-11-04

## 2018-11-05 ENCOUNTER — OUTPATIENT (OUTPATIENT)
Dept: OUTPATIENT SERVICES | Facility: HOSPITAL | Age: 33
LOS: 1 days | End: 2018-11-05
Payer: MEDICARE

## 2018-11-05 DIAGNOSIS — N19 UNSPECIFIED KIDNEY FAILURE: ICD-10-CM

## 2018-11-05 DIAGNOSIS — I77.0 ARTERIOVENOUS FISTULA, ACQUIRED: Chronic | ICD-10-CM

## 2018-11-05 DIAGNOSIS — Z98.890 OTHER SPECIFIED POSTPROCEDURAL STATES: Chronic | ICD-10-CM

## 2018-11-05 DIAGNOSIS — Z94.0 KIDNEY TRANSPLANT STATUS: Chronic | ICD-10-CM

## 2018-11-05 PROCEDURE — 36589 REMOVAL TUNNELED CV CATH: CPT

## 2018-11-12 ENCOUNTER — LABORATORY RESULT (OUTPATIENT)
Age: 33
End: 2018-11-12

## 2018-11-12 ENCOUNTER — APPOINTMENT (OUTPATIENT)
Dept: NEPHROLOGY | Facility: CLINIC | Age: 33
End: 2018-11-12
Payer: MEDICARE

## 2018-11-12 VITALS
HEIGHT: 64 IN | TEMPERATURE: 97.9 F | BODY MASS INDEX: 25.61 KG/M2 | WEIGHT: 150 LBS | HEART RATE: 76 BPM | RESPIRATION RATE: 17 BRPM | SYSTOLIC BLOOD PRESSURE: 156 MMHG | DIASTOLIC BLOOD PRESSURE: 96 MMHG

## 2018-11-12 PROCEDURE — 99215 OFFICE O/P EST HI 40 MIN: CPT

## 2018-11-12 RX ORDER — NYSTATIN 100000 [USP'U]/ML
100000 SUSPENSION ORAL 4 TIMES DAILY
Qty: 1800 | Refills: 0 | Status: DISCONTINUED | COMMUNITY
Start: 2018-07-26 | End: 2018-11-12

## 2018-11-12 RX ORDER — CEFEPIME 1 G/50ML
2 INJECTION, SOLUTION INTRAVENOUS
Qty: 1 | Refills: 0 | Status: DISCONTINUED | OUTPATIENT
Start: 2018-10-05 | End: 2018-11-12

## 2018-11-13 LAB
ALBUMIN SERPL ELPH-MCNC: 4.6 G/DL
ALP BLD-CCNC: 63 U/L
ALT SERPL-CCNC: 10 U/L
ANION GAP SERPL CALC-SCNC: 12 MMOL/L
APPEARANCE: CLEAR
AST SERPL-CCNC: 14 U/L
BACTERIA: NEGATIVE
BASOPHILS # BLD AUTO: 0 K/UL
BASOPHILS NFR BLD AUTO: 0 %
BILIRUB SERPL-MCNC: 0.3 MG/DL
BILIRUBIN URINE: NEGATIVE
BLOOD URINE: NEGATIVE
BUN SERPL-MCNC: 30 MG/DL
CALCIUM SERPL-MCNC: 11 MG/DL
CHLORIDE SERPL-SCNC: 107 MMOL/L
CMV DNA SPEC QL NAA+PROBE: NOT DETECTED IU/ML
CMVPCR LOG: NOT DETECTED LOGIU/ML
CO2 SERPL-SCNC: 18 MMOL/L
COLOR: YELLOW
CREAT SERPL-MCNC: 1.67 MG/DL
CREAT SPEC-SCNC: 117 MG/DL
CREAT/PROT UR: 0.1 RATIO
EOSINOPHIL # BLD AUTO: 0.1 K/UL
EOSINOPHIL NFR BLD AUTO: 3.1 %
GLUCOSE QUALITATIVE U: NEGATIVE MG/DL
GLUCOSE SERPL-MCNC: 111 MG/DL
HCT VFR BLD CALC: 41.1 %
HGB BLD-MCNC: 12.9 G/DL
HYALINE CASTS: 1 /LPF
IMM GRANULOCYTES NFR BLD AUTO: 7.1 %
KETONES URINE: NEGATIVE
LDH SERPL-CCNC: 279 U/L
LEUKOCYTE ESTERASE URINE: NEGATIVE
LYMPHOCYTES # BLD AUTO: 1.17 K/UL
LYMPHOCYTES NFR BLD AUTO: 36.1 %
MAGNESIUM SERPL-MCNC: 1.1 MG/DL
MAN DIFF?: NORMAL
MCHC RBC-ENTMCNC: 24.8 PG
MCHC RBC-ENTMCNC: 31.4 GM/DL
MCV RBC AUTO: 79 FL
MICROSCOPIC-UA: NORMAL
MONOCYTES # BLD AUTO: 0.26 K/UL
MONOCYTES NFR BLD AUTO: 8 %
NEUTROPHILS # BLD AUTO: 1.48 K/UL
NEUTROPHILS NFR BLD AUTO: 45.7 %
NITRITE URINE: NEGATIVE
PH URINE: 5.5
PHOSPHATE SERPL-MCNC: 1.7 MG/DL
PLATELET # BLD AUTO: 155 K/UL
POTASSIUM SERPL-SCNC: 5.4 MMOL/L
PROT SERPL-MCNC: 6.6 G/DL
PROT UR-MCNC: 10 MG/DL
PROTEIN URINE: NEGATIVE MG/DL
RBC # BLD: 5.2 M/UL
RBC # FLD: 14.9 %
RED BLOOD CELLS URINE: 1 /HPF
SODIUM SERPL-SCNC: 136 MMOL/L
SPECIFIC GRAVITY URINE: 1.02
SQUAMOUS EPITHELIAL CELLS: 0 /HPF
TACROLIMUS SERPL-MCNC: 9.7 NG/ML
UROBILINOGEN URINE: NEGATIVE MG/DL
WBC # FLD AUTO: 3.24 K/UL
WHITE BLOOD CELLS URINE: 2 /HPF

## 2018-11-14 ENCOUNTER — MEDICATION RENEWAL (OUTPATIENT)
Age: 33
End: 2018-11-14

## 2018-11-14 DIAGNOSIS — N18.6 END STAGE RENAL DISEASE: ICD-10-CM

## 2018-11-14 DIAGNOSIS — Z45.2 ENCOUNTER FOR ADJUSTMENT AND MANAGEMENT OF VASCULAR ACCESS DEVICE: ICD-10-CM

## 2018-11-14 DIAGNOSIS — N25.81 SECONDARY HYPERPARATHYROIDISM OF RENAL ORIGIN: ICD-10-CM

## 2018-11-16 LAB — BKV DNA SPEC QL NAA+PROBE: NORMAL

## 2018-11-26 ENCOUNTER — APPOINTMENT (OUTPATIENT)
Dept: NEPHROLOGY | Facility: CLINIC | Age: 33
End: 2018-11-26

## 2018-11-28 ENCOUNTER — APPOINTMENT (OUTPATIENT)
Dept: NEPHROLOGY | Facility: CLINIC | Age: 33
End: 2018-11-28
Payer: MEDICARE

## 2018-11-28 VITALS
WEIGHT: 151 LBS | HEART RATE: 75 BPM | HEIGHT: 64 IN | BODY MASS INDEX: 25.78 KG/M2 | RESPIRATION RATE: 14 BRPM | DIASTOLIC BLOOD PRESSURE: 87 MMHG | TEMPERATURE: 98.2 F | SYSTOLIC BLOOD PRESSURE: 137 MMHG | OXYGEN SATURATION: 98 %

## 2018-11-28 PROCEDURE — 99215 OFFICE O/P EST HI 40 MIN: CPT

## 2018-11-28 RX ORDER — TAMSULOSIN HYDROCHLORIDE 0.4 MG/1
0.4 CAPSULE ORAL
Qty: 30 | Refills: 11 | Status: DISCONTINUED | COMMUNITY
Start: 2018-07-31 | End: 2018-11-28

## 2018-11-28 RX ORDER — SENNOSIDES 8.6 MG TABLETS 8.6 MG/1
8.6 TABLET ORAL
Qty: 30 | Refills: 1 | Status: DISCONTINUED | COMMUNITY
Start: 2018-07-26 | End: 2018-11-28

## 2018-11-29 LAB
ALBUMIN SERPL ELPH-MCNC: 4.4 G/DL
ALBUMIN SERPL ELPH-MCNC: 4.6 G/DL
ALP BLD-CCNC: 76 U/L
ALP BLD-CCNC: 76 U/L
ALT SERPL-CCNC: 13 U/L
ALT SERPL-CCNC: 14 U/L
ANION GAP SERPL CALC-SCNC: 16 MMOL/L
ANION GAP SERPL CALC-SCNC: 9 MMOL/L
APPEARANCE: CLEAR
AST SERPL-CCNC: 17 U/L
AST SERPL-CCNC: 18 U/L
BACTERIA: NEGATIVE
BASOPHILS # BLD AUTO: 0.01 K/UL
BASOPHILS NFR BLD AUTO: 0.2 %
BILIRUB SERPL-MCNC: 0.4 MG/DL
BILIRUB SERPL-MCNC: 0.6 MG/DL
BILIRUBIN URINE: NEGATIVE
BKV DNA SPEC QL NAA+PROBE: NOT DETECTED COPIES/ML
BLOOD URINE: NEGATIVE
BUN SERPL-MCNC: 31 MG/DL
BUN SERPL-MCNC: 36 MG/DL
CALCIUM SERPL-MCNC: 9.2 MG/DL
CALCIUM SERPL-MCNC: 9.5 MG/DL
CHLORIDE SERPL-SCNC: 108 MMOL/L
CHLORIDE SERPL-SCNC: 110 MMOL/L
CO2 SERPL-SCNC: 16 MMOL/L
CO2 SERPL-SCNC: 21 MMOL/L
COLOR: YELLOW
CREAT SERPL-MCNC: 2.08 MG/DL
CREAT SERPL-MCNC: 2.22 MG/DL
EOSINOPHIL # BLD AUTO: 0.05 K/UL
EOSINOPHIL NFR BLD AUTO: 0.9 %
GLUCOSE QUALITATIVE U: NEGATIVE MG/DL
GLUCOSE SERPL-MCNC: 102 MG/DL
GLUCOSE SERPL-MCNC: 103 MG/DL
HCT VFR BLD CALC: 42.2 %
HGB BLD-MCNC: 13 G/DL
HYALINE CASTS: 0 /LPF
IMM GRANULOCYTES NFR BLD AUTO: 1.5 %
KETONES URINE: NEGATIVE
LDH SERPL-CCNC: 319 U/L
LEUKOCYTE ESTERASE URINE: NEGATIVE
LYMPHOCYTES # BLD AUTO: 1.13 K/UL
LYMPHOCYTES NFR BLD AUTO: 20.7 %
MAGNESIUM SERPL-MCNC: 1.4 MG/DL
MAN DIFF?: NORMAL
MCHC RBC-ENTMCNC: 25.6 PG
MCHC RBC-ENTMCNC: 30.8 GM/DL
MCV RBC AUTO: 83.1 FL
MICROSCOPIC-UA: NORMAL
MONOCYTES # BLD AUTO: 0.28 K/UL
MONOCYTES NFR BLD AUTO: 5.1 %
NEUTROPHILS # BLD AUTO: 3.9 K/UL
NEUTROPHILS NFR BLD AUTO: 71.6 %
NITRITE URINE: NEGATIVE
PH URINE: 7.5
PHOSPHATE SERPL-MCNC: 1.6 MG/DL
PLATELET # BLD AUTO: 206 K/UL
POTASSIUM SERPL-SCNC: 4.6 MMOL/L
POTASSIUM SERPL-SCNC: 5.4 MMOL/L
PROT SERPL-MCNC: 6.7 G/DL
PROT SERPL-MCNC: 7.3 G/DL
PROTEIN URINE: NEGATIVE MG/DL
RBC # BLD: 5.08 M/UL
RBC # FLD: 15.8 %
RED BLOOD CELLS URINE: 1 /HPF
SODIUM SERPL-SCNC: 140 MMOL/L
SODIUM SERPL-SCNC: 140 MMOL/L
SPECIFIC GRAVITY URINE: 1.02
SQUAMOUS EPITHELIAL CELLS: 2 /HPF
TACROLIMUS SERPL-MCNC: 5.9 NG/ML
TACROLIMUS SERPL-MCNC: 7.6 NG/ML
URATE SERPL-MCNC: 10 MG/DL
UROBILINOGEN URINE: NEGATIVE MG/DL
WBC # FLD AUTO: 5.45 K/UL
WHITE BLOOD CELLS URINE: 1 /HPF

## 2018-12-04 ENCOUNTER — APPOINTMENT (OUTPATIENT)
Dept: TRANSPLANT | Facility: CLINIC | Age: 33
End: 2018-12-04

## 2018-12-04 ENCOUNTER — LABORATORY RESULT (OUTPATIENT)
Age: 33
End: 2018-12-04

## 2018-12-04 ENCOUNTER — APPOINTMENT (OUTPATIENT)
Dept: NEPHROLOGY | Facility: CLINIC | Age: 33
End: 2018-12-04
Payer: MEDICARE

## 2018-12-04 VITALS
WEIGHT: 152 LBS | BODY MASS INDEX: 25.95 KG/M2 | RESPIRATION RATE: 16 BRPM | DIASTOLIC BLOOD PRESSURE: 83 MMHG | SYSTOLIC BLOOD PRESSURE: 130 MMHG | HEIGHT: 64 IN | HEART RATE: 86 BPM | OXYGEN SATURATION: 93 % | TEMPERATURE: 98.4 F

## 2018-12-04 PROCEDURE — 99214 OFFICE O/P EST MOD 30 MIN: CPT

## 2018-12-05 ENCOUNTER — APPOINTMENT (OUTPATIENT)
Dept: ULTRASOUND IMAGING | Facility: HOSPITAL | Age: 33
End: 2018-12-05
Payer: MEDICARE

## 2018-12-05 ENCOUNTER — OUTPATIENT (OUTPATIENT)
Dept: OUTPATIENT SERVICES | Facility: HOSPITAL | Age: 33
LOS: 1 days | End: 2018-12-05
Payer: MEDICARE

## 2018-12-05 DIAGNOSIS — Z94.0 KIDNEY TRANSPLANT STATUS: ICD-10-CM

## 2018-12-05 DIAGNOSIS — Z94.0 KIDNEY TRANSPLANT STATUS: Chronic | ICD-10-CM

## 2018-12-05 DIAGNOSIS — I77.0 ARTERIOVENOUS FISTULA, ACQUIRED: Chronic | ICD-10-CM

## 2018-12-05 DIAGNOSIS — Z98.890 OTHER SPECIFIED POSTPROCEDURAL STATES: Chronic | ICD-10-CM

## 2018-12-05 PROCEDURE — 76776 US EXAM K TRANSPL W/DOPPLER: CPT | Mod: 26,RT

## 2018-12-05 PROCEDURE — 76776 US EXAM K TRANSPL W/DOPPLER: CPT

## 2018-12-06 LAB
ALBUMIN SERPL ELPH-MCNC: 4.4 G/DL
ALP BLD-CCNC: 81 U/L
ALT SERPL-CCNC: 27 U/L
ANION GAP SERPL CALC-SCNC: 13 MMOL/L
APPEARANCE: CLEAR
AST SERPL-CCNC: 23 U/L
BACTERIA: NEGATIVE
BASOPHILS # BLD AUTO: 0 K/UL
BASOPHILS NFR BLD AUTO: 0 %
BILIRUB SERPL-MCNC: 0.9 MG/DL
BILIRUBIN URINE: NEGATIVE
BKV DNA SPEC QL NAA+PROBE: NOT DETECTED COPIES/ML
BLOOD URINE: NEGATIVE
BUN SERPL-MCNC: 32 MG/DL
CALCIUM SERPL-MCNC: 10.3 MG/DL
CHLORIDE SERPL-SCNC: 105 MMOL/L
CO2 SERPL-SCNC: 18 MMOL/L
COLOR: YELLOW
CREAT SERPL-MCNC: 2.16 MG/DL
CREAT SPEC-SCNC: 194 MG/DL
CREAT/PROT UR: 0.1 RATIO
EOSINOPHIL # BLD AUTO: 0 K/UL
EOSINOPHIL NFR BLD AUTO: 0 %
GLUCOSE QUALITATIVE U: NEGATIVE MG/DL
GLUCOSE SERPL-MCNC: 119 MG/DL
HCT VFR BLD CALC: 40.7 %
HGB BLD-MCNC: 13 G/DL
HYALINE CASTS: 0 /LPF
KETONES URINE: NEGATIVE
LDH SERPL-CCNC: 359 U/L
LEUKOCYTE ESTERASE URINE: NEGATIVE
LYMPHOCYTES # BLD AUTO: 1.21 K/UL
LYMPHOCYTES NFR BLD AUTO: 18 %
MAGNESIUM SERPL-MCNC: 1.1 MG/DL
MAN DIFF?: NORMAL
MCHC RBC-ENTMCNC: 25.4 PG
MCHC RBC-ENTMCNC: 31.9 GM/DL
MCV RBC AUTO: 79.6 FL
MICROSCOPIC-UA: NORMAL
MONOCYTES # BLD AUTO: 0.87 K/UL
MONOCYTES NFR BLD AUTO: 13 %
NEUTROPHILS # BLD AUTO: 4.23 K/UL
NEUTROPHILS NFR BLD AUTO: 60 %
NITRITE URINE: NEGATIVE
PH URINE: 5.5
PHOSPHATE SERPL-MCNC: 2 MG/DL
PLATELET # BLD AUTO: 162 K/UL
POTASSIUM SERPL-SCNC: 4.7 MMOL/L
PROT SERPL-MCNC: 7.4 G/DL
PROT UR-MCNC: 19 MG/DL
PROTEIN URINE: ABNORMAL MG/DL
RBC # BLD: 5.11 M/UL
RBC # FLD: 15.1 %
RED BLOOD CELLS URINE: 2 /HPF
SODIUM SERPL-SCNC: 136 MMOL/L
SPECIFIC GRAVITY URINE: 1.02
SQUAMOUS EPITHELIAL CELLS: 0 /HPF
TACROLIMUS SERPL-MCNC: 7.7 NG/ML
URATE SERPL-MCNC: 9 MG/DL
UROBILINOGEN URINE: NEGATIVE MG/DL
WBC # FLD AUTO: 6.72 K/UL
WHITE BLOOD CELLS URINE: 2 /HPF

## 2018-12-14 ENCOUNTER — APPOINTMENT (OUTPATIENT)
Dept: ULTRASOUND IMAGING | Facility: HOSPITAL | Age: 33
End: 2018-12-14
Payer: MEDICARE

## 2018-12-14 ENCOUNTER — RESULT REVIEW (OUTPATIENT)
Age: 33
End: 2018-12-14

## 2018-12-14 ENCOUNTER — OUTPATIENT (OUTPATIENT)
Dept: OUTPATIENT SERVICES | Facility: HOSPITAL | Age: 33
LOS: 1 days | End: 2018-12-14
Payer: MEDICARE

## 2018-12-14 DIAGNOSIS — Z79.899 OTHER LONG TERM (CURRENT) DRUG THERAPY: ICD-10-CM

## 2018-12-14 DIAGNOSIS — Z94.0 KIDNEY TRANSPLANT STATUS: Chronic | ICD-10-CM

## 2018-12-14 DIAGNOSIS — Z94.0 KIDNEY TRANSPLANT STATUS: ICD-10-CM

## 2018-12-14 DIAGNOSIS — I77.0 ARTERIOVENOUS FISTULA, ACQUIRED: Chronic | ICD-10-CM

## 2018-12-14 DIAGNOSIS — Z98.890 OTHER SPECIFIED POSTPROCEDURAL STATES: Chronic | ICD-10-CM

## 2018-12-14 LAB
APTT BLD: 35.2 SEC — SIGNIFICANT CHANGE UP (ref 27.5–36.3)
INR BLD: 1.07 RATIO — SIGNIFICANT CHANGE UP (ref 0.88–1.16)
PROTHROM AB SERPL-ACNC: 12.2 SEC — SIGNIFICANT CHANGE UP (ref 10–12.9)

## 2018-12-14 PROCEDURE — 88346 IMFLUOR 1ST 1ANTB STAIN PX: CPT

## 2018-12-14 PROCEDURE — 88350 IMFLUOR EA ADDL 1ANTB STN PX: CPT

## 2018-12-14 PROCEDURE — 88313 SPECIAL STAINS GROUP 2: CPT | Mod: 26

## 2018-12-14 PROCEDURE — 50200 RENAL BIOPSY PERQ: CPT

## 2018-12-14 PROCEDURE — 85730 THROMBOPLASTIN TIME PARTIAL: CPT

## 2018-12-14 PROCEDURE — 76942 ECHO GUIDE FOR BIOPSY: CPT | Mod: 26

## 2018-12-14 PROCEDURE — 88313 SPECIAL STAINS GROUP 2: CPT

## 2018-12-14 PROCEDURE — 88350 IMFLUOR EA ADDL 1ANTB STN PX: CPT | Mod: 26

## 2018-12-14 PROCEDURE — 88312 SPECIAL STAINS GROUP 1: CPT | Mod: 26

## 2018-12-14 PROCEDURE — 76942 ECHO GUIDE FOR BIOPSY: CPT

## 2018-12-14 PROCEDURE — 88348 ELECTRON MICROSCOPY DX: CPT | Mod: 26

## 2018-12-14 PROCEDURE — 85610 PROTHROMBIN TIME: CPT

## 2018-12-14 PROCEDURE — 88312 SPECIAL STAINS GROUP 1: CPT

## 2018-12-14 PROCEDURE — 88305 TISSUE EXAM BY PATHOLOGIST: CPT | Mod: 26

## 2018-12-14 PROCEDURE — 50200 RENAL BIOPSY PERQ: CPT | Mod: RT

## 2018-12-14 PROCEDURE — 88342 IMHCHEM/IMCYTCHM 1ST ANTB: CPT | Mod: 26

## 2018-12-14 PROCEDURE — 88346 IMFLUOR 1ST 1ANTB STAIN PX: CPT | Mod: 26

## 2018-12-14 PROCEDURE — 88342 IMHCHEM/IMCYTCHM 1ST ANTB: CPT

## 2018-12-14 PROCEDURE — 88348 ELECTRON MICROSCOPY DX: CPT

## 2018-12-14 PROCEDURE — 88305 TISSUE EXAM BY PATHOLOGIST: CPT

## 2018-12-15 ENCOUNTER — EMERGENCY (EMERGENCY)
Facility: HOSPITAL | Age: 33
LOS: 1 days | Discharge: HOME | End: 2018-12-15
Attending: EMERGENCY MEDICINE
Payer: MEDICARE

## 2018-12-15 DIAGNOSIS — Z94.0 KIDNEY TRANSPLANT STATUS: Chronic | ICD-10-CM

## 2018-12-15 DIAGNOSIS — Z98.890 OTHER SPECIFIED POSTPROCEDURAL STATES: Chronic | ICD-10-CM

## 2018-12-15 DIAGNOSIS — I77.0 ARTERIOVENOUS FISTULA, ACQUIRED: Chronic | ICD-10-CM

## 2018-12-15 PROCEDURE — 99284 EMERGENCY DEPT VISIT MOD MDM: CPT | Mod: GC

## 2018-12-16 VITALS
WEIGHT: 153 LBS | HEART RATE: 78 BPM | OXYGEN SATURATION: 99 % | DIASTOLIC BLOOD PRESSURE: 106 MMHG | HEIGHT: 66 IN | SYSTOLIC BLOOD PRESSURE: 155 MMHG | TEMPERATURE: 99 F | RESPIRATION RATE: 18 BRPM

## 2018-12-16 VITALS
HEART RATE: 83 BPM | DIASTOLIC BLOOD PRESSURE: 74 MMHG | TEMPERATURE: 98 F | RESPIRATION RATE: 17 BRPM | SYSTOLIC BLOOD PRESSURE: 109 MMHG | OXYGEN SATURATION: 98 %

## 2018-12-16 LAB
ALBUMIN SERPL ELPH-MCNC: 4.7 G/DL — SIGNIFICANT CHANGE UP (ref 3.3–5)
ALP SERPL-CCNC: 82 U/L — SIGNIFICANT CHANGE UP (ref 40–120)
ALT FLD-CCNC: 12 U/L — SIGNIFICANT CHANGE UP (ref 10–45)
ANION GAP SERPL CALC-SCNC: 15 MMOL/L — SIGNIFICANT CHANGE UP (ref 5–17)
AST SERPL-CCNC: 12 U/L — SIGNIFICANT CHANGE UP (ref 10–40)
BASOPHILS # BLD AUTO: 0.1 K/UL — SIGNIFICANT CHANGE UP (ref 0–0.2)
BILIRUB SERPL-MCNC: 0.3 MG/DL — SIGNIFICANT CHANGE UP (ref 0.2–1.2)
BUN SERPL-MCNC: 29 MG/DL — HIGH (ref 7–23)
CALCIUM SERPL-MCNC: 9.9 MG/DL — SIGNIFICANT CHANGE UP (ref 8.4–10.5)
CHLORIDE SERPL-SCNC: 102 MMOL/L — SIGNIFICANT CHANGE UP (ref 96–108)
CO2 SERPL-SCNC: 22 MMOL/L — SIGNIFICANT CHANGE UP (ref 22–31)
CREAT SERPL-MCNC: 2.23 MG/DL — HIGH (ref 0.5–1.3)
EOSINOPHIL # BLD AUTO: 0.1 K/UL — SIGNIFICANT CHANGE UP (ref 0–0.5)
EOSINOPHIL NFR BLD AUTO: 3 % — SIGNIFICANT CHANGE UP (ref 0–6)
GAS PNL BLDV: SIGNIFICANT CHANGE UP
GLUCOSE SERPL-MCNC: 101 MG/DL — HIGH (ref 70–99)
HCT VFR BLD CALC: 37.7 % — LOW (ref 39–50)
HGB BLD-MCNC: 12.6 G/DL — LOW (ref 13–17)
LYMPHOCYTES # BLD AUTO: 1.8 K/UL — SIGNIFICANT CHANGE UP (ref 1–3.3)
LYMPHOCYTES # BLD AUTO: 27 % — SIGNIFICANT CHANGE UP (ref 13–44)
MCHC RBC-ENTMCNC: 26.3 PG — LOW (ref 27–34)
MCHC RBC-ENTMCNC: 33.4 GM/DL — SIGNIFICANT CHANGE UP (ref 32–36)
MCV RBC AUTO: 78.7 FL — LOW (ref 80–100)
MONOCYTES # BLD AUTO: 0.4 K/UL — SIGNIFICANT CHANGE UP (ref 0–0.9)
MONOCYTES NFR BLD AUTO: 9 % — SIGNIFICANT CHANGE UP (ref 2–14)
NEUTROPHILS # BLD AUTO: 3.4 K/UL — SIGNIFICANT CHANGE UP (ref 1.8–7.4)
NEUTROPHILS NFR BLD AUTO: 53 % — SIGNIFICANT CHANGE UP (ref 43–77)
PLATELET # BLD AUTO: 296 K/UL — SIGNIFICANT CHANGE UP (ref 150–400)
POTASSIUM SERPL-MCNC: 4.9 MMOL/L — SIGNIFICANT CHANGE UP (ref 3.5–5.3)
POTASSIUM SERPL-SCNC: 4.9 MMOL/L — SIGNIFICANT CHANGE UP (ref 3.5–5.3)
PROT SERPL-MCNC: 7.5 G/DL — SIGNIFICANT CHANGE UP (ref 6–8.3)
RBC # BLD: 4.79 M/UL — SIGNIFICANT CHANGE UP (ref 4.2–5.8)
RBC # FLD: 14.2 % — SIGNIFICANT CHANGE UP (ref 10.3–14.5)
SODIUM SERPL-SCNC: 139 MMOL/L — SIGNIFICANT CHANGE UP (ref 135–145)
WBC # BLD: 5.8 K/UL — SIGNIFICANT CHANGE UP (ref 3.8–10.5)
WBC # FLD AUTO: 5.8 K/UL — SIGNIFICANT CHANGE UP (ref 3.8–10.5)

## 2018-12-16 PROCEDURE — 82330 ASSAY OF CALCIUM: CPT

## 2018-12-16 PROCEDURE — 99284 EMERGENCY DEPT VISIT MOD MDM: CPT | Mod: 25

## 2018-12-16 PROCEDURE — 82803 BLOOD GASES ANY COMBINATION: CPT

## 2018-12-16 PROCEDURE — 70360 X-RAY EXAM OF NECK: CPT

## 2018-12-16 PROCEDURE — 96375 TX/PRO/DX INJ NEW DRUG ADDON: CPT

## 2018-12-16 PROCEDURE — 84132 ASSAY OF SERUM POTASSIUM: CPT

## 2018-12-16 PROCEDURE — 84295 ASSAY OF SERUM SODIUM: CPT

## 2018-12-16 PROCEDURE — 85027 COMPLETE CBC AUTOMATED: CPT

## 2018-12-16 PROCEDURE — 85014 HEMATOCRIT: CPT

## 2018-12-16 PROCEDURE — 83605 ASSAY OF LACTIC ACID: CPT

## 2018-12-16 PROCEDURE — 96374 THER/PROPH/DIAG INJ IV PUSH: CPT

## 2018-12-16 PROCEDURE — 70360 X-RAY EXAM OF NECK: CPT | Mod: 26

## 2018-12-16 PROCEDURE — 82435 ASSAY OF BLOOD CHLORIDE: CPT

## 2018-12-16 PROCEDURE — 80053 COMPREHEN METABOLIC PANEL: CPT

## 2018-12-16 PROCEDURE — 82947 ASSAY GLUCOSE BLOOD QUANT: CPT

## 2018-12-16 RX ORDER — ACETAMINOPHEN 500 MG
1000 TABLET ORAL ONCE
Qty: 0 | Refills: 0 | Status: DISCONTINUED | OUTPATIENT
Start: 2018-12-16 | End: 2018-12-16

## 2018-12-16 RX ORDER — DEXAMETHASONE 0.5 MG/5ML
5 ELIXIR ORAL ONCE
Qty: 0 | Refills: 0 | Status: COMPLETED | OUTPATIENT
Start: 2018-12-16 | End: 2018-12-16

## 2018-12-16 RX ORDER — ACETAMINOPHEN 500 MG
1000 TABLET ORAL ONCE
Qty: 0 | Refills: 0 | Status: COMPLETED | OUTPATIENT
Start: 2018-12-16 | End: 2018-12-16

## 2018-12-16 RX ADMIN — Medication 5 MILLIGRAM(S): at 05:39

## 2018-12-16 RX ADMIN — Medication 400 MILLIGRAM(S): at 03:45

## 2018-12-16 RX ADMIN — Medication 1000 MILLIGRAM(S): at 05:37

## 2018-12-16 NOTE — ED ADULT NURSE NOTE - NSIMPLEMENTINTERV_GEN_ALL_ED
Implemented All Universal Safety Interventions:  [place] to call system. Call bell, personal items and telephone within reach. Instruct patient to call for assistance. Room bathroom lighting operational. Non-slip footwear when patient is off stretcher. Physically safe environment: no spills, clutter or unnecessary equipment. Stretcher in lowest position, wheels locked, appropriate side rails in place.

## 2018-12-16 NOTE — ED PROVIDER NOTE - NS ED ROS FT
GENERAL: No fever. + chills, //             EYES: no change in vision, //             HEENT: +pain w/ swallowing //             CARDIAC: no chest pain, //              PULMONARY: +cough, no SOB, //             GI: no abdominal pain, no nausea or no vomiting, no diarrhea or constipation, //             : No changes in urination,  //            SKIN: no rashes,  //            NEURO: no headache,  //             MSK: No joint pain otherwise as HPI or negative.

## 2018-12-16 NOTE — ED PROVIDER NOTE - OBJECTIVE STATEMENT
32 y/o M w/ PMH of renal transplant (on prograf, cellcept, prednisone) presenting w/ throat pain. Stated the pain started for 3 days. Left sided throat pain and ear pain. +pain w/ swallowing foods and liquids, but is able to swallow without difficulty. +chills at home but no recorded fevers. +cough. 3 weeks ago had similar symptoms and was seen by nephrologist and told he had URI. No recent sick contacts. Had taken Tylenol yesterday, 650mg, w/ minimal relief of symptoms.

## 2018-12-16 NOTE — ED PROVIDER NOTE - PROGRESS NOTE DETAILS
Katherin: pt's creatinine elevated from 10/18. Pt states his nephrologist is aware of this. Blood work last week w/ reported creatinine of 2.1. Had kidney bx last Friday. Pt reports resolution of his pain. Ready for DC.

## 2018-12-16 NOTE — ED PROVIDER NOTE - MEDICAL DECISION MAKING DETAILS
32 y/o M w/ throat pain. Symptoms consistent w/ viral URI. No concern for facial/neck abscess. Will get lateral neck xray to eval for edema. Given immunosuppressed state from medications will check CBC and CMP. Pain meds, decadron. Reassess. 32 y/o M w/ throat pain. Symptoms consistent w/ viral URI. No concern for facial/neck abscess. Will get lateral neck xray to eval for edema. Given immunosuppressed state from medications will check CBC and CMP. Pain meds, decadron. Reassess.  ATTG: Dr. Fisher

## 2018-12-16 NOTE — ED ADULT NURSE NOTE - OBJECTIVE STATEMENT
33y male arrived to ED complaining of throat pain. 33y male arrived to ED complaining of throat pain. Patient reports that throat pain has been ongoing x 1 month. Patient recently dx with URI. Patient endorses pain to left side of the throat and left ear. Endorses difficulty swallowing due to pain. Patient taking Tylenol without relief. Denies CP, SOB, n/v/d, ab pain, chills, fever. Patient A&Ox4, ambulatory, VS stable. Patient well appearing. PMHx kidney transplant secondary to ESRD, HTN.

## 2018-12-16 NOTE — ED PROVIDER NOTE - PHYSICAL EXAMINATION
Gen: NAD, AOx3, able to make needs known, non-toxic //            Head: NCAT //            HEENT: EOMI, oral mucosa moist, posterior oropharynx erythematous, no tonsilar exudate, no appreciable oral cavity fluctuance, normal conjunctiva, L submandibular gland tender, L anterior cervical  to palpation, //            Lung: CTAB, no respiratory distress, no wheezes/rhonchi/rales B/L, speaking in full sentences. //            CV: RRR, no murmurs //            Abd: soft, NTND, no guarding, no CVA tenderness //            MSK: no visible deformities //            Neuro: No focal sensory or motor deficits //            Skin: Warm, well perfused//            Psych: normal affect.

## 2018-12-16 NOTE — ED ADULT TRIAGE NOTE - CHIEF COMPLAINT QUOTE
throat and ear pain, seen by PMD and dx with viral illness.  now c/o worsening pain and epigastric pain with swallowing.  patient with hx renal transplant in July

## 2018-12-16 NOTE — ED ADULT NURSE NOTE - TEMPLATE LIST FOR HEAD TO TOE ASSESSMENT
----- Message from Sarina Luque sent at 11/28/2018 11:30 AM EST -----  Regarding: TERRENCE - PET SCAN RESULTS   Contact: [phone]  PLEASE CALL PATIENT REGARDING PET SCAN RESULTS    General

## 2018-12-18 LAB — SURGICAL PATHOLOGY STUDY: SIGNIFICANT CHANGE UP

## 2018-12-19 ENCOUNTER — APPOINTMENT (OUTPATIENT)
Dept: NEPHROLOGY | Facility: CLINIC | Age: 33
End: 2018-12-19
Payer: MEDICARE

## 2018-12-19 ENCOUNTER — LABORATORY RESULT (OUTPATIENT)
Age: 33
End: 2018-12-19

## 2018-12-19 VITALS
BODY MASS INDEX: 25.44 KG/M2 | DIASTOLIC BLOOD PRESSURE: 87 MMHG | HEIGHT: 64 IN | WEIGHT: 149 LBS | TEMPERATURE: 97.8 F | HEART RATE: 71 BPM | SYSTOLIC BLOOD PRESSURE: 126 MMHG | OXYGEN SATURATION: 99 % | RESPIRATION RATE: 16 BRPM

## 2018-12-19 LAB
ALBUMIN SERPL ELPH-MCNC: 4.4 G/DL
ALP BLD-CCNC: 78 U/L
ALT SERPL-CCNC: 7 U/L
ANION GAP SERPL CALC-SCNC: 11 MMOL/L
APPEARANCE: CLEAR
AST SERPL-CCNC: 13 U/L
BASOPHILS # BLD AUTO: 0 K/UL
BASOPHILS NFR BLD AUTO: 0 %
BILIRUB SERPL-MCNC: 0.4 MG/DL
BILIRUBIN URINE: NEGATIVE
BLOOD URINE: NEGATIVE
BUN SERPL-MCNC: 32 MG/DL
CALCIUM SERPL-MCNC: 9.7 MG/DL
CHLORIDE SERPL-SCNC: 106 MMOL/L
CO2 SERPL-SCNC: 22 MMOL/L
COLOR: YELLOW
CREAT SERPL-MCNC: 2.13 MG/DL
CREAT SPEC-SCNC: 209 MG/DL
CREAT/PROT UR: 0.1 RATIO
EOSINOPHIL # BLD AUTO: 0.05 K/UL
EOSINOPHIL NFR BLD AUTO: 1 %
GLUCOSE QUALITATIVE U: NEGATIVE MG/DL
GLUCOSE SERPL-MCNC: 110 MG/DL
HCT VFR BLD CALC: 39.4 %
HGB BLD-MCNC: 12.3 G/DL
KETONES URINE: NEGATIVE
LDH SERPL-CCNC: 294 U/L
LEUKOCYTE ESTERASE URINE: NEGATIVE
LYMPHOCYTES # BLD AUTO: 1.47 K/UL
LYMPHOCYTES NFR BLD AUTO: 27 %
MAGNESIUM SERPL-MCNC: 1.4 MG/DL
MAN DIFF?: NORMAL
MCHC RBC-ENTMCNC: 24.8 PG
MCHC RBC-ENTMCNC: 31.2 GM/DL
MCV RBC AUTO: 79.6 FL
MONOCYTES # BLD AUTO: 0.38 K/UL
MONOCYTES NFR BLD AUTO: 7 %
NEUTROPHILS # BLD AUTO: 3.27 K/UL
NEUTROPHILS NFR BLD AUTO: 55 %
NITRITE URINE: NEGATIVE
PH URINE: 6.5
PHOSPHATE SERPL-MCNC: 2.1 MG/DL
PLATELET # BLD AUTO: 339 K/UL
POTASSIUM SERPL-SCNC: 4.5 MMOL/L
PROT SERPL-MCNC: 7.1 G/DL
PROT UR-MCNC: 15 MG/DL
PROTEIN URINE: NEGATIVE MG/DL
RBC # BLD: 4.95 M/UL
RBC # FLD: 14.6 %
SODIUM SERPL-SCNC: 139 MMOL/L
SPECIFIC GRAVITY URINE: 1.02
TACROLIMUS SERPL-MCNC: 11.5 NG/ML
URATE SERPL-MCNC: 8.8 MG/DL
UROBILINOGEN URINE: NEGATIVE MG/DL
WBC # FLD AUTO: 5.45 K/UL

## 2018-12-19 PROCEDURE — 99214 OFFICE O/P EST MOD 30 MIN: CPT

## 2018-12-19 RX ORDER — DIBASIC SODIUM PHOSPHATE, MONOBASIC POTASSIUM PHOSPHATE AND MONOBASIC SODIUM PHOSPHATE 852; 155; 130 MG/1; MG/1; MG/1
155-852-130 TABLET ORAL
Qty: 360 | Refills: 1 | Status: DISCONTINUED | COMMUNITY
Start: 2018-08-31 | End: 2018-12-19

## 2018-12-20 LAB — BKV DNA SPEC QL NAA+PROBE: NOT DETECTED COPIES/ML

## 2019-01-03 ENCOUNTER — APPOINTMENT (OUTPATIENT)
Dept: TRANSPLANT | Facility: CLINIC | Age: 34
End: 2019-01-03

## 2019-01-04 ENCOUNTER — CHART COPY (OUTPATIENT)
Age: 34
End: 2019-01-04

## 2019-01-07 LAB
25(OH)D3 SERPL-MCNC: 11.1 NG/ML
ALBUMIN SERPL ELPH-MCNC: 4.6 G/DL
ALP BLD-CCNC: 84 U/L
ALT SERPL-CCNC: 11 U/L
ANION GAP SERPL CALC-SCNC: 14 MMOL/L
APPEARANCE: CLEAR
AST SERPL-CCNC: 14 U/L
BASOPHILS # BLD AUTO: 0.01 K/UL
BASOPHILS NFR BLD AUTO: 0.2 %
BILIRUB SERPL-MCNC: 0.4 MG/DL
BILIRUBIN URINE: NEGATIVE
BKV DNA SPEC QL NAA+PROBE: NOT DETECTED COPIES/ML
BLOOD URINE: NEGATIVE
BUN SERPL-MCNC: 21 MG/DL
CALCIUM SERPL-MCNC: 9.6 MG/DL
CHLORIDE SERPL-SCNC: 107 MMOL/L
CHOLEST SERPL-MCNC: 177 MG/DL
CHOLEST/HDLC SERPL: 4.7 RATIO
CMV DNA SPEC QL NAA+PROBE: NOT DETECTED IU/ML
CMVPCR LOG: NOT DETECTED LOGIU/ML
CO2 SERPL-SCNC: 19 MMOL/L
COLOR: YELLOW
CREAT SERPL-MCNC: 2.04 MG/DL
CREAT SPEC-SCNC: 120 MG/DL
CREAT/PROT UR: 0.1 RATIO
EOSINOPHIL # BLD AUTO: 0.52 K/UL
EOSINOPHIL NFR BLD AUTO: 8.4 %
GLUCOSE QUALITATIVE U: NEGATIVE MG/DL
GLUCOSE SERPL-MCNC: 102 MG/DL
HBA1C MFR BLD HPLC: 4.9 %
HCT VFR BLD CALC: 40.9 %
HDLC SERPL-MCNC: 38 MG/DL
HGB BLD-MCNC: 12.4 G/DL
IMM GRANULOCYTES NFR BLD AUTO: 1 %
KETONES URINE: NEGATIVE
LDH SERPL-CCNC: 313 U/L
LDLC SERPL CALC-MCNC: 103 MG/DL
LEUKOCYTE ESTERASE URINE: NEGATIVE
LYMPHOCYTES # BLD AUTO: 1.47 K/UL
LYMPHOCYTES NFR BLD AUTO: 23.7 %
MAGNESIUM SERPL-MCNC: 1.4 MG/DL
MAN DIFF?: NORMAL
MCHC RBC-ENTMCNC: 25.1 PG
MCHC RBC-ENTMCNC: 30.3 GM/DL
MCV RBC AUTO: 82.8 FL
MONOCYTES # BLD AUTO: 0.74 K/UL
MONOCYTES NFR BLD AUTO: 12 %
NEUTROPHILS # BLD AUTO: 3.39 K/UL
NEUTROPHILS NFR BLD AUTO: 54.7 %
NITRITE URINE: NEGATIVE
PH URINE: 6
PHOSPHATE SERPL-MCNC: 2.2 MG/DL
PLATELET # BLD AUTO: 230 K/UL
POTASSIUM SERPL-SCNC: 4.6 MMOL/L
PROT SERPL-MCNC: 7.2 G/DL
PROT UR-MCNC: 7 MG/DL
PROTEIN URINE: NEGATIVE MG/DL
RBC # BLD: 4.94 M/UL
RBC # FLD: 15.6 %
SODIUM SERPL-SCNC: 140 MMOL/L
SPECIFIC GRAVITY URINE: 1.02
TACROLIMUS SERPL-MCNC: 9.4 NG/ML
TRIGL SERPL-MCNC: 180 MG/DL
URATE SERPL-MCNC: 8.5 MG/DL
UROBILINOGEN URINE: NEGATIVE MG/DL
WBC # FLD AUTO: 6.19 K/UL

## 2019-01-29 ENCOUNTER — OTHER (OUTPATIENT)
Age: 34
End: 2019-01-29

## 2019-01-30 ENCOUNTER — APPOINTMENT (OUTPATIENT)
Dept: NEPHROLOGY | Facility: CLINIC | Age: 34
End: 2019-01-30
Payer: MEDICARE

## 2019-01-30 VITALS
WEIGHT: 161 LBS | TEMPERATURE: 97.7 F | SYSTOLIC BLOOD PRESSURE: 125 MMHG | DIASTOLIC BLOOD PRESSURE: 83 MMHG | HEIGHT: 64 IN | OXYGEN SATURATION: 97 % | BODY MASS INDEX: 27.49 KG/M2 | HEART RATE: 79 BPM | RESPIRATION RATE: 16 BRPM

## 2019-01-30 DIAGNOSIS — E55.9 VITAMIN D DEFICIENCY, UNSPECIFIED: ICD-10-CM

## 2019-01-30 PROCEDURE — 99214 OFFICE O/P EST MOD 30 MIN: CPT

## 2019-01-30 RX ORDER — AMOXICILLIN 875 MG/1
875 TABLET, FILM COATED ORAL
Qty: 14 | Refills: 0 | Status: DISCONTINUED | COMMUNITY
Start: 2018-12-19 | End: 2019-01-30

## 2019-01-30 RX ORDER — VALGANCICLOVIR HYDROCHLORIDE 450 MG/1
450 TABLET ORAL
Qty: 90 | Refills: 1 | Status: DISCONTINUED | COMMUNITY
Start: 2018-07-26 | End: 2019-01-30

## 2019-01-31 LAB
25(OH)D3 SERPL-MCNC: 11.2 NG/ML
ALBUMIN SERPL ELPH-MCNC: 4.7 G/DL
ALP BLD-CCNC: 90 U/L
ALT SERPL-CCNC: 17 U/L
ANION GAP SERPL CALC-SCNC: 15 MMOL/L
APPEARANCE: CLEAR
AST SERPL-CCNC: 16 U/L
BACTERIA: NEGATIVE
BASOPHILS # BLD AUTO: 0.01 K/UL
BASOPHILS NFR BLD AUTO: 0.1 %
BILIRUB SERPL-MCNC: 0.3 MG/DL
BILIRUBIN URINE: NEGATIVE
BLOOD URINE: NEGATIVE
BUN SERPL-MCNC: 36 MG/DL
CALCIUM SERPL-MCNC: 10.2 MG/DL
CHLORIDE SERPL-SCNC: 103 MMOL/L
CHOLEST SERPL-MCNC: 193 MG/DL
CHOLEST/HDLC SERPL: 3.9 RATIO
CMV IGG SERPL QL: 0.88 U/ML
CMV IGG SERPL-IMP: POSITIVE
CO2 SERPL-SCNC: 20 MMOL/L
COLOR: YELLOW
CREAT SERPL-MCNC: 1.92 MG/DL
CREAT SPEC-SCNC: 92 MG/DL
CREAT/PROT UR: 0.1 RATIO
EOSINOPHIL # BLD AUTO: 0.16 K/UL
EOSINOPHIL NFR BLD AUTO: 1.9 %
GLUCOSE QUALITATIVE U: NEGATIVE MG/DL
GLUCOSE SERPL-MCNC: 105 MG/DL
HBA1C MFR BLD HPLC: 4.9 %
HCT VFR BLD CALC: 42.4 %
HDLC SERPL-MCNC: 49 MG/DL
HGB BLD-MCNC: 13.1 G/DL
HYALINE CASTS: 1 /LPF
IMM GRANULOCYTES NFR BLD AUTO: 0.4 %
KETONES URINE: NEGATIVE
LDH SERPL-CCNC: 262 U/L
LDLC SERPL CALC-MCNC: 114 MG/DL
LEUKOCYTE ESTERASE URINE: NEGATIVE
LYMPHOCYTES # BLD AUTO: 1.73 K/UL
LYMPHOCYTES NFR BLD AUTO: 20.4 %
MAGNESIUM SERPL-MCNC: 1.3 MG/DL
MAN DIFF?: NORMAL
MCHC RBC-ENTMCNC: 25.2 PG
MCHC RBC-ENTMCNC: 30.9 GM/DL
MCV RBC AUTO: 81.7 FL
MICROSCOPIC-UA: NORMAL
MONOCYTES # BLD AUTO: 0.67 K/UL
MONOCYTES NFR BLD AUTO: 7.9 %
NEUTROPHILS # BLD AUTO: 5.9 K/UL
NEUTROPHILS NFR BLD AUTO: 69.3 %
NITRITE URINE: NEGATIVE
PH URINE: 6
PHOSPHATE SERPL-MCNC: 3.2 MG/DL
PLATELET # BLD AUTO: 246 K/UL
POTASSIUM SERPL-SCNC: 4.6 MMOL/L
PROT SERPL-MCNC: 7.4 G/DL
PROT UR-MCNC: 7 MG/DL
PROTEIN URINE: NEGATIVE MG/DL
RBC # BLD: 5.19 M/UL
RBC # FLD: 15.9 %
RED BLOOD CELLS URINE: 0 /HPF
SODIUM SERPL-SCNC: 138 MMOL/L
SPECIFIC GRAVITY URINE: 1.01
SQUAMOUS EPITHELIAL CELLS: 0 /HPF
TACROLIMUS SERPL-MCNC: 8.9 NG/ML
TRIGL SERPL-MCNC: 152 MG/DL
URATE SERPL-MCNC: 10.3 MG/DL
UROBILINOGEN URINE: NEGATIVE MG/DL
WBC # FLD AUTO: 8.5 K/UL
WHITE BLOOD CELLS URINE: 1 /HPF

## 2019-02-04 ENCOUNTER — APPOINTMENT (OUTPATIENT)
Dept: NEPHROLOGY | Facility: CLINIC | Age: 34
End: 2019-02-04

## 2019-02-07 LAB
BKV DNA SPEC QL NAA+PROBE: NOT DETECTED COPIES/ML
CMV DNA SPEC QL NAA+PROBE: NOT DETECTED IU/ML
CMVPCR LOG: NOT DETECTED LOGIU/ML

## 2019-02-28 ENCOUNTER — APPOINTMENT (OUTPATIENT)
Dept: NEPHROLOGY | Facility: CLINIC | Age: 34
End: 2019-02-28

## 2019-03-04 ENCOUNTER — APPOINTMENT (OUTPATIENT)
Dept: NEPHROLOGY | Facility: CLINIC | Age: 34
End: 2019-03-04
Payer: MEDICARE

## 2019-03-04 PROCEDURE — 99214 OFFICE O/P EST MOD 30 MIN: CPT

## 2019-03-04 RX ORDER — FAMOTIDINE 20 MG/1
20 TABLET, FILM COATED ORAL
Qty: 90 | Refills: 3 | Status: DISCONTINUED | COMMUNITY
Start: 2018-07-26 | End: 2019-03-04

## 2019-03-08 NOTE — ASSESSMENT
[FreeTextEntry1] : 1.  Renal transplant - Transplanted with a young donor, suspect ATN then culture negative pyelo on biopsy.  Creatinine nadired at 1.6 but now settled around 2.  Likely related to severe arterial sclerosis of kidney.  BP controlled - if no improvement or if worsening will consider changing to everolimus.  Pt was treated with cefepime for 4 weeks for culture neg pyelo on last kidney biopsy.\par 2.  Immunosuppression - tacro for target 6-9, MMF 1gm BID and pred 5.    May change to mTOR inhibitor in the future. \par 3.  HTN - stable\par 4.  hyperkalemia - re-educated pt, he is now on mepron.  \par 5.  Hypercalcemia - On sensipar 60mgs daily.  \par 6.  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  Cont sodium bicarbonate.  \par \par Pt will return for f/u in 1 month for visit\par He will see Dr. Liao in 2 months.  Once > 1 year post will reduce visits to every 6 months then annual.  \par

## 2019-03-08 NOTE — HISTORY OF PRESENT ILLNESS
[FreeTextEntry1] : Pt is a 33 y.o male with ESRD for about 8 years from unclear cause s/p DDRT on 18.  Donor was 33 and  of ICH.  Pts hospital course was complicated by urinary leak requiring reoperation and nephrostomy tube.  Had a prolonged hospitalization with marquez and LACEY drain both of which were removed.  Also has a JJ stent in place.  All tubes were removed during admission of .\par \par INterval events:\par Has build up of fluid in left eye, now on eye drops.  Vision had been getting worse now stable.  Saw Dr. Liao .  Feels good otherwise

## 2019-03-08 NOTE — PHYSICAL EXAM
[General Appearance - Alert] : alert [General Appearance - In No Acute Distress] : in no acute distress [General Appearance - Well Nourished] : well nourished [General Appearance - Well Developed] : well developed [Sclera] : the sclera and conjunctiva were normal [Extraocular Movements] : extraocular movements were intact [Outer Ear] : the ears and nose were normal in appearance [Neck Appearance] : the appearance of the neck was normal [Neck Cervical Mass (___cm)] : no neck mass was observed [Jugular Venous Distention Increased] : there was no jugular-venous distention [Respiration, Rhythm And Depth] : normal respiratory rhythm and effort [Exaggerated Use Of Accessory Muscles For Inspiration] : no accessory muscle use [Auscultation Breath Sounds / Voice Sounds] : lungs were clear to auscultation bilaterally [Heart Rate And Rhythm] : heart rate was normal and rhythm regular [Heart Sounds] : normal S1 and S2 [Heart Sounds Gallop] : no gallops [Murmurs] : no murmurs [Edema] : there was no peripheral edema [Bowel Sounds] : normal bowel sounds [Abdomen Soft] : soft [Abdomen Tenderness] : non-tender [] : no hepato-splenomegaly [Cervical Lymph Nodes Enlarged Posterior Bilaterally] : posterior cervical [Cervical Lymph Nodes Enlarged Anterior Bilaterally] : anterior cervical [Supraclavicular Lymph Nodes Enlarged Bilaterally] : supraclavicular [No CVA Tenderness] : no ~M costovertebral angle tenderness [Abnormal Walk] : normal gait [Musculoskeletal - Swelling] : no joint swelling seen [Skin Color & Pigmentation] : normal skin color and pigmentation [Skin Turgor] : normal skin turgor [Cranial Nerves] : cranial nerves 2-12 were intact [No Focal Deficits] : no focal deficits [Oriented To Time, Place, And Person] : oriented to person, place, and time [Impaired Insight] : insight and judgment were intact [FreeTextEntry1] : pt has half and half nails

## 2019-03-22 LAB
ALBUMIN SERPL ELPH-MCNC: 4.9 G/DL
ALP BLD-CCNC: 93 U/L
ALT SERPL-CCNC: 15 U/L
ANION GAP SERPL CALC-SCNC: 15 MMOL/L
APPEARANCE: CLEAR
AST SERPL-CCNC: 14 U/L
BACTERIA: NEGATIVE
BASOPHILS # BLD AUTO: 0.03 K/UL
BASOPHILS NFR BLD AUTO: 0.4 %
BILIRUB SERPL-MCNC: 0.4 MG/DL
BILIRUBIN URINE: NEGATIVE
BKV DNA SPEC QL NAA+PROBE: NOT DETECTED COPIES/ML
BLOOD URINE: NEGATIVE
BUN SERPL-MCNC: 28 MG/DL
CALCIUM SERPL-MCNC: 10.1 MG/DL
CHLORIDE SERPL-SCNC: 105 MMOL/L
CMV DNA SPEC QL NAA+PROBE: NOT DETECTED
CMVPCR LOG: NOT DETECTED LOGIU/ML
CO2 SERPL-SCNC: 19 MMOL/L
COLOR: NORMAL
CREAT SERPL-MCNC: 1.94 MG/DL
CREAT SPEC-SCNC: 136 MG/DL
CREAT/PROT UR: 0 RATIO
EOSINOPHIL # BLD AUTO: 0.2 K/UL
EOSINOPHIL NFR BLD AUTO: 2.5 %
GLUCOSE QUALITATIVE U: NEGATIVE
GLUCOSE SERPL-MCNC: 92 MG/DL
HCT VFR BLD CALC: 45.6 %
HGB BLD-MCNC: 14 G/DL
HYALINE CASTS: 0 /LPF
IMM GRANULOCYTES NFR BLD AUTO: 0.6 %
KETONES URINE: NEGATIVE
LDH SERPL-CCNC: 186 U/L
LEUKOCYTE ESTERASE URINE: NEGATIVE
LYMPHOCYTES # BLD AUTO: 1.98 K/UL
LYMPHOCYTES NFR BLD AUTO: 24.6 %
MAGNESIUM SERPL-MCNC: 1.4 MG/DL
MAN DIFF?: NORMAL
MCHC RBC-ENTMCNC: 25.6 PG
MCHC RBC-ENTMCNC: 30.7 GM/DL
MCV RBC AUTO: 83.5 FL
MICROSCOPIC-UA: NORMAL
MONOCYTES # BLD AUTO: 0.54 K/UL
MONOCYTES NFR BLD AUTO: 6.7 %
NEUTROPHILS # BLD AUTO: 5.25 K/UL
NEUTROPHILS NFR BLD AUTO: 65.2 %
NITRITE URINE: NEGATIVE
PH URINE: 6.5
PHOSPHATE SERPL-MCNC: 2.6 MG/DL
PLATELET # BLD AUTO: 227 K/UL
POTASSIUM SERPL-SCNC: 4.7 MMOL/L
PROT SERPL-MCNC: 7.3 G/DL
PROT UR-MCNC: 6 MG/DL
PROTEIN URINE: NEGATIVE
RBC # BLD: 5.46 M/UL
RBC # FLD: 14.7 %
RED BLOOD CELLS URINE: 0 /HPF
SODIUM SERPL-SCNC: 139 MMOL/L
SPECIFIC GRAVITY URINE: 1.01
SQUAMOUS EPITHELIAL CELLS: 0 /HPF
TACROLIMUS SERPL-MCNC: 10 NG/ML
UROBILINOGEN URINE: NORMAL
WBC # FLD AUTO: 8.05 K/UL
WHITE BLOOD CELLS URINE: 1 /HPF

## 2019-04-11 ENCOUNTER — CLINICAL ADVICE (OUTPATIENT)
Age: 34
End: 2019-04-11

## 2019-04-11 ENCOUNTER — APPOINTMENT (OUTPATIENT)
Dept: NEPHROLOGY | Facility: CLINIC | Age: 34
End: 2019-04-11
Payer: MEDICARE

## 2019-04-11 VITALS
WEIGHT: 167 LBS | OXYGEN SATURATION: 97 % | HEART RATE: 68 BPM | SYSTOLIC BLOOD PRESSURE: 145 MMHG | BODY MASS INDEX: 28.67 KG/M2 | TEMPERATURE: 98 F | DIASTOLIC BLOOD PRESSURE: 97 MMHG | RESPIRATION RATE: 16 BRPM

## 2019-04-11 PROCEDURE — 99214 OFFICE O/P EST MOD 30 MIN: CPT

## 2019-04-11 RX ORDER — ERGOCALCIFEROL 1.25 MG/1
1.25 MG CAPSULE, LIQUID FILLED ORAL
Qty: 8 | Refills: 0 | Status: DISCONTINUED | COMMUNITY
Start: 2019-01-30 | End: 2019-04-11

## 2019-04-11 NOTE — CONSULT LETTER
[Dear  ___] : Dear  [unfilled], [Courtesy Letter:] : I had the pleasure of seeing your patient, [unfilled], in my office today. [Consult Closing:] : Thank you very much for allowing me to participate in the care of this patient.  If you have any questions, please do not hesitate to contact me. [Please see my note below.] : Please see my note below. [Sincerely,] : Sincerely, [FreeTextEntry3] : Jose Jean, DO

## 2019-04-11 NOTE — HISTORY OF PRESENT ILLNESS
[FreeTextEntry1] : Pt is a 33 y.o male with ESRD for about 8 years from unclear cause s/p DDRT on 18.  Donor was 33 and  of ICH.  Pts hospital course was complicated by urinary leak requiring reoperation and nephrostomy tube.  Had a prolonged hospitalization with marquez and LACEY drain both of which were removed.  Also has a JJ stent in place.  All tubes were removed during admission of .\par \par INterval events:\par Has build up of fluid in left eye, now on eye drops - vision improving.  Saw Dr. Liao last month.  Pt gaining weight.  blood pressure controlled at home.  Not exercising

## 2019-04-11 NOTE — ASSESSMENT
[FreeTextEntry1] : 1.  Renal transplant - Transplanted with a young donor, suspect ATN then culture negative pyelo on biopsy.  Creatinine nadired at 1.6 but now settled around 2.  Likely related to severe arterial sclerosis of kidney.  BP controlled - if renal function worsening will consider changing to everolimus.  Pt was treated with cefepime for 4 weeks for culture neg pyelo on last kidney biopsy.\par 2.  Immunosuppression - tacro for target 6-9, MMF 1gm BID and pred 5.   \par 3.  HTN - stable\par 4.  hyperkalemia - re-educated pt, he is now on mepron.  \par 5.  Hypercalcemia - On sensipar 60mgs daily.  \par 6.  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  Cont sodium bicarbonate.  \par 7.  Weight gain - discussed increasing exercise and improving diet by decreasing sweets.  \par \par Pt will see Dr. Liao next month than return here in 2 months.  \par

## 2019-04-12 LAB
ALBUMIN SERPL ELPH-MCNC: 4.6 G/DL
ALP BLD-CCNC: 99 U/L
ALT SERPL-CCNC: 15 U/L
ANION GAP SERPL CALC-SCNC: 13 MMOL/L
APPEARANCE: CLEAR
AST SERPL-CCNC: 13 U/L
BACTERIA: NEGATIVE
BASOPHILS # BLD AUTO: 0.02 K/UL
BASOPHILS NFR BLD AUTO: 0.3 %
BILIRUB SERPL-MCNC: 0.2 MG/DL
BILIRUBIN URINE: NEGATIVE
BKV DNA SPEC QL NAA+PROBE: NOT DETECTED COPIES/ML
BLOOD URINE: NEGATIVE
BUN SERPL-MCNC: 32 MG/DL
CALCIUM SERPL-MCNC: 9.5 MG/DL
CHLORIDE SERPL-SCNC: 107 MMOL/L
CO2 SERPL-SCNC: 22 MMOL/L
COLOR: NORMAL
CREAT SERPL-MCNC: 1.94 MG/DL
CREAT SPEC-SCNC: 98 MG/DL
CREAT/PROT UR: 0.1 RATIO
EOSINOPHIL # BLD AUTO: 0.14 K/UL
EOSINOPHIL NFR BLD AUTO: 1.9 %
GLUCOSE QUALITATIVE U: NEGATIVE
GLUCOSE SERPL-MCNC: 99 MG/DL
HCT VFR BLD CALC: 44.1 %
HGB BLD-MCNC: 13.9 G/DL
HYALINE CASTS: 0 /LPF
IMM GRANULOCYTES NFR BLD AUTO: 0.3 %
KETONES URINE: NEGATIVE
LDH SERPL-CCNC: 156 U/L
LEUKOCYTE ESTERASE URINE: NEGATIVE
LYMPHOCYTES # BLD AUTO: 2.37 K/UL
LYMPHOCYTES NFR BLD AUTO: 33 %
MAGNESIUM SERPL-MCNC: 1.3 MG/DL
MAN DIFF?: NORMAL
MCHC RBC-ENTMCNC: 25.9 PG
MCHC RBC-ENTMCNC: 31.5 GM/DL
MCV RBC AUTO: 82.1 FL
MICROSCOPIC-UA: NORMAL
MONOCYTES # BLD AUTO: 0.63 K/UL
MONOCYTES NFR BLD AUTO: 8.8 %
NEUTROPHILS # BLD AUTO: 4 K/UL
NEUTROPHILS NFR BLD AUTO: 55.7 %
NITRITE URINE: NEGATIVE
PH URINE: 6.5
PHOSPHATE SERPL-MCNC: 2.8 MG/DL
PLATELET # BLD AUTO: 188 K/UL
POTASSIUM SERPL-SCNC: 4.4 MMOL/L
PROT SERPL-MCNC: 6.7 G/DL
PROT UR-MCNC: 7 MG/DL
PROTEIN URINE: NEGATIVE
RBC # BLD: 5.37 M/UL
RBC # FLD: 13.8 %
RED BLOOD CELLS URINE: 0 /HPF
SODIUM SERPL-SCNC: 142 MMOL/L
SPECIFIC GRAVITY URINE: 1.02
SQUAMOUS EPITHELIAL CELLS: 0 /HPF
TACROLIMUS SERPL-MCNC: 6.6 NG/ML
URATE SERPL-MCNC: 9.7 MG/DL
UROBILINOGEN URINE: NORMAL
WBC # FLD AUTO: 7.18 K/UL
WHITE BLOOD CELLS URINE: 0 /HPF

## 2019-04-24 LAB
CMV DNA SPEC QL NAA+PROBE: 55 IU/ML
CMVPCR LOG: 1.74 LOGIU/ML

## 2019-05-01 ENCOUNTER — APPOINTMENT (OUTPATIENT)
Dept: TRANSPLANT | Facility: CLINIC | Age: 34
End: 2019-05-01

## 2019-05-03 LAB
ALBUMIN SERPL ELPH-MCNC: 4.6 G/DL
ALP BLD-CCNC: 88 U/L
ALT SERPL-CCNC: 13 U/L
ANION GAP SERPL CALC-SCNC: 13 MMOL/L
APPEARANCE: CLEAR
AST SERPL-CCNC: 11 U/L
BACTERIA: NEGATIVE
BASOPHILS # BLD AUTO: 0.02 K/UL
BASOPHILS NFR BLD AUTO: 0.2 %
BILIRUB SERPL-MCNC: 0.4 MG/DL
BILIRUBIN URINE: NEGATIVE
BKV DNA SPEC QL NAA+PROBE: NOT DETECTED COPIES/ML
BLOOD URINE: NEGATIVE
BUN SERPL-MCNC: 32 MG/DL
CALCIUM SERPL-MCNC: 10.5 MG/DL
CHLORIDE SERPL-SCNC: 106 MMOL/L
CO2 SERPL-SCNC: 22 MMOL/L
COLOR: NORMAL
CREAT SERPL-MCNC: 1.78 MG/DL
CREAT SPEC-SCNC: 98 MG/DL
CREAT/PROT UR: 0.1 RATIO
EOSINOPHIL # BLD AUTO: 0.1 K/UL
EOSINOPHIL NFR BLD AUTO: 1.1 %
GLUCOSE QUALITATIVE U: NEGATIVE
GLUCOSE SERPL-MCNC: 92 MG/DL
HCT VFR BLD CALC: 46.8 %
HGB BLD-MCNC: 14.6 G/DL
HYALINE CASTS: 0 /LPF
IMM GRANULOCYTES NFR BLD AUTO: 0.2 %
KETONES URINE: NEGATIVE
LDH SERPL-CCNC: 192 U/L
LEUKOCYTE ESTERASE URINE: NEGATIVE
LYMPHOCYTES # BLD AUTO: 2.11 K/UL
LYMPHOCYTES NFR BLD AUTO: 24.1 %
MAGNESIUM SERPL-MCNC: 1.4 MG/DL
MAN DIFF?: NORMAL
MCHC RBC-ENTMCNC: 26 PG
MCHC RBC-ENTMCNC: 31.2 GM/DL
MCV RBC AUTO: 83.4 FL
MICROSCOPIC-UA: NORMAL
MONOCYTES # BLD AUTO: 0.64 K/UL
MONOCYTES NFR BLD AUTO: 7.3 %
NEUTROPHILS # BLD AUTO: 5.87 K/UL
NEUTROPHILS NFR BLD AUTO: 67.1 %
NITRITE URINE: NEGATIVE
PH URINE: 6.5
PHOSPHATE SERPL-MCNC: 3 MG/DL
PLATELET # BLD AUTO: 209 K/UL
POTASSIUM SERPL-SCNC: 4.6 MMOL/L
PROT SERPL-MCNC: 7.2 G/DL
PROT UR-MCNC: 6 MG/DL
PROTEIN URINE: NEGATIVE
RBC # BLD: 5.61 M/UL
RBC # FLD: 13.9 %
RED BLOOD CELLS URINE: 0 /HPF
SODIUM SERPL-SCNC: 141 MMOL/L
SPECIFIC GRAVITY URINE: 1.02
SQUAMOUS EPITHELIAL CELLS: 0 /HPF
TACROLIMUS SERPL-MCNC: 8.4 NG/ML
UROBILINOGEN URINE: NORMAL
WBC # FLD AUTO: 8.76 K/UL
WHITE BLOOD CELLS URINE: 0 /HPF

## 2019-05-14 LAB
CMV DNA SPEC QL NAA+PROBE: 99 IU/ML
CMVPCR LOG: 2 LOGIU/ML

## 2019-06-06 ENCOUNTER — APPOINTMENT (OUTPATIENT)
Dept: TRANSPLANT | Facility: CLINIC | Age: 34
End: 2019-06-06

## 2019-06-13 ENCOUNTER — APPOINTMENT (OUTPATIENT)
Dept: NEPHROLOGY | Facility: CLINIC | Age: 34
End: 2019-06-13
Payer: MEDICARE

## 2019-06-13 VITALS
WEIGHT: 167 LBS | OXYGEN SATURATION: 97 % | TEMPERATURE: 98 F | RESPIRATION RATE: 16 BRPM | DIASTOLIC BLOOD PRESSURE: 86 MMHG | BODY MASS INDEX: 28.67 KG/M2 | SYSTOLIC BLOOD PRESSURE: 127 MMHG | HEART RATE: 69 BPM

## 2019-06-13 PROCEDURE — 99214 OFFICE O/P EST MOD 30 MIN: CPT

## 2019-06-13 RX ORDER — SODIUM POLYSTYRENE SULFONATE 15 G/60ML
15 SUSPENSION ORAL; RECTAL DAILY
Qty: 1 | Refills: 0 | Status: DISCONTINUED | COMMUNITY
Start: 2018-10-11 | End: 2019-06-13

## 2019-06-13 RX ORDER — ALBUTEROL SULFATE 90 UG/1
108 (90 BASE) AEROSOL, METERED RESPIRATORY (INHALATION) TWICE DAILY
Qty: 1 | Refills: 0 | Status: ACTIVE | COMMUNITY
Start: 2019-06-13 | End: 1900-01-01

## 2019-06-13 NOTE — HISTORY OF PRESENT ILLNESS
[FreeTextEntry1] : Pt is a 33 y.o male with ESRD for about 8 years from unclear cause s/p DDRT on 18.  Donor was 33 and  of ICH.  Pts hospital course was complicated by urinary leak requiring reoperation and nephrostomy tube.  Had a prolonged hospitalization with marquez and LACEY drain both of which were removed.  Also has a JJ stent in place.  All tubes were removed during admission of .\par \par INterval events:\par Blood pressure controlled.  Has had a cold for a few weeks - has cough at night.  No fever or dyspnea.

## 2019-06-13 NOTE — PHYSICAL EXAM
[General Appearance - Alert] : alert [General Appearance - In No Acute Distress] : in no acute distress [General Appearance - Well Nourished] : well nourished [General Appearance - Well Developed] : well developed [Extraocular Movements] : extraocular movements were intact [Sclera] : the sclera and conjunctiva were normal [Outer Ear] : the ears and nose were normal in appearance [Neck Cervical Mass (___cm)] : no neck mass was observed [Neck Appearance] : the appearance of the neck was normal [Jugular Venous Distention Increased] : there was no jugular-venous distention [Respiration, Rhythm And Depth] : normal respiratory rhythm and effort [Exaggerated Use Of Accessory Muscles For Inspiration] : no accessory muscle use [Heart Sounds] : normal S1 and S2 [Heart Sounds Gallop] : no gallops [Murmurs] : no murmurs [Heart Rate And Rhythm] : heart rate was normal and rhythm regular [Bowel Sounds] : normal bowel sounds [Edema] : there was no peripheral edema [] : no hepato-splenomegaly [Abdomen Tenderness] : non-tender [Abdomen Soft] : soft [Cervical Lymph Nodes Enlarged Posterior Bilaterally] : posterior cervical [Cervical Lymph Nodes Enlarged Anterior Bilaterally] : anterior cervical [No CVA Tenderness] : no ~M costovertebral angle tenderness [Supraclavicular Lymph Nodes Enlarged Bilaterally] : supraclavicular [Abnormal Walk] : normal gait [Musculoskeletal - Swelling] : no joint swelling seen [Skin Turgor] : normal skin turgor [Skin Color & Pigmentation] : normal skin color and pigmentation [Cranial Nerves] : cranial nerves 2-12 were intact [No Focal Deficits] : no focal deficits [Oriented To Time, Place, And Person] : oriented to person, place, and time [Impaired Insight] : insight and judgment were intact [FreeTextEntry1] : pt has half and half nails

## 2019-06-13 NOTE — CONSULT LETTER
[Dear  ___] : Dear  [unfilled], [Courtesy Letter:] : I had the pleasure of seeing your patient, [unfilled], in my office today. [Please see my note below.] : Please see my note below. [Consult Closing:] : Thank you very much for allowing me to participate in the care of this patient.  If you have any questions, please do not hesitate to contact me. [Sincerely,] : Sincerely, [FreeTextEntry3] : Jose Jean, DO

## 2019-06-14 LAB
ALBUMIN SERPL ELPH-MCNC: 4.6 G/DL
ALP BLD-CCNC: 91 U/L
ALT SERPL-CCNC: 12 U/L
ANION GAP SERPL CALC-SCNC: 17 MMOL/L
APPEARANCE: CLEAR
AST SERPL-CCNC: 12 U/L
BACTERIA: NEGATIVE
BASOPHILS # BLD AUTO: 0.01 K/UL
BASOPHILS NFR BLD AUTO: 0.1 %
BILIRUB SERPL-MCNC: 0.3 MG/DL
BILIRUBIN URINE: NEGATIVE
BLOOD URINE: NEGATIVE
BUN SERPL-MCNC: 27 MG/DL
CALCIUM SERPL-MCNC: 9.6 MG/DL
CHLORIDE SERPL-SCNC: 103 MMOL/L
CO2 SERPL-SCNC: 17 MMOL/L
COLOR: NORMAL
CREAT SERPL-MCNC: 1.81 MG/DL
CREAT SPEC-SCNC: 127 MG/DL
CREAT/PROT UR: 0.1 RATIO
EOSINOPHIL # BLD AUTO: 0.27 K/UL
EOSINOPHIL NFR BLD AUTO: 3 %
GLUCOSE QUALITATIVE U: NEGATIVE
GLUCOSE SERPL-MCNC: 106 MG/DL
HCT VFR BLD CALC: 43.2 %
HGB BLD-MCNC: 13.7 G/DL
HYALINE CASTS: 0 /LPF
IMM GRANULOCYTES NFR BLD AUTO: 0.6 %
KETONES URINE: NEGATIVE
LDH SERPL-CCNC: 153 U/L
LEUKOCYTE ESTERASE URINE: NEGATIVE
LYMPHOCYTES # BLD AUTO: 2.64 K/UL
LYMPHOCYTES NFR BLD AUTO: 29.8 %
MAGNESIUM SERPL-MCNC: 1.3 MG/DL
MAN DIFF?: NORMAL
MCHC RBC-ENTMCNC: 25.8 PG
MCHC RBC-ENTMCNC: 31.7 GM/DL
MCV RBC AUTO: 81.4 FL
MICROSCOPIC-UA: NORMAL
MONOCYTES # BLD AUTO: 0.61 K/UL
MONOCYTES NFR BLD AUTO: 6.9 %
NEUTROPHILS # BLD AUTO: 5.29 K/UL
NEUTROPHILS NFR BLD AUTO: 59.6 %
NITRITE URINE: NEGATIVE
PH URINE: 6
PHOSPHATE SERPL-MCNC: 2.6 MG/DL
PLATELET # BLD AUTO: 240 K/UL
POTASSIUM SERPL-SCNC: 4.3 MMOL/L
PROT SERPL-MCNC: 6.9 G/DL
PROT UR-MCNC: 6 MG/DL
PROTEIN URINE: NEGATIVE
RBC # BLD: 5.31 M/UL
RBC # FLD: 14.1 %
RED BLOOD CELLS URINE: 0 /HPF
SODIUM SERPL-SCNC: 137 MMOL/L
SPECIFIC GRAVITY URINE: 1.01
SQUAMOUS EPITHELIAL CELLS: 1 /HPF
TACROLIMUS SERPL-MCNC: 27.1 NG/ML
UROBILINOGEN URINE: NORMAL
WBC # FLD AUTO: 8.87 K/UL
WHITE BLOOD CELLS URINE: 1 /HPF

## 2019-06-18 LAB
ALBUMIN SERPL ELPH-MCNC: 4.7 G/DL
ALP BLD-CCNC: 100 U/L
ALT SERPL-CCNC: 19 U/L
ANION GAP SERPL CALC-SCNC: 17 MMOL/L
APPEARANCE: CLEAR
AST SERPL-CCNC: 13 U/L
BACTERIA: NEGATIVE
BASOPHILS # BLD AUTO: 0.02 K/UL
BASOPHILS NFR BLD AUTO: 0.3 %
BILIRUB SERPL-MCNC: 0.5 MG/DL
BILIRUBIN URINE: NEGATIVE
BKV DNA SPEC QL NAA+PROBE: NOT DETECTED COPIES/ML
BKV DNA SPEC QL NAA+PROBE: NOT DETECTED COPIES/ML
BLOOD URINE: NEGATIVE
BUN SERPL-MCNC: 26 MG/DL
CALCIUM SERPL-MCNC: 10.3 MG/DL
CHLORIDE SERPL-SCNC: 104 MMOL/L
CMV DNA SPEC QL NAA+PROBE: 71 IU/ML
CMV DNA SPEC QL NAA+PROBE: NOT DETECTED
CMVPCR LOG: 1.85 LOGIU/ML
CMVPCR LOG: NOT DETECTED LOGIU/ML
CO2 SERPL-SCNC: 19 MMOL/L
COLOR: YELLOW
CREAT SERPL-MCNC: 1.92 MG/DL
CREAT SPEC-SCNC: 148 MG/DL
CREAT/PROT UR: 0.1 RATIO
EOSINOPHIL # BLD AUTO: 0.31 K/UL
EOSINOPHIL NFR BLD AUTO: 5 %
GLUCOSE QUALITATIVE U: NEGATIVE
GLUCOSE SERPL-MCNC: 97 MG/DL
HCT VFR BLD CALC: 45.9 %
HGB BLD-MCNC: 14.5 G/DL
HYALINE CASTS: 0 /LPF
IMM GRANULOCYTES NFR BLD AUTO: 0.2 %
KETONES URINE: NEGATIVE
LDH SERPL-CCNC: 193 U/L
LEUKOCYTE ESTERASE URINE: NEGATIVE
LYMPHOCYTES # BLD AUTO: 2.09 K/UL
LYMPHOCYTES NFR BLD AUTO: 33.9 %
MAGNESIUM SERPL-MCNC: 1.5 MG/DL
MAN DIFF?: NORMAL
MCHC RBC-ENTMCNC: 26.1 PG
MCHC RBC-ENTMCNC: 31.6 GM/DL
MCV RBC AUTO: 82.7 FL
MICROSCOPIC-UA: NORMAL
MONOCYTES # BLD AUTO: 0.72 K/UL
MONOCYTES NFR BLD AUTO: 11.7 %
NEUTROPHILS # BLD AUTO: 3.02 K/UL
NEUTROPHILS NFR BLD AUTO: 48.9 %
NITRITE URINE: NEGATIVE
PH URINE: 5
PHOSPHATE SERPL-MCNC: 2.6 MG/DL
PLATELET # BLD AUTO: 186 K/UL
POTASSIUM SERPL-SCNC: 4.5 MMOL/L
PROT SERPL-MCNC: 7.3 G/DL
PROT UR-MCNC: 10 MG/DL
PROTEIN URINE: NORMAL
RBC # BLD: 5.55 M/UL
RBC # FLD: 13.9 %
RED BLOOD CELLS URINE: 0 /HPF
SODIUM SERPL-SCNC: 140 MMOL/L
SPECIFIC GRAVITY URINE: 1.02
SQUAMOUS EPITHELIAL CELLS: 0 /HPF
TACROLIMUS SERPL-MCNC: 10.2 NG/ML
URINE COMMENTS: NORMAL
UROBILINOGEN URINE: NORMAL
WBC # FLD AUTO: 6.17 K/UL
WHITE BLOOD CELLS URINE: 0 /HPF

## 2019-06-27 ENCOUNTER — APPOINTMENT (OUTPATIENT)
Dept: TRANSPLANT | Facility: CLINIC | Age: 34
End: 2019-06-27

## 2019-06-28 LAB
ALBUMIN SERPL ELPH-MCNC: 4.7 G/DL
ALP BLD-CCNC: 93 U/L
ALT SERPL-CCNC: 21 U/L
ANION GAP SERPL CALC-SCNC: 14 MMOL/L
APPEARANCE: CLEAR
AST SERPL-CCNC: 12 U/L
BACTERIA: NEGATIVE
BASOPHILS # BLD AUTO: 0.01 K/UL
BASOPHILS NFR BLD AUTO: 0.1 %
BILIRUB SERPL-MCNC: 0.4 MG/DL
BILIRUBIN URINE: NEGATIVE
BKV DNA SPEC QL NAA+PROBE: NOT DETECTED COPIES/ML
BLOOD URINE: NEGATIVE
BUN SERPL-MCNC: 26 MG/DL
CALCIUM SERPL-MCNC: 10.3 MG/DL
CHLORIDE SERPL-SCNC: 106 MMOL/L
CO2 SERPL-SCNC: 20 MMOL/L
COLOR: NORMAL
CREAT SERPL-MCNC: 1.8 MG/DL
CREAT SPEC-SCNC: 120 MG/DL
CREAT/PROT UR: 0.1 RATIO
EOSINOPHIL # BLD AUTO: 0.18 K/UL
EOSINOPHIL NFR BLD AUTO: 2 %
GLUCOSE QUALITATIVE U: NEGATIVE
GLUCOSE SERPL-MCNC: 100 MG/DL
HCT VFR BLD CALC: 42.7 %
HGB BLD-MCNC: 13.6 G/DL
HYALINE CASTS: 0 /LPF
IMM GRANULOCYTES NFR BLD AUTO: 0.3 %
KETONES URINE: NEGATIVE
LDH SERPL-CCNC: 171 U/L
LEUKOCYTE ESTERASE URINE: NEGATIVE
LYMPHOCYTES # BLD AUTO: 2.3 K/UL
LYMPHOCYTES NFR BLD AUTO: 26.1 %
MAGNESIUM SERPL-MCNC: 1.5 MG/DL
MAN DIFF?: NORMAL
MCHC RBC-ENTMCNC: 26.1 PG
MCHC RBC-ENTMCNC: 31.9 GM/DL
MCV RBC AUTO: 81.8 FL
MICROSCOPIC-UA: NORMAL
MONOCYTES # BLD AUTO: 0.53 K/UL
MONOCYTES NFR BLD AUTO: 6 %
NEUTROPHILS # BLD AUTO: 5.75 K/UL
NEUTROPHILS NFR BLD AUTO: 65.5 %
NITRITE URINE: NEGATIVE
PH URINE: 6
PHOSPHATE SERPL-MCNC: 2.4 MG/DL
PLATELET # BLD AUTO: 237 K/UL
POTASSIUM SERPL-SCNC: 4.7 MMOL/L
PROT SERPL-MCNC: 7 G/DL
PROT UR-MCNC: 7 MG/DL
PROTEIN URINE: NEGATIVE
RBC # BLD: 5.22 M/UL
RBC # FLD: 14.2 %
RED BLOOD CELLS URINE: 1 /HPF
SODIUM SERPL-SCNC: 140 MMOL/L
SPECIFIC GRAVITY URINE: 1.01
SQUAMOUS EPITHELIAL CELLS: 0 /HPF
TACROLIMUS SERPL-MCNC: 4.5 NG/ML
URATE SERPL-MCNC: 9.7 MG/DL
UROBILINOGEN URINE: NORMAL
WBC # FLD AUTO: 8.8 K/UL
WHITE BLOOD CELLS URINE: 0 /HPF

## 2019-07-02 LAB
CMV DNA SPEC QL NAA+PROBE: NOT DETECTED
CMVPCR LOG: NOT DETECTED LOGIU/ML

## 2019-07-10 ENCOUNTER — OTHER (OUTPATIENT)
Age: 34
End: 2019-07-10

## 2019-07-12 NOTE — PROGRESS NOTE ADULT - PROBLEM SELECTOR PROBLEM 4
----- Message from Anita Banks DO sent at 7/10/2019 12:21 PM CDT -----  Normal results.  Please let her know.      Anita     
Essential hypertension
HTN (hypertension)

## 2019-07-15 ENCOUNTER — APPOINTMENT (OUTPATIENT)
Dept: NEPHROLOGY | Facility: CLINIC | Age: 34
End: 2019-07-15
Payer: MEDICARE

## 2019-07-15 VITALS
HEART RATE: 70 BPM | WEIGHT: 171 LBS | HEIGHT: 64 IN | OXYGEN SATURATION: 100 % | BODY MASS INDEX: 29.19 KG/M2 | TEMPERATURE: 97.7 F | SYSTOLIC BLOOD PRESSURE: 161 MMHG | DIASTOLIC BLOOD PRESSURE: 105 MMHG | RESPIRATION RATE: 16 BRPM

## 2019-07-15 DIAGNOSIS — Z87.448 PERSONAL HISTORY OF OTHER DISEASES OF URINARY SYSTEM: ICD-10-CM

## 2019-07-15 DIAGNOSIS — Z87.898 PERSONAL HISTORY OF OTHER SPECIFIED CONDITIONS: ICD-10-CM

## 2019-07-15 DIAGNOSIS — Z87.09 PERSONAL HISTORY OF OTHER DISEASES OF THE RESPIRATORY SYSTEM: ICD-10-CM

## 2019-07-15 PROCEDURE — 99214 OFFICE O/P EST MOD 30 MIN: CPT

## 2019-07-15 NOTE — ASSESSMENT
[FreeTextEntry1] : 1.  Renal transplant - Transplanted with a young donor, suspect ATN then culture negative pyelo on biopsy.  Creatinine nadired at 1.6 but now settled around 2.  Last value 1.8.  Likely related to severe arterial sclerosis of kidney as seen on biopsy.  BP controlled - if renal function worsening will consider changing to everolimus.  Pt was treated with cefepime for 4 weeks for culture neg pyelo on last kidney biopsy.\par 2.  Immunosuppression - tacro for target 5-7 as pt essentially 1 year post transplant.  Cont MMF 1gm BID and pred 5.   \par 3.  HTN - stable, high today due to timeing f medication.\par 4.  Prophylaxis - Hyperkalemia from bactrim so on mepron.  \par 5.  Hypercalcemia - On sensipar 60mgs daily.  \par 6.  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  Cont sodium bicarbonate.  \par \par Will see patient in 6 months.\par He will f/u with Dr. Liao.   \par

## 2019-07-15 NOTE — PHYSICAL EXAM
[General Appearance - Alert] : alert [General Appearance - In No Acute Distress] : in no acute distress [General Appearance - Well Nourished] : well nourished [General Appearance - Well Developed] : well developed [Sclera] : the sclera and conjunctiva were normal [Extraocular Movements] : extraocular movements were intact [Outer Ear] : the ears and nose were normal in appearance [Neck Appearance] : the appearance of the neck was normal [Neck Cervical Mass (___cm)] : no neck mass was observed [Jugular Venous Distention Increased] : there was no jugular-venous distention [Respiration, Rhythm And Depth] : normal respiratory rhythm and effort [Exaggerated Use Of Accessory Muscles For Inspiration] : no accessory muscle use [Heart Rate And Rhythm] : heart rate was normal and rhythm regular [Heart Sounds] : normal S1 and S2 [Heart Sounds Gallop] : no gallops [Murmurs] : no murmurs [Edema] : there was no peripheral edema [Bowel Sounds] : normal bowel sounds [Abdomen Soft] : soft [Abdomen Tenderness] : non-tender [] : no hepato-splenomegaly [Cervical Lymph Nodes Enlarged Posterior Bilaterally] : posterior cervical [Cervical Lymph Nodes Enlarged Anterior Bilaterally] : anterior cervical [Supraclavicular Lymph Nodes Enlarged Bilaterally] : supraclavicular [No CVA Tenderness] : no ~M costovertebral angle tenderness [Abnormal Walk] : normal gait [Musculoskeletal - Swelling] : no joint swelling seen [Skin Color & Pigmentation] : normal skin color and pigmentation [Skin Turgor] : normal skin turgor [Cranial Nerves] : cranial nerves 2-12 were intact [No Focal Deficits] : no focal deficits [Oriented To Time, Place, And Person] : oriented to person, place, and time [Impaired Insight] : insight and judgment were intact [Auscultation Breath Sounds / Voice Sounds] : lungs were clear to auscultation bilaterally [FreeTextEntry1] : pt has half and half nails

## 2019-07-15 NOTE — HISTORY OF PRESENT ILLNESS
[FreeTextEntry1] : Pt is a 33 y.o male with ESRD for about 8 years from unclear cause s/p DDRT on 18.  Donor was 33 and  of I[facility].  Pts hospital course was complicated by urinary leak requiring reoperation and nephrostomy tube.  Had a prolonged hospitalization with marquez and LACEY drain both of which were removed.  Also has a JJ stent in place.  All tubes were removed during admission of .\par \par INterval events:\par PT feels well.  Blood pressure better at home - took meds late today - just before BP checked.  No further cough or dyspnea.

## 2019-07-16 LAB
25(OH)D3 SERPL-MCNC: 11.3 NG/ML
ALBUMIN SERPL ELPH-MCNC: 4.9 G/DL
ALP BLD-CCNC: 113 U/L
ALT SERPL-CCNC: 20 U/L
ANION GAP SERPL CALC-SCNC: 13 MMOL/L
APPEARANCE: CLEAR
AST SERPL-CCNC: 16 U/L
BACTERIA: NEGATIVE
BASOPHILS # BLD AUTO: 0.01 K/UL
BASOPHILS NFR BLD AUTO: 0.1 %
BILIRUB SERPL-MCNC: 0.3 MG/DL
BILIRUBIN URINE: NEGATIVE
BLOOD URINE: NEGATIVE
BUN SERPL-MCNC: 22 MG/DL
CALCIUM SERPL-MCNC: 11.4 MG/DL
CALCIUM SERPL-MCNC: 11.4 MG/DL
CHLORIDE SERPL-SCNC: 106 MMOL/L
CHOLEST SERPL-MCNC: 221 MG/DL
CHOLEST/HDLC SERPL: 5.1 RATIO
CO2 SERPL-SCNC: 19 MMOL/L
COLOR: COLORLESS
CREAT SERPL-MCNC: 1.72 MG/DL
CREAT SPEC-SCNC: 49 MG/DL
CREAT/PROT UR: 0.1 RATIO
EOSINOPHIL # BLD AUTO: 0.19 K/UL
EOSINOPHIL NFR BLD AUTO: 2 %
ESTIMATED AVERAGE GLUCOSE: 97 MG/DL
GLUCOSE QUALITATIVE U: NEGATIVE
GLUCOSE SERPL-MCNC: 90 MG/DL
HBA1C MFR BLD HPLC: 5 %
HCT VFR BLD CALC: 46.4 %
HDLC SERPL-MCNC: 43 MG/DL
HGB BLD-MCNC: 14.3 G/DL
HYALINE CASTS: 0 /LPF
IMM GRANULOCYTES NFR BLD AUTO: 0.2 %
KETONES URINE: NEGATIVE
LDH SERPL-CCNC: 176 U/L
LDLC SERPL CALC-MCNC: 124 MG/DL
LEUKOCYTE ESTERASE URINE: NEGATIVE
LYMPHOCYTES # BLD AUTO: 3.22 K/UL
LYMPHOCYTES NFR BLD AUTO: 33.6 %
MAGNESIUM SERPL-MCNC: 1.6 MG/DL
MAN DIFF?: NORMAL
MCHC RBC-ENTMCNC: 26 PG
MCHC RBC-ENTMCNC: 30.8 GM/DL
MCV RBC AUTO: 84.4 FL
MICROSCOPIC-UA: NORMAL
MONOCYTES # BLD AUTO: 0.65 K/UL
MONOCYTES NFR BLD AUTO: 6.8 %
NEUTROPHILS # BLD AUTO: 5.49 K/UL
NEUTROPHILS NFR BLD AUTO: 57.3 %
NITRITE URINE: NEGATIVE
PARATHYROID HORMONE INTACT: 353 PG/ML
PH URINE: 6
PHOSPHATE SERPL-MCNC: 2.7 MG/DL
PLATELET # BLD AUTO: 249 K/UL
POTASSIUM SERPL-SCNC: 4.6 MMOL/L
PROT SERPL-MCNC: 7.1 G/DL
PROT UR-MCNC: 5 MG/DL
PROTEIN URINE: NEGATIVE
RBC # BLD: 5.5 M/UL
RBC # FLD: 14.5 %
RED BLOOD CELLS URINE: 0 /HPF
SODIUM SERPL-SCNC: 138 MMOL/L
SPECIFIC GRAVITY URINE: 1.01
SQUAMOUS EPITHELIAL CELLS: 0 /HPF
TACROLIMUS SERPL-MCNC: 5.7 NG/ML
TRIGL SERPL-MCNC: 270 MG/DL
URATE SERPL-MCNC: 9.1 MG/DL
UROBILINOGEN URINE: NORMAL
WBC # FLD AUTO: 9.58 K/UL
WHITE BLOOD CELLS URINE: 0 /HPF

## 2019-07-16 RX ORDER — CINACALCET 30 MG/1
30 TABLET ORAL DAILY
Qty: 90 | Refills: 3 | Status: DISCONTINUED | COMMUNITY
Start: 2019-07-16 | End: 2019-07-16

## 2019-07-25 LAB
BKV DNA SPEC QL NAA+PROBE: NOT DETECTED COPIES/ML
CMV DNA SPEC QL NAA+PROBE: NOT DETECTED
CMVPCR LOG: NOT DETECTED LOGIU/ML

## 2019-08-14 ENCOUNTER — MEDICATION RENEWAL (OUTPATIENT)
Age: 34
End: 2019-08-14

## 2019-08-14 ENCOUNTER — OTHER (OUTPATIENT)
Age: 34
End: 2019-08-14

## 2019-08-20 ENCOUNTER — OTHER (OUTPATIENT)
Age: 34
End: 2019-08-20

## 2019-08-21 ENCOUNTER — APPOINTMENT (OUTPATIENT)
Dept: TRANSPLANT | Facility: CLINIC | Age: 34
End: 2019-08-21

## 2019-08-21 LAB
ALBUMIN SERPL ELPH-MCNC: 4.6 G/DL
ALP BLD-CCNC: 94 U/L
ALT SERPL-CCNC: 19 U/L
ANION GAP SERPL CALC-SCNC: 14 MMOL/L
APPEARANCE: CLEAR
AST SERPL-CCNC: 18 U/L
BACTERIA: NEGATIVE
BASOPHILS # BLD AUTO: 0.02 K/UL
BASOPHILS NFR BLD AUTO: 0.2 %
BILIRUB SERPL-MCNC: 0.4 MG/DL
BILIRUBIN URINE: NEGATIVE
BLOOD URINE: NEGATIVE
BUN SERPL-MCNC: 22 MG/DL
CALCIUM SERPL-MCNC: 10.5 MG/DL
CHLORIDE SERPL-SCNC: 109 MMOL/L
CO2 SERPL-SCNC: 20 MMOL/L
COLOR: NORMAL
CREAT SERPL-MCNC: 1.72 MG/DL
CREAT SPEC-SCNC: 230 MG/DL
CREAT/PROT UR: 0.1 RATIO
EOSINOPHIL # BLD AUTO: 0.32 K/UL
EOSINOPHIL NFR BLD AUTO: 2.5 %
GLUCOSE QUALITATIVE U: NEGATIVE
GLUCOSE SERPL-MCNC: 104 MG/DL
HCT VFR BLD CALC: 43.5 %
HGB BLD-MCNC: 13.9 G/DL
HYALINE CASTS: 0 /LPF
IMM GRANULOCYTES NFR BLD AUTO: 0.3 %
KETONES URINE: NEGATIVE
LDH SERPL-CCNC: 247 U/L
LEUKOCYTE ESTERASE URINE: NEGATIVE
LYMPHOCYTES # BLD AUTO: 3.05 K/UL
LYMPHOCYTES NFR BLD AUTO: 24 %
MAGNESIUM SERPL-MCNC: 1.6 MG/DL
MAN DIFF?: NORMAL
MCHC RBC-ENTMCNC: 26.2 PG
MCHC RBC-ENTMCNC: 32 GM/DL
MCV RBC AUTO: 81.9 FL
MICROSCOPIC-UA: NORMAL
MONOCYTES # BLD AUTO: 0.9 K/UL
MONOCYTES NFR BLD AUTO: 7.1 %
NEUTROPHILS # BLD AUTO: 8.37 K/UL
NEUTROPHILS NFR BLD AUTO: 65.9 %
NITRITE URINE: NEGATIVE
PH URINE: 6
PHOSPHATE SERPL-MCNC: 2.2 MG/DL
PLATELET # BLD AUTO: 247 K/UL
POTASSIUM SERPL-SCNC: 4.6 MMOL/L
PROT SERPL-MCNC: 7 G/DL
PROT UR-MCNC: 20 MG/DL
PROTEIN URINE: NORMAL
RBC # BLD: 5.31 M/UL
RBC # FLD: 13.9 %
RED BLOOD CELLS URINE: 1 /HPF
SODIUM SERPL-SCNC: 142 MMOL/L
SPECIFIC GRAVITY URINE: 1.02
SQUAMOUS EPITHELIAL CELLS: 0 /HPF
TACROLIMUS SERPL-MCNC: 5.2 NG/ML
URATE SERPL-MCNC: 9.6 MG/DL
UROBILINOGEN URINE: NORMAL
WBC # FLD AUTO: 12.7 K/UL
WHITE BLOOD CELLS URINE: 1 /HPF

## 2019-08-22 LAB — BKV DNA SPEC QL NAA+PROBE: NOT DETECTED COPIES/ML

## 2019-08-23 LAB
CMV DNA SPEC QL NAA+PROBE: <50 IU/ML
CMVPCR LOG: 1.7 LOGIU/ML

## 2019-08-25 ENCOUNTER — EMERGENCY (EMERGENCY)
Facility: HOSPITAL | Age: 34
LOS: 1 days | Discharge: HOME | End: 2019-08-25
Attending: EMERGENCY MEDICINE
Payer: MEDICARE

## 2019-08-25 VITALS
SYSTOLIC BLOOD PRESSURE: 147 MMHG | OXYGEN SATURATION: 99 % | HEART RATE: 78 BPM | DIASTOLIC BLOOD PRESSURE: 92 MMHG | HEIGHT: 66 IN | WEIGHT: 169.98 LBS | TEMPERATURE: 98 F | RESPIRATION RATE: 19 BRPM

## 2019-08-25 DIAGNOSIS — I77.0 ARTERIOVENOUS FISTULA, ACQUIRED: Chronic | ICD-10-CM

## 2019-08-25 DIAGNOSIS — Z94.0 KIDNEY TRANSPLANT STATUS: Chronic | ICD-10-CM

## 2019-08-25 DIAGNOSIS — Z98.890 OTHER SPECIFIED POSTPROCEDURAL STATES: Chronic | ICD-10-CM

## 2019-08-25 LAB
BASOPHILS # BLD AUTO: 0 K/UL — SIGNIFICANT CHANGE UP (ref 0–0.2)
BASOPHILS NFR BLD AUTO: 0.4 % — SIGNIFICANT CHANGE UP (ref 0–2)
EOSINOPHIL # BLD AUTO: 0.3 K/UL — SIGNIFICANT CHANGE UP (ref 0–0.5)
EOSINOPHIL NFR BLD AUTO: 2.1 % — SIGNIFICANT CHANGE UP (ref 0–6)
GAS PNL BLDV: SIGNIFICANT CHANGE UP
HCT VFR BLD CALC: 45.4 % — SIGNIFICANT CHANGE UP (ref 39–50)
HGB BLD-MCNC: 13.7 G/DL — SIGNIFICANT CHANGE UP (ref 13–17)
LYMPHOCYTES # BLD AUTO: 27.7 % — SIGNIFICANT CHANGE UP (ref 13–44)
LYMPHOCYTES # BLD AUTO: 3.4 K/UL — HIGH (ref 1–3.3)
MCHC RBC-ENTMCNC: 25.3 PG — LOW (ref 27–34)
MCHC RBC-ENTMCNC: 30.3 GM/DL — LOW (ref 32–36)
MCV RBC AUTO: 83.5 FL — SIGNIFICANT CHANGE UP (ref 80–100)
MONOCYTES # BLD AUTO: 1.1 K/UL — HIGH (ref 0–0.9)
MONOCYTES NFR BLD AUTO: 9.1 % — SIGNIFICANT CHANGE UP (ref 2–14)
NEUTROPHILS # BLD AUTO: 7.4 K/UL — SIGNIFICANT CHANGE UP (ref 1.8–7.4)
NEUTROPHILS NFR BLD AUTO: 60.7 % — SIGNIFICANT CHANGE UP (ref 43–77)
PLATELET # BLD AUTO: 291 K/UL — SIGNIFICANT CHANGE UP (ref 150–400)
RBC # BLD: 5.43 M/UL — SIGNIFICANT CHANGE UP (ref 4.2–5.8)
RBC # FLD: 13.2 % — SIGNIFICANT CHANGE UP (ref 10.3–14.5)
WBC # BLD: 12.2 K/UL — HIGH (ref 3.8–10.5)
WBC # FLD AUTO: 12.2 K/UL — HIGH (ref 3.8–10.5)

## 2019-08-25 PROCEDURE — 99284 EMERGENCY DEPT VISIT MOD MDM: CPT | Mod: GC

## 2019-08-25 RX ORDER — SODIUM CHLORIDE 9 MG/ML
1500 INJECTION INTRAMUSCULAR; INTRAVENOUS; SUBCUTANEOUS ONCE
Refills: 0 | Status: COMPLETED | OUTPATIENT
Start: 2019-08-25 | End: 2019-08-25

## 2019-08-25 RX ORDER — IPRATROPIUM/ALBUTEROL SULFATE 18-103MCG
3 AEROSOL WITH ADAPTER (GRAM) INHALATION ONCE
Refills: 0 | Status: COMPLETED | OUTPATIENT
Start: 2019-08-25 | End: 2019-08-25

## 2019-08-25 RX ORDER — CARVEDILOL PHOSPHATE 80 MG/1
1 CAPSULE, EXTENDED RELEASE ORAL
Qty: 0 | Refills: 0 | DISCHARGE

## 2019-08-25 RX ORDER — NIFEDIPINE 30 MG
1 TABLET, EXTENDED RELEASE 24 HR ORAL
Qty: 0 | Refills: 0 | DISCHARGE

## 2019-08-25 RX ORDER — TACROLIMUS 5 MG/1
1 CAPSULE ORAL
Qty: 0 | Refills: 0 | DISCHARGE

## 2019-08-25 RX ORDER — SODIUM BICARBONATE 1 MEQ/ML
1 SYRINGE (ML) INTRAVENOUS
Qty: 0 | Refills: 0 | DISCHARGE

## 2019-08-25 RX ORDER — SODIUM CHLORIDE 9 MG/ML
1000 INJECTION INTRAMUSCULAR; INTRAVENOUS; SUBCUTANEOUS ONCE
Refills: 0 | Status: COMPLETED | OUTPATIENT
Start: 2019-08-25 | End: 2019-08-25

## 2019-08-25 RX ORDER — ATOVAQUONE 750 MG/5ML
5 SUSPENSION ORAL
Qty: 0 | Refills: 0 | DISCHARGE

## 2019-08-25 RX ORDER — CINACALCET 30 MG/1
1 TABLET, FILM COATED ORAL
Qty: 0 | Refills: 0 | DISCHARGE

## 2019-08-25 RX ADMIN — Medication 3 MILLILITER(S): at 23:22

## 2019-08-25 RX ADMIN — SODIUM CHLORIDE 1000 MILLILITER(S): 9 INJECTION INTRAMUSCULAR; INTRAVENOUS; SUBCUTANEOUS at 23:22

## 2019-08-25 NOTE — ED PROVIDER NOTE - OBJECTIVE STATEMENT
33yo M hx renal transplant years ago on prograf, prednisone, cellcept p/w 2 weeks of dry cough and sob that was initially improving now worsening again. Saw renal dr for sx and unremarkable blood work inc prograf level. Syncopal episode during vigorous coughing episode but immediately woke up.

## 2019-08-25 NOTE — ED PROVIDER NOTE - NSFOLLOWUPINSTRUCTIONS_ED_ALL_ED_FT
- Lab and imaging results, if performed, were discussed with you along with your discharge diagnosis    - Follow up with your doctor in 1 week - bring copies of your results if you were given. If you do not have a primary doctor, please call [phone] to find one convenient for you    - Return to the ED for any new, worsening, or concerning symptoms to you    - Continue all prescribed medications    - Take ibuprofen/tylenol as directed as needed for pain    - Rest and keep yourself hydrated with fluids

## 2019-08-25 NOTE — ED ADULT NURSE NOTE - OBJECTIVE STATEMENT
34M to ED c/o cough for 2 weeks with white sputum. Pt breath sounds clear bilaterally. Pt is alert and oriented x4, calm/cooperative, NAD, VSS. Pt on room air. Pt has hx of kidney transplant 1 year ago. Pt has fistula to L forearm. Pt c/o syncopal episode last night while having coughing fit. Pt has 20 ga placed to RAC in ED.

## 2019-08-25 NOTE — ED PROVIDER NOTE - CLINICAL SUMMARY MEDICAL DECISION MAKING FREE TEXT BOX
35yo M hx renal transplant years ago on prograf, prednisone, cellcept p/w 2 weeks of dry cough and sob that was initially improving now worsening again. Syncopal episode during vigorous coughing episode. Scant wheezing on exam. Labs, cxr r/o pna. RVP to r/o flu and 35yo M hx renal transplant years ago on prograf, prednisone, cellcept p/w 2 weeks of dry cough and sob that was initially improving now worsening again. Saw renal dr for sx and unremarkable blood work inc prograf level. Syncopal episode during vigorous coughing episode. Scant wheezing on exam. Labs, cxr r/o pna. RVP to r/o flu and pertussis. Nebs, hycodan, ivf, ekg, labs, cxr, reassess. 33yo M hx renal transplant years ago on prograf, prednisone, cellcept p/w 2 weeks of dry cough and sob that was initially improving now worsening again. Saw renal dr for sx and unremarkable blood work inc prograf level. Syncopal episode during vigorous coughing episode. Scant wheezing on exam. Labs, cxr r/o pna. RVP to r/o flu and pertussis. Nebs, hycodan, ivf, ekg, labs, cxr, reassess.    SARAH Oneill MD: Pt is a 33 y/o male with PMH renal transplant on immunosuppressant medications who p/w c/o "2 weeks of cough." States that cough is dry. Pt denies: chest pain, SOB, fevers, chills, pleuritic chest pain, abdominal pain, n/v/d, back pain, neck pain, HA, neck stiffness, focal numbness or weakness, visual changes, dizziness, lightheadedness, leg pain/swelling, recent travel, recent trauma, recent immobilization, dysuria, hematuria, rash. Ddx includes, however, is not limited to: URI, PNA, bronchitis, other. Plan: basic labs, CXR, RVP, bcx, lactate, IVF, nebs, reassess

## 2019-08-25 NOTE — ED PROVIDER NOTE - PHYSICAL EXAMINATION
Gen: Well appearing, NAD  Head: NCAT  HEENT: PERRL, MMM, normal conjunctiva, anicteric, neck supple  Lung: scant wheezing b/l on exam  CV: RRR, no murmurs  Abd: soft, NTND, no rebound or guarding, no CVAT  MSK: No edema, no visible deformities  Neuro: Moving all extremity grossly  Skin: Warm and dry, no evidence of rash  Psych: normal mood and affect

## 2019-08-26 VITALS
DIASTOLIC BLOOD PRESSURE: 89 MMHG | HEART RATE: 83 BPM | TEMPERATURE: 99 F | OXYGEN SATURATION: 98 % | RESPIRATION RATE: 16 BRPM | SYSTOLIC BLOOD PRESSURE: 145 MMHG

## 2019-08-26 LAB
ALBUMIN SERPL ELPH-MCNC: 4.6 G/DL — SIGNIFICANT CHANGE UP (ref 3.3–5)
ALP SERPL-CCNC: 108 U/L — SIGNIFICANT CHANGE UP (ref 40–120)
ALT FLD-CCNC: 18 U/L — SIGNIFICANT CHANGE UP (ref 10–45)
ANION GAP SERPL CALC-SCNC: 14 MMOL/L — SIGNIFICANT CHANGE UP (ref 5–17)
APPEARANCE UR: CLEAR — SIGNIFICANT CHANGE UP
AST SERPL-CCNC: 18 U/L — SIGNIFICANT CHANGE UP (ref 10–40)
BILIRUB SERPL-MCNC: 0.4 MG/DL — SIGNIFICANT CHANGE UP (ref 0.2–1.2)
BILIRUB UR-MCNC: NEGATIVE — SIGNIFICANT CHANGE UP
BUN SERPL-MCNC: 20 MG/DL — SIGNIFICANT CHANGE UP (ref 7–23)
CALCIUM SERPL-MCNC: 10.7 MG/DL — HIGH (ref 8.4–10.5)
CHLORIDE SERPL-SCNC: 103 MMOL/L — SIGNIFICANT CHANGE UP (ref 96–108)
CO2 SERPL-SCNC: 24 MMOL/L — SIGNIFICANT CHANGE UP (ref 22–31)
COLOR SPEC: SIGNIFICANT CHANGE UP
CREAT SERPL-MCNC: 1.8 MG/DL — HIGH (ref 0.5–1.3)
DIFF PNL FLD: NEGATIVE — SIGNIFICANT CHANGE UP
GLUCOSE SERPL-MCNC: 100 MG/DL — HIGH (ref 70–99)
GLUCOSE UR QL: NEGATIVE — SIGNIFICANT CHANGE UP
KETONES UR-MCNC: NEGATIVE — SIGNIFICANT CHANGE UP
LACTATE BLDV-MCNC: 1.2 MMOL/L — SIGNIFICANT CHANGE UP (ref 0.7–2)
LEUKOCYTE ESTERASE UR-ACNC: NEGATIVE — SIGNIFICANT CHANGE UP
NITRITE UR-MCNC: NEGATIVE — SIGNIFICANT CHANGE UP
PH UR: 7 — SIGNIFICANT CHANGE UP (ref 5–8)
POTASSIUM SERPL-MCNC: 4.7 MMOL/L — SIGNIFICANT CHANGE UP (ref 3.5–5.3)
POTASSIUM SERPL-SCNC: 4.7 MMOL/L — SIGNIFICANT CHANGE UP (ref 3.5–5.3)
PROT SERPL-MCNC: 7.6 G/DL — SIGNIFICANT CHANGE UP (ref 6–8.3)
PROT UR-MCNC: ABNORMAL
RAPID RVP RESULT: SIGNIFICANT CHANGE UP
SODIUM SERPL-SCNC: 141 MMOL/L — SIGNIFICANT CHANGE UP (ref 135–145)
SP GR SPEC: 1.02 — SIGNIFICANT CHANGE UP (ref 1.01–1.02)
TACROLIMUS SERPL-MCNC: 8.5 NG/ML — SIGNIFICANT CHANGE UP
UROBILINOGEN FLD QL: NEGATIVE — SIGNIFICANT CHANGE UP

## 2019-08-26 PROCEDURE — 83605 ASSAY OF LACTIC ACID: CPT

## 2019-08-26 PROCEDURE — 93005 ELECTROCARDIOGRAM TRACING: CPT

## 2019-08-26 PROCEDURE — 82330 ASSAY OF CALCIUM: CPT

## 2019-08-26 PROCEDURE — 82947 ASSAY GLUCOSE BLOOD QUANT: CPT

## 2019-08-26 PROCEDURE — 80053 COMPREHEN METABOLIC PANEL: CPT

## 2019-08-26 PROCEDURE — 71046 X-RAY EXAM CHEST 2 VIEWS: CPT | Mod: 26

## 2019-08-26 PROCEDURE — 82435 ASSAY OF BLOOD CHLORIDE: CPT

## 2019-08-26 PROCEDURE — 99284 EMERGENCY DEPT VISIT MOD MDM: CPT | Mod: 25

## 2019-08-26 PROCEDURE — 87486 CHLMYD PNEUM DNA AMP PROBE: CPT

## 2019-08-26 PROCEDURE — 81001 URINALYSIS AUTO W/SCOPE: CPT

## 2019-08-26 PROCEDURE — 84295 ASSAY OF SERUM SODIUM: CPT

## 2019-08-26 PROCEDURE — 84132 ASSAY OF SERUM POTASSIUM: CPT

## 2019-08-26 PROCEDURE — 71046 X-RAY EXAM CHEST 2 VIEWS: CPT

## 2019-08-26 PROCEDURE — 87798 DETECT AGENT NOS DNA AMP: CPT

## 2019-08-26 PROCEDURE — 87581 M.PNEUMON DNA AMP PROBE: CPT

## 2019-08-26 PROCEDURE — 85014 HEMATOCRIT: CPT

## 2019-08-26 PROCEDURE — 87040 BLOOD CULTURE FOR BACTERIA: CPT

## 2019-08-26 PROCEDURE — 82803 BLOOD GASES ANY COMBINATION: CPT

## 2019-08-26 PROCEDURE — 80197 ASSAY OF TACROLIMUS: CPT

## 2019-08-26 PROCEDURE — 82565 ASSAY OF CREATININE: CPT

## 2019-08-26 PROCEDURE — 94640 AIRWAY INHALATION TREATMENT: CPT

## 2019-08-26 PROCEDURE — 87633 RESP VIRUS 12-25 TARGETS: CPT

## 2019-08-26 PROCEDURE — 85027 COMPLETE CBC AUTOMATED: CPT

## 2019-08-26 RX ORDER — ALBUTEROL 90 UG/1
1 AEROSOL, METERED ORAL ONCE
Refills: 0 | Status: COMPLETED | OUTPATIENT
Start: 2019-08-26 | End: 2019-08-26

## 2019-08-26 RX ADMIN — ALBUTEROL 1 PUFF(S): 90 AEROSOL, METERED ORAL at 02:52

## 2019-08-26 RX ADMIN — SODIUM CHLORIDE 1500 MILLILITER(S): 9 INJECTION INTRAMUSCULAR; INTRAVENOUS; SUBCUTANEOUS at 00:42

## 2019-08-31 LAB
CULTURE RESULTS: SIGNIFICANT CHANGE UP
CULTURE RESULTS: SIGNIFICANT CHANGE UP
SPECIMEN SOURCE: SIGNIFICANT CHANGE UP
SPECIMEN SOURCE: SIGNIFICANT CHANGE UP

## 2019-09-25 ENCOUNTER — APPOINTMENT (OUTPATIENT)
Dept: CARDIOLOGY | Facility: CLINIC | Age: 34
End: 2019-09-25
Payer: MEDICARE

## 2019-09-25 ENCOUNTER — NON-APPOINTMENT (OUTPATIENT)
Age: 34
End: 2019-09-25

## 2019-09-25 VITALS
HEIGHT: 64 IN | OXYGEN SATURATION: 96 % | BODY MASS INDEX: 29.02 KG/M2 | SYSTOLIC BLOOD PRESSURE: 111 MMHG | WEIGHT: 170 LBS | HEART RATE: 76 BPM | DIASTOLIC BLOOD PRESSURE: 74 MMHG

## 2019-09-25 DIAGNOSIS — R55 SYNCOPE AND COLLAPSE: ICD-10-CM

## 2019-09-25 DIAGNOSIS — Z87.891 PERSONAL HISTORY OF NICOTINE DEPENDENCE: ICD-10-CM

## 2019-09-25 DIAGNOSIS — F17.200 NICOTINE DEPENDENCE, UNSPECIFIED, UNCOMPLICATED: ICD-10-CM

## 2019-09-25 DIAGNOSIS — Z87.440 PERSONAL HISTORY OF URINARY (TRACT) INFECTIONS: ICD-10-CM

## 2019-09-25 PROCEDURE — 99213 OFFICE O/P EST LOW 20 MIN: CPT

## 2019-09-25 PROCEDURE — 93000 ELECTROCARDIOGRAM COMPLETE: CPT

## 2019-09-26 ENCOUNTER — APPOINTMENT (OUTPATIENT)
Dept: TRANSPLANT | Facility: CLINIC | Age: 34
End: 2019-09-26

## 2019-09-26 ENCOUNTER — APPOINTMENT (OUTPATIENT)
Dept: NEPHROLOGY | Facility: CLINIC | Age: 34
End: 2019-09-26
Payer: MEDICARE

## 2019-09-26 VITALS
RESPIRATION RATE: 16 BRPM | DIASTOLIC BLOOD PRESSURE: 88 MMHG | TEMPERATURE: 98.3 F | OXYGEN SATURATION: 100 % | SYSTOLIC BLOOD PRESSURE: 129 MMHG | BODY MASS INDEX: 29.02 KG/M2 | WEIGHT: 170 LBS | HEART RATE: 84 BPM | HEIGHT: 64 IN

## 2019-09-26 PROBLEM — Z87.891 FORMER SMOKER: Status: ACTIVE | Noted: 2019-09-26

## 2019-09-26 LAB — TACROLIMUS SERPL-MCNC: 4.6 NG/ML

## 2019-09-26 PROCEDURE — 99214 OFFICE O/P EST MOD 30 MIN: CPT

## 2019-09-26 RX ORDER — TACROLIMUS 4 MG/1
4 TABLET, EXTENDED RELEASE ORAL
Qty: 30 | Refills: 11 | Status: DISCONTINUED | COMMUNITY
Start: 2019-09-13 | End: 2019-09-26

## 2019-09-26 RX ORDER — TACROLIMUS 1 MG/1
1 CAPSULE ORAL TWICE DAILY
Qty: 120 | Refills: 11 | Status: DISCONTINUED | COMMUNITY
Start: 2018-07-26 | End: 2019-09-26

## 2019-09-26 RX ORDER — ATOVAQUONE 750 MG/5ML
750 SUSPENSION ORAL DAILY
Qty: 300 | Refills: 11 | Status: DISCONTINUED | COMMUNITY
Start: 2018-10-29 | End: 2019-09-26

## 2019-09-26 NOTE — HISTORY OF PRESENT ILLNESS
[FreeTextEntry1] : Doing okay. Denies chest pain, shortness of breath or palpitations. Had renal transplant July 23, 2018. Had a few episodes of syncope in setting of having coughing fit. Coughing appears to be related to some underlying allergies.

## 2019-09-26 NOTE — DISCUSSION/SUMMARY
[Hypertension] : hypertension [Vasovagal Syncope] : vasovagal syncope [Stable] : stable [FreeTextEntry1] : \par Currently stable from a cardiovascular standpoint. Normotensive. Euvolemic. Syncopal episodes in setting of coughing. Patient with likely vasovagal syncope. Has not passed out in recent times since coughing has gotten better. Continue current medications. ECG completed today and reviewed (findings as noted above). Patient advised to maintain adequate hydration. Discussed vasovagal precautionary measures. No cardiac testing necessary at this time. Follow up in 6 months.

## 2019-09-26 NOTE — PHYSICAL EXAM
[Normal Appearance] : normal appearance [General Appearance - Well Developed] : well developed [Well Groomed] : well groomed [No Deformities] : no deformities [General Appearance - Well Nourished] : well nourished [General Appearance - In No Acute Distress] : no acute distress [Normal Conjunctiva] : the conjunctiva exhibited no abnormalities [Eyelids - No Xanthelasma] : the eyelids demonstrated no xanthelasmas [Normal Oral Mucosa] : normal oral mucosa [No Oral Pallor] : no oral pallor [No Oral Cyanosis] : no oral cyanosis [Respiration, Rhythm And Depth] : normal respiratory rhythm and effort [Exaggerated Use Of Accessory Muscles For Inspiration] : no accessory muscle use [Auscultation Breath Sounds / Voice Sounds] : lungs were clear to auscultation bilaterally [Heart Rate And Rhythm] : heart rate and rhythm were normal [Heart Sounds] : normal S1 and S2 [Murmurs] : no murmurs present [Edema] : no peripheral edema present [Abdomen Soft] : soft [Abdomen Tenderness] : non-tender [Abnormal Walk] : normal gait [Gait - Sufficient For Exercise Testing] : the gait was sufficient for exercise testing [Nail Clubbing] : no clubbing of the fingernails [Cyanosis, Localized] : no localized cyanosis [Petechial Hemorrhages (___cm)] : no petechial hemorrhages [Skin Color & Pigmentation] : normal skin color and pigmentation [] : no rash [No Venous Stasis] : no venous stasis [Skin Lesions] : no skin lesions [No Skin Ulcers] : no skin ulcer [No Xanthoma] : no  xanthoma was observed [Oriented To Time, Place, And Person] : oriented to person, place, and time [Affect] : the affect was normal [Mood] : the mood was normal [No Anxiety] : not feeling anxious [FreeTextEntry1] : left forearm fistula

## 2019-09-27 LAB
25(OH)D3 SERPL-MCNC: 11.7 NG/ML
ALBUMIN SERPL ELPH-MCNC: 4.7 G/DL
ALP BLD-CCNC: 99 U/L
ALT SERPL-CCNC: 17 U/L
ANION GAP SERPL CALC-SCNC: 15 MMOL/L
APPEARANCE: CLEAR
AST SERPL-CCNC: 15 U/L
BACTERIA: NEGATIVE
BASOPHILS # BLD AUTO: 0.01 K/UL
BASOPHILS NFR BLD AUTO: 0.1 %
BILIRUB SERPL-MCNC: 0.4 MG/DL
BILIRUBIN URINE: NEGATIVE
BKV DNA SPEC QL NAA+PROBE: NOT DETECTED COPIES/ML
BLOOD URINE: NEGATIVE
BUN SERPL-MCNC: 20 MG/DL
CALCIUM SERPL-MCNC: 9.9 MG/DL
CALCIUM SERPL-MCNC: 9.9 MG/DL
CHLORIDE SERPL-SCNC: 103 MMOL/L
CHOLEST SERPL-MCNC: 207 MG/DL
CHOLEST/HDLC SERPL: 4.9 RATIO
CO2 SERPL-SCNC: 21 MMOL/L
COLOR: COLORLESS
CREAT SERPL-MCNC: 1.49 MG/DL
CREAT SPEC-SCNC: 52 MG/DL
CREAT/PROT UR: 0.1 RATIO
EOSINOPHIL # BLD AUTO: 0.19 K/UL
EOSINOPHIL NFR BLD AUTO: 2.2 %
ESTIMATED AVERAGE GLUCOSE: 100 MG/DL
GLUCOSE QUALITATIVE U: NEGATIVE
GLUCOSE SERPL-MCNC: 98 MG/DL
HBA1C MFR BLD HPLC: 5.1 %
HCT VFR BLD CALC: 42.8 %
HDLC SERPL-MCNC: 42 MG/DL
HGB BLD-MCNC: 13.3 G/DL
HYALINE CASTS: 0 /LPF
IMM GRANULOCYTES NFR BLD AUTO: 0.5 %
KETONES URINE: NEGATIVE
LDH SERPL-CCNC: 170 U/L
LDLC SERPL CALC-MCNC: 124 MG/DL
LEUKOCYTE ESTERASE URINE: NEGATIVE
LYMPHOCYTES # BLD AUTO: 2.45 K/UL
LYMPHOCYTES NFR BLD AUTO: 28.8 %
MAGNESIUM SERPL-MCNC: 1.4 MG/DL
MAN DIFF?: NORMAL
MCHC RBC-ENTMCNC: 25.5 PG
MCHC RBC-ENTMCNC: 31.1 GM/DL
MCV RBC AUTO: 82 FL
MICROSCOPIC-UA: NORMAL
MONOCYTES # BLD AUTO: 0.96 K/UL
MONOCYTES NFR BLD AUTO: 11.3 %
NEUTROPHILS # BLD AUTO: 4.85 K/UL
NEUTROPHILS NFR BLD AUTO: 57.1 %
NITRITE URINE: NEGATIVE
PARATHYROID HORMONE INTACT: 243 PG/ML
PH URINE: 6
PHOSPHATE SERPL-MCNC: 3.2 MG/DL
PLATELET # BLD AUTO: 199 K/UL
POTASSIUM SERPL-SCNC: 4 MMOL/L
PROT SERPL-MCNC: 6.7 G/DL
PROT UR-MCNC: 5 MG/DL
PROTEIN URINE: NEGATIVE
RBC # BLD: 5.22 M/UL
RBC # FLD: 14.6 %
RED BLOOD CELLS URINE: 0 /HPF
SODIUM SERPL-SCNC: 139 MMOL/L
SPECIFIC GRAVITY URINE: 1.01
SQUAMOUS EPITHELIAL CELLS: 0 /HPF
TRIGL SERPL-MCNC: 205 MG/DL
URATE SERPL-MCNC: 9.2 MG/DL
UROBILINOGEN URINE: NORMAL
WBC # FLD AUTO: 8.5 K/UL
WHITE BLOOD CELLS URINE: 0 /HPF

## 2019-10-02 LAB
CMV DNA SPEC QL NAA+PROBE: 329 IU/ML
CMVPCR LOG: 2.52 LOGIU/ML

## 2019-10-08 ENCOUNTER — OTHER (OUTPATIENT)
Age: 34
End: 2019-10-08

## 2019-10-10 ENCOUNTER — MED ADMIN CHARGE (OUTPATIENT)
Age: 34
End: 2019-10-10

## 2019-10-10 ENCOUNTER — APPOINTMENT (OUTPATIENT)
Dept: NEPHROLOGY | Facility: CLINIC | Age: 34
End: 2019-10-10
Payer: MEDICARE

## 2019-10-10 VITALS
WEIGHT: 170 LBS | TEMPERATURE: 97.8 F | HEIGHT: 64 IN | DIASTOLIC BLOOD PRESSURE: 84 MMHG | OXYGEN SATURATION: 98 % | RESPIRATION RATE: 16 BRPM | SYSTOLIC BLOOD PRESSURE: 118 MMHG | BODY MASS INDEX: 29.02 KG/M2 | HEART RATE: 71 BPM

## 2019-10-10 DIAGNOSIS — B35.4 TINEA CORPORIS: ICD-10-CM

## 2019-10-10 LAB
25(OH)D3 SERPL-MCNC: 12.5 NG/ML
ALBUMIN SERPL ELPH-MCNC: 4.9 G/DL
ALP BLD-CCNC: 106 U/L
ALT SERPL-CCNC: 21 U/L
ANION GAP SERPL CALC-SCNC: 15 MMOL/L
APPEARANCE: CLEAR
AST SERPL-CCNC: 17 U/L
BACTERIA: NEGATIVE
BASOPHILS # BLD AUTO: 0.02 K/UL
BASOPHILS NFR BLD AUTO: 0.3 %
BILIRUB SERPL-MCNC: 0.4 MG/DL
BILIRUBIN URINE: NEGATIVE
BLOOD URINE: NEGATIVE
BUN SERPL-MCNC: 27 MG/DL
CALCIUM SERPL-MCNC: 10.4 MG/DL
CALCIUM SERPL-MCNC: 10.5 MG/DL
CHLORIDE SERPL-SCNC: 104 MMOL/L
CHOLEST SERPL-MCNC: 213 MG/DL
CHOLEST/HDLC SERPL: 5.2 RATIO
CO2 SERPL-SCNC: 20 MMOL/L
COLOR: COLORLESS
CREAT SERPL-MCNC: 1.79 MG/DL
CREAT SPEC-SCNC: 73 MG/DL
CREAT/PROT UR: 0.1 RATIO
EOSINOPHIL # BLD AUTO: 0.12 K/UL
EOSINOPHIL NFR BLD AUTO: 1.7 %
ESTIMATED AVERAGE GLUCOSE: 97 MG/DL
GLUCOSE QUALITATIVE U: NEGATIVE
GLUCOSE SERPL-MCNC: 101 MG/DL
HBA1C MFR BLD HPLC: 5 %
HCT VFR BLD CALC: 44.1 %
HDLC SERPL-MCNC: 41 MG/DL
HGB BLD-MCNC: 13.7 G/DL
HYALINE CASTS: 0 /LPF
IMM GRANULOCYTES NFR BLD AUTO: 0.3 %
KETONES URINE: NEGATIVE
LDH SERPL-CCNC: 166 U/L
LDLC SERPL CALC-MCNC: 125 MG/DL
LEUKOCYTE ESTERASE URINE: NEGATIVE
LYMPHOCYTES # BLD AUTO: 2.45 K/UL
LYMPHOCYTES NFR BLD AUTO: 34.7 %
MAGNESIUM SERPL-MCNC: 1.6 MG/DL
MAN DIFF?: NORMAL
MCHC RBC-ENTMCNC: 25.9 PG
MCHC RBC-ENTMCNC: 31.1 GM/DL
MCV RBC AUTO: 83.5 FL
MICROSCOPIC-UA: NORMAL
MONOCYTES # BLD AUTO: 0.73 K/UL
MONOCYTES NFR BLD AUTO: 10.3 %
NEUTROPHILS # BLD AUTO: 3.73 K/UL
NEUTROPHILS NFR BLD AUTO: 52.7 %
NITRITE URINE: NEGATIVE
PARATHYROID HORMONE INTACT: 78 PG/ML
PH URINE: 6
PHOSPHATE SERPL-MCNC: 3 MG/DL
PLATELET # BLD AUTO: 225 K/UL
POTASSIUM SERPL-SCNC: 4.4 MMOL/L
PROT SERPL-MCNC: 7.1 G/DL
PROT UR-MCNC: 7 MG/DL
PROTEIN URINE: NEGATIVE
RBC # BLD: 5.28 M/UL
RBC # FLD: 14.6 %
RED BLOOD CELLS URINE: 0 /HPF
SODIUM SERPL-SCNC: 139 MMOL/L
SPECIFIC GRAVITY URINE: 1.01
SQUAMOUS EPITHELIAL CELLS: 0 /HPF
TACROLIMUS SERPL-MCNC: 6.7 NG/ML
TRIGL SERPL-MCNC: 236 MG/DL
URATE SERPL-MCNC: 9.9 MG/DL
UROBILINOGEN URINE: NORMAL
WBC # FLD AUTO: 7.07 K/UL
WHITE BLOOD CELLS URINE: 1 /HPF

## 2019-10-10 PROCEDURE — 99214 OFFICE O/P EST MOD 30 MIN: CPT

## 2019-10-10 NOTE — PHYSICAL EXAM
[General Appearance - Alert] : alert [General Appearance - Well Nourished] : well nourished [General Appearance - In No Acute Distress] : in no acute distress [General Appearance - Well Developed] : well developed [Extraocular Movements] : extraocular movements were intact [Sclera] : the sclera and conjunctiva were normal [Outer Ear] : the ears and nose were normal in appearance [Neck Appearance] : the appearance of the neck was normal [Neck Cervical Mass (___cm)] : no neck mass was observed [Jugular Venous Distention Increased] : there was no jugular-venous distention [Respiration, Rhythm And Depth] : normal respiratory rhythm and effort [Auscultation Breath Sounds / Voice Sounds] : lungs were clear to auscultation bilaterally [Exaggerated Use Of Accessory Muscles For Inspiration] : no accessory muscle use [Heart Sounds] : normal S1 and S2 [Heart Rate And Rhythm] : heart rate was normal and rhythm regular [Heart Sounds Gallop] : no gallops [Murmurs] : no murmurs [Edema] : there was no peripheral edema [Bowel Sounds] : normal bowel sounds [Abdomen Tenderness] : non-tender [Abdomen Soft] : soft [] : no hepato-splenomegaly [Cervical Lymph Nodes Enlarged Anterior Bilaterally] : anterior cervical [Cervical Lymph Nodes Enlarged Posterior Bilaterally] : posterior cervical [No CVA Tenderness] : no ~M costovertebral angle tenderness [Supraclavicular Lymph Nodes Enlarged Bilaterally] : supraclavicular [Abnormal Walk] : normal gait [Musculoskeletal - Swelling] : no joint swelling seen [Skin Color & Pigmentation] : normal skin color and pigmentation [Cranial Nerves] : cranial nerves 2-12 were intact [Skin Turgor] : normal skin turgor [No Focal Deficits] : no focal deficits [Oriented To Time, Place, And Person] : oriented to person, place, and time [Impaired Insight] : insight and judgment were intact

## 2019-10-10 NOTE — HISTORY OF PRESENT ILLNESS
[FreeTextEntry1] : Pt is a 33 y.o male with ESRD for about 8 years from unclear cause s/p DDRT on 18.  Donor was 33 and  of ICH.  Pts hospital course was complicated by urinary leak requiring reoperation and nephrostomy tube.  Had a prolonged hospitalization with marquez and LACEY drain both of which were removed.  Also has a JJ stent in place.  All tubes were removed during admission of .\par \par INterval events:\par Pt feeling better, ringworm has improved. Blood pressure controlled at home.

## 2019-10-10 NOTE — HISTORY OF PRESENT ILLNESS
[FreeTextEntry1] : Pt is a 33 y.o male with ESRD for about 8 years from unclear cause s/p DDRT on 18.  Donor was 33 and  of I[facility].  Pts hospital course was complicated by urinary leak requiring reoperation and nephrostomy tube.  Had a prolonged hospitalization with marquez and LACEY drain both of which were removed.  Also has a JJ stent in place.  All tubes were removed during admission of .\par \par INterval events:\par Pt was having coughing fits and developed vasovagal episodes.  Saw cardiology and PMD.  Pt started on allergy meds and feels better.  Also developed lesion on neck after haircut and shave.

## 2019-10-10 NOTE — CONSULT LETTER
[Dear  ___] : Dear  [unfilled], [Courtesy Letter:] : I had the pleasure of seeing your patient, [unfilled], in my office today. [Please see my note below.] : Please see my note below. [Consult Closing:] : Thank you very much for allowing me to participate in the care of this patient.  If you have any questions, please do not hesitate to contact me. [FreeTextEntry3] : Jose Jean, DO [Sincerely,] : Sincerely,

## 2019-10-10 NOTE — ASSESSMENT
[FreeTextEntry1] : 1.  Renal transplant - Transplanted with a young donor, suspect ATN then culture negative pyelo on biopsy.  Creatinine nadired at 1.6 but now settled around 2.  Last value 1.7.  Likely related to severe arterial sclerosis of kidney as seen on biopsy.  BP controlled.  Pt was treated with cefepime for 4 weeks for culture neg pyelo on last kidney biopsy.\par 2.  Immunosuppression - tacro for target 5-7 as pt essentially 1 year post transplant.  Cont MMF 1gm BID and pred 5.   Changing to envarsus 3mgs daily.  \par 3.  HTN - stable\par 4.  Prophylaxis - Hyperkalemia has been well controlled.  Will try to change mepron to bactrim.  \par 5.  Hypercalcemia - On sensipar 60mgs daily.  \par 6.  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  Cont sodium bicarbonate.  Also on magnesium TID.  \par 7.  Tinea corporus - Start ketoconazole BID \par \par Will see patient in January but will repeat labs in 2 weeks. \par He will f/u with Dr. Liao.   \par

## 2019-10-10 NOTE — ASSESSMENT
[FreeTextEntry1] : 1.  Renal transplant - Transplanted with a young donor, suspect ATN then culture negative pyelo on biopsy.  Creatinine nadired at 1.6 but now settled around 2.  Last value 1.7.  Likely related to severe arterial sclerosis of kidney as seen on biopsy.  BP controlled.  Pt was treated with cefepime for 4 weeks for culture neg pyelo on last kidney biopsy.\par 2.  Immunosuppression - tacro for target 5-7 as pt essentially 1 year post transplant.  Cont MMF 1gm BID and pred 5.   continue envarsus 3mgs daily.  \par 3.  HTN - stable\par 4.  Prophylaxis - Hyperkalemia has been well controlled.  Will try to change mepron to bactrim.  \par 5.  Hypercalcemia - On sensipar 60mgs daily.  \par 6.  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  Cont sodium bicarbonate.  Also on magnesium TID.  \par 7.  Tinea corporus - improved\par \par Will see patient in January \par He will f/u with Dr. Liao.   \par Influenza vaccine given today

## 2019-10-11 LAB — BKV DNA SPEC QL NAA+PROBE: NOT DETECTED COPIES/ML

## 2019-10-15 LAB
CMV DNA SPEC QL NAA+PROBE: 220 IU/ML
CMVPCR LOG: 2.34 LOGIU/ML

## 2019-12-03 NOTE — PROGRESS NOTE ADULT - PROVIDER SPECIALTY LIST ADULT
December 3, 2019         Patient: Bryon Jama   YOB: 1997   Date of Visit: 12/3/2019           To Whom it May Concern:    Bryon Jama was seen in my clinic on 12/3/2019. Please excuse him from work today.        Sincerely,           ZEKE Connell.  Electronically Signed      Nephrology

## 2020-01-09 ENCOUNTER — APPOINTMENT (OUTPATIENT)
Dept: TRANSPLANT | Facility: CLINIC | Age: 35
End: 2020-01-09

## 2020-01-13 ENCOUNTER — APPOINTMENT (OUTPATIENT)
Dept: NEPHROLOGY | Facility: CLINIC | Age: 35
End: 2020-01-13
Payer: MEDICARE

## 2020-01-13 VITALS
SYSTOLIC BLOOD PRESSURE: 148 MMHG | BODY MASS INDEX: 30.05 KG/M2 | WEIGHT: 176 LBS | DIASTOLIC BLOOD PRESSURE: 93 MMHG | RESPIRATION RATE: 16 BRPM | HEIGHT: 64 IN | HEART RATE: 79 BPM | TEMPERATURE: 98.4 F | OXYGEN SATURATION: 98 %

## 2020-01-13 PROCEDURE — 99214 OFFICE O/P EST MOD 30 MIN: CPT

## 2020-01-13 NOTE — ASSESSMENT
[FreeTextEntry1] : 1.  Renal transplant - Transplanted with a young donor, suspect ATN then culture negative pyelo on biopsy.  Creatinine nadired at 1.6 but now settled around 2.  Last value 1.9 possibly due to elevated tacro trough.  Prior biopsy revealed severe arterial sclerosis.   \par 2.  Immunosuppression - tacro for target 5-7.  Lowered envarsus to 2mgs for trough of 8 - repeat level next week.  Cont MMF 1gm BID and pred 5.  \par 3.  HTN - stable\par 4.  Prophylaxis - Hyperkalemia has been well controlled.  \par 5.  Hypercalcemia - On sensipar 60mgs daily.  \par 6.  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  Reduce sodium bicarbonate to BID.  Also on magnesium TID.  \par \par Pt will repeat labs next week.  Next visit in 6 months.  \par He will f/u with Dr. Liao.   \par

## 2020-01-13 NOTE — PHYSICAL EXAM
[General Appearance - Alert] : alert [General Appearance - In No Acute Distress] : in no acute distress [General Appearance - Well Nourished] : well nourished [Sclera] : the sclera and conjunctiva were normal [General Appearance - Well Developed] : well developed [Extraocular Movements] : extraocular movements were intact [Outer Ear] : the ears and nose were normal in appearance [Neck Cervical Mass (___cm)] : no neck mass was observed [Neck Appearance] : the appearance of the neck was normal [Jugular Venous Distention Increased] : there was no jugular-venous distention [Respiration, Rhythm And Depth] : normal respiratory rhythm and effort [Auscultation Breath Sounds / Voice Sounds] : lungs were clear to auscultation bilaterally [Exaggerated Use Of Accessory Muscles For Inspiration] : no accessory muscle use [Heart Sounds] : normal S1 and S2 [Heart Sounds Gallop] : no gallops [Heart Rate And Rhythm] : heart rate was normal and rhythm regular [Edema] : there was no peripheral edema [Murmurs] : no murmurs [Bowel Sounds] : normal bowel sounds [Abdomen Soft] : soft [Abdomen Tenderness] : non-tender [] : no hepato-splenomegaly [Cervical Lymph Nodes Enlarged Anterior Bilaterally] : anterior cervical [Supraclavicular Lymph Nodes Enlarged Bilaterally] : supraclavicular [Cervical Lymph Nodes Enlarged Posterior Bilaterally] : posterior cervical [No CVA Tenderness] : no ~M costovertebral angle tenderness [Abnormal Walk] : normal gait [Musculoskeletal - Swelling] : no joint swelling seen [Skin Color & Pigmentation] : normal skin color and pigmentation [Skin Turgor] : normal skin turgor [No Focal Deficits] : no focal deficits [Cranial Nerves] : cranial nerves 2-12 were intact [Oriented To Time, Place, And Person] : oriented to person, place, and time [Impaired Insight] : insight and judgment were intact [FreeTextEntry1] : pt has half and half nails, ringworm on neck

## 2020-01-13 NOTE — HISTORY OF PRESENT ILLNESS
[FreeTextEntry1] : Pt is a 33 y.o male with ESRD for about 8 years from unclear cause s/p DDRT on 18.  Donor was 33 and  of ICH.  Pts hospital course was complicated by urinary leak requiring reoperation and nephrostomy tube.  Had a prolonged hospitalization with marquez and LACEY drain both of which were removed.  Also has a JJ stent in place.  All tubes were removed during admission of .\par \par INterval events:\par Pt feels well.  has gained some weight.  Notices more drooling when sleeping since transplant.  also still gaining weight.

## 2020-01-13 NOTE — CONSULT LETTER
[Dear  ___] : Dear  [unfilled], [Courtesy Letter:] : I had the pleasure of seeing your patient, [unfilled], in my office today. [Please see my note below.] : Please see my note below. [Sincerely,] : Sincerely, [Consult Closing:] : Thank you very much for allowing me to participate in the care of this patient.  If you have any questions, please do not hesitate to contact me. [FreeTextEntry3] : Jose Jean, DO

## 2020-01-15 LAB
ALBUMIN SERPL ELPH-MCNC: 4.6 G/DL
ALP BLD-CCNC: 88 U/L
ALT SERPL-CCNC: 13 U/L
ANION GAP SERPL CALC-SCNC: 13 MMOL/L
APPEARANCE: CLEAR
AST SERPL-CCNC: 11 U/L
BACTERIA: NEGATIVE
BASOPHILS # BLD AUTO: 0.02 K/UL
BASOPHILS NFR BLD AUTO: 0.2 %
BILIRUB SERPL-MCNC: 0.5 MG/DL
BILIRUBIN URINE: NEGATIVE
BKV DNA SPEC QL NAA+PROBE: NOT DETECTED COPIES/ML
BLOOD URINE: NEGATIVE
BUN SERPL-MCNC: 24 MG/DL
CALCIUM SERPL-MCNC: 9.1 MG/DL
CHLORIDE SERPL-SCNC: 103 MMOL/L
CMV DNA SPEC QL NAA+PROBE: <50 IU/ML
CMVPCR LOG: 1.7 LOG10IU/ML
CO2 SERPL-SCNC: 23 MMOL/L
COLOR: COLORLESS
CREAT SERPL-MCNC: 1.96 MG/DL
CREAT SPEC-SCNC: 134 MG/DL
CREAT/PROT UR: 0.1 RATIO
EOSINOPHIL # BLD AUTO: 0.13 K/UL
EOSINOPHIL NFR BLD AUTO: 1.6 %
GLUCOSE QUALITATIVE U: NEGATIVE
GLUCOSE SERPL-MCNC: 90 MG/DL
HCT VFR BLD CALC: 44.7 %
HGB BLD-MCNC: 14.2 G/DL
HYALINE CASTS: 0 /LPF
IMM GRANULOCYTES NFR BLD AUTO: 0.2 %
KETONES URINE: NEGATIVE
LDH SERPL-CCNC: 165 U/L
LEUKOCYTE ESTERASE URINE: NEGATIVE
LYMPHOCYTES # BLD AUTO: 3.06 K/UL
LYMPHOCYTES NFR BLD AUTO: 38.2 %
MAGNESIUM SERPL-MCNC: 1.5 MG/DL
MAN DIFF?: NORMAL
MCHC RBC-ENTMCNC: 26.1 PG
MCHC RBC-ENTMCNC: 31.8 GM/DL
MCV RBC AUTO: 82 FL
MICROSCOPIC-UA: NORMAL
MONOCYTES # BLD AUTO: 0.8 K/UL
MONOCYTES NFR BLD AUTO: 10 %
NEUTROPHILS # BLD AUTO: 3.98 K/UL
NEUTROPHILS NFR BLD AUTO: 49.8 %
NITRITE URINE: NEGATIVE
PH URINE: 6.5
PHOSPHATE SERPL-MCNC: 2.6 MG/DL
PLATELET # BLD AUTO: 206 K/UL
POTASSIUM SERPL-SCNC: 4.2 MMOL/L
PROT SERPL-MCNC: 6.6 G/DL
PROT UR-MCNC: 7 MG/DL
PROTEIN URINE: NEGATIVE
RBC # BLD: 5.45 M/UL
RBC # FLD: 14.4 %
RED BLOOD CELLS URINE: 1 /HPF
SODIUM SERPL-SCNC: 139 MMOL/L
SPECIFIC GRAVITY URINE: 1.02
SQUAMOUS EPITHELIAL CELLS: 0 /HPF
TACROLIMUS SERPL-MCNC: 8.2 NG/ML
UROBILINOGEN URINE: NORMAL
WBC # FLD AUTO: 8.01 K/UL
WHITE BLOOD CELLS URINE: 0 /HPF

## 2020-01-22 ENCOUNTER — APPOINTMENT (OUTPATIENT)
Dept: TRANSPLANT | Facility: CLINIC | Age: 35
End: 2020-01-22

## 2020-01-22 LAB
25(OH)D3 SERPL-MCNC: 12.6 NG/ML
ALBUMIN SERPL ELPH-MCNC: 4.8 G/DL
ALP BLD-CCNC: 90 U/L
ALT SERPL-CCNC: 22 U/L
ANION GAP SERPL CALC-SCNC: 13 MMOL/L
APPEARANCE: CLEAR
AST SERPL-CCNC: 19 U/L
BACTERIA: NEGATIVE
BASOPHILS # BLD AUTO: 0.01 K/UL
BASOPHILS NFR BLD AUTO: 0.1 %
BILIRUB SERPL-MCNC: 0.3 MG/DL
BILIRUBIN URINE: NEGATIVE
BLOOD URINE: NEGATIVE
BUN SERPL-MCNC: 18 MG/DL
CALCIUM SERPL-MCNC: 9.7 MG/DL
CALCIUM SERPL-MCNC: 9.7 MG/DL
CHLORIDE SERPL-SCNC: 103 MMOL/L
CHOLEST SERPL-MCNC: 192 MG/DL
CHOLEST/HDLC SERPL: 5.1 RATIO
CO2 SERPL-SCNC: 21 MMOL/L
COLOR: NORMAL
CREAT SERPL-MCNC: 1.77 MG/DL
CREAT SPEC-SCNC: 94 MG/DL
CREAT/PROT UR: 0.1 RATIO
EOSINOPHIL # BLD AUTO: 0.14 K/UL
EOSINOPHIL NFR BLD AUTO: 1.8 %
ESTIMATED AVERAGE GLUCOSE: 100 MG/DL
GLUCOSE QUALITATIVE U: NEGATIVE
GLUCOSE SERPL-MCNC: 94 MG/DL
HBA1C MFR BLD HPLC: 5.1 %
HCT VFR BLD CALC: 46.4 %
HDLC SERPL-MCNC: 38 MG/DL
HGB BLD-MCNC: 14.2 G/DL
HYALINE CASTS: 0 /LPF
IMM GRANULOCYTES NFR BLD AUTO: 0.3 %
KETONES URINE: NEGATIVE
LDH SERPL-CCNC: 167 U/L
LDLC SERPL CALC-MCNC: 123 MG/DL
LEUKOCYTE ESTERASE URINE: NEGATIVE
LYMPHOCYTES # BLD AUTO: 2.69 K/UL
LYMPHOCYTES NFR BLD AUTO: 33.9 %
MAGNESIUM SERPL-MCNC: 1.5 MG/DL
MAN DIFF?: NORMAL
MCHC RBC-ENTMCNC: 25.5 PG
MCHC RBC-ENTMCNC: 30.6 GM/DL
MCV RBC AUTO: 83.3 FL
MICROSCOPIC-UA: NORMAL
MONOCYTES # BLD AUTO: 0.79 K/UL
MONOCYTES NFR BLD AUTO: 9.9 %
NEUTROPHILS # BLD AUTO: 4.29 K/UL
NEUTROPHILS NFR BLD AUTO: 54 %
NITRITE URINE: NEGATIVE
PARATHYROID HORMONE INTACT: 141 PG/ML
PH URINE: 6
PHOSPHATE SERPL-MCNC: 2.9 MG/DL
PLATELET # BLD AUTO: 233 K/UL
POTASSIUM SERPL-SCNC: 4.3 MMOL/L
PROT SERPL-MCNC: 6.7 G/DL
PROT UR-MCNC: 9 MG/DL
PROTEIN URINE: NEGATIVE
RBC # BLD: 5.57 M/UL
RBC # FLD: 14.4 %
RED BLOOD CELLS URINE: 0 /HPF
SODIUM SERPL-SCNC: 137 MMOL/L
SPECIFIC GRAVITY URINE: 1.01
SQUAMOUS EPITHELIAL CELLS: 0 /HPF
TACROLIMUS SERPL-MCNC: 3.6 NG/ML
TRIGL SERPL-MCNC: 158 MG/DL
URATE SERPL-MCNC: 9.2 MG/DL
UROBILINOGEN URINE: NORMAL
WBC # FLD AUTO: 7.94 K/UL
WHITE BLOOD CELLS URINE: 1 /HPF

## 2020-01-24 ENCOUNTER — OTHER (OUTPATIENT)
Age: 35
End: 2020-01-24

## 2020-01-24 LAB
BKV DNA SPEC QL NAA+PROBE: NOT DETECTED COPIES/ML
CMV DNA SPEC QL NAA+PROBE: ABNORMAL IU/ML
CMVPCR LOG: ABNORMAL LOG10IU/ML

## 2020-02-07 ENCOUNTER — APPOINTMENT (OUTPATIENT)
Dept: TRANSPLANT | Facility: CLINIC | Age: 35
End: 2020-02-07

## 2020-02-07 LAB
ALBUMIN SERPL ELPH-MCNC: 4.9 G/DL
ALP BLD-CCNC: 100 U/L
ALT SERPL-CCNC: 25 U/L
ANION GAP SERPL CALC-SCNC: 15 MMOL/L
APPEARANCE: CLEAR
AST SERPL-CCNC: 16 U/L
BACTERIA: NEGATIVE
BASOPHILS # BLD AUTO: 0.02 K/UL
BASOPHILS NFR BLD AUTO: 0.2 %
BILIRUB SERPL-MCNC: 0.4 MG/DL
BILIRUBIN URINE: NEGATIVE
BLOOD URINE: NEGATIVE
BUN SERPL-MCNC: 22 MG/DL
CALCIUM SERPL-MCNC: 9.7 MG/DL
CHLORIDE SERPL-SCNC: 100 MMOL/L
CO2 SERPL-SCNC: 22 MMOL/L
COLOR: NORMAL
CREAT SERPL-MCNC: 1.94 MG/DL
CREAT SPEC-SCNC: 120 MG/DL
CREAT/PROT UR: 0.1 RATIO
EOSINOPHIL # BLD AUTO: 0.13 K/UL
EOSINOPHIL NFR BLD AUTO: 1.6 %
GLUCOSE QUALITATIVE U: NEGATIVE
GLUCOSE SERPL-MCNC: 87 MG/DL
HCT VFR BLD CALC: 44.6 %
HGB BLD-MCNC: 14.2 G/DL
HYALINE CASTS: 0 /LPF
IMM GRANULOCYTES NFR BLD AUTO: 0.4 %
KETONES URINE: NEGATIVE
LDH SERPL-CCNC: 178 U/L
LEUKOCYTE ESTERASE URINE: NEGATIVE
LYMPHOCYTES # BLD AUTO: 3.1 K/UL
LYMPHOCYTES NFR BLD AUTO: 38.5 %
MAGNESIUM SERPL-MCNC: 1.5 MG/DL
MAN DIFF?: NORMAL
MCHC RBC-ENTMCNC: 26 PG
MCHC RBC-ENTMCNC: 31.8 GM/DL
MCV RBC AUTO: 81.5 FL
MICROSCOPIC-UA: NORMAL
MONOCYTES # BLD AUTO: 0.72 K/UL
MONOCYTES NFR BLD AUTO: 8.9 %
NEUTROPHILS # BLD AUTO: 4.05 K/UL
NEUTROPHILS NFR BLD AUTO: 50.4 %
NITRITE URINE: NEGATIVE
PH URINE: 6
PHOSPHATE SERPL-MCNC: 3 MG/DL
PLATELET # BLD AUTO: 237 K/UL
POTASSIUM SERPL-SCNC: 4.4 MMOL/L
PROT SERPL-MCNC: 7.2 G/DL
PROT UR-MCNC: 9 MG/DL
PROTEIN URINE: NEGATIVE
RBC # BLD: 5.47 M/UL
RBC # FLD: 14.3 %
RED BLOOD CELLS URINE: 1 /HPF
SODIUM SERPL-SCNC: 137 MMOL/L
SPECIFIC GRAVITY URINE: 1.01
SQUAMOUS EPITHELIAL CELLS: 0 /HPF
TACROLIMUS SERPL-MCNC: 8.3 NG/ML
URATE SERPL-MCNC: 8.9 MG/DL
UROBILINOGEN URINE: NORMAL
WBC # FLD AUTO: 8.05 K/UL
WHITE BLOOD CELLS URINE: 1 /HPF

## 2020-02-10 LAB — BKV DNA SPEC QL NAA+PROBE: NOT DETECTED COPIES/ML

## 2020-02-11 LAB
CMV DNA SPEC QL NAA+PROBE: ABNORMAL IU/ML
CMVPCR LOG: ABNORMAL LOG10IU/ML

## 2020-02-25 ENCOUNTER — RX RENEWAL (OUTPATIENT)
Age: 35
End: 2020-02-25

## 2020-05-14 LAB
ALBUMIN SERPL ELPH-MCNC: 4.9 G/DL
ALP BLD-CCNC: 101 U/L
ALT SERPL-CCNC: 13 U/L
ANION GAP SERPL CALC-SCNC: 16 MMOL/L
APPEARANCE: CLEAR
AST SERPL-CCNC: 16 U/L
BACTERIA: NEGATIVE
BASOPHILS # BLD AUTO: 0.01 K/UL
BASOPHILS NFR BLD AUTO: 0.1 %
BILIRUB SERPL-MCNC: 0.3 MG/DL
BILIRUBIN URINE: NEGATIVE
BKV DNA SPEC QL NAA+PROBE: NOT DETECTED COPIES/ML
BLOOD URINE: NEGATIVE
BUN SERPL-MCNC: 24 MG/DL
CALCIUM SERPL-MCNC: 9.3 MG/DL
CHLORIDE SERPL-SCNC: 106 MMOL/L
CMV DNA SPEC QL NAA+PROBE: ABNORMAL IU/ML
CMVPCR LOG: ABNORMAL LOG10IU/ML
CO2 SERPL-SCNC: 20 MMOL/L
COLOR: NORMAL
CREAT SERPL-MCNC: 1.82 MG/DL
CREAT SPEC-SCNC: 151 MG/DL
CREAT/PROT UR: 0.2 RATIO
EOSINOPHIL # BLD AUTO: 0.29 K/UL
EOSINOPHIL NFR BLD AUTO: 3.9 %
GLUCOSE QUALITATIVE U: NEGATIVE
GLUCOSE SERPL-MCNC: 83 MG/DL
HCT VFR BLD CALC: 44.6 %
HGB BLD-MCNC: 14.2 G/DL
HYALINE CASTS: 2 /LPF
IMM GRANULOCYTES NFR BLD AUTO: 0.3 %
KETONES URINE: NEGATIVE
LDH SERPL-CCNC: 203 U/L
LEUKOCYTE ESTERASE URINE: NEGATIVE
LYMPHOCYTES # BLD AUTO: 2.75 K/UL
LYMPHOCYTES NFR BLD AUTO: 36.7 %
MAGNESIUM SERPL-MCNC: 1.5 MG/DL
MAN DIFF?: NORMAL
MCHC RBC-ENTMCNC: 26 PG
MCHC RBC-ENTMCNC: 31.8 GM/DL
MCV RBC AUTO: 81.5 FL
MICROSCOPIC-UA: NORMAL
MONOCYTES # BLD AUTO: 0.79 K/UL
MONOCYTES NFR BLD AUTO: 10.5 %
NEUTROPHILS # BLD AUTO: 3.64 K/UL
NEUTROPHILS NFR BLD AUTO: 48.5 %
NITRITE URINE: NEGATIVE
PH URINE: 5.5
PHOSPHATE SERPL-MCNC: 2.6 MG/DL
PLATELET # BLD AUTO: 240 K/UL
POTASSIUM SERPL-SCNC: 4.1 MMOL/L
PROT SERPL-MCNC: 7 G/DL
PROT UR-MCNC: 28 MG/DL
PROTEIN URINE: NORMAL
RBC # BLD: 5.47 M/UL
RBC # FLD: 14.7 %
RED BLOOD CELLS URINE: 1 /HPF
SODIUM SERPL-SCNC: 141 MMOL/L
SPECIFIC GRAVITY URINE: 1.02
SQUAMOUS EPITHELIAL CELLS: 0 /HPF
TACROLIMUS SERPL-MCNC: 5.1 NG/ML
UROBILINOGEN URINE: NORMAL
WBC # FLD AUTO: 7.5 K/UL
WHITE BLOOD CELLS URINE: 1 /HPF

## 2020-07-03 LAB
25(OH)D3 SERPL-MCNC: 13.7 NG/ML
ALBUMIN SERPL ELPH-MCNC: 5 G/DL
ALP BLD-CCNC: 112 U/L
ALT SERPL-CCNC: 19 U/L
ANION GAP SERPL CALC-SCNC: 14 MMOL/L
APPEARANCE: CLEAR
AST SERPL-CCNC: 17 U/L
BACTERIA: NEGATIVE
BASOPHILS # BLD AUTO: 0.02 K/UL
BASOPHILS NFR BLD AUTO: 0.2 %
BILIRUB SERPL-MCNC: 0.3 MG/DL
BILIRUBIN URINE: NEGATIVE
BLOOD URINE: NEGATIVE
BUN SERPL-MCNC: 23 MG/DL
CALCIUM SERPL-MCNC: 9.5 MG/DL
CALCIUM SERPL-MCNC: 9.5 MG/DL
CHLORIDE SERPL-SCNC: 105 MMOL/L
CHOLEST SERPL-MCNC: 212 MG/DL
CHOLEST/HDLC SERPL: 4.9 RATIO
CO2 SERPL-SCNC: 22 MMOL/L
COLOR: NORMAL
CREAT SERPL-MCNC: 1.7 MG/DL
CREAT SPEC-SCNC: 155 MG/DL
CREAT/PROT UR: 0.1 RATIO
EOSINOPHIL # BLD AUTO: 0.09 K/UL
EOSINOPHIL NFR BLD AUTO: 1.1 %
GLUCOSE QUALITATIVE U: NEGATIVE
GLUCOSE SERPL-MCNC: 89 MG/DL
HCT VFR BLD CALC: 49.9 %
HDLC SERPL-MCNC: 43 MG/DL
HGB BLD-MCNC: 15.2 G/DL
HYALINE CASTS: 0 /LPF
IMM GRANULOCYTES NFR BLD AUTO: 0.2 %
KETONES URINE: NEGATIVE
LDH SERPL-CCNC: 174 U/L
LDLC SERPL CALC-MCNC: 126 MG/DL
LEUKOCYTE ESTERASE URINE: NEGATIVE
LYMPHOCYTES # BLD AUTO: 2.5 K/UL
LYMPHOCYTES NFR BLD AUTO: 29.8 %
MAGNESIUM SERPL-MCNC: 1.6 MG/DL
MAN DIFF?: NORMAL
MCHC RBC-ENTMCNC: 25.8 PG
MCHC RBC-ENTMCNC: 30.5 GM/DL
MCV RBC AUTO: 84.7 FL
MICROSCOPIC-UA: NORMAL
MONOCYTES # BLD AUTO: 0.88 K/UL
MONOCYTES NFR BLD AUTO: 10.5 %
NEUTROPHILS # BLD AUTO: 4.89 K/UL
NEUTROPHILS NFR BLD AUTO: 58.2 %
NITRITE URINE: NEGATIVE
PARATHYROID HORMONE INTACT: 125 PG/ML
PH URINE: 6
PHOSPHATE SERPL-MCNC: 2.8 MG/DL
PLATELET # BLD AUTO: 237 K/UL
POTASSIUM SERPL-SCNC: 4.5 MMOL/L
PROT SERPL-MCNC: 7.2 G/DL
PROT UR-MCNC: 10 MG/DL
PROTEIN URINE: NEGATIVE
RBC # BLD: 5.89 M/UL
RBC # FLD: 14.7 %
RED BLOOD CELLS URINE: 1 /HPF
SODIUM SERPL-SCNC: 140 MMOL/L
SPECIFIC GRAVITY URINE: 1.02
SQUAMOUS EPITHELIAL CELLS: 0 /HPF
TACROLIMUS SERPL-MCNC: 5.7 NG/ML
TRIGL SERPL-MCNC: 217 MG/DL
URATE SERPL-MCNC: 9.3 MG/DL
UROBILINOGEN URINE: NORMAL
WBC # FLD AUTO: 8.4 K/UL
WHITE BLOOD CELLS URINE: 0 /HPF

## 2020-07-06 ENCOUNTER — APPOINTMENT (OUTPATIENT)
Dept: NEPHROLOGY | Facility: CLINIC | Age: 35
End: 2020-07-06
Payer: MEDICARE

## 2020-07-06 PROCEDURE — 99213 OFFICE O/P EST LOW 20 MIN: CPT | Mod: 95

## 2020-07-06 NOTE — ASSESSMENT
[FreeTextEntry1] : 1.  Renal transplant - Transplanted with a young donor, suspect ATN then culture negative pyelo on biopsy.  Creatinine nadired at 1.6 but now around 1.7- 2.  Last value 1.7.  Prior biopsy revealed severe arterial sclerosis.   \par 2.  Immunosuppression - tacro for target 5-7.  Currently at goal. Cont MMF 1gm BID and pred 5.  \par 3.  HTN - stable\par 4.  Prophylaxis - on bactrim\par 5.  Hypercalcemia - On sensipar 60mgs daily.  \par 6.  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  Reduce sodium bicarbonate to 1 tablet BID.  Also on magnesium TID.  \par \par Pt will repeat labs next week.  Next visit in 6 months.  \par Start crestor 5mgs daily.  Pt will start ergocaliferol 50,000 IU weekly. \par He will f/u with Dr. Liao.   \par

## 2020-07-06 NOTE — HISTORY OF PRESENT ILLNESS
[Home] : at home, [unfilled] , at the time of the visit. [Medical Office: ([facility])___] : at the medical office located in  [Verbal consent obtained from patient] : the patient, [unfilled] [FreeTextEntry1] : Pt is a 33 y.o male with ESRD for about 8 years from unclear cause s/p DDRT on 18.  Donor was 33 and  of ICH.  Pts hospital course was complicated by urinary leak requiring reoperation and nephrostomy tube.  Had a prolonged hospitalization with marquez and LACEY drain both of which were removed.  Also has a JJ stent in place.  All tubes were removed during admission of .\par \par INterval events:\par Pt feels well, blood pressure controlled.  No gout or arthritis but uric acid has been high.  Pt has cut back red meat intake.  Has some swelling of ankles. \par

## 2020-07-06 NOTE — PHYSICAL EXAM
[General Appearance - Alert] : alert [General Appearance - In No Acute Distress] : in no acute distress [General Appearance - Well Nourished] : well nourished [General Appearance - Well Developed] : well developed [Sclera] : the sclera and conjunctiva were normal [Extraocular Movements] : extraocular movements were intact [Outer Ear] : the ears and nose were normal in appearance [Neck Appearance] : the appearance of the neck was normal [Neck Cervical Mass (___cm)] : no neck mass was observed [Jugular Venous Distention Increased] : there was no jugular-venous distention [] : no respiratory distress [Oriented To Time, Place, And Person] : oriented to person, place, and time [Impaired Insight] : insight and judgment were intact [Affect] : the affect was normal [Mood] : the mood was normal [FreeTextEntry1] : trace LE edema visualized.

## 2020-07-07 LAB
CMV DNA SPEC QL NAA+PROBE: 105 IU/ML
CMVPCR LOG: 2.02 LOG10IU/ML

## 2020-07-08 LAB — BKV DNA SPEC QL NAA+PROBE: NOT DETECTED COPIES/ML

## 2020-09-17 ENCOUNTER — TRANSCRIPTION ENCOUNTER (OUTPATIENT)
Age: 35
End: 2020-09-17

## 2020-09-17 RX ORDER — ROSUVASTATIN CALCIUM 5 MG/1
5 TABLET, FILM COATED ORAL DAILY
Qty: 30 | Refills: 11 | Status: DISCONTINUED | COMMUNITY
Start: 2020-07-06 | End: 2020-09-17

## 2020-10-13 RX ORDER — ATORVASTATIN CALCIUM 10 MG/1
10 TABLET, FILM COATED ORAL
Qty: 30 | Refills: 5 | Status: DISCONTINUED | COMMUNITY
Start: 2020-09-17 | End: 2020-10-13

## 2020-10-20 ENCOUNTER — APPOINTMENT (OUTPATIENT)
Dept: TRANSPLANT | Facility: CLINIC | Age: 35
End: 2020-10-20

## 2020-11-03 LAB
ALBUMIN SERPL ELPH-MCNC: 4.9 G/DL
ALP BLD-CCNC: 112 U/L
ALT SERPL-CCNC: 21 U/L
ANION GAP SERPL CALC-SCNC: 17 MMOL/L
APPEARANCE: CLEAR
AST SERPL-CCNC: 18 U/L
BACTERIA: NEGATIVE
BASOPHILS # BLD AUTO: 0.02 K/UL
BASOPHILS NFR BLD AUTO: 0.2 %
BILIRUB SERPL-MCNC: 0.5 MG/DL
BILIRUBIN URINE: NEGATIVE
BLOOD URINE: NEGATIVE
BUN SERPL-MCNC: 18 MG/DL
CALCIUM SERPL-MCNC: 10 MG/DL
CHLORIDE SERPL-SCNC: 104 MMOL/L
CO2 SERPL-SCNC: 18 MMOL/L
COLOR: NORMAL
CREAT SERPL-MCNC: 1.59 MG/DL
CREAT SPEC-SCNC: 81 MG/DL
CREAT/PROT UR: 0.1 RATIO
EOSINOPHIL # BLD AUTO: 0.15 K/UL
EOSINOPHIL NFR BLD AUTO: 1.9 %
GLUCOSE QUALITATIVE U: NEGATIVE
GLUCOSE SERPL-MCNC: 88 MG/DL
HCT VFR BLD CALC: 49.3 %
HGB BLD-MCNC: 15.2 G/DL
HYALINE CASTS: 0 /LPF
IMM GRANULOCYTES NFR BLD AUTO: 0.2 %
KETONES URINE: NEGATIVE
LDH SERPL-CCNC: 181 U/L
LEUKOCYTE ESTERASE URINE: NEGATIVE
LYMPHOCYTES # BLD AUTO: 2.83 K/UL
LYMPHOCYTES NFR BLD AUTO: 35.2 %
MAGNESIUM SERPL-MCNC: 1.4 MG/DL
MAN DIFF?: NORMAL
MCHC RBC-ENTMCNC: 25.6 PG
MCHC RBC-ENTMCNC: 30.8 GM/DL
MCV RBC AUTO: 83 FL
MICROSCOPIC-UA: NORMAL
MONOCYTES # BLD AUTO: 0.71 K/UL
MONOCYTES NFR BLD AUTO: 8.8 %
NEUTROPHILS # BLD AUTO: 4.32 K/UL
NEUTROPHILS NFR BLD AUTO: 53.7 %
NITRITE URINE: NEGATIVE
PH URINE: 6
PHOSPHATE SERPL-MCNC: 2.4 MG/DL
PLATELET # BLD AUTO: 242 K/UL
POTASSIUM SERPL-SCNC: 4.3 MMOL/L
PROT SERPL-MCNC: 7.1 G/DL
PROT UR-MCNC: 9 MG/DL
PROTEIN URINE: NEGATIVE
RBC # BLD: 5.94 M/UL
RBC # FLD: 14.2 %
RED BLOOD CELLS URINE: 0 /HPF
SODIUM SERPL-SCNC: 139 MMOL/L
SPECIFIC GRAVITY URINE: 1.01
SQUAMOUS EPITHELIAL CELLS: 0 /HPF
TACROLIMUS SERPL-MCNC: 6.7 NG/ML
URATE SERPL-MCNC: 8.8 MG/DL
UROBILINOGEN URINE: NORMAL
WBC # FLD AUTO: 8.05 K/UL
WHITE BLOOD CELLS URINE: 0 /HPF

## 2020-11-05 LAB
BKV DNA SPEC QL NAA+PROBE: NOT DETECTED COPIES/ML
CMV DNA SPEC QL NAA+PROBE: 700 IU/ML

## 2020-11-18 RX ORDER — ROSUVASTATIN CALCIUM 5 MG/1
5 TABLET, FILM COATED ORAL DAILY
Qty: 90 | Refills: 3 | Status: DISCONTINUED | COMMUNITY
Start: 2020-10-13 | End: 2020-11-18

## 2020-12-03 ENCOUNTER — APPOINTMENT (OUTPATIENT)
Dept: TRANSPLANT | Facility: CLINIC | Age: 35
End: 2020-12-03

## 2020-12-14 ENCOUNTER — NON-APPOINTMENT (OUTPATIENT)
Age: 35
End: 2020-12-14

## 2020-12-14 LAB
SARS-COV-2 IGG SERPL IA-ACNC: <0.1 INDEX
SARS-COV-2 IGG SERPL QL IA: NEGATIVE

## 2020-12-17 ENCOUNTER — LABORATORY RESULT (OUTPATIENT)
Age: 35
End: 2020-12-17

## 2020-12-21 LAB
ALBUMIN SERPL ELPH-MCNC: 4.9 G/DL
ALP BLD-CCNC: 115 U/L
ALT SERPL-CCNC: 21 U/L
ANION GAP SERPL CALC-SCNC: 14 MMOL/L
APPEARANCE: CLEAR
AST SERPL-CCNC: 16 U/L
BACTERIA: NEGATIVE
BASOPHILS # BLD AUTO: 0.01 K/UL
BASOPHILS NFR BLD AUTO: 0.1 %
BILIRUB SERPL-MCNC: 0.4 MG/DL
BILIRUBIN URINE: NEGATIVE
BKV DNA SPEC QL NAA+PROBE: NOT DETECTED COPIES/ML
BLOOD URINE: NEGATIVE
BUN SERPL-MCNC: 23 MG/DL
CALCIUM SERPL-MCNC: 9.1 MG/DL
CHLORIDE SERPL-SCNC: 102 MMOL/L
CMV DNA SPEC QL NAA+PROBE: NOT DETECTED IU/ML
CO2 SERPL-SCNC: 22 MMOL/L
COLOR: NORMAL
CREAT SERPL-MCNC: 1.56 MG/DL
CREAT SPEC-SCNC: 117 MG/DL
CREAT/PROT UR: 0.1 RATIO
EOSINOPHIL # BLD AUTO: 0.12 K/UL
EOSINOPHIL NFR BLD AUTO: 1.4 %
GLUCOSE QUALITATIVE U: NEGATIVE
GLUCOSE SERPL-MCNC: 84 MG/DL
HCT VFR BLD CALC: 48.9 %
HGB BLD-MCNC: 15.4 G/DL
HYALINE CASTS: 0 /LPF
IMM GRANULOCYTES NFR BLD AUTO: 0.2 %
KETONES URINE: NEGATIVE
LDH SERPL-CCNC: 182 U/L
LEUKOCYTE ESTERASE URINE: NEGATIVE
LYMPHOCYTES # BLD AUTO: 3.26 K/UL
LYMPHOCYTES NFR BLD AUTO: 37.6 %
MAGNESIUM SERPL-MCNC: 1.4 MG/DL
MAN DIFF?: NORMAL
MCHC RBC-ENTMCNC: 26.6 PG
MCHC RBC-ENTMCNC: 31.5 GM/DL
MCV RBC AUTO: 84.5 FL
MICROSCOPIC-UA: NORMAL
MONOCYTES # BLD AUTO: 0.53 K/UL
MONOCYTES NFR BLD AUTO: 6.1 %
NEUTROPHILS # BLD AUTO: 4.74 K/UL
NEUTROPHILS NFR BLD AUTO: 54.6 %
NITRITE URINE: NEGATIVE
PH URINE: 6
PHOSPHATE SERPL-MCNC: 3.3 MG/DL
PLATELET # BLD AUTO: 244 K/UL
POTASSIUM SERPL-SCNC: 4.2 MMOL/L
PROT SERPL-MCNC: 7 G/DL
PROT UR-MCNC: 14 MG/DL
PROTEIN URINE: NORMAL
RBC # BLD: 5.79 M/UL
RBC # FLD: 14.6 %
RED BLOOD CELLS URINE: 1 /HPF
SODIUM SERPL-SCNC: 138 MMOL/L
SPECIFIC GRAVITY URINE: 1.02
SQUAMOUS EPITHELIAL CELLS: 0 /HPF
TACROLIMUS SERPL-MCNC: 9.9 NG/ML
URATE SERPL-MCNC: 8.9 MG/DL
UROBILINOGEN URINE: NORMAL
WBC # FLD AUTO: 8.68 K/UL
WHITE BLOOD CELLS URINE: 1 /HPF

## 2021-01-19 LAB
APPEARANCE: CLEAR
BACTERIA: NEGATIVE
BILIRUBIN URINE: NEGATIVE
BLOOD URINE: NEGATIVE
COLOR: NORMAL
GLUCOSE QUALITATIVE U: NEGATIVE
HYALINE CASTS: 0 /LPF
KETONES URINE: NEGATIVE
LEUKOCYTE ESTERASE URINE: NEGATIVE
MICROSCOPIC-UA: NORMAL
NITRITE URINE: NEGATIVE
PH URINE: 6
PROTEIN URINE: NEGATIVE
RED BLOOD CELLS URINE: 1 /HPF
SPECIFIC GRAVITY URINE: 1.02
SQUAMOUS EPITHELIAL CELLS: 0 /HPF
UROBILINOGEN URINE: NORMAL
WHITE BLOOD CELLS URINE: 0 /HPF

## 2021-01-20 ENCOUNTER — NON-APPOINTMENT (OUTPATIENT)
Age: 36
End: 2021-01-20

## 2021-01-21 LAB
25(OH)D3 SERPL-MCNC: 14 NG/ML
ALBUMIN SERPL ELPH-MCNC: 4.8 G/DL
ALP BLD-CCNC: 121 U/L
ALT SERPL-CCNC: 15 U/L
ANION GAP SERPL CALC-SCNC: 17 MMOL/L
AST SERPL-CCNC: 14 U/L
BASOPHILS # BLD AUTO: 0.03 K/UL
BASOPHILS NFR BLD AUTO: 0.3 %
BILIRUB SERPL-MCNC: 0.3 MG/DL
BKV DNA SPEC QL NAA+PROBE: NOT DETECTED COPIES/ML
BUN SERPL-MCNC: 16 MG/DL
CALCIUM SERPL-MCNC: 9.7 MG/DL
CALCIUM SERPL-MCNC: 9.7 MG/DL
CHLORIDE SERPL-SCNC: 102 MMOL/L
CHOLEST SERPL-MCNC: 143 MG/DL
CMV DNA SPEC QL NAA+PROBE: ABNORMAL IU/ML
CO2 SERPL-SCNC: 21 MMOL/L
CREAT SERPL-MCNC: 1.75 MG/DL
CREAT SPEC-SCNC: 106 MG/DL
CREAT/PROT UR: 0.1 RATIO
EOSINOPHIL # BLD AUTO: 0.31 K/UL
EOSINOPHIL NFR BLD AUTO: 3.1 %
ESTIMATED AVERAGE GLUCOSE: 97 MG/DL
GLUCOSE SERPL-MCNC: 88 MG/DL
HBA1C MFR BLD HPLC: 5 %
HCT VFR BLD CALC: 49.7 %
HDLC SERPL-MCNC: 40 MG/DL
HGB BLD-MCNC: 15.9 G/DL
IMM GRANULOCYTES NFR BLD AUTO: 0.2 %
LDH SERPL-CCNC: 206 U/L
LDLC SERPL CALC-MCNC: 49 MG/DL
LYMPHOCYTES # BLD AUTO: 2.33 K/UL
LYMPHOCYTES NFR BLD AUTO: 23.1 %
MAGNESIUM SERPL-MCNC: 1.6 MG/DL
MAN DIFF?: NORMAL
MCHC RBC-ENTMCNC: 26.5 PG
MCHC RBC-ENTMCNC: 32 GM/DL
MCV RBC AUTO: 83 FL
MONOCYTES # BLD AUTO: 1.02 K/UL
MONOCYTES NFR BLD AUTO: 10.1 %
NEUTROPHILS # BLD AUTO: 6.39 K/UL
NEUTROPHILS NFR BLD AUTO: 63.2 %
NONHDLC SERPL-MCNC: 103 MG/DL
PARATHYROID HORMONE INTACT: 78 PG/ML
PHOSPHATE SERPL-MCNC: 2.7 MG/DL
PLATELET # BLD AUTO: 191 K/UL
POTASSIUM SERPL-SCNC: 4.5 MMOL/L
PROT SERPL-MCNC: 7.2 G/DL
PROT UR-MCNC: 9 MG/DL
RBC # BLD: 5.99 M/UL
RBC # FLD: 13.7 %
SODIUM SERPL-SCNC: 139 MMOL/L
TACROLIMUS SERPL-MCNC: 6.7 NG/ML
TRIGL SERPL-MCNC: 271 MG/DL
URATE SERPL-MCNC: 8.7 MG/DL
WBC # FLD AUTO: 10.1 K/UL

## 2021-01-22 LAB — SARS-COV-2 N GENE NPH QL NAA+PROBE: NOT DETECTED

## 2021-03-23 ENCOUNTER — APPOINTMENT (OUTPATIENT)
Dept: TRANSPLANT | Facility: CLINIC | Age: 36
End: 2021-03-23

## 2021-04-05 ENCOUNTER — APPOINTMENT (OUTPATIENT)
Dept: TRANSPLANT | Facility: CLINIC | Age: 36
End: 2021-04-05

## 2021-04-05 ENCOUNTER — NON-APPOINTMENT (OUTPATIENT)
Age: 36
End: 2021-04-05

## 2021-04-06 LAB
25(OH)D3 SERPL-MCNC: 21.2 NG/ML
ALBUMIN SERPL ELPH-MCNC: 4.9 G/DL
ALP BLD-CCNC: 110 U/L
ALT SERPL-CCNC: 22 U/L
ANION GAP SERPL CALC-SCNC: 13 MMOL/L
APPEARANCE: CLEAR
AST SERPL-CCNC: 15 U/L
BACTERIA: NEGATIVE
BASOPHILS # BLD AUTO: 0.02 K/UL
BASOPHILS NFR BLD AUTO: 0.2 %
BILIRUB SERPL-MCNC: 0.3 MG/DL
BILIRUBIN URINE: NEGATIVE
BLOOD URINE: NORMAL
BUN SERPL-MCNC: 18 MG/DL
CALCIUM SERPL-MCNC: 9.5 MG/DL
CALCIUM SERPL-MCNC: 9.5 MG/DL
CHLORIDE SERPL-SCNC: 104 MMOL/L
CHOLEST SERPL-MCNC: 138 MG/DL
CO2 SERPL-SCNC: 23 MMOL/L
COLOR: COLORLESS
CREAT SERPL-MCNC: 1.63 MG/DL
CREAT SPEC-SCNC: 64 MG/DL
CREAT/PROT UR: 0.1 RATIO
EOSINOPHIL # BLD AUTO: 0.13 K/UL
EOSINOPHIL NFR BLD AUTO: 1.4 %
ESTIMATED AVERAGE GLUCOSE: 100 MG/DL
GLUCOSE QUALITATIVE U: NEGATIVE
GLUCOSE SERPL-MCNC: 92 MG/DL
HBA1C MFR BLD HPLC: 5.1 %
HCT VFR BLD CALC: 48.4 %
HDLC SERPL-MCNC: 46 MG/DL
HGB BLD-MCNC: 14.9 G/DL
HYALINE CASTS: 0 /LPF
IMM GRANULOCYTES NFR BLD AUTO: 0.2 %
KETONES URINE: NEGATIVE
LDH SERPL-CCNC: 163 U/L
LDLC SERPL CALC-MCNC: 64 MG/DL
LEUKOCYTE ESTERASE URINE: NEGATIVE
LYMPHOCYTES # BLD AUTO: 2.96 K/UL
LYMPHOCYTES NFR BLD AUTO: 32.6 %
MAGNESIUM SERPL-MCNC: 1.5 MG/DL
MAN DIFF?: NORMAL
MCHC RBC-ENTMCNC: 25.8 PG
MCHC RBC-ENTMCNC: 30.8 GM/DL
MCV RBC AUTO: 83.7 FL
MICROSCOPIC-UA: NORMAL
MONOCYTES # BLD AUTO: 0.84 K/UL
MONOCYTES NFR BLD AUTO: 9.2 %
NEUTROPHILS # BLD AUTO: 5.12 K/UL
NEUTROPHILS NFR BLD AUTO: 56.4 %
NITRITE URINE: NEGATIVE
NONHDLC SERPL-MCNC: 92 MG/DL
PARATHYROID HORMONE INTACT: 95 PG/ML
PH URINE: 6
PHOSPHATE SERPL-MCNC: 3.3 MG/DL
PLATELET # BLD AUTO: 252 K/UL
POTASSIUM SERPL-SCNC: 4.3 MMOL/L
PROT SERPL-MCNC: 7 G/DL
PROT UR-MCNC: 6 MG/DL
PROTEIN URINE: NEGATIVE
RBC # BLD: 5.78 M/UL
RBC # FLD: 14.3 %
RED BLOOD CELLS URINE: 0 /HPF
SODIUM SERPL-SCNC: 140 MMOL/L
SPECIFIC GRAVITY URINE: 1.01
SQUAMOUS EPITHELIAL CELLS: 0 /HPF
TACROLIMUS SERPL-MCNC: 6 NG/ML
TRIGL SERPL-MCNC: 138 MG/DL
URATE SERPL-MCNC: 9.5 MG/DL
UROBILINOGEN URINE: NORMAL
WBC # FLD AUTO: 9.09 K/UL
WHITE BLOOD CELLS URINE: 0 /HPF

## 2021-04-08 LAB
BKV DNA SPEC QL NAA+PROBE: NEGATIVE COPIES/ML
CMV DNA SPEC QL NAA+PROBE: 175 IU/ML
LOG 10 BK QUANTITATION PCR: NORMAL

## 2021-04-15 ENCOUNTER — APPOINTMENT (OUTPATIENT)
Dept: TRANSPLANT | Facility: CLINIC | Age: 36
End: 2021-04-15

## 2021-04-15 ENCOUNTER — NON-APPOINTMENT (OUTPATIENT)
Age: 36
End: 2021-04-15

## 2021-04-15 LAB
ALBUMIN SERPL ELPH-MCNC: 4.6 G/DL
ALP BLD-CCNC: 110 U/L
ALT SERPL-CCNC: 23 U/L
ANION GAP SERPL CALC-SCNC: 12 MMOL/L
APPEARANCE: CLEAR
AST SERPL-CCNC: 21 U/L
BACTERIA: NEGATIVE
BASOPHILS # BLD AUTO: 0.01 K/UL
BASOPHILS NFR BLD AUTO: 0.1 %
BILIRUB SERPL-MCNC: 0.3 MG/DL
BILIRUBIN URINE: NEGATIVE
BLOOD URINE: NORMAL
BUN SERPL-MCNC: 18 MG/DL
CALCIUM SERPL-MCNC: 9 MG/DL
CALCIUM SERPL-MCNC: 9 MG/DL
CHLORIDE SERPL-SCNC: 104 MMOL/L
CO2 SERPL-SCNC: 23 MMOL/L
COLOR: NORMAL
CREAT SERPL-MCNC: 1.55 MG/DL
CREAT SPEC-SCNC: 167 MG/DL
CREAT/PROT UR: 0.1 RATIO
EOSINOPHIL # BLD AUTO: 0.12 K/UL
EOSINOPHIL NFR BLD AUTO: 1.3 %
GLUCOSE QUALITATIVE U: NEGATIVE
GLUCOSE SERPL-MCNC: 97 MG/DL
HCT VFR BLD CALC: 45.7 %
HGB BLD-MCNC: 14.3 G/DL
HYALINE CASTS: 0 /LPF
IMM GRANULOCYTES NFR BLD AUTO: 0.4 %
KETONES URINE: NEGATIVE
LDH SERPL-CCNC: 175 U/L
LEUKOCYTE ESTERASE URINE: NEGATIVE
LYMPHOCYTES # BLD AUTO: 2.49 K/UL
LYMPHOCYTES NFR BLD AUTO: 26.4 %
MAGNESIUM SERPL-MCNC: 1.4 MG/DL
MAN DIFF?: NORMAL
MCHC RBC-ENTMCNC: 26 PG
MCHC RBC-ENTMCNC: 31.3 GM/DL
MCV RBC AUTO: 83.2 FL
MICROSCOPIC-UA: NORMAL
MONOCYTES # BLD AUTO: 1.01 K/UL
MONOCYTES NFR BLD AUTO: 10.7 %
NEUTROPHILS # BLD AUTO: 5.77 K/UL
NEUTROPHILS NFR BLD AUTO: 61.1 %
NITRITE URINE: NEGATIVE
PARATHYROID HORMONE INTACT: 182 PG/ML
PH URINE: 6
PHOSPHATE SERPL-MCNC: 2.7 MG/DL
PLATELET # BLD AUTO: 240 K/UL
POTASSIUM SERPL-SCNC: 4 MMOL/L
PROT SERPL-MCNC: 6.9 G/DL
PROT UR-MCNC: 13 MG/DL
PROTEIN URINE: NORMAL
RBC # BLD: 5.49 M/UL
RBC # FLD: 14.2 %
RED BLOOD CELLS URINE: 2 /HPF
SODIUM SERPL-SCNC: 139 MMOL/L
SPECIFIC GRAVITY URINE: 1.02
SQUAMOUS EPITHELIAL CELLS: 0 /HPF
TACROLIMUS SERPL-MCNC: 4.4 NG/ML
URATE SERPL-MCNC: 9 MG/DL
UROBILINOGEN URINE: NORMAL
WBC # FLD AUTO: 9.44 K/UL
WHITE BLOOD CELLS URINE: 1 /HPF

## 2021-04-19 ENCOUNTER — APPOINTMENT (OUTPATIENT)
Dept: NEPHROLOGY | Facility: CLINIC | Age: 36
End: 2021-04-19

## 2021-04-22 LAB
BKV DNA SPEC QL NAA+PROBE: NEGATIVE COPIES/ML
CMV DNA SPEC QL NAA+PROBE: ABNORMAL IU/ML
LOG 10 BK QUANTITATION PCR: NORMAL

## 2021-04-26 ENCOUNTER — APPOINTMENT (OUTPATIENT)
Dept: NEPHROLOGY | Facility: CLINIC | Age: 36
End: 2021-04-26
Payer: MEDICARE

## 2021-04-26 VITALS — SYSTOLIC BLOOD PRESSURE: 140 MMHG | DIASTOLIC BLOOD PRESSURE: 80 MMHG

## 2021-04-26 DIAGNOSIS — K21.9 GASTRO-ESOPHAGEAL REFLUX DISEASE W/OUT ESOPHAGITIS: ICD-10-CM

## 2021-04-26 PROCEDURE — 99214 OFFICE O/P EST MOD 30 MIN: CPT

## 2021-04-26 RX ORDER — VALGANCICLOVIR HYDROCHLORIDE 450 MG/1
450 TABLET ORAL
Qty: 60 | Refills: 5 | Status: DISCONTINUED | COMMUNITY
Start: 2020-11-05 | End: 2021-04-26

## 2021-04-26 RX ORDER — KETOCONAZOLE 20 MG/G
2 CREAM TOPICAL TWICE DAILY
Qty: 1 | Refills: 2 | Status: DISCONTINUED | COMMUNITY
Start: 2019-09-26 | End: 2021-04-26

## 2021-04-26 RX ORDER — SODIUM BICARBONATE 650 MG/1
650 TABLET ORAL
Qty: 180 | Refills: 11 | Status: DISCONTINUED | COMMUNITY
Start: 2018-08-13 | End: 2021-04-26

## 2021-04-26 NOTE — HISTORY OF PRESENT ILLNESS
[FreeTextEntry1] : Pt is a 33 y.o male with ESRD for about 8 years from unclear cause s/p DDRT on 18.  Donor was 33 and  of ICH.  Pts hospital course was complicated by urinary leak requiring reoperation and nephrostomy tube.  Had a prolonged hospitalization with marquez and LACEY drain both of which were removed.  Also has a JJ stent in place.  All tubes were removed during admission of .\par \par INterval events:\par Pt feels well, blood pressure controlled.  Did not take meds yet.  Has some gingival disease, thought to be related to nifedipine.  also with acid reflux.  Labs from  reviewed. \par

## 2021-04-26 NOTE — ASSESSMENT
[FreeTextEntry1] : 1.  Renal transplant - Transplanted with a young donor, suspect ATN then culture negative pyelo on biopsy.  Last creatinine 1.5.  Used to be around 1.7- 2. Prior biopsy revealed severe arterial sclerosis.   \par 2.  Immunosuppression - tacro for target 5-7.  Currently at goal. Cont MMF 1gm BID and pred 5.  \par 3.  HTN - stable.  did not take bp meds yet today \par 4.  Prophylaxis - on bactrim\par 5.  Hypercalcemia - On sensipar 60mgs daily.  \par 6.  Acidosis -  resolved, will stop bicarb\par 7.  Acid reflux - start pepcid. \par \par Next visit in 6 months.  \par He will f/u with Dr. Liao.   \par

## 2021-04-26 NOTE — PHYSICAL EXAM
[General Appearance - Alert] : alert [General Appearance - In No Acute Distress] : in no acute distress [General Appearance - Well Nourished] : well nourished [General Appearance - Well Developed] : well developed [Sclera] : the sclera and conjunctiva were normal [Extraocular Movements] : extraocular movements were intact [Outer Ear] : the ears and nose were normal in appearance [Neck Appearance] : the appearance of the neck was normal [Neck Cervical Mass (___cm)] : no neck mass was observed [Jugular Venous Distention Increased] : there was no jugular-venous distention [Oriented To Time, Place, And Person] : oriented to person, place, and time [Impaired Insight] : insight and judgment were intact [Affect] : the affect was normal [Mood] : the mood was normal [Hearing Threshold Finger Rub Not Lares] : hearing was normal [Nasal Cavity] : the nasal mucosa and septum were normal [Exaggerated Use Of Accessory Muscles For Inspiration] : no accessory muscle use [Auscultation Breath Sounds / Voice Sounds] : lungs were clear to auscultation bilaterally [Heart Rate And Rhythm] : heart rate was normal and rhythm regular [Heart Sounds] : normal S1 and S2 [Heart Sounds Gallop] : no gallops [Edema] : there was no peripheral edema [Bowel Sounds] : normal bowel sounds [Abdomen Soft] : soft [Abdomen Tenderness] : non-tender [Cervical Lymph Nodes Enlarged Posterior Bilaterally] : posterior cervical [Cervical Lymph Nodes Enlarged Anterior Bilaterally] : anterior cervical [Supraclavicular Lymph Nodes Enlarged Bilaterally] : supraclavicular [Abnormal Walk] : normal gait [Nail Clubbing] : no clubbing  or cyanosis of the fingernails [Musculoskeletal - Swelling] : no joint swelling seen [Skin Color & Pigmentation] : normal skin color and pigmentation [] : no rash [Skin Turgor] : normal skin turgor [Cranial Nerves] : cranial nerves 2-12 were intact [Sensation] : the sensory exam was normal to light touch and pinprick [No Focal Deficits] : no focal deficits

## 2021-05-15 NOTE — ED ADULT NURSE NOTE - CHPI ED NUR TIMING2
Contacted mom    States 5/14/21 AM patient vomited 3 times  Patient felt mild nausea when waking up    No fever  No cough, runny nose  No diarrhea  No known COVID exposure  Patient eating,drinking well    Reviewed home care advise per peds triage protocol
Mother calling stating andrea vomited at school yesterday and school needs a note to be able to return.  No appointments available    Please contact
gradual onset

## 2021-08-11 NOTE — DISCHARGE NOTE ADULT - MEDICATION SUMMARY - MEDICATIONS TO TAKE
X Size Of Lesion In Cm (Optional): 0.2 I will START or STAY ON the medications listed below when I get home from the hospital:    predniSONE 5 mg oral tablet  -- 1 tab(s) by mouth once a day  -- Indication: For Immunosuppression    acetaminophen 325 mg oral tablet  -- 2 tab(s) by mouth every 6 hours, As needed, Mild Pain (1 - 3)  -- Indication: For pain    sodium bicarbonate 650 mg oral tablet  -- 2 tab(s) by mouth 2 times a day  -- Indication: For renal    tamsulosin 0.4 mg oral capsule  -- 1 cap(s) by mouth once a day (at bedtime)  -- Indication: For     nystatin 100,000 units/mL oral suspension  -- 5 milliliter(s) by mouth 4 times a day  -- Indication: For ppx    valGANciclovir 450 mg oral tablet  -- 1 tab(s) by mouth   -- Indication: For ppx    carvedilol 12.5 mg oral tablet  -- 1 tab(s) by mouth every 12 hours  -- Indication: For HTN (hypertension)    NIFEdipine 90 mg oral tablet, extended release  -- 1 tab(s) by mouth once a day  -- Indication: For HTN (hypertension)    famotidine 20 mg oral tablet  -- 1 tab(s) by mouth once a day  -- Indication: For gi    mycophenolate mofetil 250 mg oral capsule  --  1000mg by mouth two times daily  -- Indication: For Immunosuppression    tacrolimus 1 mg oral capsule  -- 4 cap(s) by mouth 2 times a day  -- Indication: For Immunosuppression    docusate sodium 100 mg oral capsule  -- 1 cap(s) by mouth once a day  -- Indication: For gi    senna oral tablet  -- 2 tab(s) by mouth once a day (at bedtime)  -- Indication: For gi    polyethylene glycol 3350 oral powder for reconstitution  -- 17 gram(s) by mouth once a day  -- Indication: For gi    simethicone 80 mg oral tablet, chewable  -- 1 tab(s) by mouth every 6 hours, As needed, Gas  -- Indication: For gi    sulfamethoxazole-trimethoprim 400 mg-80 mg oral tablet  -- 1 tab(s) by mouth once a day  -- Indication: For ppx    oxybutynin 5 mg oral tablet  -- 1 tab(s) by mouth 2 times a day  -- Indication: For gu

## 2021-08-23 ENCOUNTER — NON-APPOINTMENT (OUTPATIENT)
Age: 36
End: 2021-08-23

## 2021-08-24 ENCOUNTER — NON-APPOINTMENT (OUTPATIENT)
Age: 36
End: 2021-08-24

## 2021-08-24 ENCOUNTER — EMERGENCY (EMERGENCY)
Facility: HOSPITAL | Age: 36
LOS: 1 days | Discharge: HOME | End: 2021-08-24
Attending: EMERGENCY MEDICINE
Payer: MEDICAID

## 2021-08-24 VITALS
OXYGEN SATURATION: 96 % | DIASTOLIC BLOOD PRESSURE: 78 MMHG | RESPIRATION RATE: 20 BRPM | SYSTOLIC BLOOD PRESSURE: 133 MMHG | TEMPERATURE: 99 F | HEART RATE: 76 BPM

## 2021-08-24 VITALS
HEIGHT: 66 IN | HEART RATE: 84 BPM | TEMPERATURE: 101 F | WEIGHT: 160.06 LBS | OXYGEN SATURATION: 95 % | DIASTOLIC BLOOD PRESSURE: 80 MMHG | SYSTOLIC BLOOD PRESSURE: 121 MMHG | RESPIRATION RATE: 18 BRPM

## 2021-08-24 DIAGNOSIS — I77.0 ARTERIOVENOUS FISTULA, ACQUIRED: Chronic | ICD-10-CM

## 2021-08-24 DIAGNOSIS — Z98.890 OTHER SPECIFIED POSTPROCEDURAL STATES: Chronic | ICD-10-CM

## 2021-08-24 DIAGNOSIS — Z94.0 KIDNEY TRANSPLANT STATUS: Chronic | ICD-10-CM

## 2021-08-24 LAB
ALBUMIN SERPL ELPH-MCNC: 4.5 G/DL — SIGNIFICANT CHANGE UP (ref 3.3–5)
ALP SERPL-CCNC: 95 U/L — SIGNIFICANT CHANGE UP (ref 40–120)
ALT FLD-CCNC: 23 U/L — SIGNIFICANT CHANGE UP (ref 10–45)
ANION GAP SERPL CALC-SCNC: 11 MMOL/L — SIGNIFICANT CHANGE UP (ref 5–17)
APPEARANCE UR: CLEAR — SIGNIFICANT CHANGE UP
APTT BLD: 34.1 SEC — SIGNIFICANT CHANGE UP (ref 27.5–35.5)
AST SERPL-CCNC: 19 U/L — SIGNIFICANT CHANGE UP (ref 10–40)
BASOPHILS # BLD AUTO: 0 K/UL — SIGNIFICANT CHANGE UP (ref 0–0.2)
BASOPHILS NFR BLD AUTO: 0 % — SIGNIFICANT CHANGE UP (ref 0–2)
BILIRUB SERPL-MCNC: 0.6 MG/DL — SIGNIFICANT CHANGE UP (ref 0.2–1.2)
BILIRUB UR-MCNC: NEGATIVE — SIGNIFICANT CHANGE UP
BUN SERPL-MCNC: 16 MG/DL — SIGNIFICANT CHANGE UP (ref 7–23)
CALCIUM SERPL-MCNC: 10.8 MG/DL — HIGH (ref 8.4–10.5)
CHLORIDE SERPL-SCNC: 102 MMOL/L — SIGNIFICANT CHANGE UP (ref 96–108)
CO2 SERPL-SCNC: 21 MMOL/L — LOW (ref 22–31)
COLOR SPEC: SIGNIFICANT CHANGE UP
CREAT SERPL-MCNC: 1.68 MG/DL — HIGH (ref 0.5–1.3)
DIFF PNL FLD: ABNORMAL
EOSINOPHIL # BLD AUTO: 0 K/UL — SIGNIFICANT CHANGE UP (ref 0–0.5)
EOSINOPHIL NFR BLD AUTO: 0 % — SIGNIFICANT CHANGE UP (ref 0–6)
GLUCOSE SERPL-MCNC: 115 MG/DL — HIGH (ref 70–99)
GLUCOSE UR QL: NEGATIVE — SIGNIFICANT CHANGE UP
HCT VFR BLD CALC: 44.5 % — SIGNIFICANT CHANGE UP (ref 39–50)
HGB BLD-MCNC: 14.3 G/DL — SIGNIFICANT CHANGE UP (ref 13–17)
INR BLD: 1.16 RATIO — SIGNIFICANT CHANGE UP (ref 0.88–1.16)
KETONES UR-MCNC: NEGATIVE — SIGNIFICANT CHANGE UP
LEUKOCYTE ESTERASE UR-ACNC: NEGATIVE — SIGNIFICANT CHANGE UP
LYMPHOCYTES # BLD AUTO: 0.9 K/UL — LOW (ref 1–3.3)
LYMPHOCYTES # BLD AUTO: 13.2 % — SIGNIFICANT CHANGE UP (ref 13–44)
MCHC RBC-ENTMCNC: 26.3 PG — LOW (ref 27–34)
MCHC RBC-ENTMCNC: 32.1 GM/DL — SIGNIFICANT CHANGE UP (ref 32–36)
MCV RBC AUTO: 82 FL — SIGNIFICANT CHANGE UP (ref 80–100)
MONOCYTES # BLD AUTO: 0.95 K/UL — HIGH (ref 0–0.9)
MONOCYTES NFR BLD AUTO: 14 % — SIGNIFICANT CHANGE UP (ref 2–14)
NEUTROPHILS # BLD AUTO: 4.96 K/UL — SIGNIFICANT CHANGE UP (ref 1.8–7.4)
NEUTROPHILS NFR BLD AUTO: 66.7 % — SIGNIFICANT CHANGE UP (ref 43–77)
NITRITE UR-MCNC: NEGATIVE — SIGNIFICANT CHANGE UP
PH UR: 6.5 — SIGNIFICANT CHANGE UP (ref 5–8)
PLATELET # BLD AUTO: 166 K/UL — SIGNIFICANT CHANGE UP (ref 150–400)
POTASSIUM SERPL-MCNC: 4.3 MMOL/L — SIGNIFICANT CHANGE UP (ref 3.5–5.3)
POTASSIUM SERPL-SCNC: 4.3 MMOL/L — SIGNIFICANT CHANGE UP (ref 3.5–5.3)
PROT SERPL-MCNC: 6.9 G/DL — SIGNIFICANT CHANGE UP (ref 6–8.3)
PROT UR-MCNC: SIGNIFICANT CHANGE UP
PROTHROM AB SERPL-ACNC: 13.8 SEC — HIGH (ref 10.6–13.6)
RAPID RVP RESULT: DETECTED
RBC # BLD: 5.43 M/UL — SIGNIFICANT CHANGE UP (ref 4.2–5.8)
RBC # FLD: 14.5 % — SIGNIFICANT CHANGE UP (ref 10.3–14.5)
SARS-COV-2 RNA SPEC QL NAA+PROBE: DETECTED
SODIUM SERPL-SCNC: 134 MMOL/L — LOW (ref 135–145)
SP GR SPEC: 1.01 — LOW (ref 1.01–1.02)
UROBILINOGEN FLD QL: NEGATIVE — SIGNIFICANT CHANGE UP
WBC # BLD: 6.81 K/UL — SIGNIFICANT CHANGE UP (ref 3.8–10.5)
WBC # FLD AUTO: 6.81 K/UL — SIGNIFICANT CHANGE UP (ref 3.8–10.5)

## 2021-08-24 PROCEDURE — 71045 X-RAY EXAM CHEST 1 VIEW: CPT

## 2021-08-24 PROCEDURE — 71045 X-RAY EXAM CHEST 1 VIEW: CPT | Mod: 26

## 2021-08-24 PROCEDURE — 0225U NFCT DS DNA&RNA 21 SARSCOV2: CPT

## 2021-08-24 PROCEDURE — 99284 EMERGENCY DEPT VISIT MOD MDM: CPT | Mod: 25

## 2021-08-24 PROCEDURE — 85025 COMPLETE CBC W/AUTO DIFF WBC: CPT

## 2021-08-24 PROCEDURE — 85730 THROMBOPLASTIN TIME PARTIAL: CPT

## 2021-08-24 PROCEDURE — 87086 URINE CULTURE/COLONY COUNT: CPT

## 2021-08-24 PROCEDURE — 81001 URINALYSIS AUTO W/SCOPE: CPT

## 2021-08-24 PROCEDURE — 80053 COMPREHEN METABOLIC PANEL: CPT

## 2021-08-24 PROCEDURE — 85610 PROTHROMBIN TIME: CPT

## 2021-08-24 PROCEDURE — 87040 BLOOD CULTURE FOR BACTERIA: CPT

## 2021-08-24 PROCEDURE — L9998: CPT

## 2021-08-24 PROCEDURE — 93005 ELECTROCARDIOGRAM TRACING: CPT

## 2021-08-24 RX ORDER — SODIUM CHLORIDE 9 MG/ML
2300 INJECTION INTRAMUSCULAR; INTRAVENOUS; SUBCUTANEOUS ONCE
Refills: 0 | Status: DISCONTINUED | OUTPATIENT
Start: 2021-08-24 | End: 2021-08-24

## 2021-08-24 RX ORDER — SODIUM CHLORIDE 9 MG/ML
2000 INJECTION, SOLUTION INTRAVENOUS ONCE
Refills: 0 | Status: COMPLETED | OUTPATIENT
Start: 2021-08-24 | End: 2021-08-24

## 2021-08-24 RX ADMIN — SODIUM CHLORIDE 2000 MILLILITER(S): 9 INJECTION, SOLUTION INTRAVENOUS at 15:25

## 2021-08-24 NOTE — ED ADULT NURSE NOTE - OBJECTIVE STATEMENT
35 yo m c/o of Fever/chill/ and cough x 2 days. Pt recently diagnosed with COVID-19 with rapid test yest. Left arm fistula noted. During initial assessment pt reporting "feeling better" and most symptoms have subsided. Was told to come to ED by " his transplant provider". Pt may be a candidate for Antibody therapy, but pt needs further testing as per pt report. Pt is aaox4 well appearing, and speaking in full sentences without difficulty. Denies chest pain -Denies N/V. Breathing spontaneous and unlabored with pulse ox >95% on room air. Upon assessment, abdomen soft and nontender, +strong peripheral pulses, moving all extremities without difficulty, lungs clear, No pitting edema to b/l LE-Pulse oximeter applied. Awaiting Provider evauluation.

## 2021-08-24 NOTE — ED PROVIDER NOTE - PROGRESS NOTE DETAILS
KIANA Harrell (PGY-2) - d/w Dr. Jose otero, have positive test, will dc w/ urgent f/u for monoclonal abs.

## 2021-08-24 NOTE — ED PROVIDER NOTE - CLINICAL SUMMARY MEDICAL DECISION MAKING FREE TEXT BOX
Pt w/ renal transplant sent in for COVID. VSS. Exam unremarkable, 96% RA. Labs, rvp, urine. Reassess. Pt w/ renal transplant sent in for COVID. VSS. Exam unremarkable, 96% RA. Labs, rvp, urine. Reassess.    SARAH Oneill MD: Agree with resident MDM, assessment and plan as above.

## 2021-08-24 NOTE — ED PROVIDER NOTE - NSFOLLOWUPINSTRUCTIONS_ED_ALL_ED_FT
You were seen in the Emergency Department for COVID-19. You tested positive here. Your blood work was normal. You will received a call regarding mono-clonal antibodies. Please follow up with your normal doctors. Quarantine for 14 days.     1) Continue all previously prescribed medications as directed.    2) Follow up with your primary care physician - take copies of your results.    3) Return to the Emergency Department for worsening or persistent symptoms, and/or ANY NEW OR CONCERNING SYMPTOMS.

## 2021-08-24 NOTE — ED CLERICAL - NS ED CLERK NOTE PRE-ARRIVAL INFORMATION; ADDITIONAL PRE-ARRIVAL INFORMATION
CC/Reason For referral: Covid positive symptomatic   Preferred Consultant(if applicable): Dr. Jean  Who admits for you (if needed):  Do you have documents you would like to fax over?  Would you still like to speak to an ED attending? YES

## 2021-08-24 NOTE — ED PROVIDER NOTE - NSICDXPASTSURGICALHX_GEN_ALL_CORE_FT
PAST SURGICAL HISTORY:  AV fistula left arm fistula    H/O arthroscopy of right knee     H/O kidney transplant 7/23/2018

## 2021-08-24 NOTE — ED PROVIDER NOTE - OBJECTIVE STATEMENT
37yo M hx of renal transplant sent in to ED for COVID. Vaccinated x2. Traveled to [place], has 1 day of body aches and cough. Sent in by Dr. Jose otero for mono-clonal abs. Denies fevers, cp, abdominal pain, change in urine or bowel.

## 2021-08-24 NOTE — ED ADULT NURSE NOTE - CAS ELECT INFOMATION PROVIDED
Pt discharged, VSS, afebrile, ambulating independently. Nurse clearly reviewed discharge instructions, followup care, and when to return to the ED - Pt verbalized understanding./DC instructions

## 2021-08-24 NOTE — ED PROVIDER NOTE - PATIENT PORTAL LINK FT
You can access the FollowMyHealth Patient Portal offered by [facility] by registering at the following website: [email or web address]. By joining [employer]’s FollowMyHealth portal, you will also be able to view your health information using other applications (apps) compatible with our system. You can access the FollowMyHealth Patient Portal offered by [facility] by registering at the following website: [email or web address]. By joining [employer]’s FollowMyHealth portal, you will also be able to view your health information using other applications (apps) compatible with our system.

## 2021-08-24 NOTE — ED PROVIDER NOTE - PHYSICAL EXAMINATION
General: NAD  HEENT: NCAT, PERRL  Cardiac: RRR, no murmurs, 2+ peripheral pulses  Chest: CTA, ambulatory pulse ox 96% RA  Abdomen: soft, non-distended, bowel sounds present, no ttp, no rebound or guarding  Extremities: no peripheral edema, calf tenderness, or leg size discrepancies  Skin: no rashes  Neuro: AAOx3, motor and sensory grossly intact  Psych: mood and affect appropriate

## 2021-08-25 ENCOUNTER — TRANSCRIPTION ENCOUNTER (OUTPATIENT)
Age: 36
End: 2021-08-25

## 2021-08-25 LAB
CULTURE RESULTS: NO GROWTH — SIGNIFICANT CHANGE UP
SPECIMEN SOURCE: SIGNIFICANT CHANGE UP

## 2021-08-27 ENCOUNTER — OUTPATIENT (OUTPATIENT)
Dept: OUTPATIENT SERVICES | Facility: HOSPITAL | Age: 36
LOS: 1 days | End: 2021-08-27
Payer: MEDICAID

## 2021-08-27 ENCOUNTER — APPOINTMENT (OUTPATIENT)
Dept: DISASTER EMERGENCY | Facility: HOSPITAL | Age: 36
End: 2021-08-27

## 2021-08-27 VITALS
HEART RATE: 76 BPM | SYSTOLIC BLOOD PRESSURE: 119 MMHG | HEIGHT: 66 IN | TEMPERATURE: 101 F | OXYGEN SATURATION: 94 % | WEIGHT: 169.98 LBS | RESPIRATION RATE: 20 BRPM | DIASTOLIC BLOOD PRESSURE: 76 MMHG

## 2021-08-27 VITALS
DIASTOLIC BLOOD PRESSURE: 59 MMHG | TEMPERATURE: 101 F | SYSTOLIC BLOOD PRESSURE: 90 MMHG | RESPIRATION RATE: 18 BRPM | OXYGEN SATURATION: 94 % | HEART RATE: 71 BPM

## 2021-08-27 DIAGNOSIS — U07.1 COVID-19: ICD-10-CM

## 2021-08-27 DIAGNOSIS — Z94.0 KIDNEY TRANSPLANT STATUS: Chronic | ICD-10-CM

## 2021-08-27 DIAGNOSIS — I77.0 ARTERIOVENOUS FISTULA, ACQUIRED: Chronic | ICD-10-CM

## 2021-08-27 DIAGNOSIS — Z98.890 OTHER SPECIFIED POSTPROCEDURAL STATES: Chronic | ICD-10-CM

## 2021-08-27 PROCEDURE — M0243: CPT

## 2021-08-27 RX ORDER — SODIUM CHLORIDE 9 MG/ML
250 INJECTION INTRAMUSCULAR; INTRAVENOUS; SUBCUTANEOUS
Refills: 0 | Status: DISCONTINUED | OUTPATIENT
Start: 2021-08-27 | End: 2021-09-11

## 2021-08-27 RX ORDER — ACETAMINOPHEN 500 MG
650 TABLET ORAL ONCE
Refills: 0 | Status: COMPLETED | OUTPATIENT
Start: 2021-08-27 | End: 2021-08-27

## 2021-08-27 RX ADMIN — SODIUM CHLORIDE 25 MILLILITER(S): 9 INJECTION INTRAMUSCULAR; INTRAVENOUS; SUBCUTANEOUS at 13:40

## 2021-08-27 RX ADMIN — Medication 650 MILLIGRAM(S): at 13:40

## 2021-08-27 NOTE — CHART NOTE - NSCHARTNOTEFT_GEN_A_CORE
CC: Monoclonal Antibody Infusion/COVID 19 Positive on 8/24/21      History: Patient presents for infusion of monoclonal antibody infusion. Patient has been screened and was deemed to be a candidate.    Symptoms/ Criteria: Cough, malasise , muscle aches    Risk Profile includes: Renal transplant 2018 on immunosuprressive Tx    acetaminophen   Tablet .. 650 milliGRAM(s) Oral once  casirivimab/imdevimab in sodium chloride 0.9% (EUA) IVPB 100 milliLiter(s) IV Intermittent once  sodium chloride 0.9%. 250 milliLiter(s) IV Continuous <Continuous>      PMHx:  Infection due to severe acute respiratory syndrome coronavirus 2 (SARS-CoV-2)    HTN (hypertension)    ESRD on dialysis    Nephrostomy status    H/O kidney transplant    No significant past surgical history    No significant past surgical history    H/O arthroscopy of right knee    H/O kidney transplant    AV fistula    No significant past surgical history          T(C): 38.6 (08-27-21 @ 13:10), Max: 38.6 (08-27-21 @ 13:10)  HR: 76 (08-27-21 @ 13:10) (76 - 76)  BP: 119/76 (08-27-21 @ 13:10) (119/76 - 119/76)  RR: 20 (08-27-21 @ 13:10) (20 - 20)  SpO2: 94% (08-27-21 @ 13:10) (94% - 94%)      PE:   Appearance: NAD	  HEENT:   Normal oral mucosa.   Lymphatic: No lymphadenopathy  Cardiovascular: Normal S1 S2, No JVD, No murmurs, No edema  Respiratory: Lungs clear to auscultation	  Gastrointestinal:  Soft, Non-tender. No guarding   Skin: warm and dry  Neurologic: Non-focal  Extremities: Normal range of motion.     ASSESSMENT:  Pt is a 36y year old Male Covid +  referred to the infusion center for Monoclonal antibody infusion (Regeneron).  Pt was fully vaccinated moderna last dose 7/29/21  Tylenol given for 101.4 temp    PLAN:  - infusion procedure explained to patient   - Consent for monoclonal antibody infusion obtained   - Risk & benefits discussed/all questions answered  - infuse Regeneron over 21 minutes  - will observe patient for one hour post infusion  and then if stable discharge home with outpt follow up as planned by PMD.    POST INFUSION ASSESSMENT:   DISCHARGE at approximately  1  hour post infusion    - Patient tolerated infusion well denies complaints of chest pain/SOB/dizziness/ palps  - VSS for discharge home  - D/C instructions given/ fact sheet included.  - Patient to follow-up with PCP as needed.

## 2021-08-29 ENCOUNTER — TRANSCRIPTION ENCOUNTER (OUTPATIENT)
Age: 36
End: 2021-08-29

## 2021-08-31 ENCOUNTER — TRANSCRIPTION ENCOUNTER (OUTPATIENT)
Age: 36
End: 2021-08-31

## 2021-09-01 ENCOUNTER — TRANSCRIPTION ENCOUNTER (OUTPATIENT)
Age: 36
End: 2021-09-01

## 2021-09-03 NOTE — ED PROVIDER NOTE - HIV OFFER
Communication w/ Dr. Glory Calvillo via perfectserve:      9/3/21 6:54 AM   [phone] Hospital or Facility: [facility] From: Kev Brito RE: Stephenie Sanders 1960 RM: [id number] Lab called with abnormal result: Potassium = 2.8 Need Callback: NO CALLBACK REQ C4 PROGRESSIVE CARE ROUTINE  Read 6:55 AM     Dr. Glory Calvillo:  9/3/21 6:56 AM   Order is in Opt out

## 2021-09-13 RX ORDER — MYCOPHENOLATE MOFETIL 500 MG/1
500 TABLET ORAL TWICE DAILY
Qty: 360 | Refills: 3 | Status: DISCONTINUED | COMMUNITY
Start: 2021-09-13 | End: 2021-09-13

## 2021-09-28 NOTE — ED ADULT NURSE REASSESSMENT NOTE - NS ED NURSE REASSESS COMMENT FT1
Diabetes is improving with treatment.   Continue current treatment regimen.  Titrate up insulin.  Diabetes will be reassessed in 3 months.   sloan called to have medication sent to RED SIDE OF ED for pt.

## 2021-10-12 LAB
ALBUMIN SERPL ELPH-MCNC: 5.1 G/DL
ALP BLD-CCNC: 95 U/L
ALT SERPL-CCNC: 27 U/L
ANION GAP SERPL CALC-SCNC: 24 MMOL/L
APPEARANCE: CLEAR
AST SERPL-CCNC: 20 U/L
BACTERIA: NEGATIVE
BASOPHILS # BLD AUTO: 0.02 K/UL
BASOPHILS NFR BLD AUTO: 0.2 %
BILIRUB SERPL-MCNC: 0.3 MG/DL
BILIRUBIN URINE: NEGATIVE
BLOOD URINE: NORMAL
BUN SERPL-MCNC: 20 MG/DL
CALCIUM SERPL-MCNC: 10.4 MG/DL
CALCIUM SERPL-MCNC: 10.4 MG/DL
CHLORIDE SERPL-SCNC: 106 MMOL/L
CO2 SERPL-SCNC: 14 MMOL/L
COLOR: NORMAL
CREAT SERPL-MCNC: 1.51 MG/DL
CREAT SPEC-SCNC: 172 MG/DL
CREAT/PROT UR: 0.1 RATIO
EOSINOPHIL # BLD AUTO: 0.15 K/UL
EOSINOPHIL NFR BLD AUTO: 1.5 %
GLUCOSE QUALITATIVE U: NEGATIVE
GLUCOSE SERPL-MCNC: 94 MG/DL
HCT VFR BLD CALC: 47.6 %
HGB BLD-MCNC: 15.2 G/DL
HYALINE CASTS: 1 /LPF
IMM GRANULOCYTES NFR BLD AUTO: 0.3 %
KETONES URINE: NEGATIVE
LDH SERPL-CCNC: 185 U/L
LEUKOCYTE ESTERASE URINE: NEGATIVE
LYMPHOCYTES # BLD AUTO: 3.9 K/UL
LYMPHOCYTES NFR BLD AUTO: 39.6 %
MAGNESIUM SERPL-MCNC: 1.3 MG/DL
MAN DIFF?: NORMAL
MCHC RBC-ENTMCNC: 26.8 PG
MCHC RBC-ENTMCNC: 31.9 GM/DL
MCV RBC AUTO: 84 FL
MICROSCOPIC-UA: NORMAL
MONOCYTES # BLD AUTO: 1 K/UL
MONOCYTES NFR BLD AUTO: 10.1 %
NEUTROPHILS # BLD AUTO: 4.76 K/UL
NEUTROPHILS NFR BLD AUTO: 48.3 %
NITRITE URINE: NEGATIVE
PARATHYROID HORMONE INTACT: 24 PG/ML
PH URINE: 6
PHOSPHATE SERPL-MCNC: 2.8 MG/DL
PLATELET # BLD AUTO: 255 K/UL
POTASSIUM SERPL-SCNC: 4.2 MMOL/L
PROT SERPL-MCNC: 7.6 G/DL
PROT UR-MCNC: 12 MG/DL
PROTEIN URINE: NEGATIVE
RBC # BLD: 5.67 M/UL
RBC # FLD: 14.4 %
RED BLOOD CELLS URINE: 1 /HPF
SODIUM SERPL-SCNC: 144 MMOL/L
SPECIFIC GRAVITY URINE: 1.02
SQUAMOUS EPITHELIAL CELLS: 1 /HPF
TACROLIMUS SERPL-MCNC: 9.9 NG/ML
URATE SERPL-MCNC: 9.8 MG/DL
UROBILINOGEN URINE: NORMAL
WBC # FLD AUTO: 9.86 K/UL
WHITE BLOOD CELLS URINE: 1 /HPF

## 2021-10-25 ENCOUNTER — APPOINTMENT (OUTPATIENT)
Dept: TRANSPLANT | Facility: CLINIC | Age: 36
End: 2021-10-25

## 2021-12-12 NOTE — ED ADULT NURSE NOTE - NSSISCREENINGQ3_ED_A_ED
Goal Outcome Evaluation:  Plan of Care Reviewed With: patient        Progress: improving  Outcome Summary: pt responded well to PT w/ multiple gait trips up 24 ft CGA-min A w/ RW. pt t/f recliner to chair w/ HHA due to RW in BR, blocked by recliner. pt is very unsteady w/o RW and required assist to prevent fall. no new goals met at this time. pt would continue to benefit from PT services.   No

## 2021-12-28 LAB
25(OH)D3 SERPL-MCNC: 13.3 NG/ML
ALBUMIN SERPL ELPH-MCNC: 4.7 G/DL
ALP BLD-CCNC: 89 U/L
ALT SERPL-CCNC: 33 U/L
ANION GAP SERPL CALC-SCNC: 15 MMOL/L
APPEARANCE: CLEAR
AST SERPL-CCNC: 25 U/L
BACTERIA: NEGATIVE
BASOPHILS # BLD AUTO: 0.02 K/UL
BASOPHILS NFR BLD AUTO: 0.2 %
BILIRUB SERPL-MCNC: 0.6 MG/DL
BILIRUBIN URINE: NEGATIVE
BKV DNA SPEC QL NAA+PROBE: NOT DETECTED COPIES/ML
BLOOD URINE: NEGATIVE
BUN SERPL-MCNC: 16 MG/DL
CALCIUM SERPL-MCNC: 9.6 MG/DL
CALCIUM SERPL-MCNC: 9.6 MG/DL
CHLORIDE SERPL-SCNC: 103 MMOL/L
CHOLEST SERPL-MCNC: 157 MG/DL
CMV DNA SPEC QL NAA+PROBE: ABNORMAL IU/ML
CO2 SERPL-SCNC: 22 MMOL/L
COLOR: NORMAL
CREAT SERPL-MCNC: 1.54 MG/DL
CREAT SPEC-SCNC: 138 MG/DL
CREAT/PROT UR: 0.1 RATIO
EOSINOPHIL # BLD AUTO: 0.13 K/UL
EOSINOPHIL NFR BLD AUTO: 1.4 %
ESTIMATED AVERAGE GLUCOSE: 94 MG/DL
GLUCOSE QUALITATIVE U: NEGATIVE
GLUCOSE SERPL-MCNC: 82 MG/DL
HBA1C MFR BLD HPLC: 4.9 %
HCT VFR BLD CALC: 44.3 %
HDLC SERPL-MCNC: 47 MG/DL
HGB BLD-MCNC: 14 G/DL
HYALINE CASTS: 0 /LPF
IMM GRANULOCYTES NFR BLD AUTO: 0.2 %
KETONES URINE: NEGATIVE
LDH SERPL-CCNC: 175 U/L
LDLC SERPL CALC-MCNC: 85 MG/DL
LEUKOCYTE ESTERASE URINE: NEGATIVE
LYMPHOCYTES # BLD AUTO: 3.22 K/UL
LYMPHOCYTES NFR BLD AUTO: 34.7 %
MAGNESIUM SERPL-MCNC: 1.4 MG/DL
MAN DIFF?: NORMAL
MCHC RBC-ENTMCNC: 26.3 PG
MCHC RBC-ENTMCNC: 31.6 GM/DL
MCV RBC AUTO: 83.3 FL
MICROSCOPIC-UA: NORMAL
MONOCYTES # BLD AUTO: 0.83 K/UL
MONOCYTES NFR BLD AUTO: 9 %
NEUTROPHILS # BLD AUTO: 5.05 K/UL
NEUTROPHILS NFR BLD AUTO: 54.5 %
NITRITE URINE: NEGATIVE
NONHDLC SERPL-MCNC: 110 MG/DL
PARATHYROID HORMONE INTACT: 60 PG/ML
PH URINE: 6
PHOSPHATE SERPL-MCNC: 3 MG/DL
PLATELET # BLD AUTO: 258 K/UL
POTASSIUM SERPL-SCNC: 4.2 MMOL/L
PROT SERPL-MCNC: 6.9 G/DL
PROT UR-MCNC: 8 MG/DL
PROTEIN URINE: NEGATIVE
RBC # BLD: 5.32 M/UL
RBC # FLD: 14.1 %
RED BLOOD CELLS URINE: 1 /HPF
SODIUM SERPL-SCNC: 140 MMOL/L
SPECIFIC GRAVITY URINE: 1.02
SQUAMOUS EPITHELIAL CELLS: 1 /HPF
TACROLIMUS SERPL-MCNC: 3.5 NG/ML
TRIGL SERPL-MCNC: 125 MG/DL
URATE SERPL-MCNC: 8 MG/DL
UROBILINOGEN URINE: NORMAL
WBC # FLD AUTO: 9.27 K/UL
WHITE BLOOD CELLS URINE: 1 /HPF

## 2022-01-07 ENCOUNTER — NON-APPOINTMENT (OUTPATIENT)
Age: 37
End: 2022-01-07

## 2022-01-07 ENCOUNTER — TRANSCRIPTION ENCOUNTER (OUTPATIENT)
Age: 37
End: 2022-01-07

## 2022-01-09 ENCOUNTER — OUTPATIENT (OUTPATIENT)
Dept: OUTPATIENT SERVICES | Facility: HOSPITAL | Age: 37
LOS: 1 days | End: 2022-01-09

## 2022-01-09 ENCOUNTER — APPOINTMENT (OUTPATIENT)
Dept: DISASTER EMERGENCY | Facility: HOSPITAL | Age: 37
End: 2022-01-09

## 2022-01-09 VITALS
RESPIRATION RATE: 16 BRPM | SYSTOLIC BLOOD PRESSURE: 124 MMHG | HEART RATE: 80 BPM | OXYGEN SATURATION: 98 % | TEMPERATURE: 98 F | DIASTOLIC BLOOD PRESSURE: 72 MMHG

## 2022-01-09 VITALS — WEIGHT: 177.03 LBS | HEIGHT: 66 IN

## 2022-01-09 DIAGNOSIS — U07.1 COVID-19: ICD-10-CM

## 2022-01-09 DIAGNOSIS — Z94.0 KIDNEY TRANSPLANT STATUS: Chronic | ICD-10-CM

## 2022-01-09 DIAGNOSIS — Z98.890 OTHER SPECIFIED POSTPROCEDURAL STATES: Chronic | ICD-10-CM

## 2022-01-09 DIAGNOSIS — I77.0 ARTERIOVENOUS FISTULA, ACQUIRED: Chronic | ICD-10-CM

## 2022-01-09 RX ORDER — SODIUM CHLORIDE 9 MG/ML
250 INJECTION INTRAMUSCULAR; INTRAVENOUS; SUBCUTANEOUS
Refills: 0 | Status: DISCONTINUED | OUTPATIENT
Start: 2022-01-09 | End: 2022-01-23

## 2022-01-09 RX ORDER — SOTROVIMAB 62.5 MG/ML
500 INJECTION, SOLUTION, CONCENTRATE INTRAVENOUS ONCE
Refills: 0 | Status: COMPLETED | OUTPATIENT
Start: 2022-01-09 | End: 2022-01-09

## 2022-01-09 RX ADMIN — SOTROVIMAB 116 MILLIGRAM(S): 62.5 INJECTION, SOLUTION, CONCENTRATE INTRAVENOUS at 14:34

## 2022-01-09 RX ADMIN — SODIUM CHLORIDE 25 MILLILITER(S): 9 INJECTION INTRAMUSCULAR; INTRAVENOUS; SUBCUTANEOUS at 14:34

## 2022-01-09 NOTE — MONOCLONAL ANTIBODY INFUSION - HOME MEDICATIONS
hydrocodone-homatropine 5 mg-1.5 mg oral tablet , 1 tab(s) orally 4 times a day, As Needed -for cough MDD:4  Sensipar 60 mg oral tablet , 1 tab(s) orally once a day  atovaquone 750 mg/5 mL oral suspension , 5 milliliter(s) orally 2 times a day  sodium bicarbonate 650 mg oral tablet , 1 tab(s) orally 4 times a day  Procardia XL 60 mg oral tablet, extended release , 1 tab(s) orally once a day  Coreg 12.5 mg oral tablet , 1 tab(s) orally 2 times a day  Prograf 1 mg oral capsule , 1 cap(s) orally every 12 hours  tacrolimus 5 mg oral capsule , 1 cap(s) orally 2 times a day  NIFEdipine 30 mg oral tablet, extended release , 1 tab(s) orally   carvedilol 12.5 mg oral tablet , 1 tab(s) orally every 12 hours  mycophenolate mofetil 250 mg oral capsule ,  1000mg orally two times daily  predniSONE 5 mg oral tablet , 1 tab(s) orally once a day

## 2022-01-09 NOTE — MONOCLONAL ANTIBODY INFUSION - ASSESSMENT AND PLAN
ASSESSMENT:  Pt is a 36 yrs old male with h/o CRF and had kidney transplant in 2018 pt Covid + on 1/7/22  referred by Dr. Jacobo.who presents to infusion center for Monoclonal antibody infusion (Sotrovimab)  Symptoms/ Criteria:   Risk Profile includes:     PLAN:  - infusion procedure explained to patient   -Consent for monoclonal antibody infusion obtained   - Risk & benifits discussed/all questions answered  -infuse  Sotrovimab IV over 30 minutes  -observe patient for one hour post infusion and discharge home ASSESSMENT:  Pt is a 36 yrs old male with h/o CRF and had kidney transplant in 2018 pt Covid + on 1/7/22  referred by Dr. Jacobo.who presents to infusion center for Monoclonal antibody infusion (Sotrovimab)  Symptoms/ Criteria: cough and fatigue  Risk Profile includes: CRF and had kidney transplant 3 yrs ago, and HTN    PLAN:  - infusion procedure explained to patient   -Consent for monoclonal antibody infusion obtained   - Risk & benifits discussed/all questions answered  -infuse  Sotrovimab IV over 30 minutes  -observe patient for one hour post infusion and discharge home

## 2022-01-09 NOTE — MONOCLONAL ANTIBODY INFUSION - EXAM
CC: Monoclonal Antibody Infusion/COVID 19 Positive  36yMale    exam/findings:  T(C): 37 (01-09-22 @ 14:25), Max: 37 (01-09-22 @ 14:25)  HR: 87 (01-09-22 @ 14:25) (87 - 87)  BP: 116/70 (01-09-22 @ 14:25) (116/70 - 116/70)  RR: 18 (01-09-22 @ 14:25) (18 - 18)  SpO2: 97% (01-09-22 @ 14:25) (97% - 97%)      PE:   Appearance: NAD	  HEENT:   Normal oral mucosa,   Lymphatic: No lymphadenopathy  Cardiovascular: Normal S1 S2, No JVD, No murmurs, No edema  Respiratory: Lungs clear to auscultation	  Gastrointestinal:  Soft, Non-tender, + BS	  Skin: warm and dry  Neurologic: Non-focal  Extremities: Normal range of motion,

## 2022-01-11 ENCOUNTER — APPOINTMENT (OUTPATIENT)
Dept: TRANSPLANT | Facility: CLINIC | Age: 37
End: 2022-01-11

## 2022-02-01 LAB
ALBUMIN SERPL ELPH-MCNC: 4.8 G/DL
ALP BLD-CCNC: 92 U/L
ALT SERPL-CCNC: 26 U/L
ANION GAP SERPL CALC-SCNC: 15 MMOL/L
APPEARANCE: CLEAR
AST SERPL-CCNC: 18 U/L
BACTERIA: NEGATIVE
BASOPHILS # BLD AUTO: 0.01 K/UL
BASOPHILS NFR BLD AUTO: 0.1 %
BILIRUB SERPL-MCNC: 0.3 MG/DL
BILIRUBIN URINE: NEGATIVE
BKV DNA SPEC QL NAA+PROBE: NOT DETECTED COPIES/ML
BLOOD URINE: NORMAL
BUN SERPL-MCNC: 17 MG/DL
CALCIUM SERPL-MCNC: 8.8 MG/DL
CALCIUM SERPL-MCNC: 8.8 MG/DL
CHLORIDE SERPL-SCNC: 102 MMOL/L
CMV DNA SPEC QL NAA+PROBE: NOT DETECTED IU/ML
CO2 SERPL-SCNC: 21 MMOL/L
COLOR: NORMAL
COVID-19 SPIKE DOMAIN ANTIBODY INTERPRETATION: POSITIVE
CREAT SERPL-MCNC: 1.49 MG/DL
CREAT SPEC-SCNC: 116 MG/DL
CREAT/PROT UR: 0.1 RATIO
EOSINOPHIL # BLD AUTO: 0.19 K/UL
EOSINOPHIL NFR BLD AUTO: 2 %
GLUCOSE QUALITATIVE U: NEGATIVE
GLUCOSE SERPL-MCNC: 91 MG/DL
HCT VFR BLD CALC: 44.3 %
HGB BLD-MCNC: 13.9 G/DL
HYALINE CASTS: 0 /LPF
IMM GRANULOCYTES NFR BLD AUTO: 0.2 %
KETONES URINE: NEGATIVE
LEUKOCYTE ESTERASE URINE: NEGATIVE
LYMPHOCYTES # BLD AUTO: 3.27 K/UL
LYMPHOCYTES NFR BLD AUTO: 35.1 %
MAGNESIUM SERPL-MCNC: 1.4 MG/DL
MAN DIFF?: NORMAL
MCHC RBC-ENTMCNC: 26.1 PG
MCHC RBC-ENTMCNC: 31.4 GM/DL
MCV RBC AUTO: 83.1 FL
MICROSCOPIC-UA: NORMAL
MONOCYTES # BLD AUTO: 0.9 K/UL
MONOCYTES NFR BLD AUTO: 9.7 %
NEUTROPHILS # BLD AUTO: 4.92 K/UL
NEUTROPHILS NFR BLD AUTO: 52.9 %
NITRITE URINE: NEGATIVE
PARATHYROID HORMONE INTACT: 142 PG/ML
PH URINE: 6.5
PHOSPHATE SERPL-MCNC: 3.3 MG/DL
PLATELET # BLD AUTO: 263 K/UL
POTASSIUM SERPL-SCNC: 4.2 MMOL/L
PROT SERPL-MCNC: 7.1 G/DL
PROT UR-MCNC: 9 MG/DL
PROTEIN URINE: NORMAL
RBC # BLD: 5.33 M/UL
RBC # FLD: 14.3 %
RED BLOOD CELLS URINE: 3 /HPF
SARS-COV-2 AB SERPL IA-ACNC: >250 U/ML
SODIUM SERPL-SCNC: 139 MMOL/L
SPECIFIC GRAVITY URINE: 1.02
SQUAMOUS EPITHELIAL CELLS: 0 /HPF
TACROLIMUS SERPL-MCNC: 5.2 NG/ML
URATE SERPL-MCNC: 9.2 MG/DL
UROBILINOGEN URINE: NORMAL
WBC # FLD AUTO: 9.31 K/UL
WHITE BLOOD CELLS URINE: 0 /HPF

## 2022-03-11 ENCOUNTER — APPOINTMENT (OUTPATIENT)
Dept: TRANSPLANT | Facility: CLINIC | Age: 37
End: 2022-03-11

## 2022-03-15 ENCOUNTER — APPOINTMENT (OUTPATIENT)
Dept: TRANSPLANT | Facility: CLINIC | Age: 37
End: 2022-03-15

## 2022-03-17 ENCOUNTER — APPOINTMENT (OUTPATIENT)
Dept: TRANSPLANT | Facility: CLINIC | Age: 37
End: 2022-03-17

## 2022-03-22 LAB
25(OH)D3 SERPL-MCNC: 30 NG/ML
CALCIUM SERPL-MCNC: 9.6 MG/DL
PARATHYROID HORMONE INTACT: 61 PG/ML
URATE SERPL-MCNC: 9.4 MG/DL

## 2022-03-23 ENCOUNTER — NON-APPOINTMENT (OUTPATIENT)
Age: 37
End: 2022-03-23

## 2022-03-23 LAB
ALBUMIN SERPL ELPH-MCNC: 5.1 G/DL
ALP BLD-CCNC: 101 U/L
ALT SERPL-CCNC: 22 U/L
ANION GAP SERPL CALC-SCNC: 16 MMOL/L
APPEARANCE: ABNORMAL
AST SERPL-CCNC: 17 U/L
BACTERIA: NEGATIVE
BASOPHILS # BLD AUTO: 0.03 K/UL
BASOPHILS NFR BLD AUTO: 0.3 %
BILIRUB SERPL-MCNC: 0.3 MG/DL
BILIRUBIN URINE: NEGATIVE
BLOOD URINE: NORMAL
BUN SERPL-MCNC: 17 MG/DL
CALCIUM OXALATE CRYSTALS: ABNORMAL
CALCIUM SERPL-MCNC: 9.5 MG/DL
CHLORIDE SERPL-SCNC: 101 MMOL/L
CHOLEST SERPL-MCNC: 243 MG/DL
CO2 SERPL-SCNC: 20 MMOL/L
COLOR: YELLOW
CREAT SERPL-MCNC: 1.6 MG/DL
CREAT SPEC-SCNC: 278 MG/DL
CREAT/PROT UR: 0.1 RATIO
EGFR: 57 ML/MIN/1.73M2
EOSINOPHIL # BLD AUTO: 0.12 K/UL
EOSINOPHIL NFR BLD AUTO: 1.3 %
ESTIMATED AVERAGE GLUCOSE: 97 MG/DL
GLUCOSE QUALITATIVE U: NEGATIVE
GLUCOSE SERPL-MCNC: 87 MG/DL
HBA1C MFR BLD HPLC: 5 %
HCT VFR BLD CALC: 46.7 %
HDLC SERPL-MCNC: 47 MG/DL
HGB BLD-MCNC: 14.6 G/DL
HYALINE CASTS: 0 /LPF
IMM GRANULOCYTES NFR BLD AUTO: 0.3 %
KETONES URINE: NEGATIVE
LDH SERPL-CCNC: 189 U/L
LDLC SERPL CALC-MCNC: 161 MG/DL
LEUKOCYTE ESTERASE URINE: NEGATIVE
LYMPHOCYTES # BLD AUTO: 3.28 K/UL
LYMPHOCYTES NFR BLD AUTO: 35.2 %
MAGNESIUM SERPL-MCNC: 1.4 MG/DL
MAN DIFF?: NORMAL
MCHC RBC-ENTMCNC: 25.3 PG
MCHC RBC-ENTMCNC: 31.3 GM/DL
MCV RBC AUTO: 81.1 FL
MICROSCOPIC-UA: NORMAL
MONOCYTES # BLD AUTO: 0.79 K/UL
MONOCYTES NFR BLD AUTO: 8.5 %
NEUTROPHILS # BLD AUTO: 5.07 K/UL
NEUTROPHILS NFR BLD AUTO: 54.4 %
NITRITE URINE: NEGATIVE
NONHDLC SERPL-MCNC: 195 MG/DL
PH URINE: 6
PHOSPHATE SERPL-MCNC: 2.7 MG/DL
PLATELET # BLD AUTO: 272 K/UL
POTASSIUM SERPL-SCNC: 4.1 MMOL/L
PROT SERPL-MCNC: 7.4 G/DL
PROT UR-MCNC: 23 MG/DL
PROTEIN URINE: ABNORMAL
RBC # BLD: 5.76 M/UL
RBC # FLD: 14.2 %
RED BLOOD CELLS URINE: 5 /HPF
SODIUM SERPL-SCNC: 137 MMOL/L
SPECIFIC GRAVITY URINE: 1.02
SQUAMOUS EPITHELIAL CELLS: 1 /HPF
TACROLIMUS SERPL-MCNC: 7.2 NG/ML
TRIGL SERPL-MCNC: 174 MG/DL
UROBILINOGEN URINE: NORMAL
WBC # FLD AUTO: 9.32 K/UL
WHITE BLOOD CELLS URINE: 1 /HPF

## 2022-03-28 LAB
BKV DNA SPEC QL NAA+PROBE: NOT DETECTED COPIES/ML
CMV DNA SPEC QL NAA+PROBE: NOT DETECTED IU/ML

## 2022-05-09 ENCOUNTER — APPOINTMENT (OUTPATIENT)
Dept: TRANSPLANT | Facility: CLINIC | Age: 37
End: 2022-05-09

## 2022-05-16 ENCOUNTER — APPOINTMENT (OUTPATIENT)
Dept: NEPHROLOGY | Facility: CLINIC | Age: 37
End: 2022-05-16
Payer: MEDICAID

## 2022-05-16 VITALS
OXYGEN SATURATION: 97 % | RESPIRATION RATE: 16 BRPM | BODY MASS INDEX: 32.1 KG/M2 | HEART RATE: 77 BPM | DIASTOLIC BLOOD PRESSURE: 94 MMHG | TEMPERATURE: 97.7 F | WEIGHT: 188 LBS | SYSTOLIC BLOOD PRESSURE: 141 MMHG | HEIGHT: 64 IN

## 2022-05-16 LAB
ALBUMIN SERPL ELPH-MCNC: 4.9 G/DL
ALP BLD-CCNC: 97 U/L
ALT SERPL-CCNC: 27 U/L
ANION GAP SERPL CALC-SCNC: 16 MMOL/L
APPEARANCE: CLEAR
AST SERPL-CCNC: 21 U/L
BACTERIA: NEGATIVE
BASOPHILS # BLD AUTO: 0.03 K/UL
BASOPHILS NFR BLD AUTO: 0.3 %
BILIRUB SERPL-MCNC: 0.5 MG/DL
BILIRUBIN URINE: NEGATIVE
BLOOD URINE: NORMAL
BUN SERPL-MCNC: 17 MG/DL
CALCIUM SERPL-MCNC: 9.2 MG/DL
CALCIUM SERPL-MCNC: 9.2 MG/DL
CHLORIDE SERPL-SCNC: 102 MMOL/L
CMV DNA SPEC QL NAA+PROBE: NOT DETECTED IU/ML
CMVPCR LOG: NOT DETECTED LOG10IU/ML
CO2 SERPL-SCNC: 21 MMOL/L
COLOR: NORMAL
CREAT SERPL-MCNC: 1.53 MG/DL
CREAT SPEC-SCNC: 152 MG/DL
CREAT/PROT UR: 0.1 RATIO
EGFR: 60 ML/MIN/1.73M2
EOSINOPHIL # BLD AUTO: 0.17 K/UL
EOSINOPHIL NFR BLD AUTO: 1.8 %
GLUCOSE QUALITATIVE U: NEGATIVE
GLUCOSE SERPL-MCNC: 88 MG/DL
HCT VFR BLD CALC: 44.3 %
HGB BLD-MCNC: 14.6 G/DL
HYALINE CASTS: 0 /LPF
IMM GRANULOCYTES NFR BLD AUTO: 0.3 %
KETONES URINE: NEGATIVE
LDH SERPL-CCNC: 194 U/L
LEUKOCYTE ESTERASE URINE: NEGATIVE
LYMPHOCYTES # BLD AUTO: 3.33 K/UL
LYMPHOCYTES NFR BLD AUTO: 35.7 %
MAGNESIUM SERPL-MCNC: 1.5 MG/DL
MAN DIFF?: NORMAL
MCHC RBC-ENTMCNC: 26.3 PG
MCHC RBC-ENTMCNC: 33 GM/DL
MCV RBC AUTO: 79.7 FL
MICROSCOPIC-UA: NORMAL
MONOCYTES # BLD AUTO: 0.99 K/UL
MONOCYTES NFR BLD AUTO: 10.6 %
NEUTROPHILS # BLD AUTO: 4.78 K/UL
NEUTROPHILS NFR BLD AUTO: 51.3 %
NITRITE URINE: NEGATIVE
PARATHYROID HORMONE INTACT: 51 PG/ML
PH URINE: 6.5
PHOSPHATE SERPL-MCNC: 3.1 MG/DL
PLATELET # BLD AUTO: 243 K/UL
POTASSIUM SERPL-SCNC: 4.1 MMOL/L
PROT SERPL-MCNC: 7.2 G/DL
PROT UR-MCNC: 15 MG/DL
PROTEIN URINE: NORMAL
RBC # BLD: 5.56 M/UL
RBC # FLD: 14.1 %
RED BLOOD CELLS URINE: 3 /HPF
SODIUM SERPL-SCNC: 139 MMOL/L
SPECIFIC GRAVITY URINE: 1.02
SQUAMOUS EPITHELIAL CELLS: 0 /HPF
TACROLIMUS SERPL-MCNC: 4.3 NG/ML
URATE SERPL-MCNC: 8.7 MG/DL
UROBILINOGEN URINE: NORMAL
WBC # FLD AUTO: 9.33 K/UL
WHITE BLOOD CELLS URINE: 1 /HPF

## 2022-05-16 PROCEDURE — 99213 OFFICE O/P EST LOW 20 MIN: CPT

## 2022-05-16 RX ORDER — ERGOCALCIFEROL 1.25 MG/1
1.25 MG CAPSULE, LIQUID FILLED ORAL
Qty: 4 | Refills: 1 | Status: DISCONTINUED | COMMUNITY
Start: 2020-01-22 | End: 2022-05-16

## 2022-05-16 NOTE — HISTORY OF PRESENT ILLNESS
[FreeTextEntry1] : Pt is a 36 y.o male with ESRD for about 8 years from unclear cause s/p DDRT on 18.  Donor was 33 and  of ICH.  Pts hospital course was complicated by urinary leak requiring reoperation and nephrostomy tube.  Had a prolonged hospitalization with marquez and LACEY drain both of which were removed.  Also has a JJ stent in place.  All tubes were removed during admission of .\par \par  [de-identified] : Pt had not been seen in 2 years.  Had Covid twice, not hospitalized.  Has been having labs done every 3 months or so.  Has not seen Dr. Liao either.  Blood pressure at home about 110 systolic - did not take BP meds yet.  \par

## 2022-05-16 NOTE — PHYSICAL EXAM
[General Appearance - Alert] : alert [General Appearance - In No Acute Distress] : in no acute distress [General Appearance - Well Nourished] : well nourished [General Appearance - Well Developed] : well developed [Sclera] : the sclera and conjunctiva were normal [Extraocular Movements] : extraocular movements were intact [Outer Ear] : the ears and nose were normal in appearance [Hearing Threshold Finger Rub Not [place]] : hearing was normal [Nasal Cavity] : the nasal mucosa and septum were normal [Neck Appearance] : the appearance of the neck was normal [Neck Cervical Mass (___cm)] : no neck mass was observed [Jugular Venous Distention Increased] : there was no jugular-venous distention [Exaggerated Use Of Accessory Muscles For Inspiration] : no accessory muscle use [Auscultation Breath Sounds / Voice Sounds] : lungs were clear to auscultation bilaterally [Heart Rate And Rhythm] : heart rate was normal and rhythm regular [Heart Sounds] : normal S1 and S2 [Heart Sounds Gallop] : no gallops [Edema] : there was no peripheral edema [Bowel Sounds] : normal bowel sounds [Abdomen Soft] : soft [Abdomen Tenderness] : non-tender [Cervical Lymph Nodes Enlarged Posterior Bilaterally] : posterior cervical [Cervical Lymph Nodes Enlarged Anterior Bilaterally] : anterior cervical [Supraclavicular Lymph Nodes Enlarged Bilaterally] : supraclavicular [Abnormal Walk] : normal gait [Nail Clubbing] : no clubbing  or cyanosis of the fingernails [Musculoskeletal - Swelling] : no joint swelling seen [Skin Color & Pigmentation] : normal skin color and pigmentation [Skin Turgor] : normal skin turgor [] : no rash [Cranial Nerves] : cranial nerves 2-12 were intact [Sensation] : the sensory exam was normal to light touch and pinprick [No Focal Deficits] : no focal deficits [Oriented To Time, Place, And Person] : oriented to person, place, and time [Impaired Insight] : insight and judgment were intact [Affect] : the affect was normal [Mood] : the mood was normal

## 2022-05-16 NOTE — ASSESSMENT
[FreeTextEntry1] : 1.  Renal transplant - Transplanted with a young donor, suspect ATN then culture negative pyelo on biopsy.  Last creatinine 1.5.  Used to be around 1.7- 2. Prior biopsy revealed severe arterial sclerosis.   Continue bicarbonate. \par 2.  Immunosuppression - tacro level was 4.3 - within target.  Currently at goal. Cont MMF 1gm BID and pred 5.  \par 3.  HTN - stable at home.  did not take bp meds yet today \par 4.  Prophylaxis - on bactrim\par 5.  Hypercalcemia - On sensipar 60mgs daily.  Controlled. \par 6.  Acidosis -  cont sodium bicarb.  \par 7.  Acid reflux - start pepcid. \par 8. Covid prevention - pt has had Covid and received 2 vaccine doses.  Will schedule booster. \par \par Next visit in 6 months.  \par He will f/u with Dr. Liao.   \par

## 2022-05-17 LAB — BKV DNA SPEC QL NAA+PROBE: NOT DETECTED COPIES/ML

## 2022-06-29 ENCOUNTER — NON-APPOINTMENT (OUTPATIENT)
Age: 37
End: 2022-06-29

## 2022-06-29 LAB
ALBUMIN SERPL ELPH-MCNC: 4.8 G/DL
ALP BLD-CCNC: 114 U/L
ALT SERPL-CCNC: 31 U/L
ANION GAP SERPL CALC-SCNC: 17 MMOL/L
AST SERPL-CCNC: 20 U/L
BASOPHILS # BLD AUTO: 0.02 K/UL
BASOPHILS NFR BLD AUTO: 0.3 %
BILIRUB SERPL-MCNC: 0.3 MG/DL
BUN SERPL-MCNC: 17 MG/DL
CALCIUM SERPL-MCNC: 9.2 MG/DL
CHLORIDE SERPL-SCNC: 103 MMOL/L
CO2 SERPL-SCNC: 19 MMOL/L
CREAT SERPL-MCNC: 1.6 MG/DL
EGFR: 57 ML/MIN/1.73M2
EOSINOPHIL # BLD AUTO: 0.32 K/UL
EOSINOPHIL NFR BLD AUTO: 4.2 %
GLUCOSE SERPL-MCNC: 111 MG/DL
HCT VFR BLD CALC: 41.9 %
HGB BLD-MCNC: 13.1 G/DL
HPIV3 RNA SPEC QL NAA+PROBE: DETECTED
IMM GRANULOCYTES NFR BLD AUTO: 0.3 %
LYMPHOCYTES # BLD AUTO: 1.53 K/UL
LYMPHOCYTES NFR BLD AUTO: 20.2 %
MAN DIFF?: NORMAL
MCHC RBC-ENTMCNC: 24.8 PG
MCHC RBC-ENTMCNC: 31.3 GM/DL
MCV RBC AUTO: 79.2 FL
MONOCYTES # BLD AUTO: 0.82 K/UL
MONOCYTES NFR BLD AUTO: 10.8 %
NEUTROPHILS # BLD AUTO: 4.86 K/UL
NEUTROPHILS NFR BLD AUTO: 64.2 %
PLATELET # BLD AUTO: 279 K/UL
POTASSIUM SERPL-SCNC: 4.4 MMOL/L
PROT SERPL-MCNC: 7.3 G/DL
RAPID RVP RESULT: DETECTED
RBC # BLD: 5.29 M/UL
RBC # FLD: 14.2 %
SARS-COV-2 RNA PNL RESP NAA+PROBE: NOT DETECTED
SODIUM SERPL-SCNC: 139 MMOL/L
WBC # FLD AUTO: 7.57 K/UL

## 2022-06-30 ENCOUNTER — NON-APPOINTMENT (OUTPATIENT)
Age: 37
End: 2022-06-30

## 2022-06-30 LAB
CMV DNA SPEC QL NAA+PROBE: ABNORMAL IU/ML
CMVPCR LOG: ABNORMAL LOG10IU/ML

## 2022-08-11 ENCOUNTER — APPOINTMENT (OUTPATIENT)
Dept: TRANSPLANT | Facility: CLINIC | Age: 37
End: 2022-08-11

## 2022-08-15 ENCOUNTER — NON-APPOINTMENT (OUTPATIENT)
Age: 37
End: 2022-08-15

## 2022-08-15 LAB
ALBUMIN SERPL ELPH-MCNC: 4.6 G/DL
ALP BLD-CCNC: 104 U/L
ALT SERPL-CCNC: 18 U/L
ANION GAP SERPL CALC-SCNC: 12 MMOL/L
APPEARANCE: CLEAR
AST SERPL-CCNC: 13 U/L
BACTERIA: NEGATIVE
BASOPHILS # BLD AUTO: 0.02 K/UL
BASOPHILS NFR BLD AUTO: 0.2 %
BILIRUB SERPL-MCNC: 0.3 MG/DL
BILIRUBIN URINE: NEGATIVE
BKV DNA SPEC QL NAA+PROBE: NOT DETECTED IU/ML
BLOOD URINE: NEGATIVE
BUN SERPL-MCNC: 20 MG/DL
CALCIUM SERPL-MCNC: 9 MG/DL
CALCIUM SERPL-MCNC: 9 MG/DL
CHLORIDE SERPL-SCNC: 104 MMOL/L
CMV DNA SPEC QL NAA+PROBE: NOT DETECTED IU/ML
CMVPCR LOG: NOT DETECTED LOG10IU/ML
CO2 SERPL-SCNC: 24 MMOL/L
COLOR: COLORLESS
CREAT SERPL-MCNC: 1.7 MG/DL
CREAT SPEC-SCNC: 69 MG/DL
CREAT/PROT UR: 0.2 RATIO
EGFR: 53 ML/MIN/1.73M2
EOSINOPHIL # BLD AUTO: 0.16 K/UL
EOSINOPHIL NFR BLD AUTO: 1.8 %
GLUCOSE QUALITATIVE U: NEGATIVE
GLUCOSE SERPL-MCNC: 96 MG/DL
HCT VFR BLD CALC: 40.7 %
HGB BLD-MCNC: 13.2 G/DL
HYALINE CASTS: 0 /LPF
IMM GRANULOCYTES NFR BLD AUTO: 0.2 %
KETONES URINE: NEGATIVE
LDH SERPL-CCNC: 181 U/L
LEUKOCYTE ESTERASE URINE: NEGATIVE
LYMPHOCYTES # BLD AUTO: 2.64 K/UL
LYMPHOCYTES NFR BLD AUTO: 30 %
MAGNESIUM SERPL-MCNC: 1.5 MG/DL
MAN DIFF?: NORMAL
MCHC RBC-ENTMCNC: 25.7 PG
MCHC RBC-ENTMCNC: 32.4 GM/DL
MCV RBC AUTO: 79.3 FL
MICROSCOPIC-UA: NORMAL
MONOCYTES # BLD AUTO: 0.95 K/UL
MONOCYTES NFR BLD AUTO: 10.8 %
NEUTROPHILS # BLD AUTO: 5.01 K/UL
NEUTROPHILS NFR BLD AUTO: 57 %
NITRITE URINE: NEGATIVE
PARATHYROID HORMONE INTACT: 63 PG/ML
PH URINE: 6.5
PHOSPHATE SERPL-MCNC: 3.4 MG/DL
PLATELET # BLD AUTO: 238 K/UL
POTASSIUM SERPL-SCNC: 4.1 MMOL/L
PROT SERPL-MCNC: 6.7 G/DL
PROT UR-MCNC: 11 MG/DL
PROTEIN URINE: NEGATIVE
RBC # BLD: 5.13 M/UL
RBC # FLD: 14.6 %
RED BLOOD CELLS URINE: 0 /HPF
SODIUM SERPL-SCNC: 141 MMOL/L
SPECIFIC GRAVITY URINE: 1.01
SQUAMOUS EPITHELIAL CELLS: 0 /HPF
TACROLIMUS SERPL-MCNC: 5.9 NG/ML
URATE SERPL-MCNC: 10 MG/DL
UROBILINOGEN URINE: NORMAL
WBC # FLD AUTO: 8.8 K/UL
WHITE BLOOD CELLS URINE: 0 /HPF

## 2022-08-18 ENCOUNTER — APPOINTMENT (OUTPATIENT)
Dept: TRANSPLANT | Facility: CLINIC | Age: 37
End: 2022-08-18

## 2022-08-23 LAB
ALBUMIN SERPL ELPH-MCNC: 4.5 G/DL
ALP BLD-CCNC: 104 U/L
ALT SERPL-CCNC: 16 U/L
ANION GAP SERPL CALC-SCNC: 16 MMOL/L
APPEARANCE: CLEAR
AST SERPL-CCNC: 15 U/L
BACTERIA: NEGATIVE
BASOPHILS # BLD AUTO: 0.01 K/UL
BASOPHILS NFR BLD AUTO: 0.1 %
BILIRUB SERPL-MCNC: 0.3 MG/DL
BILIRUBIN URINE: NEGATIVE
BLOOD URINE: NEGATIVE
BUN SERPL-MCNC: 25 MG/DL
CALCIUM SERPL-MCNC: 9.4 MG/DL
CHLORIDE SERPL-SCNC: 102 MMOL/L
CO2 SERPL-SCNC: 21 MMOL/L
COLOR: COLORLESS
CREAT SERPL-MCNC: 1.76 MG/DL
EGFR: 51 ML/MIN/1.73M2
EOSINOPHIL # BLD AUTO: 0.12 K/UL
EOSINOPHIL NFR BLD AUTO: 1.5 %
GLUCOSE QUALITATIVE U: NEGATIVE
GLUCOSE SERPL-MCNC: 98 MG/DL
HCT VFR BLD CALC: 42.3 %
HGB BLD-MCNC: 13.1 G/DL
HYALINE CASTS: 0 /LPF
IMM GRANULOCYTES NFR BLD AUTO: 0.1 %
KETONES URINE: NEGATIVE
LEUKOCYTE ESTERASE URINE: NEGATIVE
LYMPHOCYTES # BLD AUTO: 2.61 K/UL
LYMPHOCYTES NFR BLD AUTO: 31.9 %
MAGNESIUM SERPL-MCNC: 1.4 MG/DL
MAN DIFF?: NORMAL
MCHC RBC-ENTMCNC: 25.8 PG
MCHC RBC-ENTMCNC: 31 GM/DL
MCV RBC AUTO: 83.3 FL
MICROSCOPIC-UA: NORMAL
MONOCYTES # BLD AUTO: 0.79 K/UL
MONOCYTES NFR BLD AUTO: 9.6 %
NEUTROPHILS # BLD AUTO: 4.65 K/UL
NEUTROPHILS NFR BLD AUTO: 56.8 %
NITRITE URINE: NEGATIVE
PH URINE: 6.5
PHOSPHATE SERPL-MCNC: 3.2 MG/DL
PLATELET # BLD AUTO: 254 K/UL
POTASSIUM SERPL-SCNC: 4 MMOL/L
PROT SERPL-MCNC: 6.6 G/DL
PROTEIN URINE: NEGATIVE
RBC # BLD: 5.08 M/UL
RBC # FLD: 14.7 %
RED BLOOD CELLS URINE: 0 /HPF
SODIUM SERPL-SCNC: 139 MMOL/L
SPECIFIC GRAVITY URINE: 1
SQUAMOUS EPITHELIAL CELLS: 0 /HPF
TACROLIMUS SERPL-MCNC: 5 NG/ML
UROBILINOGEN URINE: NORMAL
WBC # FLD AUTO: 8.19 K/UL
WHITE BLOOD CELLS URINE: 0 /HPF

## 2022-08-25 ENCOUNTER — OUTPATIENT (OUTPATIENT)
Dept: OUTPATIENT SERVICES | Facility: HOSPITAL | Age: 37
LOS: 1 days | End: 2022-08-25
Payer: MEDICAID

## 2022-08-25 ENCOUNTER — APPOINTMENT (OUTPATIENT)
Dept: ULTRASOUND IMAGING | Facility: HOSPITAL | Age: 37
End: 2022-08-25

## 2022-08-25 DIAGNOSIS — I77.0 ARTERIOVENOUS FISTULA, ACQUIRED: Chronic | ICD-10-CM

## 2022-08-25 DIAGNOSIS — Z94.0 KIDNEY TRANSPLANT STATUS: Chronic | ICD-10-CM

## 2022-08-25 DIAGNOSIS — Z98.890 OTHER SPECIFIED POSTPROCEDURAL STATES: Chronic | ICD-10-CM

## 2022-08-25 DIAGNOSIS — Z94.0 KIDNEY TRANSPLANT STATUS: ICD-10-CM

## 2022-08-25 PROCEDURE — 76776 US EXAM K TRANSPL W/DOPPLER: CPT | Mod: 26,RT

## 2022-08-25 PROCEDURE — 76776 US EXAM K TRANSPL W/DOPPLER: CPT

## 2022-10-14 RX ORDER — CHOLECALCIFEROL (VITAMIN D3) 1250 MCG
1.25 MG CAPSULE ORAL
Qty: 8 | Refills: 0 | Status: ACTIVE | COMMUNITY
Start: 2022-10-14 | End: 1900-01-01

## 2022-10-17 ENCOUNTER — NON-APPOINTMENT (OUTPATIENT)
Age: 37
End: 2022-10-17

## 2022-10-17 LAB
25(OH)D3 SERPL-MCNC: 14.7 NG/ML
ALBUMIN SERPL ELPH-MCNC: 4.7 G/DL
ALP BLD-CCNC: 114 U/L
ALT SERPL-CCNC: 36 U/L
ANION GAP SERPL CALC-SCNC: 16 MMOL/L
AST SERPL-CCNC: 16 U/L
BASOPHILS # BLD AUTO: 0.02 K/UL
BASOPHILS NFR BLD AUTO: 0.2 %
BILIRUB SERPL-MCNC: 0.4 MG/DL
BKV DNA SPEC QL NAA+PROBE: 42 IU/ML
BUN SERPL-MCNC: 16 MG/DL
CALCIUM SERPL-MCNC: 8.8 MG/DL
CALCIUM SERPL-MCNC: 8.8 MG/DL
CHLORIDE SERPL-SCNC: 102 MMOL/L
CHOLEST SERPL-MCNC: 153 MG/DL
CMV DNA SPEC QL NAA+PROBE: NOT DETECTED IU/ML
CMVPCR LOG: NOT DETECTED LOG10IU/ML
CO2 SERPL-SCNC: 21 MMOL/L
CREAT SERPL-MCNC: 1.44 MG/DL
EGFR: 65 ML/MIN/1.73M2
EOSINOPHIL # BLD AUTO: 0.18 K/UL
EOSINOPHIL NFR BLD AUTO: 1.8 %
ESTIMATED AVERAGE GLUCOSE: 103 MG/DL
GLUCOSE SERPL-MCNC: 86 MG/DL
HBA1C MFR BLD HPLC: 5.2 %
HCT VFR BLD CALC: 43.6 %
HDLC SERPL-MCNC: 46 MG/DL
HGB BLD-MCNC: 14.1 G/DL
IMM GRANULOCYTES NFR BLD AUTO: 0.4 %
LDH SERPL-CCNC: 189 U/L
LDLC SERPL CALC-MCNC: 73 MG/DL
LYMPHOCYTES # BLD AUTO: 2.98 K/UL
LYMPHOCYTES NFR BLD AUTO: 30.3 %
MAGNESIUM SERPL-MCNC: 1.3 MG/DL
MAN DIFF?: NORMAL
MCHC RBC-ENTMCNC: 26.2 PG
MCHC RBC-ENTMCNC: 32.3 GM/DL
MCV RBC AUTO: 81 FL
MONOCYTES # BLD AUTO: 0.86 K/UL
MONOCYTES NFR BLD AUTO: 8.7 %
NEUTROPHILS # BLD AUTO: 5.75 K/UL
NEUTROPHILS NFR BLD AUTO: 58.6 %
NONHDLC SERPL-MCNC: 107 MG/DL
PARATHYROID HORMONE INTACT: 162 PG/ML
PHOSPHATE SERPL-MCNC: 2.7 MG/DL
PLATELET # BLD AUTO: 247 K/UL
POTASSIUM SERPL-SCNC: 4.1 MMOL/L
PROT SERPL-MCNC: 7 G/DL
PSA SERPL-MCNC: 0.26 NG/ML
RBC # BLD: 5.38 M/UL
RBC # FLD: 14.2 %
SODIUM SERPL-SCNC: 139 MMOL/L
TACROLIMUS SERPL-MCNC: 5.7 NG/ML
TRIGL SERPL-MCNC: 166 MG/DL
URATE SERPL-MCNC: 8.7 MG/DL
WBC # FLD AUTO: 9.83 K/UL

## 2022-10-21 ENCOUNTER — APPOINTMENT (OUTPATIENT)
Dept: NEPHROLOGY | Facility: CLINIC | Age: 37
End: 2022-10-21

## 2022-10-21 VITALS
RESPIRATION RATE: 17 BRPM | DIASTOLIC BLOOD PRESSURE: 72 MMHG | SYSTOLIC BLOOD PRESSURE: 125 MMHG | BODY MASS INDEX: 30.73 KG/M2 | TEMPERATURE: 98.1 F | HEIGHT: 64 IN | OXYGEN SATURATION: 96 % | WEIGHT: 180 LBS | HEART RATE: 68 BPM

## 2022-10-21 PROCEDURE — 99213 OFFICE O/P EST LOW 20 MIN: CPT

## 2022-10-21 NOTE — PHYSICAL EXAM
[General Appearance - Alert] : alert [General Appearance - In No Acute Distress] : in no acute distress [General Appearance - Well Nourished] : well nourished [General Appearance - Well Developed] : well developed [Sclera] : the sclera and conjunctiva were normal [Extraocular Movements] : extraocular movements were intact [Outer Ear] : the ears and nose were normal in appearance [Hearing Threshold Finger Rub Not [place]] : hearing was normal [Nasal Cavity] : the nasal mucosa and septum were normal [Neck Appearance] : the appearance of the neck was normal [Neck Cervical Mass (___cm)] : no neck mass was observed [Jugular Venous Distention Increased] : there was no jugular-venous distention [Exaggerated Use Of Accessory Muscles For Inspiration] : no accessory muscle use [Auscultation Breath Sounds / Voice Sounds] : lungs were clear to auscultation bilaterally [Heart Rate And Rhythm] : heart rate was normal and rhythm regular [Heart Sounds] : normal S1 and S2 [Heart Sounds Gallop] : no gallops [Edema] : there was no peripheral edema [Bowel Sounds] : normal bowel sounds [Abdomen Soft] : soft [Abdomen Tenderness] : non-tender [Cervical Lymph Nodes Enlarged Posterior Bilaterally] : posterior cervical [Cervical Lymph Nodes Enlarged Anterior Bilaterally] : anterior cervical [Supraclavicular Lymph Nodes Enlarged Bilaterally] : supraclavicular [Abnormal Walk] : normal gait [Nail Clubbing] : no clubbing  or cyanosis of the fingernails [Musculoskeletal - Swelling] : no joint swelling seen [Skin Color & Pigmentation] : normal skin color and pigmentation [Skin Turgor] : normal skin turgor [] : no rash [Cranial Nerves] : cranial nerves 2-12 were intact [Sensation] : the sensory exam was normal to light touch and pinprick [No Focal Deficits] : no focal deficits [Oriented To Time, Place, And Person] : oriented to person, place, and time [Impaired Insight] : insight and judgment were intact [Affect] : the affect was normal [Mood] : the mood was normal

## 2022-10-27 NOTE — ASSESSMENT
[FreeTextEntry1] : 1.  Renal transplant - Transplanted with a young donor, suspect ATN then culture negative pyelo on biopsy.  Last creatinine 1.5.  Used to be around 1.7- 2. Prior biopsy revealed severe arterial sclerosis.   Continue bicarbonate.  Todays creatinine back down to 1.4.  \par 2.  Immunosuppression - tacro level was 4.3 - within target.  Currently at goal. Cont MMF 1gm BID and pred 5.  \par 3.  HTN - stable at home.  did not take bp meds yet today \par 4.  Prophylaxis - on bactrim\par 5.  Hypercalcemia - On sensipar 60mgs daily.  Controlled. \par 6.  Acidosis -  cont sodium bicarb.  \par 7.  Acid reflux - start pepcid. \par 8. Covid prevention - pt has had Covid and received 2 vaccine doses.  \par \par Next visit in 6 months.  \par He will f/u with Dr. Liao.   \par

## 2022-10-27 NOTE — HISTORY OF PRESENT ILLNESS
[FreeTextEntry1] : Pt is a 36 y.o male with ESRD for about 8 years from unclear cause s/p DDRT on 18.  Donor was 33 and  of ICH.  Pts hospital course was complicated by urinary leak requiring reoperation and nephrostomy tube.  Had a prolonged hospitalization with marquez and LACEY drain both of which were removed.  Also has a JJ stent in place.  All tubes were removed during admission of .\par \par  [de-identified] : Pt now following with Dr. Liao.  He has been doing well.  Creatinine increased to 1.7 then improved. \par

## 2022-12-23 ENCOUNTER — APPOINTMENT (OUTPATIENT)
Dept: DERMATOLOGY | Facility: CLINIC | Age: 37
End: 2022-12-23

## 2023-01-06 ENCOUNTER — APPOINTMENT (OUTPATIENT)
Dept: DERMATOLOGY | Facility: CLINIC | Age: 38
End: 2023-01-06

## 2023-01-19 ENCOUNTER — APPOINTMENT (OUTPATIENT)
Dept: PULMONOLOGY | Facility: CLINIC | Age: 38
End: 2023-01-19
Payer: MEDICAID

## 2023-01-19 VITALS
HEIGHT: 64 IN | RESPIRATION RATE: 17 BRPM | HEART RATE: 74 BPM | TEMPERATURE: 98.2 F | DIASTOLIC BLOOD PRESSURE: 83 MMHG | OXYGEN SATURATION: 96 % | BODY MASS INDEX: 32.61 KG/M2 | SYSTOLIC BLOOD PRESSURE: 132 MMHG | WEIGHT: 191 LBS

## 2023-01-19 DIAGNOSIS — G47.30 SLEEP APNEA, UNSPECIFIED: ICD-10-CM

## 2023-01-19 PROCEDURE — 99204 OFFICE O/P NEW MOD 45 MIN: CPT

## 2023-01-19 NOTE — REVIEW OF SYSTEMS
[Fatigue] : fatigue [Recent Wt Gain (___ Lbs)] : recent [unfilled] ~Ulb weight gain [Difficulty Maintaining Sleep] : difficulty maintaining sleep [Hypersomnolence] : sleeping much more than usual [Negative] : Psychiatric [EDS: ESS=____] : daytime somnolence: ESS=[unfilled] [Difficulty Initiating Sleep] : no difficulty falling asleep [Unusual Sleep Behavior] : no unusual sleep behavior [Hypnogogic Hallucinations] : no hypnogogic hallucinations [Hypnopompic Hallucinations] : no hypnopompic hallucinations [FreeTextEntry8] : shortness of breath with playing basketball/running [de-identified] : snoring, witnessed apneas

## 2023-01-19 NOTE — ASSESSMENT
[FreeTextEntry1] : Omid Perla is a 37 year old male s/p DDRT on 7/23/18, also with HTN, who presents to the office today for evaluation of possible sleep apnea, ESS of 10.\par \par Given patient reports of witnessed apneas, snoring, and persistent daytime fatigue in the context of recent weight gain, there is concern for obstructive sleep apnea.  Split study (PSG/CPAP titration) ordered 1/19/23, patient given information/instructions regarding how to schedule study, will plan to follow up with patient once study completed/results available.\par \par RTC after completion of sleep study\par \par Patient seen and discussed w/Dr. Baig\par \par Amaury Rodriguez PGY5\par [id number]

## 2023-01-19 NOTE — END OF VISIT
[] : Fellow [FreeTextEntry3] : The patient has signs and symptoms suggestive of sleep disordered breathing. Therapeutic modalities were discussed with the patient and a split sleep study will be scheduled. Follow up upon completion of sleep study.\par  [Time Spent: ___ minutes] : I have spent [unfilled] minutes of time on the encounter.

## 2023-01-19 NOTE — PHYSICAL EXAM
[General Appearance - Well Developed] : well developed [General Appearance - Well Nourished] : well nourished [Normal Conjunctiva] : the conjunctiva exhibited no abnormalities [Normal Oropharynx] : normal oropharynx [Neck Appearance] : the appearance of the neck was normal [Thyroid Diffuse Enlargement] : the thyroid was not enlarged [Apical Impulse] : the apical impulse was normal [Heart Sounds] : normal S1 and S2 [Respiration, Rhythm And Depth] : normal respiratory rhythm and effort [Auscultation Breath Sounds / Voice Sounds] : lungs were clear to auscultation bilaterally [Chest Palpation] : palpation of the chest revealed no abnormalities [Lungs Percussion] : the lungs were normal to percussion [Abnormal Walk] : normal gait [Musculoskeletal - Swelling] : no joint swelling seen [Motor Tone] : muscle strength and tone were normal [Nail Clubbing] : no clubbing of the fingernails [Cyanosis, Localized] : no localized cyanosis [Skin Turgor] : normal skin turgor [] : no rash [No Focal Deficits] : no focal deficits [Oriented To Time, Place, And Person] : oriented to person, place, and time [Impaired Insight] : insight and judgment were intact [Affect] : the affect was normal

## 2023-01-19 NOTE — HISTORY OF PRESENT ILLNESS
[Witnessed Apneas] : witnessed sleep apnea [Awakes Unrefreshed] : awakening unrefreshed [Recent  Weight Gain] : recent weight gain [Daytime Somnolence] : daytime somnolence [Hypersomnolence] : hypersomnolence [Snoring] : Snoring [FreeTextEntry1] : \par Omid Perla is a 37 year old male s/p DDRT on 7/23/18, also with HTN, who presents to the office today for evaluation of possible sleep apnea.\par \par On exam patient states that he made the appointment after his girlfriend informed him that he appears to stop breathing during his sleep and that these episodes are getting longer, sometimes lasting up to 20 seconds.  He also snores when he sleeps, and he thinks this has been going on since his renal transplant about 4 years ago.  \par \par He wakes up in the morning feeling tired and feels tired throughout the day.  If he is able he will nap 1-2 times during the day.  He normally goes to sleep around 2:30 in the morning and once he lays down it only takes him approximately 5 minutes to fall asleep.  Normally he only sleeps for approximately 4 hours and wakes up about 2-3 times during the night to urinate.  He reports "fogginess" in the morning but denies morning headaches.  \par \par He denies hallucinations upon going to bed or waking up, as well as cataplexy.  He has not been told that he kicks/punches in his sleep or acts out his dreams. He gets short of breath when he runs while playing basketball.\par \par He has gained approximately 50 pounds over the course of the last year.  He used to smoke approximately 1 pack every 2-3 weeks but quit 6-7 years ago.  \par \par He denies any personal or family history of lung disease.  \par \par  [Unusual Sleep Behavior] : no unusual sleep behavior [Cataplexy] : no cataplexy [Hypnagogic Hallucinations] : no hallucinations when falling asleep [Hypnopompic Hallucinations] : no hallucinations when awakening [ESS] : 10

## 2023-04-19 NOTE — CONSULT NOTE ADULT - I WAS PHYSICALLY PRESENT FOR THE KEY PORTIONS OF THE EVALUATION AND MANAGEMENT (E/M) SERVICE PROVIDED.  I AGREE WITH THE ABOVE HISTORY, PHYSICAL, AND PLAN WHICH I HAVE REVIEWED AND EDITED WHERE APPROPRIATE
Overdue/ BMP to recheck potassium.  Lab orders extended.  3rd reminder letter sent  
Statement Selected

## 2023-06-02 ENCOUNTER — APPOINTMENT (OUTPATIENT)
Dept: DERMATOLOGY | Facility: CLINIC | Age: 38
End: 2023-06-02

## 2023-07-20 NOTE — ED ADULT NURSE NOTE - GENITOURINARY ASSESSMENT
Opioid Pregnancy And Lactation Text: These medications can lead to premature delivery and should be avoided during pregnancy. These medications are also present in breast milk in small amounts. WDL

## 2023-09-05 RX ORDER — CARVEDILOL 25 MG/1
25 TABLET, FILM COATED ORAL TWICE DAILY
Qty: 180 | Refills: 2 | Status: ACTIVE | COMMUNITY
Start: 2018-08-13 | End: 1900-01-01

## 2023-10-04 RX ORDER — MYCOPHENOLATE MOFETIL 500 MG/1
500 TABLET ORAL
Qty: 120 | Refills: 11 | Status: ACTIVE | COMMUNITY
Start: 2018-07-26 | End: 1900-01-01

## 2023-10-31 RX ORDER — ATORVASTATIN CALCIUM 20 MG/1
20 TABLET, FILM COATED ORAL
Qty: 30 | Refills: 11 | Status: ACTIVE | COMMUNITY
Start: 2020-11-18 | End: 1900-01-01

## 2023-10-31 RX ORDER — FAMOTIDINE 20 MG/1
20 TABLET, FILM COATED ORAL
Qty: 30 | Refills: 11 | Status: ACTIVE | COMMUNITY
Start: 2021-04-26 | End: 1900-01-01

## 2023-12-04 RX ORDER — CINACALCET 60 MG/1
60 TABLET ORAL
Qty: 90 | Refills: 3 | Status: ACTIVE | COMMUNITY
Start: 2018-11-12 | End: 1900-01-01

## 2023-12-11 ENCOUNTER — APPOINTMENT (OUTPATIENT)
Dept: TRANSPLANT | Facility: CLINIC | Age: 38
End: 2023-12-11

## 2023-12-11 LAB
25(OH)D3 SERPL-MCNC: 14.9 NG/ML
ALBUMIN SERPL ELPH-MCNC: 4.5 G/DL
ALP BLD-CCNC: 123 U/L
ALT SERPL-CCNC: 30 U/L
ANION GAP SERPL CALC-SCNC: 14 MMOL/L
APPEARANCE: CLEAR
AST SERPL-CCNC: 17 U/L
BACTERIA: NEGATIVE /HPF
BILIRUB SERPL-MCNC: 0.2 MG/DL
BILIRUBIN URINE: NEGATIVE
BLOOD URINE: NEGATIVE
BUN SERPL-MCNC: 20 MG/DL
CALCIUM SERPL-MCNC: 9.3 MG/DL
CALCIUM SERPL-MCNC: 9.3 MG/DL
CAST: 1 /LPF
CHLORIDE SERPL-SCNC: 105 MMOL/L
CHOLEST SERPL-MCNC: 135 MG/DL
CMV DNA SPEC QL NAA+PROBE: NOT DETECTED IU/ML
CMVPCR LOG: NOT DETECTED LOG10IU/ML
CO2 SERPL-SCNC: 22 MMOL/L
COLOR: YELLOW
CREAT SERPL-MCNC: 2 MG/DL
CREAT SPEC-SCNC: 74 MG/DL
CREAT/PROT UR: 0.1 RATIO
EGFR: 43 ML/MIN/1.73M2
EPITHELIAL CELLS: 0 /HPF
ESTIMATED AVERAGE GLUCOSE: 103 MG/DL
GLUCOSE QUALITATIVE U: NEGATIVE MG/DL
GLUCOSE SERPL-MCNC: 86 MG/DL
HBA1C MFR BLD HPLC: 5.2 %
HCT VFR BLD CALC: 43 %
HDLC SERPL-MCNC: 38 MG/DL
HGB BLD-MCNC: 13.4 G/DL
KETONES URINE: NEGATIVE MG/DL
LDH SERPL-CCNC: 180 U/L
LDLC SERPL CALC-MCNC: 59 MG/DL
LEUKOCYTE ESTERASE URINE: NEGATIVE
MAGNESIUM SERPL-MCNC: 1.5 MG/DL
MCHC RBC-ENTMCNC: 26.1 PG
MCHC RBC-ENTMCNC: 31.2 GM/DL
MCV RBC AUTO: 83.7 FL
MICROSCOPIC-UA: NORMAL
NITRITE URINE: NEGATIVE
NONHDLC SERPL-MCNC: 97 MG/DL
PARATHYROID HORMONE INTACT: 69 PG/ML
PH URINE: 6.5
PHOSPHATE SERPL-MCNC: 3 MG/DL
PLATELET # BLD AUTO: 252 K/UL
POTASSIUM SERPL-SCNC: 4.2 MMOL/L
PROT SERPL-MCNC: 6.8 G/DL
PROT UR-MCNC: 4 MG/DL
PROTEIN URINE: NEGATIVE MG/DL
RBC # BLD: 5.14 M/UL
RBC # FLD: 14.6 %
RED BLOOD CELLS URINE: 1 /HPF
SODIUM SERPL-SCNC: 140 MMOL/L
SPECIFIC GRAVITY URINE: 1.01
TACROLIMUS SERPL-MCNC: 4.4 NG/ML
TRIGL SERPL-MCNC: 241 MG/DL
URATE SERPL-MCNC: 9.2 MG/DL
UROBILINOGEN URINE: 0.2 MG/DL
WBC # FLD AUTO: 9.45 K/UL
WHITE BLOOD CELLS URINE: 0 /HPF

## 2023-12-11 RX ORDER — CHOLECALCIFEROL (VITAMIN D3) 1250 MCG
1.25 MG CAPSULE ORAL
Qty: 8 | Refills: 0 | Status: ACTIVE | COMMUNITY
Start: 2023-12-11 | End: 1900-01-01

## 2023-12-12 LAB — BKV DNA SPEC QL NAA+PROBE: NOT DETECTED IU/ML

## 2023-12-13 ENCOUNTER — APPOINTMENT (OUTPATIENT)
Dept: NEPHROLOGY | Facility: CLINIC | Age: 38
End: 2023-12-13
Payer: MEDICAID

## 2023-12-13 ENCOUNTER — NON-APPOINTMENT (OUTPATIENT)
Age: 38
End: 2023-12-13

## 2023-12-13 VITALS
DIASTOLIC BLOOD PRESSURE: 88 MMHG | SYSTOLIC BLOOD PRESSURE: 126 MMHG | HEART RATE: 69 BPM | BODY MASS INDEX: 31.37 KG/M2 | WEIGHT: 186 LBS | TEMPERATURE: 98 F | HEIGHT: 64.5 IN | OXYGEN SATURATION: 97 %

## 2023-12-13 DIAGNOSIS — E21.3 HYPERPARATHYROIDISM, UNSPECIFIED: ICD-10-CM

## 2023-12-13 LAB
ALBUMIN SERPL ELPH-MCNC: 4.7 G/DL
ANION GAP SERPL CALC-SCNC: 15 MMOL/L
APPEARANCE: CLEAR
BILIRUBIN URINE: NEGATIVE
BLOOD URINE: NEGATIVE
BUN SERPL-MCNC: 18 MG/DL
CALCIUM SERPL-MCNC: 8.7 MG/DL
CHLORIDE SERPL-SCNC: 103 MMOL/L
CO2 SERPL-SCNC: 22 MMOL/L
COLOR: YELLOW
CREAT SERPL-MCNC: 1.45 MG/DL
EGFR: 64 ML/MIN/1.73M2
GLUCOSE QUALITATIVE U: NEGATIVE MG/DL
GLUCOSE SERPL-MCNC: 92 MG/DL
HCT VFR BLD CALC: 44.7 %
HGB BLD-MCNC: 14.2 G/DL
KETONES URINE: ABNORMAL MG/DL
LEUKOCYTE ESTERASE URINE: NEGATIVE
MCHC RBC-ENTMCNC: 25.9 PG
MCHC RBC-ENTMCNC: 31.8 GM/DL
MCV RBC AUTO: 81.6 FL
NITRITE URINE: NEGATIVE
PH URINE: 5.5
PHOSPHATE SERPL-MCNC: 3.1 MG/DL
PLATELET # BLD AUTO: 253 K/UL
POTASSIUM SERPL-SCNC: 3.8 MMOL/L
PROTEIN URINE: NORMAL MG/DL
RBC # BLD: 5.48 M/UL
RBC # FLD: 13.9 %
SODIUM SERPL-SCNC: 141 MMOL/L
SPECIFIC GRAVITY URINE: 1.03
TACROLIMUS SERPL-MCNC: 4.8 NG/ML
UROBILINOGEN URINE: 0.2 MG/DL
WBC # FLD AUTO: 14.33 K/UL

## 2023-12-13 PROCEDURE — 99214 OFFICE O/P EST MOD 30 MIN: CPT

## 2023-12-13 NOTE — PHYSICAL EXAM
[General Appearance - Alert] : alert [General Appearance - In No Acute Distress] : in no acute distress [General Appearance - Well Nourished] : well nourished [General Appearance - Well Developed] : well developed [Sclera] : the sclera and conjunctiva were normal [Extraocular Movements] : extraocular movements were intact [Outer Ear] : the ears and nose were normal in appearance [Hearing Threshold Finger Rub Not [place]] : hearing was normal [Nasal Cavity] : the nasal mucosa and septum were normal [Neck Appearance] : the appearance of the neck was normal [Neck Cervical Mass (___cm)] : no neck mass was observed [Jugular Venous Distention Increased] : there was no jugular-venous distention [Exaggerated Use Of Accessory Muscles For Inspiration] : no accessory muscle use [Auscultation Breath Sounds / Voice Sounds] : lungs were clear to auscultation bilaterally [Heart Rate And Rhythm] : heart rate was normal and rhythm regular [Heart Sounds] : normal S1 and S2 [Heart Sounds Gallop] : no gallops [Edema] : there was no peripheral edema [Bowel Sounds] : normal bowel sounds [Abdomen Soft] : soft [Abdomen Tenderness] : non-tender [Cervical Lymph Nodes Enlarged Posterior Bilaterally] : posterior cervical [Cervical Lymph Nodes Enlarged Anterior Bilaterally] : anterior cervical [Supraclavicular Lymph Nodes Enlarged Bilaterally] : supraclavicular [Abnormal Walk] : normal gait [Nail Clubbing] : no clubbing  or cyanosis of the fingernails [Musculoskeletal - Swelling] : no joint swelling seen [Skin Color & Pigmentation] : normal skin color and pigmentation [Skin Turgor] : normal skin turgor [] : no rash [Cranial Nerves] : cranial nerves 2-12 were intact [Sensation] : the sensory exam was normal to light touch and pinprick [No Focal Deficits] : no focal deficits [Oriented To Time, Place, And Person] : oriented to person, place, and time [Impaired Insight] : insight and judgment were intact [Affect] : the affect was normal [Mood] : the mood was normal

## 2023-12-13 NOTE — PLAN
[FreeTextEntry1] : 1. Renal transplant - Transplanted with a young donor, suspect ATN then culture negative pyelo on biopsy. Last creatinine 1.5. Used to be around 1.7- 2. Prior biopsy revealed severe arterial sclerosis. Continue bicarbonate. Todays creatinine back up to 2.  Will check DSA and cell free dna.   2. Immunosuppression - tacro target 4-6. Cont MMF 1gm BID and pred 5. 3. HTN - stable 4. Prophylaxis - on bactrim 5. Hypercalcemia - On sensipar 60mgs daily. Controlled. 6. Acidosis - cont sodium bicarb. 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   Repeat labs and cell free DNA/ DSA.  May need renal sonogram and biopsy.  He will f/u with Dr. Liao.

## 2023-12-13 NOTE — HISTORY OF PRESENT ILLNESS
[FreeTextEntry1] : Pt is a 36 y.o male with ESRD for about 8 years from unclear cause s/p DDRT on 18.  Donor was 33 and  of ICH.  Pts hospital course was complicated by urinary leak requiring reoperation and nephrostomy tube.  Had a prolonged hospitalization with marquez and LACEY drain both of which were removed.  Also has a JJ stent in place.  All tubes were removed during admission of .\par  \par   [de-identified] : Pt now following with Dr. Liao.  He has been doing well except that his creatinine increased to 2.  He has been urinating well, no signs of dehydration.  Eating and drinking normally.  He is compliant with all of his medications.

## 2024-02-05 RX ORDER — PREDNISONE 5 MG/1
5 TABLET ORAL
Qty: 90 | Refills: 3 | Status: ACTIVE | COMMUNITY
Start: 2018-07-26 | End: 1900-01-01

## 2024-04-02 NOTE — BRIEF OPERATIVE NOTE - POST-OP DX
Ureteral stent retained of kidney transplant  09/25/2018    Active  Coby Gasca None known in lifetime

## 2024-04-15 ENCOUNTER — APPOINTMENT (OUTPATIENT)
Dept: NEPHROLOGY | Facility: CLINIC | Age: 39
End: 2024-04-15
Payer: MEDICAID

## 2024-04-15 VITALS
BODY MASS INDEX: 31.03 KG/M2 | WEIGHT: 184 LBS | RESPIRATION RATE: 17 BRPM | TEMPERATURE: 99.3 F | SYSTOLIC BLOOD PRESSURE: 134 MMHG | OXYGEN SATURATION: 97 % | HEART RATE: 73 BPM | HEIGHT: 64.5 IN | DIASTOLIC BLOOD PRESSURE: 91 MMHG

## 2024-04-15 DIAGNOSIS — I10 ESSENTIAL (PRIMARY) HYPERTENSION: ICD-10-CM

## 2024-04-15 DIAGNOSIS — Z79.899 OTHER LONG TERM (CURRENT) DRUG THERAPY: ICD-10-CM

## 2024-04-15 DIAGNOSIS — Z94.0 KIDNEY TRANSPLANT STATUS: ICD-10-CM

## 2024-04-15 PROCEDURE — 99214 OFFICE O/P EST MOD 30 MIN: CPT

## 2024-04-15 RX ORDER — NIFEDIPINE 60 MG/1
60 TABLET, EXTENDED RELEASE ORAL TWICE DAILY
Qty: 180 | Refills: 3 | Status: DISCONTINUED | COMMUNITY
Start: 2018-07-27 | End: 2024-04-15

## 2024-04-15 RX ORDER — SULFAMETHOXAZOLE AND TRIMETHOPRIM 400; 80 MG/1; MG/1
400-80 TABLET ORAL
Qty: 90 | Refills: 3 | Status: DISCONTINUED | COMMUNITY
Start: 2019-09-26 | End: 2024-04-15

## 2024-04-15 RX ORDER — LOSARTAN POTASSIUM 50 MG/1
50 TABLET, FILM COATED ORAL DAILY
Qty: 30 | Refills: 3 | Status: ACTIVE | COMMUNITY
Start: 2024-04-15 | End: 1900-01-01

## 2024-04-15 NOTE — PLAN
[FreeTextEntry1] : 1. Renal transplant - Transplanted with a young donor, suspect ATN then culture negative pyelo on biopsy. Last creatinine 1.5. Used to be around 1.7- 2. Prior biopsy revealed severe arterial sclerosis.  Stop bactrim, pt now very low risk for pneumocystis.   2. Immunosuppression - tacro target 4-6. Cont MMF 1gm BID and pred 5. 3. 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  WIll stop and start losartan 50mgs.   4. 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  Will change to losartan.  5. Hypercalcemia - On sensipar 60mgs daily. Controlled. 6. Acidosis - cont sodium bicarb.  He will f/u with Dr. Liao. And come back for labs in 1 month.

## 2024-04-15 NOTE — PHYSICAL EXAM
[General Appearance - Alert] : alert [General Appearance - In No Acute Distress] : in no acute distress [General Appearance - Well Developed] : well developed [General Appearance - Well Nourished] : well nourished [Sclera] : the sclera and conjunctiva were normal [Extraocular Movements] : extraocular movements were intact [Outer Ear] : the ears and nose were normal in appearance [Hearing Threshold Finger Rub Not [place]] : hearing was normal [Nasal Cavity] : the nasal mucosa and septum were normal [Neck Appearance] : the appearance of the neck was normal [Neck Cervical Mass (___cm)] : no neck mass was observed [Jugular Venous Distention Increased] : there was no jugular-venous distention [Exaggerated Use Of Accessory Muscles For Inspiration] : no accessory muscle use [Auscultation Breath Sounds / Voice Sounds] : lungs were clear to auscultation bilaterally [Heart Rate And Rhythm] : heart rate was normal and rhythm regular [Heart Sounds] : normal S1 and S2 [Heart Sounds Gallop] : no gallops [Bowel Sounds] : normal bowel sounds [Abdomen Soft] : soft [Abdomen Tenderness] : non-tender [Cervical Lymph Nodes Enlarged Posterior Bilaterally] : posterior cervical [Cervical Lymph Nodes Enlarged Anterior Bilaterally] : anterior cervical [Supraclavicular Lymph Nodes Enlarged Bilaterally] : supraclavicular [Abnormal Walk] : normal gait [Nail Clubbing] : no clubbing  or cyanosis of the fingernails [Musculoskeletal - Swelling] : no joint swelling seen [Skin Color & Pigmentation] : normal skin color and pigmentation [Skin Turgor] : normal skin turgor [] : no rash [Cranial Nerves] : cranial nerves 2-12 were intact [Sensation] : the sensory exam was normal to light touch and pinprick [No Focal Deficits] : no focal deficits [Oriented To Time, Place, And Person] : oriented to person, place, and time [Impaired Insight] : insight and judgment were intact [Mood] : the mood was normal [Affect] : the affect was normal [FreeTextEntry1] : trace LE edema

## 2024-04-15 NOTE — HISTORY OF PRESENT ILLNESS
[FreeTextEntry1] : Pt is a 36 y.o male with ESRD for about 8 years from unclear cause s/p DDRT on 18.  Donor was 33 and  of ICH.  Pts hospital course was complicated by urinary leak requiring reoperation and nephrostomy tube.  Had a prolonged hospitalization with marquez and LACEY drain both of which were removed.  Also has a JJ stent in place.  All tubes were removed during admission of .\par  \par   [de-identified] : Pt now following with Dr. Liao.  Gingival hyperplasia has gotten worse.  Has slight LE edema.

## 2024-04-16 ENCOUNTER — NON-APPOINTMENT (OUTPATIENT)
Age: 39
End: 2024-04-16

## 2024-04-16 LAB
ALBUMIN SERPL ELPH-MCNC: 5.1 G/DL
ALP BLD-CCNC: 114 U/L
ALT SERPL-CCNC: 23 U/L
ANION GAP SERPL CALC-SCNC: 16 MMOL/L
APPEARANCE: CLEAR
AST SERPL-CCNC: 18 U/L
BACTERIA: NEGATIVE /HPF
BASOPHILS # BLD AUTO: 0.03 K/UL
BASOPHILS NFR BLD AUTO: 0.3 %
BILIRUB SERPL-MCNC: 0.4 MG/DL
BILIRUBIN URINE: NEGATIVE
BLOOD URINE: NEGATIVE
BUN SERPL-MCNC: 18 MG/DL
CALCIUM SERPL-MCNC: 8.9 MG/DL
CALCIUM SERPL-MCNC: 8.9 MG/DL
CAST: 1 /LPF
CHLORIDE SERPL-SCNC: 102 MMOL/L
CMV DNA SPEC QL NAA+PROBE: ABNORMAL IU/ML
CMVPCR LOG: ABNORMAL LOG10IU/ML
CO2 SERPL-SCNC: 22 MMOL/L
COLOR: YELLOW
CREAT SERPL-MCNC: 1.5 MG/DL
CREAT SPEC-SCNC: 204 MG/DL
CREAT/PROT UR: 0.1 RATIO
EGFR: 61 ML/MIN/1.73M2
EOSINOPHIL # BLD AUTO: 0.16 K/UL
EOSINOPHIL NFR BLD AUTO: 1.6 %
EPITHELIAL CELLS: 0 /HPF
GLUCOSE QUALITATIVE U: NEGATIVE MG/DL
GLUCOSE SERPL-MCNC: 90 MG/DL
HCT VFR BLD CALC: 45.4 %
HGB BLD-MCNC: 14.5 G/DL
IMM GRANULOCYTES NFR BLD AUTO: 0.3 %
KETONES URINE: NEGATIVE MG/DL
LEUKOCYTE ESTERASE URINE: NEGATIVE
LYMPHOCYTES # BLD AUTO: 3.37 K/UL
LYMPHOCYTES NFR BLD AUTO: 33 %
MAN DIFF?: NORMAL
MCHC RBC-ENTMCNC: 25.8 PG
MCHC RBC-ENTMCNC: 31.9 GM/DL
MCV RBC AUTO: 80.9 FL
MICROSCOPIC-UA: NORMAL
MONOCYTES # BLD AUTO: 0.86 K/UL
MONOCYTES NFR BLD AUTO: 8.4 %
NEUTROPHILS # BLD AUTO: 5.75 K/UL
NEUTROPHILS NFR BLD AUTO: 56.4 %
NITRITE URINE: NEGATIVE
PARATHYROID HORMONE INTACT: 103 PG/ML
PH URINE: 5.5
PLATELET # BLD AUTO: 289 K/UL
POTASSIUM SERPL-SCNC: 4.2 MMOL/L
PROT SERPL-MCNC: 7.3 G/DL
PROT UR-MCNC: 16 MG/DL
PROTEIN URINE: NEGATIVE MG/DL
RBC # BLD: 5.61 M/UL
RBC # FLD: 13.9 %
RED BLOOD CELLS URINE: 1 /HPF
SODIUM SERPL-SCNC: 140 MMOL/L
SPECIFIC GRAVITY URINE: 1.02
TACROLIMUS SERPL-MCNC: 5.4 NG/ML
UROBILINOGEN URINE: 0.2 MG/DL
WBC # FLD AUTO: 10.2 K/UL
WHITE BLOOD CELLS URINE: 0 /HPF

## 2024-04-18 LAB — BKV DNA SPEC QL NAA+PROBE: NOT DETECTED IU/ML

## 2024-05-06 NOTE — ED PROVIDER NOTE - NSFOLLOWUPINSTRUCTIONS_ED_ALL_ED_FT
oral
1) Follow up with your nephrologist to evaluate your kidney function  2) Return to the ER immediately for new or worsening symptoms   3) You can take Tylenol as directed on the packaging for any continued pain. Do not exceed the maximum daily dose. You were given 1,000 mg in the emergency department  4) You were provided with a copy of your test results

## 2024-06-18 RX ORDER — TACROLIMUS 1 MG/1
1 TABLET, EXTENDED RELEASE ORAL DAILY
Qty: 180 | Refills: 3 | Status: ACTIVE | COMMUNITY
Start: 2019-09-13 | End: 1900-01-01

## 2024-06-18 RX ORDER — SODIUM BICARBONATE 650 MG/1
650 TABLET ORAL
Qty: 120 | Refills: 3 | Status: ACTIVE | COMMUNITY
Start: 2021-10-12 | End: 1900-01-01

## 2024-06-18 RX ORDER — MAGNESIUM OXIDE 241.3 MG/1000MG
400 TABLET ORAL
Qty: 60 | Refills: 3 | Status: ACTIVE | COMMUNITY
Start: 2018-11-13 | End: 1900-01-01

## 2024-09-10 ENCOUNTER — APPOINTMENT (OUTPATIENT)
Dept: NEPHROLOGY | Facility: CLINIC | Age: 39
End: 2024-09-10
Payer: MEDICAID

## 2024-09-10 ENCOUNTER — NON-APPOINTMENT (OUTPATIENT)
Age: 39
End: 2024-09-10

## 2024-09-10 VITALS
RESPIRATION RATE: 17 BRPM | DIASTOLIC BLOOD PRESSURE: 91 MMHG | TEMPERATURE: 98.1 F | WEIGHT: 186 LBS | HEIGHT: 64 IN | HEART RATE: 65 BPM | OXYGEN SATURATION: 97 % | BODY MASS INDEX: 31.76 KG/M2 | SYSTOLIC BLOOD PRESSURE: 144 MMHG

## 2024-09-10 DIAGNOSIS — I10 ESSENTIAL (PRIMARY) HYPERTENSION: ICD-10-CM

## 2024-09-10 DIAGNOSIS — Z94.0 KIDNEY TRANSPLANT STATUS: ICD-10-CM

## 2024-09-10 DIAGNOSIS — Z79.899 OTHER LONG TERM (CURRENT) DRUG THERAPY: ICD-10-CM

## 2024-09-10 LAB
ALBUMIN SERPL ELPH-MCNC: 4.6 G/DL
ALP BLD-CCNC: 78 U/L
ALT SERPL-CCNC: 25 U/L
ANION GAP SERPL CALC-SCNC: 13 MMOL/L
APPEARANCE: CLEAR
AST SERPL-CCNC: 19 U/L
BACTERIA: NEGATIVE /HPF
BASOPHILS # BLD AUTO: 0.02 K/UL
BASOPHILS NFR BLD AUTO: 0.2 %
BILIRUB SERPL-MCNC: 0.7 MG/DL
BILIRUBIN URINE: NEGATIVE
BLOOD URINE: NEGATIVE
BUN SERPL-MCNC: 21 MG/DL
CALCIUM SERPL-MCNC: 9.1 MG/DL
CALCIUM SERPL-MCNC: 9.1 MG/DL
CAST: 0 /LPF
CHLORIDE SERPL-SCNC: 104 MMOL/L
CO2 SERPL-SCNC: 22 MMOL/L
COLOR: YELLOW
CREAT SERPL-MCNC: 1.39 MG/DL
CREAT SPEC-SCNC: 127 MG/DL
CREAT/PROT UR: 0.1 RATIO
EGFR: 67 ML/MIN/1.73M2
EOSINOPHIL # BLD AUTO: 0.11 K/UL
EOSINOPHIL NFR BLD AUTO: 1.1 %
EPITHELIAL CELLS: 0 /HPF
GLUCOSE QUALITATIVE U: NEGATIVE MG/DL
GLUCOSE SERPL-MCNC: 89 MG/DL
HCT VFR BLD CALC: 41 %
HGB BLD-MCNC: 13 G/DL
IMM GRANULOCYTES NFR BLD AUTO: 0.2 %
KETONES URINE: NEGATIVE MG/DL
LEUKOCYTE ESTERASE URINE: NEGATIVE
LYMPHOCYTES # BLD AUTO: 3.59 K/UL
LYMPHOCYTES NFR BLD AUTO: 35.6 %
MAGNESIUM SERPL-MCNC: 1.2 MG/DL
MAN DIFF?: NORMAL
MCHC RBC-ENTMCNC: 26.3 PG
MCHC RBC-ENTMCNC: 31.7 GM/DL
MCV RBC AUTO: 82.8 FL
MICROSCOPIC-UA: NORMAL
MONOCYTES # BLD AUTO: 0.84 K/UL
MONOCYTES NFR BLD AUTO: 8.3 %
NEUTROPHILS # BLD AUTO: 5.51 K/UL
NEUTROPHILS NFR BLD AUTO: 54.6 %
NITRITE URINE: NEGATIVE
PARATHYROID HORMONE INTACT: 55 PG/ML
PH URINE: 5.5
PHOSPHATE SERPL-MCNC: 3.2 MG/DL
PLATELET # BLD AUTO: 242 K/UL
POTASSIUM SERPL-SCNC: 4.2 MMOL/L
PROT SERPL-MCNC: 7.2 G/DL
PROT UR-MCNC: 7 MG/DL
PROTEIN URINE: NEGATIVE MG/DL
RBC # BLD: 4.95 M/UL
RBC # FLD: 14.5 %
RED BLOOD CELLS URINE: 0 /HPF
SODIUM SERPL-SCNC: 139 MMOL/L
SPECIFIC GRAVITY URINE: 1.01
TACROLIMUS SERPL-MCNC: 4.8 NG/ML
UROBILINOGEN URINE: 0.2 MG/DL
WBC # FLD AUTO: 10.09 K/UL
WHITE BLOOD CELLS URINE: 0 /HPF

## 2024-09-10 PROCEDURE — 99214 OFFICE O/P EST MOD 30 MIN: CPT

## 2024-09-10 RX ORDER — HYDROCHLOROTHIAZIDE 12.5 MG/1
12.5 CAPSULE ORAL DAILY
Qty: 90 | Refills: 3 | Status: ACTIVE | COMMUNITY
Start: 2024-09-10

## 2024-09-10 NOTE — PLAN
[FreeTextEntry1] : 1. Renal transplant - Transplanted with a young donor, suspect ATN then culture negative pyelo on biopsy. Last creatinine 1.5. Used to be around 1.7- 2. Prior biopsy revealed severe arterial sclerosis.  Stop bactrim, pt now very low risk for pneumocystis.   2. Immunosuppression - tacro target 4-6. Cont MMF 1gm BID and pred 5. 3. HTN - controlled now on HCTZ and losartan.   4. Gingival hyperplasia - improved off of nifedipine.   5. Hypercalcemia - On sensipar 60mgs daily. Controlled. 6. Acidosis - cont sodium bicarb. 7.  Left UE swelling - will see vascular to assess LUE AVF.    He will f/u with Dr. Liao. Return to transplant in 6 months.

## 2024-09-10 NOTE — PHYSICAL EXAM
[General Appearance - Alert] : alert [General Appearance - In No Acute Distress] : in no acute distress [General Appearance - Well Nourished] : well nourished [General Appearance - Well Developed] : well developed [Sclera] : the sclera and conjunctiva were normal [Extraocular Movements] : extraocular movements were intact [Outer Ear] : the ears and nose were normal in appearance [Hearing Threshold Finger Rub Not [place]] : hearing was normal [Nasal Cavity] : the nasal mucosa and septum were normal [Neck Appearance] : the appearance of the neck was normal [Neck Cervical Mass (___cm)] : no neck mass was observed [Jugular Venous Distention Increased] : there was no jugular-venous distention [Exaggerated Use Of Accessory Muscles For Inspiration] : no accessory muscle use [Auscultation Breath Sounds / Voice Sounds] : lungs were clear to auscultation bilaterally [Heart Rate And Rhythm] : heart rate was normal and rhythm regular [Heart Sounds] : normal S1 and S2 [Heart Sounds Gallop] : no gallops [Bowel Sounds] : normal bowel sounds [Abdomen Soft] : soft [Abdomen Tenderness] : non-tender [Cervical Lymph Nodes Enlarged Posterior Bilaterally] : posterior cervical [Cervical Lymph Nodes Enlarged Anterior Bilaterally] : anterior cervical [Supraclavicular Lymph Nodes Enlarged Bilaterally] : supraclavicular [Abnormal Walk] : normal gait [Nail Clubbing] : no clubbing  or cyanosis of the fingernails [Musculoskeletal - Swelling] : no joint swelling seen [Skin Color & Pigmentation] : normal skin color and pigmentation [Skin Turgor] : normal skin turgor [] : no rash [Cranial Nerves] : cranial nerves 2-12 were intact [Sensation] : the sensory exam was normal to light touch and pinprick [No Focal Deficits] : no focal deficits [Oriented To Time, Place, And Person] : oriented to person, place, and time [Impaired Insight] : insight and judgment were intact [Affect] : the affect was normal [Mood] : the mood was normal [FreeTextEntry1] : trace LE edema
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
## 2024-09-11 ENCOUNTER — NON-APPOINTMENT (OUTPATIENT)
Age: 39
End: 2024-09-11

## 2024-09-11 LAB
CMV DNA SPEC QL NAA+PROBE: ABNORMAL IU/ML
CMVPCR LOG: ABNORMAL LOG10IU/ML

## 2024-09-12 LAB — BKV DNA SPEC QL NAA+PROBE: NOT DETECTED IU/ML

## 2024-10-21 ENCOUNTER — APPOINTMENT (OUTPATIENT)
Dept: OTOLARYNGOLOGY | Facility: CLINIC | Age: 39
End: 2024-10-21
Payer: MEDICAID

## 2024-10-21 ENCOUNTER — OUTPATIENT (OUTPATIENT)
Dept: OUTPATIENT SERVICES | Facility: HOSPITAL | Age: 39
LOS: 1 days | Discharge: HOME | End: 2024-10-21

## 2024-10-21 VITALS — WEIGHT: 186 LBS | BODY MASS INDEX: 31.76 KG/M2 | HEIGHT: 64 IN

## 2024-10-21 DIAGNOSIS — I77.0 ARTERIOVENOUS FISTULA, ACQUIRED: Chronic | ICD-10-CM

## 2024-10-21 DIAGNOSIS — Z98.890 OTHER SPECIFIED POSTPROCEDURAL STATES: Chronic | ICD-10-CM

## 2024-10-21 DIAGNOSIS — H69.90 UNSPECIFIED EUSTACHIAN TUBE DISORDER, UNSPECIFIED EAR: ICD-10-CM

## 2024-10-21 DIAGNOSIS — Z94.0 KIDNEY TRANSPLANT STATUS: Chronic | ICD-10-CM

## 2024-10-21 PROCEDURE — 99203 OFFICE O/P NEW LOW 30 MIN: CPT

## 2024-10-21 PROCEDURE — 92567 TYMPANOMETRY: CPT

## 2024-11-06 DIAGNOSIS — H69.90 UNSPECIFIED EUSTACHIAN TUBE DISORDER, UNSPECIFIED EAR: ICD-10-CM

## 2025-03-11 ENCOUNTER — APPOINTMENT (OUTPATIENT)
Dept: NEPHROLOGY | Facility: CLINIC | Age: 40
End: 2025-03-11
Payer: MEDICAID

## 2025-03-11 VITALS
HEART RATE: 75 BPM | OXYGEN SATURATION: 98 % | TEMPERATURE: 98.3 F | BODY MASS INDEX: 33.8 KG/M2 | SYSTOLIC BLOOD PRESSURE: 118 MMHG | HEIGHT: 64 IN | RESPIRATION RATE: 17 BRPM | DIASTOLIC BLOOD PRESSURE: 80 MMHG | WEIGHT: 198 LBS

## 2025-03-11 DIAGNOSIS — Z79.60 LONG TERM (CURRENT) USE OF UNSPECIFIED IMMUNOMODULATORS AND IMMUNOSUPPRESSANTS: ICD-10-CM

## 2025-03-11 DIAGNOSIS — Z94.0 KIDNEY TRANSPLANT STATUS: ICD-10-CM

## 2025-03-11 DIAGNOSIS — I10 ESSENTIAL (PRIMARY) HYPERTENSION: ICD-10-CM

## 2025-03-11 PROCEDURE — 99214 OFFICE O/P EST MOD 30 MIN: CPT

## 2025-03-13 ENCOUNTER — NON-APPOINTMENT (OUTPATIENT)
Age: 40
End: 2025-03-13

## 2025-03-13 LAB
ALBUMIN SERPL ELPH-MCNC: 4.5 G/DL
ALP BLD-CCNC: 77 U/L
ALT SERPL-CCNC: 37 U/L
ANION GAP SERPL CALC-SCNC: 16 MMOL/L
APPEARANCE: CLEAR
AST SERPL-CCNC: 32 U/L
BACTERIA: NEGATIVE /HPF
BASOPHILS # BLD AUTO: 0.01 K/UL
BASOPHILS NFR BLD AUTO: 0.1 %
BILIRUB SERPL-MCNC: 0.8 MG/DL
BILIRUBIN URINE: NEGATIVE
BKV DNA SPEC QL NAA+PROBE: NOT DETECTED IU/ML
BLOOD URINE: NEGATIVE
BUN SERPL-MCNC: 19 MG/DL
CALCIUM SERPL-MCNC: 8.5 MG/DL
CALCIUM SERPL-MCNC: 8.5 MG/DL
CAST: 7 /LPF
CHLORIDE SERPL-SCNC: 100 MMOL/L
CMV DNA SPEC QL NAA+PROBE: ABNORMAL IU/ML
CMVPCR LOG: ABNORMAL LOG10IU/ML
CO2 SERPL-SCNC: 23 MMOL/L
COLOR: YELLOW
CREAT SERPL-MCNC: 1.5 MG/DL
CREAT SPEC-SCNC: 313 MG/DL
CREAT/PROT UR: 0.1 RATIO
EGFRCR SERPLBLD CKD-EPI 2021: 61 ML/MIN/1.73M2
EOSINOPHIL # BLD AUTO: 0.61 K/UL
EOSINOPHIL NFR BLD AUTO: 6.1 %
EPITHELIAL CELLS: 2 /HPF
GLUCOSE QUALITATIVE U: NEGATIVE MG/DL
GLUCOSE SERPL-MCNC: 97 MG/DL
HCT VFR BLD CALC: 42.2 %
HGB BLD-MCNC: 13.2 G/DL
IMM GRANULOCYTES NFR BLD AUTO: 0.4 %
KETONES URINE: NEGATIVE MG/DL
LEUKOCYTE ESTERASE URINE: NEGATIVE
LYMPHOCYTES # BLD AUTO: 2.69 K/UL
LYMPHOCYTES NFR BLD AUTO: 27 %
MAGNESIUM SERPL-MCNC: 1.2 MG/DL
MAN DIFF?: NORMAL
MCHC RBC-ENTMCNC: 25.7 PG
MCHC RBC-ENTMCNC: 31.3 G/DL
MCV RBC AUTO: 82.1 FL
MICROSCOPIC-UA: NORMAL
MONOCYTES # BLD AUTO: 1.19 K/UL
MONOCYTES NFR BLD AUTO: 12 %
NEUTROPHILS # BLD AUTO: 5.41 K/UL
NEUTROPHILS NFR BLD AUTO: 54.4 %
NITRITE URINE: NEGATIVE
PARATHYROID HORMONE INTACT: 105 PG/ML
PH URINE: 5.5
PHOSPHATE SERPL-MCNC: 2.8 MG/DL
PLATELET # BLD AUTO: 221 K/UL
POTASSIUM SERPL-SCNC: 3.7 MMOL/L
PROT SERPL-MCNC: 7.1 G/DL
PROT UR-MCNC: 16 MG/DL
PROTEIN URINE: NORMAL MG/DL
RBC # BLD: 5.14 M/UL
RBC # FLD: 13.5 %
RED BLOOD CELLS URINE: 1 /HPF
REVIEW: NORMAL
SODIUM SERPL-SCNC: 139 MMOL/L
SPECIFIC GRAVITY URINE: 1.02
TACROLIMUS SERPL-MCNC: 7.2 NG/ML
UROBILINOGEN URINE: 0.2 MG/DL
WBC # FLD AUTO: 9.95 K/UL
WHITE BLOOD CELLS URINE: 2 /HPF

## 2025-03-20 NOTE — ASSESSMENT
[FreeTextEntry1] : 1.  Renal transplant - Transplanted with a young donor, suspect ATN then culture negative pyelo on biopsy.  Creatinine nadired at 1.6 but now settled around 2.  Last value 1.9 from 5 days ago.  Likely related to severe arterial sclerosis of kidney as seen on biopsy.  BP controlled - if renal function worsening will consider changing to everolimus.  Pt was treated with cefepime for 4 weeks for culture neg pyelo on last kidney biopsy.\par 2.  Immunosuppression - tacro for target 6-9, MMF 1gm BID and pred 5.   \par 3.  HTN - stable\par 4.  hyperkalemia - re-educated pt, he is now on mepron.  \par 5.  Hypercalcemia - On sensipar 60mgs daily.  \par 6.  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  Cont sodium bicarbonate.  \par 7.  URI - has wheezing and rhinorrhea.  Start flonase and albuterol MDI.  \par \par Will see patient for 1 year anniversary then every 6 months or so.  \par 
4 = No assist / stand by assistance

## 2025-05-22 ENCOUNTER — APPOINTMENT (OUTPATIENT)
Dept: PULMONOLOGY | Facility: CLINIC | Age: 40
End: 2025-05-22
Payer: MEDICAID

## 2025-05-22 VITALS
DIASTOLIC BLOOD PRESSURE: 84 MMHG | WEIGHT: 210 LBS | TEMPERATURE: 97 F | RESPIRATION RATE: 15 BRPM | OXYGEN SATURATION: 96 % | SYSTOLIC BLOOD PRESSURE: 130 MMHG | HEIGHT: 66 IN | BODY MASS INDEX: 33.75 KG/M2 | HEART RATE: 71 BPM

## 2025-05-22 VITALS
TEMPERATURE: 97 F | OXYGEN SATURATION: 96 % | DIASTOLIC BLOOD PRESSURE: 84 MMHG | HEART RATE: 71 BPM | WEIGHT: 210 LBS | BODY MASS INDEX: 33.75 KG/M2 | RESPIRATION RATE: 15 BRPM | HEIGHT: 66 IN | SYSTOLIC BLOOD PRESSURE: 130 MMHG

## 2025-05-22 DIAGNOSIS — G47.30 SLEEP APNEA, UNSPECIFIED: ICD-10-CM

## 2025-05-22 PROCEDURE — G2211 COMPLEX E/M VISIT ADD ON: CPT | Mod: NC

## 2025-05-22 PROCEDURE — 99214 OFFICE O/P EST MOD 30 MIN: CPT

## 2025-05-22 RX ORDER — FLUTICASONE PROPIONATE 50 UG/1
50 SPRAY, METERED NASAL
Refills: 0 | Status: ACTIVE | COMMUNITY
Start: 2025-05-22

## 2025-05-22 RX ORDER — CETIRIZINE HYDROCHLORIDE 10 MG/1
10 TABLET, COATED ORAL
Refills: 0 | Status: ACTIVE | COMMUNITY
Start: 2025-05-22

## 2025-06-06 ENCOUNTER — EMERGENCY (EMERGENCY)
Facility: HOSPITAL | Age: 40
LOS: 1 days | End: 2025-06-06
Attending: STUDENT IN AN ORGANIZED HEALTH CARE EDUCATION/TRAINING PROGRAM
Payer: MEDICAID

## 2025-06-06 VITALS
RESPIRATION RATE: 20 BRPM | OXYGEN SATURATION: 96 % | TEMPERATURE: 100 F | DIASTOLIC BLOOD PRESSURE: 92 MMHG | HEIGHT: 66 IN | HEART RATE: 95 BPM | SYSTOLIC BLOOD PRESSURE: 145 MMHG | WEIGHT: 210.1 LBS

## 2025-06-06 VITALS
RESPIRATION RATE: 20 BRPM | DIASTOLIC BLOOD PRESSURE: 75 MMHG | HEART RATE: 80 BPM | OXYGEN SATURATION: 97 % | SYSTOLIC BLOOD PRESSURE: 133 MMHG | TEMPERATURE: 99 F

## 2025-06-06 DIAGNOSIS — I77.0 ARTERIOVENOUS FISTULA, ACQUIRED: Chronic | ICD-10-CM

## 2025-06-06 DIAGNOSIS — Z94.0 KIDNEY TRANSPLANT STATUS: Chronic | ICD-10-CM

## 2025-06-06 DIAGNOSIS — Z98.890 OTHER SPECIFIED POSTPROCEDURAL STATES: Chronic | ICD-10-CM

## 2025-06-06 LAB
ALBUMIN SERPL ELPH-MCNC: 4.2 G/DL — SIGNIFICANT CHANGE UP (ref 3.3–5)
ALP SERPL-CCNC: 82 U/L — SIGNIFICANT CHANGE UP (ref 40–120)
ALT FLD-CCNC: 25 U/L — SIGNIFICANT CHANGE UP (ref 10–45)
ANION GAP SERPL CALC-SCNC: 14 MMOL/L — SIGNIFICANT CHANGE UP (ref 5–17)
AST SERPL-CCNC: 21 U/L — SIGNIFICANT CHANGE UP (ref 10–40)
BASOPHILS # BLD AUTO: 0.01 K/UL — SIGNIFICANT CHANGE UP (ref 0–0.2)
BASOPHILS NFR BLD AUTO: 0.1 % — SIGNIFICANT CHANGE UP (ref 0–2)
BILIRUB SERPL-MCNC: 0.6 MG/DL — SIGNIFICANT CHANGE UP (ref 0.2–1.2)
BUN SERPL-MCNC: 18 MG/DL — SIGNIFICANT CHANGE UP (ref 7–23)
CALCIUM SERPL-MCNC: 9.2 MG/DL — SIGNIFICANT CHANGE UP (ref 8.4–10.5)
CHLORIDE SERPL-SCNC: 103 MMOL/L — SIGNIFICANT CHANGE UP (ref 96–108)
CO2 SERPL-SCNC: 22 MMOL/L — SIGNIFICANT CHANGE UP (ref 22–31)
CREAT SERPL-MCNC: 1.47 MG/DL — HIGH (ref 0.5–1.3)
EGFR: 62 ML/MIN/1.73M2 — SIGNIFICANT CHANGE UP
EGFR: 62 ML/MIN/1.73M2 — SIGNIFICANT CHANGE UP
EOSINOPHIL # BLD AUTO: 0.41 K/UL — SIGNIFICANT CHANGE UP (ref 0–0.5)
EOSINOPHIL NFR BLD AUTO: 4.5 % — SIGNIFICANT CHANGE UP (ref 0–6)
FLUAV AG NPH QL: SIGNIFICANT CHANGE UP
FLUBV AG NPH QL: SIGNIFICANT CHANGE UP
GAS PNL BLDV: SIGNIFICANT CHANGE UP
GLUCOSE SERPL-MCNC: 94 MG/DL — SIGNIFICANT CHANGE UP (ref 70–99)
HCT VFR BLD CALC: 41.4 % — SIGNIFICANT CHANGE UP (ref 39–50)
HGB BLD-MCNC: 13.3 G/DL — SIGNIFICANT CHANGE UP (ref 13–17)
IMM GRANULOCYTES NFR BLD AUTO: 0.2 % — SIGNIFICANT CHANGE UP (ref 0–0.9)
LYMPHOCYTES # BLD AUTO: 1.87 K/UL — SIGNIFICANT CHANGE UP (ref 1–3.3)
LYMPHOCYTES # BLD AUTO: 20.5 % — SIGNIFICANT CHANGE UP (ref 13–44)
MCHC RBC-ENTMCNC: 26.3 PG — LOW (ref 27–34)
MCHC RBC-ENTMCNC: 32.1 G/DL — SIGNIFICANT CHANGE UP (ref 32–36)
MCV RBC AUTO: 82 FL — SIGNIFICANT CHANGE UP (ref 80–100)
MONOCYTES # BLD AUTO: 1.24 K/UL — HIGH (ref 0–0.9)
MONOCYTES NFR BLD AUTO: 13.6 % — SIGNIFICANT CHANGE UP (ref 2–14)
NEUTROPHILS # BLD AUTO: 5.55 K/UL — SIGNIFICANT CHANGE UP (ref 1.8–7.4)
NEUTROPHILS NFR BLD AUTO: 61.1 % — SIGNIFICANT CHANGE UP (ref 43–77)
NRBC BLD AUTO-RTO: 0 /100 WBCS — SIGNIFICANT CHANGE UP (ref 0–0)
NT-PROBNP SERPL-SCNC: <36 PG/ML — SIGNIFICANT CHANGE UP (ref 0–300)
PLATELET # BLD AUTO: 209 K/UL — SIGNIFICANT CHANGE UP (ref 150–400)
POTASSIUM SERPL-MCNC: 4.2 MMOL/L — SIGNIFICANT CHANGE UP (ref 3.5–5.3)
POTASSIUM SERPL-SCNC: 4.2 MMOL/L — SIGNIFICANT CHANGE UP (ref 3.5–5.3)
PROT SERPL-MCNC: 7.2 G/DL — SIGNIFICANT CHANGE UP (ref 6–8.3)
RBC # BLD: 5.05 M/UL — SIGNIFICANT CHANGE UP (ref 4.2–5.8)
RBC # FLD: 13.5 % — SIGNIFICANT CHANGE UP (ref 10.3–14.5)
RSV RNA NPH QL NAA+NON-PROBE: SIGNIFICANT CHANGE UP
SARS-COV-2 RNA SPEC QL NAA+PROBE: SIGNIFICANT CHANGE UP
SODIUM SERPL-SCNC: 139 MMOL/L — SIGNIFICANT CHANGE UP (ref 135–145)
SOURCE RESPIRATORY: SIGNIFICANT CHANGE UP
TROPONIN T, HIGH SENSITIVITY RESULT: 13 NG/L — SIGNIFICANT CHANGE UP (ref 0–51)
WBC # BLD: 9.1 K/UL — SIGNIFICANT CHANGE UP (ref 3.8–10.5)
WBC # FLD AUTO: 9.1 K/UL — SIGNIFICANT CHANGE UP (ref 3.8–10.5)

## 2025-06-06 PROCEDURE — 84132 ASSAY OF SERUM POTASSIUM: CPT

## 2025-06-06 PROCEDURE — 87040 BLOOD CULTURE FOR BACTERIA: CPT

## 2025-06-06 PROCEDURE — 93005 ELECTROCARDIOGRAM TRACING: CPT

## 2025-06-06 PROCEDURE — 84484 ASSAY OF TROPONIN QUANT: CPT

## 2025-06-06 PROCEDURE — 93010 ELECTROCARDIOGRAM REPORT: CPT

## 2025-06-06 PROCEDURE — 85018 HEMOGLOBIN: CPT

## 2025-06-06 PROCEDURE — 76705 ECHO EXAM OF ABDOMEN: CPT

## 2025-06-06 PROCEDURE — 82330 ASSAY OF CALCIUM: CPT

## 2025-06-06 PROCEDURE — 82435 ASSAY OF BLOOD CHLORIDE: CPT

## 2025-06-06 PROCEDURE — 71045 X-RAY EXAM CHEST 1 VIEW: CPT

## 2025-06-06 PROCEDURE — 99285 EMERGENCY DEPT VISIT HI MDM: CPT | Mod: 25

## 2025-06-06 PROCEDURE — 76705 ECHO EXAM OF ABDOMEN: CPT | Mod: 26

## 2025-06-06 PROCEDURE — 85025 COMPLETE CBC W/AUTO DIFF WBC: CPT

## 2025-06-06 PROCEDURE — 84295 ASSAY OF SERUM SODIUM: CPT

## 2025-06-06 PROCEDURE — 87637 SARSCOV2&INF A&B&RSV AMP PRB: CPT

## 2025-06-06 PROCEDURE — 83880 ASSAY OF NATRIURETIC PEPTIDE: CPT

## 2025-06-06 PROCEDURE — 82803 BLOOD GASES ANY COMBINATION: CPT

## 2025-06-06 PROCEDURE — 94640 AIRWAY INHALATION TREATMENT: CPT

## 2025-06-06 PROCEDURE — 99285 EMERGENCY DEPT VISIT HI MDM: CPT

## 2025-06-06 PROCEDURE — 71045 X-RAY EXAM CHEST 1 VIEW: CPT | Mod: 26

## 2025-06-06 PROCEDURE — 85014 HEMATOCRIT: CPT

## 2025-06-06 PROCEDURE — 80053 COMPREHEN METABOLIC PANEL: CPT

## 2025-06-06 PROCEDURE — 83605 ASSAY OF LACTIC ACID: CPT

## 2025-06-06 PROCEDURE — 82947 ASSAY GLUCOSE BLOOD QUANT: CPT

## 2025-06-06 RX ORDER — LIDOCAINE HYDROCHLORIDE 20 MG/ML
1 JELLY TOPICAL ONCE
Refills: 0 | Status: COMPLETED | OUTPATIENT
Start: 2025-06-06 | End: 2025-06-06

## 2025-06-06 RX ORDER — ALBUTEROL SULFATE 2.5 MG/3ML
2.5 VIAL, NEBULIZER (ML) INHALATION
Refills: 0 | Status: COMPLETED | OUTPATIENT
Start: 2025-06-06 | End: 2025-06-06

## 2025-06-06 RX ORDER — ACETAMINOPHEN 500 MG/5ML
975 LIQUID (ML) ORAL ONCE
Refills: 0 | Status: COMPLETED | OUTPATIENT
Start: 2025-06-06 | End: 2025-06-06

## 2025-06-06 RX ADMIN — Medication 2.5 MILLIGRAM(S): at 02:53

## 2025-06-06 RX ADMIN — LIDOCAINE HYDROCHLORIDE 1 PATCH: 20 JELLY TOPICAL at 06:43

## 2025-06-06 RX ADMIN — LIDOCAINE HYDROCHLORIDE 1 PATCH: 20 JELLY TOPICAL at 08:35

## 2025-06-06 RX ADMIN — Medication 975 MILLIGRAM(S): at 06:42

## 2025-06-06 RX ADMIN — Medication 2.5 MILLIGRAM(S): at 03:07

## 2025-06-06 RX ADMIN — Medication 2.5 MILLIGRAM(S): at 03:27

## 2025-06-06 RX ADMIN — Medication 975 MILLIGRAM(S): at 08:35

## 2025-06-06 NOTE — ED PROVIDER NOTE - CLINICAL SUMMARY MEDICAL DECISION MAKING FREE TEXT BOX
40-year-old male with past medical history of renal transplant (7 years ago, secondary to uncontrolled hypertension), and hyperlipidemia presents to emergency department for evaluation of cough x 2 weeks.  Patient endorsing mildly productive cough, with associated pain in his chest secondary to coughing. Patient is nontoxic-appearing, no evidence of respiratory distress.  Lung exam with diffuse inspiratory and expiratory wheezing. Patient does not appear clinically volume overloaded.  Patient is a low risk for DVT or PE, PERC negative.  Low clinical suspicion for ACS.  Will give albuterol breathing treatments, check blood work and x-ray to evaluate pneumonia.  Once workup complete, we will likely have to contact transplant, but anticipate discharge home.

## 2025-06-06 NOTE — ED PROVIDER NOTE - PHYSICAL EXAMINATION
Gen: well appearing, in no acute distress   Head: normal appearing  HEENT: normal conjunctiva, oral mucosa moist, vision grossly intact   Lung: no respiratory distress, speaking in full sentences,  + inspiratory and expiratory wheeze   CV: regular rate and rhythm, no murmurs, no LE edema to JVD   Abd: soft, non distended, non tender   MSK: no visible deformities, ambulating without difficulty   Neuro: No focal deficits, AAOx3  Skin: Warm, no rashes   Psych: normal affect

## 2025-06-06 NOTE — ED ADULT NURSE REASSESSMENT NOTE - NS ED NURSE REASSESS COMMENT FT1
Pt received from KENNY Priest. Pt resting comfortably at bedside, breathing unlabored, speaking in complete sentences. Pt updated on plan of care, pt provided with food for PO challenge per MD Ferrera. Safety and patient maintained.

## 2025-06-06 NOTE — ED ADULT NURSE NOTE - NS ED NURSE LEVEL OF CONSCIOUSNESS AFFECT
Problem: Knowledge Deficit  Goal: Patient/family/caregiver demonstrates understanding of disease process, treatment plan, medications, and discharge instructions  Complete learning assessment and assess knowledge base    Interventions:  - Provide teaching at level of understanding  - Provide teaching via preferred learning methods  Outcome: Progressing Calm

## 2025-06-06 NOTE — ED ADULT TRIAGE NOTE - CHIEF COMPLAINT QUOTE
sudden onset CP w/ no radiation started this morning when he woke up and as day progressed CP worsened with associated SOB, on and off congestion, cough, sore throat x3 weeks   hx. Kidney transplant 7 years ago

## 2025-06-06 NOTE — ED ADULT NURSE NOTE - NS ED NURSE IV DC DT
Call placed to patient. He is not experiencing any chest pain and is asymptomatic with heart rate. Patient was seen at PCP who ordered and EKG that showed irregular rhythm. Patient cancelled appt in May due to election day, now is rescheduled for June. Patient advised to monitor for symptoms of irregular rates and if becomes symptomatic to seek medical attention prior to EP New Patient appt. V/U.  
Caller: Atilio Skelton    Relationship to patient: Self    Best call back number: [phone]    Chief complaint: PT IS EXPERIENCING CHEST PAIN AND IRREGULAR RHYTHM (EKG)    Type of visit: NEW PT    Requested date: ASAP     If rescheduling, when is the original appointment:  6.27.23    Additional notes: REFERRING PROVIDER CALLED IN TO TRY TO GET PT IN SOONER BUT NEXT AVAIL WAS IN June. REFERRING PROVIDER DONE AN EKG (4.19.23)  AND IT SHOWED IRREGULAR RHYTHM. REFERRING OFFICE TO FAX OVER PN AND EKG FROM TODAY.         
06-Jun-2025 08:56

## 2025-06-06 NOTE — ED PROVIDER NOTE - OBJECTIVE STATEMENT
40-year-old male with past medical history of renal transplant (7 years ago, secondary to uncontrolled hypertension), and hyperlipidemia presents to emergency department for evaluation of cough x 2 weeks.  Patient endorsing mildly productive cough, with associated pain in his chest secondary to coughing.  Has been taking albuterol, cetirizine and using Flonase as instructed by his outpatient doctors.  Today upon waking up, had lower right sided rib/chest pain, called his transplant doctor and they recommended he come to the emergency department for further evaluation.  Patient does not have a history of CAD, has seen a cardiologist in the past for pretransplant clearance, and shortly after transplant.  Has not had stress or echo recently.  Has been using albuterol 2 times a day for symptoms.  Denies recent travel, history of DVT or PE, recent surgery (last surgical procedure was December 2024, right knee procedure).  Denies fevers or chills, palpitations, lower extremity swelling, GI or  symptoms.  No known drug allergies.  Blood pressure has been well-controlled.  Last visit with transplant team was approximately 6 months ago.

## 2025-06-06 NOTE — ED PROVIDER NOTE - ATTENDING CONTRIBUTION TO CARE
I, Derek Zaidi, performed a history and physical exam of the patient and discussed their management with the resident provider. I reviewed the resident provider's note and agree with the documented findings and plan of care. I was present and available for all procedures.    Patient presenting with right upper quadrant pain as well as congestion evaluate for pneumonia otherwise dispo pending workup and reevaluation screening labs and ultrasounds nebulizer treatments possibly viral illness troponin BMP viral swab chest x-ray disposition pending workup reevaluation discussed with patient agreed with plan unlikely ACS PE pneumothorax dissection AAA     Well appearing and in NAD, head normal appearing atraumatic, trachea midline, no respiratory distress, lungs cta bilaterally, rrr no murmurs, soft NT ND abdomen, no visible extremity deformities, Alert and oriented, non focal neuro exam, skin warm and dry, normal affect and mood, no leg swelling, calf ttp or jvd

## 2025-06-06 NOTE — ED PROVIDER NOTE - PATIENT PORTAL LINK FT
You can access the FollowMyHealth Patient Portal offered by [facility] by registering at the following website: [email or web address]. By joining [facility]’s FollowMyHealth portal, you will also be able to view your health information using other applications (apps) compatible with our system.

## 2025-06-06 NOTE — ED ADULT NURSE NOTE - OBJECTIVE STATEMENT
40yoM [facility] Kidney transplant (x7yrs), Allergies (albuterol 2xday), HTN, HLD presents to ED for cough and chest pain. Patient reports dry unproductive cough began Monday, yesterday morning started experiencing sharp pain in RUQ abdomen / R lower chest, worse when coughing. Patient also has audible wheezing with dyspnea on exertion, feeling fatigue. Denies fevers, sick contacts, recent travel. Patient is still on anti-rejection medications for kidney transplant. Produces urine normally without difficulty or pain. No pedal edema. Denies N/V/D/C, weight gain, headache, abd distention, urinary symptoms, falls, injuries.

## 2025-06-06 NOTE — ED PROVIDER NOTE - NSFOLLOWUPINSTRUCTIONS_ED_ALL_ED_FT
You have been evaluated in the Emergency Department today for chest pain and abdominal pain. Your evaluation did not show evidence of medical conditions requiring emergent intervention at this time.    Please schedule an appointment with your primary care physician.    For abdominal discomfort at home, please take tylenol as per package instructions. You may also try over the counter anti-gas medications, as gas was present on your ultrasound.    Return to the Emergency Department if you experience worsening or uncontrolled pain, fevers 100.4°F or greater, recurrent vomiting, inability to tolerate food or fluids by mouth, bloody stools or vomit, black or tarry stools, or any other concerning symptoms.    Return to the Emergency Department if you experience worsening or uncontrolled chest pain, shortness of breath, light headedness, feeling faint, nausea, vomiting, or any other concerning symptoms.

## 2025-09-11 ENCOUNTER — APPOINTMENT (OUTPATIENT)
Dept: NEPHROLOGY | Facility: CLINIC | Age: 40
End: 2025-09-11
Payer: MEDICAID

## 2025-09-11 VITALS
TEMPERATURE: 97 F | WEIGHT: 209 LBS | BODY MASS INDEX: 33.59 KG/M2 | SYSTOLIC BLOOD PRESSURE: 154 MMHG | HEIGHT: 66 IN | HEART RATE: 66 BPM | OXYGEN SATURATION: 97 % | DIASTOLIC BLOOD PRESSURE: 98 MMHG

## 2025-09-11 PROCEDURE — 99214 OFFICE O/P EST MOD 30 MIN: CPT

## 2025-09-12 LAB
ALBUMIN SERPL ELPH-MCNC: 4.3 G/DL
ALP BLD-CCNC: 74 U/L
ALT SERPL-CCNC: 35 U/L
ANION GAP SERPL CALC-SCNC: 15 MMOL/L
APPEARANCE: CLEAR
AST SERPL-CCNC: 24 U/L
BACTERIA: NEGATIVE /HPF
BASOPHILS # BLD AUTO: 0.02 K/UL
BASOPHILS NFR BLD AUTO: 0.2 %
BILIRUB SERPL-MCNC: 0.5 MG/DL
BILIRUBIN URINE: NEGATIVE
BLOOD URINE: NEGATIVE
BUN SERPL-MCNC: 20 MG/DL
CALCIUM SERPL-MCNC: 9.1 MG/DL
CALCIUM SERPL-MCNC: 9.1 MG/DL
CAST: 1 /LPF
CHLORIDE SERPL-SCNC: 103 MMOL/L
CMV DNA SPEC QL NAA+PROBE: NOT DETECTED IU/ML
CMVPCR LOG: NOT DETECTED LOG10IU/ML
CO2 SERPL-SCNC: 22 MMOL/L
COLOR: YELLOW
CREAT SERPL-MCNC: 1.36 MG/DL
CREAT SPEC-SCNC: 194 MG/DL
CREAT/PROT UR: 0.1 RATIO
EGFRCR SERPLBLD CKD-EPI 2021: 68 ML/MIN/1.73M2
EOSINOPHIL # BLD AUTO: 0.14 K/UL
EOSINOPHIL NFR BLD AUTO: 1.3 %
EPITHELIAL CELLS: 0 /HPF
GLUCOSE QUALITATIVE U: NEGATIVE MG/DL
GLUCOSE SERPL-MCNC: 87 MG/DL
HCT VFR BLD CALC: 41.6 %
HGB BLD-MCNC: 12.8 G/DL
IMM GRANULOCYTES NFR BLD AUTO: 0.4 %
KETONES URINE: NEGATIVE MG/DL
LEUKOCYTE ESTERASE URINE: NEGATIVE
LYMPHOCYTES # BLD AUTO: 4.42 K/UL
LYMPHOCYTES NFR BLD AUTO: 40.4 %
MAGNESIUM SERPL-MCNC: 1.2 MG/DL
MAN DIFF?: NORMAL
MCHC RBC-ENTMCNC: 26 PG
MCHC RBC-ENTMCNC: 30.8 G/DL
MCV RBC AUTO: 84.4 FL
MICROSCOPIC-UA: NORMAL
MONOCYTES # BLD AUTO: 1.05 K/UL
MONOCYTES NFR BLD AUTO: 9.6 %
NEUTROPHILS # BLD AUTO: 5.26 K/UL
NEUTROPHILS NFR BLD AUTO: 48.1 %
NITRITE URINE: NEGATIVE
PARATHYROID HORMONE INTACT: 64 PG/ML
PH URINE: 5.5
PHOSPHATE SERPL-MCNC: 3.2 MG/DL
PLATELET # BLD AUTO: 227 K/UL
POTASSIUM SERPL-SCNC: 3.9 MMOL/L
PROT SERPL-MCNC: 6.8 G/DL
PROT UR-MCNC: 16 MG/DL
PROTEIN URINE: NORMAL MG/DL
RBC # BLD: 4.93 M/UL
RBC # FLD: 14.3 %
RED BLOOD CELLS URINE: 1 /HPF
SODIUM SERPL-SCNC: 140 MMOL/L
SPECIFIC GRAVITY URINE: 1.02
TACROLIMUS SERPL-MCNC: 4.2 NG/ML
URATE SERPL-MCNC: 9.3 MG/DL
UROBILINOGEN URINE: 0.2 MG/DL
WBC # FLD AUTO: 10.93 K/UL
WHITE BLOOD CELLS URINE: 0 /HPF

## 2025-09-15 LAB — BKV DNA SPEC QL NAA+PROBE: NOT DETECTED IU/ML
